# Patient Record
Sex: FEMALE | Employment: UNEMPLOYED | URBAN - METROPOLITAN AREA
[De-identification: names, ages, dates, MRNs, and addresses within clinical notes are randomized per-mention and may not be internally consistent; named-entity substitution may affect disease eponyms.]

---

## 2021-12-24 ENCOUNTER — HOSPITAL ENCOUNTER (INPATIENT)
Age: 71
LOS: 21 days | Discharge: NURSING, REHAB OR LONG-TERM CARE | DRG: 871 | End: 2022-01-14
Attending: EMERGENCY MEDICINE | Admitting: INTERNAL MEDICINE
Payer: MEDICARE

## 2021-12-24 ENCOUNTER — APPOINTMENT (OUTPATIENT)
Dept: CT IMAGING | Age: 71
DRG: 871 | End: 2021-12-24
Payer: MEDICARE

## 2021-12-24 DIAGNOSIS — R00.0 TACHYCARDIA: ICD-10-CM

## 2021-12-24 DIAGNOSIS — U07.1 PNEUMONIA DUE TO COVID-19 VIRUS: ICD-10-CM

## 2021-12-24 DIAGNOSIS — E86.0 DEHYDRATION: ICD-10-CM

## 2021-12-24 DIAGNOSIS — J12.82 PNEUMONIA DUE TO COVID-19 VIRUS: ICD-10-CM

## 2021-12-24 DIAGNOSIS — U07.1 COVID-19: Primary | ICD-10-CM

## 2021-12-24 LAB
-: NORMAL
ABSOLUTE EOS #: <0.03 K/UL (ref 0–0.44)
ABSOLUTE IMMATURE GRANULOCYTE: 0.42 K/UL (ref 0–0.3)
ABSOLUTE LYMPH #: 1.12 K/UL (ref 1.1–3.7)
ABSOLUTE MONO #: 0.58 K/UL (ref 0.1–1.2)
ALBUMIN SERPL-MCNC: 2.8 G/DL (ref 3.5–5.2)
ALBUMIN/GLOBULIN RATIO: 0.8 (ref 1–2.5)
ALLEN TEST: ABNORMAL
ALP BLD-CCNC: 112 U/L (ref 35–104)
ALT SERPL-CCNC: 35 U/L (ref 5–33)
ANION GAP SERPL CALCULATED.3IONS-SCNC: 15 MMOL/L (ref 9–17)
ANION GAP: 14 MMOL/L (ref 7–16)
AST SERPL-CCNC: 46 U/L
BASOPHILS # BLD: 0 % (ref 0–2)
BASOPHILS ABSOLUTE: 0.03 K/UL (ref 0–0.2)
BILIRUB SERPL-MCNC: 0.6 MG/DL (ref 0.3–1.2)
BNP INTERPRETATION: ABNORMAL
BUN BLDV-MCNC: 29 MG/DL (ref 8–23)
BUN/CREAT BLD: ABNORMAL (ref 9–20)
C-REACTIVE PROTEIN: 114.5 MG/L (ref 0–5)
CALCIUM SERPL-MCNC: 8.4 MG/DL (ref 8.6–10.4)
CHLORIDE BLD-SCNC: 96 MMOL/L (ref 98–107)
CO2: 17 MMOL/L (ref 20–31)
CREAT SERPL-MCNC: 0.67 MG/DL (ref 0.5–0.9)
D-DIMER QUANTITATIVE: 70.48 MG/L FEU
DIFFERENTIAL TYPE: ABNORMAL
EOSINOPHILS RELATIVE PERCENT: 0 % (ref 1–4)
FERRITIN: 858 UG/L (ref 13–150)
FIBRINOGEN: 205 MG/DL (ref 140–420)
FIO2: ABNORMAL
GFR AFRICAN AMERICAN: >60 ML/MIN
GFR NON-AFRICAN AMERICAN: >60 ML/MIN
GFR NON-AFRICAN AMERICAN: >60 ML/MIN
GFR SERPL CREATININE-BSD FRML MDRD: >60 ML/MIN
GFR SERPL CREATININE-BSD FRML MDRD: ABNORMAL ML/MIN/{1.73_M2}
GFR SERPL CREATININE-BSD FRML MDRD: ABNORMAL ML/MIN/{1.73_M2}
GFR SERPL CREATININE-BSD FRML MDRD: NORMAL ML/MIN/{1.73_M2}
GLUCOSE BLD-MCNC: 159 MG/DL (ref 70–99)
GLUCOSE BLD-MCNC: 173 MG/DL (ref 74–100)
HCO3 VENOUS: 24.2 MMOL/L (ref 22–29)
HCT VFR BLD CALC: 38.7 % (ref 36.3–47.1)
HCT VFR BLD CALC: 43.2 % (ref 36.3–47.1)
HEMOGLOBIN: 12.9 G/DL (ref 11.9–15.1)
HEMOGLOBIN: 14.4 G/DL (ref 11.9–15.1)
IMMATURE GRANULOCYTES: 4 %
INR BLD: 1.1
LACTATE DEHYDROGENASE: 528 U/L (ref 135–214)
LACTIC ACID, SEPSIS WHOLE BLOOD: 3.5 MMOL/L (ref 0.5–1.9)
LACTIC ACID, SEPSIS: ABNORMAL MMOL/L (ref 0.5–1.9)
LYMPHOCYTES # BLD: 9 % (ref 24–43)
MAGNESIUM: 2.4 MG/DL (ref 1.6–2.6)
MCH RBC QN AUTO: 32.4 PG (ref 25.2–33.5)
MCH RBC QN AUTO: 32.7 PG (ref 25.2–33.5)
MCHC RBC AUTO-ENTMCNC: 33.3 G/DL (ref 28.4–34.8)
MCHC RBC AUTO-ENTMCNC: 33.3 G/DL (ref 28.4–34.8)
MCV RBC AUTO: 97.1 FL (ref 82.6–102.9)
MCV RBC AUTO: 98 FL (ref 82.6–102.9)
MODE: ABNORMAL
MONOCYTES # BLD: 5 % (ref 3–12)
NEGATIVE BASE EXCESS, VEN: ABNORMAL (ref 0–2)
NRBC AUTOMATED: 0.8 PER 100 WBC
NRBC AUTOMATED: 1.4 PER 100 WBC
O2 DEVICE/FLOW/%: ABNORMAL
O2 SAT, VEN: 36 % (ref 60–85)
PARTIAL THROMBOPLASTIN TIME: 52.1 SEC (ref 20.5–30.5)
PATIENT TEMP: ABNORMAL
PCO2, VEN: 35.8 MM HG (ref 41–51)
PDW BLD-RTO: 13.5 % (ref 11.8–14.4)
PDW BLD-RTO: 13.5 % (ref 11.8–14.4)
PH VENOUS: 7.44 (ref 7.32–7.43)
PLATELET # BLD: 50 K/UL (ref 138–453)
PLATELET # BLD: ABNORMAL K/UL (ref 138–453)
PLATELET ESTIMATE: ABNORMAL
PLATELET, FLUORESCENCE: NORMAL K/UL (ref 138–453)
PLATELET, IMMATURE FRACTION: NORMAL % (ref 1.1–10.3)
PMV BLD AUTO: 12.5 FL (ref 8.1–13.5)
PMV BLD AUTO: ABNORMAL FL (ref 8.1–13.5)
PO2, VEN: 20.5 MM HG (ref 30–50)
POC BUN: 33 MG/DL (ref 8–26)
POC CHLORIDE: 98 MMOL/L (ref 98–107)
POC CREATININE: 0.68 MG/DL (ref 0.51–1.19)
POC HEMATOCRIT: 46 % (ref 36–46)
POC HEMOGLOBIN: 15.6 G/DL (ref 12–16)
POC IONIZED CALCIUM: 1.16 MMOL/L (ref 1.15–1.33)
POC LACTIC ACID: 3.98 MMOL/L (ref 0.56–1.39)
POC PCO2 TEMP: ABNORMAL MM HG
POC PH TEMP: ABNORMAL
POC PO2 TEMP: ABNORMAL MM HG
POC POTASSIUM: 3.9 MMOL/L (ref 3.5–4.5)
POC SODIUM: 136 MMOL/L (ref 138–146)
POC TCO2: 25 MMOL/L (ref 22–30)
POSITIVE BASE EXCESS, VEN: 0 (ref 0–3)
POTASSIUM SERPL-SCNC: 4.1 MMOL/L (ref 3.7–5.3)
PRO-BNP: 9968 PG/ML
PROCALCITONIN: 0.22 NG/ML
PROCALCITONIN: 0.25 NG/ML
PROTHROMBIN TIME: 12 SEC (ref 9.1–12.3)
RBC # BLD: 3.95 M/UL (ref 3.95–5.11)
RBC # BLD: 4.45 M/UL (ref 3.95–5.11)
RBC # BLD: ABNORMAL 10*6/UL
REASON FOR REJECTION: NORMAL
SAMPLE SITE: ABNORMAL
SARS-COV-2, RAPID: DETECTED
SEG NEUTROPHILS: 82 % (ref 36–65)
SEGMENTED NEUTROPHILS ABSOLUTE COUNT: 10 K/UL (ref 1.5–8.1)
SODIUM BLD-SCNC: 128 MMOL/L (ref 135–144)
SPECIMEN DESCRIPTION: ABNORMAL
TOTAL CO2, VENOUS: ABNORMAL MMOL/L (ref 23–30)
TOTAL PROTEIN: 6.3 G/DL (ref 6.4–8.3)
TROPONIN INTERP: ABNORMAL
TROPONIN T: ABNORMAL NG/ML
TROPONIN, HIGH SENSITIVITY: 54 NG/L (ref 0–14)
WBC # BLD: 12.2 K/UL (ref 3.5–11.3)
WBC # BLD: 14.6 K/UL (ref 3.5–11.3)
WBC # BLD: ABNORMAL 10*3/UL
ZZ NTE CLEAN UP: ORDERED TEST: NORMAL
ZZ NTE WITH NAME CLEAN UP: SPECIMEN SOURCE: NORMAL

## 2021-12-24 PROCEDURE — 96365 THER/PROPH/DIAG IV INF INIT: CPT

## 2021-12-24 PROCEDURE — 82947 ASSAY GLUCOSE BLOOD QUANT: CPT

## 2021-12-24 PROCEDURE — 85379 FIBRIN DEGRADATION QUANT: CPT

## 2021-12-24 PROCEDURE — 85384 FIBRINOGEN ACTIVITY: CPT

## 2021-12-24 PROCEDURE — 71260 CT THORAX DX C+: CPT

## 2021-12-24 PROCEDURE — 96361 HYDRATE IV INFUSION ADD-ON: CPT

## 2021-12-24 PROCEDURE — 6370000000 HC RX 637 (ALT 250 FOR IP): Performed by: NURSE PRACTITIONER

## 2021-12-24 PROCEDURE — 85027 COMPLETE CBC AUTOMATED: CPT

## 2021-12-24 PROCEDURE — 85610 PROTHROMBIN TIME: CPT

## 2021-12-24 PROCEDURE — 85055 RETICULATED PLATELET ASSAY: CPT

## 2021-12-24 PROCEDURE — 2580000003 HC RX 258: Performed by: STUDENT IN AN ORGANIZED HEALTH CARE EDUCATION/TRAINING PROGRAM

## 2021-12-24 PROCEDURE — 82565 ASSAY OF CREATININE: CPT

## 2021-12-24 PROCEDURE — 83615 LACTATE (LD) (LDH) ENZYME: CPT

## 2021-12-24 PROCEDURE — 82803 BLOOD GASES ANY COMBINATION: CPT

## 2021-12-24 PROCEDURE — 93005 ELECTROCARDIOGRAM TRACING: CPT | Performed by: STUDENT IN AN ORGANIZED HEALTH CARE EDUCATION/TRAINING PROGRAM

## 2021-12-24 PROCEDURE — 99223 1ST HOSP IP/OBS HIGH 75: CPT | Performed by: SURGERY

## 2021-12-24 PROCEDURE — 85014 HEMATOCRIT: CPT

## 2021-12-24 PROCEDURE — 82728 ASSAY OF FERRITIN: CPT

## 2021-12-24 PROCEDURE — 80051 ELECTROLYTE PANEL: CPT

## 2021-12-24 PROCEDURE — 80053 COMPREHEN METABOLIC PANEL: CPT

## 2021-12-24 PROCEDURE — 83735 ASSAY OF MAGNESIUM: CPT

## 2021-12-24 PROCEDURE — 99285 EMERGENCY DEPT VISIT HI MDM: CPT

## 2021-12-24 PROCEDURE — 82330 ASSAY OF CALCIUM: CPT

## 2021-12-24 PROCEDURE — 85025 COMPLETE CBC W/AUTO DIFF WBC: CPT

## 2021-12-24 PROCEDURE — 87040 BLOOD CULTURE FOR BACTERIA: CPT

## 2021-12-24 PROCEDURE — 84145 PROCALCITONIN (PCT): CPT

## 2021-12-24 PROCEDURE — 87635 SARS-COV-2 COVID-19 AMP PRB: CPT

## 2021-12-24 PROCEDURE — 6360000004 HC RX CONTRAST MEDICATION: Performed by: STUDENT IN AN ORGANIZED HEALTH CARE EDUCATION/TRAINING PROGRAM

## 2021-12-24 PROCEDURE — 6360000002 HC RX W HCPCS: Performed by: STUDENT IN AN ORGANIZED HEALTH CARE EDUCATION/TRAINING PROGRAM

## 2021-12-24 PROCEDURE — 6360000002 HC RX W HCPCS: Performed by: NURSE PRACTITIONER

## 2021-12-24 PROCEDURE — 2580000003 HC RX 258: Performed by: NURSE PRACTITIONER

## 2021-12-24 PROCEDURE — 85730 THROMBOPLASTIN TIME PARTIAL: CPT

## 2021-12-24 PROCEDURE — 84520 ASSAY OF UREA NITROGEN: CPT

## 2021-12-24 PROCEDURE — 36415 COLL VENOUS BLD VENIPUNCTURE: CPT

## 2021-12-24 PROCEDURE — 96375 TX/PRO/DX INJ NEW DRUG ADDON: CPT

## 2021-12-24 PROCEDURE — 83880 ASSAY OF NATRIURETIC PEPTIDE: CPT

## 2021-12-24 PROCEDURE — 94761 N-INVAS EAR/PLS OXIMETRY MLT: CPT

## 2021-12-24 PROCEDURE — 86140 C-REACTIVE PROTEIN: CPT

## 2021-12-24 PROCEDURE — 83605 ASSAY OF LACTIC ACID: CPT

## 2021-12-24 PROCEDURE — 94660 CPAP INITIATION&MGMT: CPT

## 2021-12-24 PROCEDURE — 2000000000 HC ICU R&B

## 2021-12-24 PROCEDURE — 84484 ASSAY OF TROPONIN QUANT: CPT

## 2021-12-24 RX ORDER — SODIUM CHLORIDE 0.9 % (FLUSH) 0.9 %
10 SYRINGE (ML) INJECTION PRN
Status: DISCONTINUED | OUTPATIENT
Start: 2021-12-24 | End: 2021-12-25

## 2021-12-24 RX ORDER — 0.9 % SODIUM CHLORIDE 0.9 %
500 INTRAVENOUS SOLUTION INTRAVENOUS ONCE
Status: COMPLETED | OUTPATIENT
Start: 2021-12-24 | End: 2021-12-24

## 2021-12-24 RX ORDER — HEPARIN SODIUM 1000 [USP'U]/ML
80 INJECTION, SOLUTION INTRAVENOUS; SUBCUTANEOUS PRN
Status: DISCONTINUED | OUTPATIENT
Start: 2021-12-24 | End: 2021-12-24 | Stop reason: SDUPTHER

## 2021-12-24 RX ORDER — SODIUM CHLORIDE 9 MG/ML
25 INJECTION, SOLUTION INTRAVENOUS PRN
Status: DISCONTINUED | OUTPATIENT
Start: 2021-12-24 | End: 2021-12-25

## 2021-12-24 RX ORDER — DEXAMETHASONE SODIUM PHOSPHATE 10 MG/ML
6 INJECTION INTRAMUSCULAR; INTRAVENOUS ONCE
Status: COMPLETED | OUTPATIENT
Start: 2021-12-24 | End: 2021-12-24

## 2021-12-24 RX ORDER — HEPARIN SODIUM 1000 [USP'U]/ML
40 INJECTION, SOLUTION INTRAVENOUS; SUBCUTANEOUS PRN
Status: DISCONTINUED | OUTPATIENT
Start: 2021-12-24 | End: 2021-12-24 | Stop reason: SDUPTHER

## 2021-12-24 RX ORDER — LORAZEPAM 2 MG/ML
1 INJECTION INTRAMUSCULAR ONCE
Status: COMPLETED | OUTPATIENT
Start: 2021-12-24 | End: 2021-12-24

## 2021-12-24 RX ORDER — VITAMIN B COMPLEX
2000 TABLET ORAL DAILY
Status: DISCONTINUED | OUTPATIENT
Start: 2021-12-25 | End: 2021-12-25

## 2021-12-24 RX ORDER — ACETAMINOPHEN 325 MG/1
650 TABLET ORAL EVERY 6 HOURS PRN
Status: DISCONTINUED | OUTPATIENT
Start: 2021-12-24 | End: 2021-12-25

## 2021-12-24 RX ORDER — SODIUM CHLORIDE 0.9 % (FLUSH) 0.9 %
5-40 SYRINGE (ML) INJECTION EVERY 12 HOURS SCHEDULED
Status: DISCONTINUED | OUTPATIENT
Start: 2021-12-24 | End: 2021-12-25

## 2021-12-24 RX ORDER — HEPARIN SODIUM 1000 [USP'U]/ML
80 INJECTION, SOLUTION INTRAVENOUS; SUBCUTANEOUS ONCE
Status: COMPLETED | OUTPATIENT
Start: 2021-12-24 | End: 2021-12-24

## 2021-12-24 RX ORDER — GUAIFENESIN DEXTROMETHORPHAN HYDROBROMIDE ORAL SOLUTION 10; 100 MG/5ML; MG/5ML
5 SOLUTION ORAL EVERY 4 HOURS PRN
Status: DISCONTINUED | OUTPATIENT
Start: 2021-12-24 | End: 2021-12-27

## 2021-12-24 RX ORDER — ACETAMINOPHEN 650 MG/1
650 SUPPOSITORY RECTAL EVERY 6 HOURS PRN
Status: DISCONTINUED | OUTPATIENT
Start: 2021-12-24 | End: 2021-12-25

## 2021-12-24 RX ORDER — HEPARIN SODIUM 10000 [USP'U]/100ML
5-30 INJECTION, SOLUTION INTRAVENOUS CONTINUOUS
Status: DISCONTINUED | OUTPATIENT
Start: 2021-12-24 | End: 2021-12-28

## 2021-12-24 RX ORDER — AZITHROMYCIN 250 MG/1
500 TABLET, FILM COATED ORAL EVERY 24 HOURS
Status: COMPLETED | OUTPATIENT
Start: 2021-12-24 | End: 2021-12-26

## 2021-12-24 RX ORDER — HEPARIN SODIUM 1000 [USP'U]/ML
80 INJECTION, SOLUTION INTRAVENOUS; SUBCUTANEOUS PRN
Status: DISCONTINUED | OUTPATIENT
Start: 2021-12-24 | End: 2021-12-28

## 2021-12-24 RX ORDER — DEXAMETHASONE SODIUM PHOSPHATE 10 MG/ML
6 INJECTION INTRAMUSCULAR; INTRAVENOUS EVERY 24 HOURS
Status: DISCONTINUED | OUTPATIENT
Start: 2021-12-24 | End: 2021-12-26

## 2021-12-24 RX ORDER — HEPARIN SODIUM 1000 [USP'U]/ML
40 INJECTION, SOLUTION INTRAVENOUS; SUBCUTANEOUS PRN
Status: DISCONTINUED | OUTPATIENT
Start: 2021-12-24 | End: 2021-12-28

## 2021-12-24 RX ORDER — HEPARIN SODIUM 10000 [USP'U]/100ML
5-30 INJECTION, SOLUTION INTRAVENOUS CONTINUOUS
Status: DISCONTINUED | OUTPATIENT
Start: 2021-12-24 | End: 2021-12-24 | Stop reason: SDUPTHER

## 2021-12-24 RX ORDER — ONDANSETRON 2 MG/ML
4 INJECTION INTRAMUSCULAR; INTRAVENOUS EVERY 6 HOURS PRN
Status: DISCONTINUED | OUTPATIENT
Start: 2021-12-24 | End: 2021-12-25

## 2021-12-24 RX ORDER — ONDANSETRON 4 MG/1
4 TABLET, ORALLY DISINTEGRATING ORAL EVERY 8 HOURS PRN
Status: DISCONTINUED | OUTPATIENT
Start: 2021-12-24 | End: 2021-12-25

## 2021-12-24 RX ADMIN — Medication 18 UNITS/KG/HR: at 20:09

## 2021-12-24 RX ADMIN — IOPAMIDOL 75 ML: 755 INJECTION, SOLUTION INTRAVENOUS at 16:55

## 2021-12-24 RX ADMIN — SODIUM CHLORIDE 500 ML: 9 INJECTION, SOLUTION INTRAVENOUS at 18:20

## 2021-12-24 RX ADMIN — SODIUM CHLORIDE, PRESERVATIVE FREE 10 ML: 5 INJECTION INTRAVENOUS at 22:50

## 2021-12-24 RX ADMIN — HEPARIN SODIUM 6750 UNITS: 1000 INJECTION INTRAVENOUS; SUBCUTANEOUS at 18:15

## 2021-12-24 RX ADMIN — DEXAMETHASONE SODIUM PHOSPHATE 6 MG: 10 INJECTION INTRAMUSCULAR; INTRAVENOUS at 22:41

## 2021-12-24 RX ADMIN — LORAZEPAM 1 MG: 2 INJECTION INTRAMUSCULAR; INTRAVENOUS at 18:15

## 2021-12-24 RX ADMIN — CEFTRIAXONE SODIUM 1000 MG: 1 INJECTION, POWDER, FOR SOLUTION INTRAMUSCULAR; INTRAVENOUS at 22:42

## 2021-12-24 RX ADMIN — AZITHROMYCIN 500 MG: 250 TABLET, FILM COATED ORAL at 22:42

## 2021-12-24 RX ADMIN — DEXAMETHASONE SODIUM PHOSPHATE 6 MG: 10 INJECTION, SOLUTION INTRAMUSCULAR; INTRAVENOUS at 18:15

## 2021-12-24 ASSESSMENT — ENCOUNTER SYMPTOMS
DIARRHEA: 0
BLOOD IN STOOL: 0
COUGH: 0
CHEST TIGHTNESS: 0
SINUS PRESSURE: 0
SHORTNESS OF BREATH: 1
SHORTNESS OF BREATH: 0
PHOTOPHOBIA: 0
VOMITING: 0
NAUSEA: 0
TACHYPNEA: 1
SORE THROAT: 0
SINUS PAIN: 0
ABDOMINAL PAIN: 0
WHEEZING: 0

## 2021-12-24 NOTE — ED TRIAGE NOTES
Py presents to ED with SOB with fall at home from standing height. EMS placed a 20 G in LFA. EMS states pt SPO2 was in the mid 50's upon arrival. PT presented to ED on non rebreather at 15 leather SPO2 at 78%.

## 2021-12-24 NOTE — ED PROVIDER NOTES
[facility]  ED  eMERGENCY dEPARTMENT eNCOUnter   Attending Attestation     Pt Name: Jelena Kapoor  MRN: [id number]  [place] 1950  Date of evaluation: 12/24/21       Jelena Kapoor is a 70 y.o. female who presents with Shortness of Breath and Fall      History: Patient lives shortness of breath and fall. Patient normally requiring BiPAP with severe hypoxia per EMS. Patient improved with BiPAP to 90s. Exam: Heart rate is elevated. Abdomen is soft, nontender. Lungs are clear however difficult to assess based on BiPAP. Concern for Covid and/or PE. Will get appropriate work-up including CTA chest, Covid screening, labs, will continue BiPAP. Plan for admission. I performed a history and physical examination of the patient and discussed management with the resident. I reviewed the residents note and agree with the documented findings and plan of care. Any areas of disagreement are noted on the chart. I was personally present for the key portions of any procedures. I have documented in the chart those procedures where I was not present during the key portions. I have personally reviewed all images and agree with the resident's interpretation. I have reviewed the emergency nurses triage note. I agree with the chief complaint, past medical history, past surgical history, allergies, medications, social and family history as documented unless otherwise noted below. Documentation of the HPI, Physical Exam and Medical Decision Making performed by medical students or scribes is based on my personal performance of the HPI, PE and MDM. For Phys Assistant/ Nurse Practitioner cases/documentation I have had a face to face evaluation of this patient and have completed at least one if not all key elements of the E/M (history, physical exam, and MDM). Additional findings are as noted.     For APC cases I have personally evaluated and examined the patient in conjunction with the [facility] and agree with the treatment plan and disposition of the patient as recorded by the APC.     [place], MD  Attending Emergency  Physician       Shane Kaur MD  12/24/21 3324

## 2021-12-24 NOTE — ED NOTES
Bed: 33  Expected date:   Expected time:   Means of arrival:   Comments:  RICHARD Phillips RN  12/24/21 3179

## 2021-12-24 NOTE — ED PROVIDER NOTES
[facility] ED  Emergency Department Encounter  EmergencyMedicine Resident     Pt Name:Cassy Engle  MRN: [id number]  [place] 1950  Date of evaluation: 12/24/21  PCP:  No primary care provider on file. CHIEF COMPLAINT       Chief Complaint   Patient presents with    Shortness of Breath    Fall       HISTORY OF PRESENT ILLNESS  (Location/Symptom, Timing/Onset, Context/Setting, Quality, Duration, Modifying Factors, Severity.)      Marie Garcia is a 70 y.o. female who presents with Lightheaded and dry. Patient's been having a cough for the past week. Patient initially had a negative Covid test 1 week ago. Patient was taken Saturday and was not breathing right but had no shortness of breath. Family found her and called EMS as she was tachypneic. EMS found her breathing 40-50 times a minute with a pulse ox of 50% on room air. Patient was placed on nonrebreather at 15 L and was satting at 73%. Patient has no complaints besides feeling dehydrated. She denies any fevers, chills, chest pain, shortness of breath, cough, abdominal pain, nausea/vomiting, dysuria, hematuria, diarrhea/constipation, melena, any easier, swelling her legs, numbness tingling or weakness. Of note, she denies falling in the shower, and having any LOC. She notes that she remembers [facility] arriving.     PAST MEDICAL / SURGICAL / SOCIAL / FAMILY HISTORY     Patient denies any pertinent past medical or surgical history    Social History     Socioeconomic History    Marital status: Not on file     Spouse name: Not on file    Number of children: Not on file    Years of education: Not on file    Highest education level: Not on file   Occupational History    Not on file   Tobacco Use    Smoking status: Not on file    Smokeless tobacco: Not on file   Substance and Sexual Activity    Alcohol use: Not on file    Drug use: Not on file    Sexual activity: Not on file   Other Topics Concern    Not on file   Social History Narrative    Not on file     Social Determinants of Health     Financial Resource Strain:     Difficulty of Paying Living Expenses: Not on file   Food Insecurity:     Worried About Running Out of Food in the Last Year: Not on file    Jones of Food in the Last Year: Not on file   Transportation Needs:     Lack of Transportation (Medical): Not on file    Lack of Transportation (Non-Medical): Not on file   Physical Activity:     Days of Exercise per Week: Not on file    Minutes of Exercise per Session: Not on file   Stress:     Feeling of Stress : Not on file   Social Connections:     Frequency of Communication with Friends and Family: Not on file    Frequency of Social Gatherings with Friends and Family: Not on file    Attends Jain Services: Not on file    Active Member of [address] [place] [address] [employer] or Organizations: Not on file    Attends Club or Organization Meetings: Not on file    Marital Status: Not on file   Intimate Partner Violence:     Fear of Current or Ex-Partner: Not on file    Emotionally Abused: Not on file    Physically Abused: Not on file    Sexually Abused: Not on file   Housing Stability:     Unable to Pay for Housing in the Last Year: Not on file    Number of [place] in the Last Year: Not on file    Unstable Housing in the Last Year: Not on file       No family history on file. Allergies:  Patient has no known allergies. Home Medications:  Prior to Admission medications    Not on File       REVIEW OF SYSTEMS    (2-9 systems for level 4, 10 or more for level 5)      Review of Systems   Constitutional: Negative for chills, diaphoresis, fatigue and fever. +dehydrated   HENT: Negative for congestion and sore throat. Respiratory: Negative for cough, shortness of breath and wheezing. Cardiovascular: Negative for chest pain, palpitations and leg swelling. Gastrointestinal: Negative for abdominal pain, blood in stool, nausea and vomiting.    Genitourinary: Negative for dysuria, hematuria, vaginal bleeding and vaginal discharge. Musculoskeletal: Negative for arthralgias and myalgias. Skin: Negative for rash and wound. Neurological: Negative for weakness, light-headedness and numbness. PHYSICAL EXAM   (up to 7 for level 4, 8 or more for level 5)      INITIAL VITALS:   /88   Pulse 126   Temp 97.8 °F (36.6 °C) (Axillary)   Resp 28   Ht 5' 4\" (1.626 m)   Wt 186 lb (84.4 kg)   SpO2 (!) 80%   Breastfeeding No   BMI 31.93 kg/m²     Physical Exam  Vitals and nursing note reviewed. Constitutional:       General: She is in acute distress. Appearance: Normal appearance. She is well-developed. She is obese. She is ill-appearing. She is not toxic-appearing or diaphoretic. HENT:      Head: Normocephalic and atraumatic. Right Ear: External ear normal.      Left Ear: External ear normal.      Nose: Nose normal.      Mouth/Throat:      Mouth: Mucous membranes are dry. Pharynx: Oropharynx is clear. Eyes:      Extraocular Movements: Extraocular movements intact. Conjunctiva/sclera: Conjunctivae normal.      Pupils: Pupils are equal, round, and reactive to light. Neck:      Vascular: No JVD. Trachea: No tracheal deviation. Cardiovascular:      Rate and Rhythm: Regular rhythm. Tachycardia present. Pulses: Normal pulses. Heart sounds: Normal heart sounds, S1 normal and S2 normal. No murmur heard. No friction rub. No gallop. Pulmonary:      Effort: Tachypnea and accessory muscle usage present. No bradypnea or respiratory distress. Breath sounds: Normal breath sounds. Abdominal:      General: Abdomen is flat. There is no distension. Palpations: Abdomen is soft. Tenderness: There is no abdominal tenderness. There is no guarding or rebound. Comments: Obese abdomen   Musculoskeletal:         General: No tenderness. Normal range of motion. Cervical back: Normal range of motion and neck supple.  No rigidity. Right lower leg: No tenderness. No edema. Left lower leg: No tenderness. No edema. Lymphadenopathy:      Cervical: No cervical adenopathy. Skin:     General: Skin is warm and dry. Capillary Refill: Capillary refill takes less than 2 seconds. Neurological:      General: No focal deficit present. Mental Status: She is alert. Mental status is at baseline. She is disoriented. Motor: No abnormal muscle tone. DIFFERENTIAL  DIAGNOSIS     PLAN (LABS / IMAGING / EKG):  Orders Placed This Encounter   Procedures    COVID-19, Rapid    Culture, Blood 1    Culture, Blood 1    CT CHEST PULMONARY EMBOLISM W CONTRAST    COMPREHENSIVE METABOLIC PANEL    MAGNESIUM    CBC Auto Differential    Troponin    Brain Natriuretic Peptide    Protime-INR    APTT    Lactate, Sepsis    ELECTROLYTES PLUS    Hemoglobin and hematocrit, blood    CALCIUM, IONIC (POC)    D-DIMER, QUANTITATIVE    FERRITIN    Procalcitonin    C-REACTIVE PROTEIN    FIBRINOGEN    Straight cath    POC Blood Gas    Venous Blood Gas, POC    Creatinine W/GFR Point of Care    POCT urea (BUN)    Lactic Acid, POC    POCT Glucose    EKG 12 Lead    Insert peripheral IV       MEDICATIONS ORDERED:  Orders Placed This Encounter   Medications    0.9 % sodium chloride bolus    LORazepam (ATIVAN) injection 1 mg    dexamethasone (DECADRON) injection 6 mg       DDX: Pneumonia, PE, ACS/MI, arrhythmia, anemia, dehydration, electrolyte abnormality, UTI    DIAGNOSTIC RESULTS / EMERGENCY DEPARTMENT COURSE / MDM   LAB RESULTS:  Results for orders placed or performed during the hospital encounter of 12/24/21   COVID-19, Rapid    Specimen: Nasopharyngeal Swab   Result Value Ref Range    Specimen Description . NASOPHARYNGEAL SWAB     SARS-CoV-2, Rapid DETECTED (A) Not Detected   Culture, Blood 1    Specimen: Blood   Result Value Ref Range    Specimen Description . BLOOD     Special Requests  RT HAND 2 ML     Culture NO GROWTH <24 HRS    ELECTROLYTES PLUS   Result Value Ref Range    POC Sodium 136 (L) 138 - 146 mmol/L    POC Potassium 3.9 3.5 - 4.5 mmol/L    POC Chloride 98 98 - 107 mmol/L    POC TCO2 25 22 - 30 mmol/L    Anion Gap 14 7 - 16 mmol/L   Hemoglobin and hematocrit, blood   Result Value Ref Range    POC Hemoglobin 15.6 12.0 - 16.0 g/dL    POC Hematocrit 46 36 - 46 %   CALCIUM, IONIC (POC)   Result Value Ref Range    POC Ionized Calcium 1.16 1.15 - 1.33 mmol/L   Venous Blood Gas, POC   Result Value Ref Range    pH, Jaylan 7.439 (H) 7.320 - 7.430    pCO2, Jaylan 35.8 (L) 41.0 - 51.0 mm Hg    pO2, Jaylan 20.5 (L) 30.0 - 50.0 mm Hg    HCO3, Venous 24.2 22.0 - 29.0 mmol/L    Total CO2, Venous NOT REPORTED 23.0 - 30.0 mmol/L    Negative Base Excess, Jayaln NOT REPORTED 0.0 - 2.0    Positive Base Excess, Jaylan 0 0.0 - 3.0    O2 Sat, Jaylan 36 (L) 60.0 - 85.0 %    O2 Device/Flow/% NOT REPORTED     Kenny Test NOT REPORTED     Sample Site NOT REPORTED     Mode NOT REPORTED     FIO2 NOT REPORTED     Pt Temp NOT REPORTED     POC pH Temp NOT REPORTED     POC pCO2 Temp NOT REPORTED mm Hg    POC pO2 Temp NOT REPORTED mm Hg   Creatinine W/GFR Point of Care   Result Value Ref Range    POC Creatinine 0.68 0.51 - 1.19 mg/dL    GFR Comment >60 >60 mL/min    GFR Non-African American >60 >60 mL/min    GFR Comment         POCT urea (BUN)   Result Value Ref Range    POC BUN 33 (H) 8 - 26 mg/dL   Lactic Acid, POC   Result Value Ref Range    POC Lactic Acid 3.98 (H) 0.56 - 1.39 mmol/L   POCT Glucose   Result Value Ref Range    POC Glucose 173 (H) 74 - 100 mg/dL       IMPRESSION: 70-year-old female presents for shortness of breath and hypoxemia. Patient appears to be acutely ill . Patient is breathing over 40 times a minute on evaluation with pulse ox of 73 with a poor waveform on examination. Patient was placed on BiPAP which she had improved respiratory rate and ease of breathing. Clear lung sounds. Abdomen is benign with no peritoneal signs.   Cap refills 84 seconds. Patient is clinically dry appearing. Concern for above differential diagnosis. Will do a infectious etiology work-up along with a cardiac work-up and Covid . Plan for admission either stepdown versus ICU. RADIOLOGY:  CT CHEST PULMONARY EMBOLISM W CONTRAST    Result Date: 12/24/2021  EXAMINATION: CTA OF THE CHEST 12/24/2021 4:41 pm TECHNIQUE: CTA of the chest was performed after the administration of intravenous contrast.  Multiplanar reformatted images are provided for review. MIP images are provided for review. Dose modulation, iterative reconstruction, and/or weight based adjustment of the mA/kV was utilized to reduce the radiation dose to as low as reasonably achievable. COMPARISON: None. HISTORY: ORDERING SYSTEM PROVIDED HISTORY: hypoxemia of 50 on RA and 73 on NRB at 15L; tachycardia TECHNOLOGIST PROVIDED HISTORY: hypoxemia of 50 on RA and 73 on NRB at 15L; tachycardia Decision Support Exception - unselect if not a suspected or confirmed emergency medical condition->Emergency Medical Condition (MA) Reason for Exam: hypoxemia of 50 on RA and 73 on NRB at 15L; tachycardia FINDINGS: Pulmonary Arteries: Pulmonary arteries are adequately opacified for evaluation. Study is motion degraded. There are pulmonary emboli in branches of the left lower lobe pulmonary artery. Other pulmonary emboli may be present but are not well visualized due to the respiratory motion. Main pulmonary artery is upper limits of normal in caliber. Mediastinum: There is mild right paratracheal adenopathy. There are mildly enlarged nodes in the AP window. There is evidence for right ventricular strain with septal bowing. There is no acute abnormality of the thoracic aorta. Lungs/pleura: There are patchy ground-glass opacities involving all lobes of both lungs primarily in a peripheral and subpleural distribution. There is no pneumothorax or pleural fluid.  Upper Abdomen: Limited images of the upper abdomen are motion degraded and grossly unremarkable. Soft Tissues/Bones: No acute bone or soft tissue abnormality. Motion degraded study. Acute pulmonary emboli suspected particularly in the left lower lobe but not well visualized due to motion artifact. There is evidence for right ventricular strain with septal bowing suggesting a significant clot burden. Moderate patchy ground-glass opacities involving all lobes of both lungs consistent with multifocal pneumonia typical for COVID pneumonia. Findings were discussed with Deric Cool at 5:39 pm on 12/24/2021. RECOMMENDATIONS: Unavailable       EKG  EKG Interpretation    Interpreted by emergency department physician    Rhythm: sinus tachycardia  Rate: 120-130  Axis: normal  Ectopy: none  Conduction: normal  ST Segments: no acute change  T Waves: inversion in  aVl  Q Waves: none    Clinical Impression: non-specific EKG    Idris Jauregui, DO      All EKG's are interpreted by the Emergency Department Physician who either signs or Co-signs this chart in the absence of a cardiologist.    EMERGENCY DEPARTMENT COURSE:  ED Course as of 12/24/21 2028   Fri Dec 24, 2021   1624 SARS-CoV-2, Rapid(!): DETECTED  Will give 6 mg of decadron. [CS]   8876 Patient was feeling anxious on the BiPAP and so normally given Ativan under observation. [CS]   1644 Troponin, High Sensitivity(!!): 54  Likely demand ischemia we will repeat. [CS]   6160 Talked to [place] radiology, they note that the pt does have Covid pneumonia, does note some small PEs however, that the patient has been breathing repeatedly through study causing a poor imaging. He does note that there is RV strain is concern for possible worsening clot burden. At this time will consult vascular surgery and follow recommendations with heparin going. .   [CS]   1740 Lactic Acid, Sepsis, Whole Blood(!): 3.5  Likely from dehydration along with prolonged tachycardia and respiratory distress. [CS]   7764 Pt updated and agrees with the plan. [CS]   1098 Pro-BNP(!): 9,968  Consitent with CT scan showing R heart strain [CS]   2003 Vascular surgery consulted and consulted cardiology for possible need for echo if MICU or vascular surgery would like it per recommendations. [CS]      ED Course User Index  [CS] Rudi Wadsworth DO         PROCEDURES:  None    CONSULTS:  None    CRITICAL CARE:  Please see attending note    FINAL IMPRESSION      1. COVID-19    2. Dehydration    3. Tachycardia          DISPOSITION / PLAN     DISPOSITION  Admission      PATIENT REFERRED TO:  No follow-up provider specified.     DISCHARGE MEDICATIONS:  New Prescriptions    No medications on file       Rudi Wadsworth DO  Emergency Medicine Resident    (Please note that portions of thisnote were completed with a voice recognition program.  Efforts were made to edit the dictations but occasionally words are mis-transcribed.)       Rudi Wadsworth DO  Resident  12/24/21 2029

## 2021-12-24 NOTE — Clinical Note
Patient Class: Inpatient [101]   REQUIRED: Diagnosis: [id number] [[id number]]   Estimated Length of Stay: Estimated stay of more than 2 midnights   Admitting Provider: Maura Gipson [[id number]]   Preferred Department: [facility]   Telemetry/Cardiac Monitoring Required?: Yes

## 2021-12-25 LAB
ABSOLUTE EOS #: <0.03 K/UL (ref 0–0.44)
ABSOLUTE EOS #: <0.03 K/UL (ref 0–0.44)
ABSOLUTE IMMATURE GRANULOCYTE: 0.32 K/UL (ref 0–0.3)
ABSOLUTE IMMATURE GRANULOCYTE: 0.34 K/UL (ref 0–0.3)
ABSOLUTE LYMPH #: 1.86 K/UL (ref 1.1–3.7)
ABSOLUTE LYMPH #: 1.95 K/UL (ref 1.1–3.7)
ABSOLUTE MONO #: 0.3 K/UL (ref 0.1–1.2)
ABSOLUTE MONO #: 0.35 K/UL (ref 0.1–1.2)
ANION GAP SERPL CALCULATED.3IONS-SCNC: 10 MMOL/L (ref 9–17)
ANION GAP SERPL CALCULATED.3IONS-SCNC: 13 MMOL/L (ref 9–17)
BASOPHILS # BLD: 0 % (ref 0–2)
BASOPHILS # BLD: 0 % (ref 0–2)
BASOPHILS ABSOLUTE: 0.04 K/UL (ref 0–0.2)
BASOPHILS ABSOLUTE: 0.04 K/UL (ref 0–0.2)
BUN BLDV-MCNC: 21 MG/DL (ref 8–23)
BUN BLDV-MCNC: 21 MG/DL (ref 8–23)
BUN/CREAT BLD: ABNORMAL (ref 9–20)
BUN/CREAT BLD: ABNORMAL (ref 9–20)
CALCIUM SERPL-MCNC: 7.7 MG/DL (ref 8.6–10.4)
CALCIUM SERPL-MCNC: 7.7 MG/DL (ref 8.6–10.4)
CHLORIDE BLD-SCNC: 103 MMOL/L (ref 98–107)
CHLORIDE BLD-SCNC: 106 MMOL/L (ref 98–107)
CO2: 19 MMOL/L (ref 20–31)
CO2: 20 MMOL/L (ref 20–31)
CREAT SERPL-MCNC: 0.51 MG/DL (ref 0.5–0.9)
CREAT SERPL-MCNC: 0.52 MG/DL (ref 0.5–0.9)
DIFFERENTIAL TYPE: ABNORMAL
DIFFERENTIAL TYPE: ABNORMAL
EOSINOPHILS RELATIVE PERCENT: 0 % (ref 1–4)
EOSINOPHILS RELATIVE PERCENT: 0 % (ref 1–4)
FIBRINOGEN: 247 MG/DL (ref 140–420)
GFR AFRICAN AMERICAN: >60 ML/MIN
GFR AFRICAN AMERICAN: >60 ML/MIN
GFR NON-AFRICAN AMERICAN: >60 ML/MIN
GFR NON-AFRICAN AMERICAN: >60 ML/MIN
GFR SERPL CREATININE-BSD FRML MDRD: ABNORMAL ML/MIN/{1.73_M2}
GLUCOSE BLD-MCNC: 162 MG/DL (ref 70–99)
GLUCOSE BLD-MCNC: 163 MG/DL (ref 70–99)
HCT VFR BLD CALC: 38.4 % (ref 36.3–47.1)
HCT VFR BLD CALC: 38.9 % (ref 36.3–47.1)
HEMOGLOBIN: 12.5 G/DL (ref 11.9–15.1)
HEMOGLOBIN: 13 G/DL (ref 11.9–15.1)
IMMATURE GRANULOCYTES: 2 %
IMMATURE GRANULOCYTES: 3 %
INR BLD: 1.1
LACTIC ACID, WHOLE BLOOD: 1.6 MMOL/L (ref 0.7–2.1)
LACTIC ACID: NORMAL MMOL/L
LV EF: 53 %
LVEF MODALITY: NORMAL
LYMPHOCYTES # BLD: 13 % (ref 24–43)
LYMPHOCYTES # BLD: 15 % (ref 24–43)
MCH RBC QN AUTO: 32.5 PG (ref 25.2–33.5)
MCH RBC QN AUTO: 33 PG (ref 25.2–33.5)
MCHC RBC AUTO-ENTMCNC: 32.6 G/DL (ref 28.4–34.8)
MCHC RBC AUTO-ENTMCNC: 33.4 G/DL (ref 28.4–34.8)
MCV RBC AUTO: 98.7 FL (ref 82.6–102.9)
MCV RBC AUTO: 99.7 FL (ref 82.6–102.9)
MONOCYTES # BLD: 2 % (ref 3–12)
MONOCYTES # BLD: 3 % (ref 3–12)
NRBC AUTOMATED: 0.3 PER 100 WBC
NRBC AUTOMATED: 0.3 PER 100 WBC
PARTIAL THROMBOPLASTIN TIME: 114.8 SEC (ref 20.5–30.5)
PARTIAL THROMBOPLASTIN TIME: 49.3 SEC (ref 20.5–30.5)
PARTIAL THROMBOPLASTIN TIME: 49.6 SEC (ref 20.5–30.5)
PARTIAL THROMBOPLASTIN TIME: 60.1 SEC (ref 20.5–30.5)
PDW BLD-RTO: 13.7 % (ref 11.8–14.4)
PDW BLD-RTO: 13.8 % (ref 11.8–14.4)
PLATELET # BLD: ABNORMAL K/UL (ref 138–453)
PLATELET # BLD: ABNORMAL K/UL (ref 138–453)
PLATELET ESTIMATE: ABNORMAL
PLATELET ESTIMATE: ABNORMAL
PLATELET, FLUORESCENCE: 55 K/UL (ref 138–453)
PLATELET, FLUORESCENCE: 59 K/UL (ref 138–453)
PLATELET, IMMATURE FRACTION: 15.9 % (ref 1.1–10.3)
PLATELET, IMMATURE FRACTION: 18.1 % (ref 1.1–10.3)
PMV BLD AUTO: ABNORMAL FL (ref 8.1–13.5)
PMV BLD AUTO: ABNORMAL FL (ref 8.1–13.5)
POTASSIUM SERPL-SCNC: 4.5 MMOL/L (ref 3.7–5.3)
POTASSIUM SERPL-SCNC: 4.6 MMOL/L (ref 3.7–5.3)
PROTHROMBIN TIME: 12 SEC (ref 9.1–12.3)
RBC # BLD: 3.85 M/UL (ref 3.95–5.11)
RBC # BLD: 3.94 M/UL (ref 3.95–5.11)
RBC # BLD: ABNORMAL 10*6/UL
RBC # BLD: ABNORMAL 10*6/UL
SEG NEUTROPHILS: 79 % (ref 36–65)
SEG NEUTROPHILS: 82 % (ref 36–65)
SEGMENTED NEUTROPHILS ABSOLUTE COUNT: 10.02 K/UL (ref 1.5–8.1)
SEGMENTED NEUTROPHILS ABSOLUTE COUNT: 11.47 K/UL (ref 1.5–8.1)
SODIUM BLD-SCNC: 135 MMOL/L (ref 135–144)
SODIUM BLD-SCNC: 136 MMOL/L (ref 135–144)
TROPONIN INTERP: ABNORMAL
TROPONIN INTERP: ABNORMAL
TROPONIN T: ABNORMAL NG/ML
TROPONIN T: ABNORMAL NG/ML
TROPONIN, HIGH SENSITIVITY: 19 NG/L (ref 0–14)
TROPONIN, HIGH SENSITIVITY: 19 NG/L (ref 0–14)
VITAMIN D 25-HYDROXY: 10.4 NG/ML (ref 30–100)
WBC # BLD: 12.7 K/UL (ref 3.5–11.3)
WBC # BLD: 14 K/UL (ref 3.5–11.3)
WBC # BLD: ABNORMAL 10*3/UL
WBC # BLD: ABNORMAL 10*3/UL

## 2021-12-25 PROCEDURE — 2700000000 HC OXYGEN THERAPY PER DAY

## 2021-12-25 PROCEDURE — 80048 BASIC METABOLIC PNL TOTAL CA: CPT

## 2021-12-25 PROCEDURE — 83605 ASSAY OF LACTIC ACID: CPT

## 2021-12-25 PROCEDURE — 2580000003 HC RX 258: Performed by: STUDENT IN AN ORGANIZED HEALTH CARE EDUCATION/TRAINING PROGRAM

## 2021-12-25 PROCEDURE — 99223 1ST HOSP IP/OBS HIGH 75: CPT | Performed by: INTERNAL MEDICINE

## 2021-12-25 PROCEDURE — 85610 PROTHROMBIN TIME: CPT

## 2021-12-25 PROCEDURE — 6370000000 HC RX 637 (ALT 250 FOR IP): Performed by: INTERNAL MEDICINE

## 2021-12-25 PROCEDURE — 82306 VITAMIN D 25 HYDROXY: CPT

## 2021-12-25 PROCEDURE — 6370000000 HC RX 637 (ALT 250 FOR IP): Performed by: STUDENT IN AN ORGANIZED HEALTH CARE EDUCATION/TRAINING PROGRAM

## 2021-12-25 PROCEDURE — 85730 THROMBOPLASTIN TIME PARTIAL: CPT

## 2021-12-25 PROCEDURE — 84484 ASSAY OF TROPONIN QUANT: CPT

## 2021-12-25 PROCEDURE — 85055 RETICULATED PLATELET ASSAY: CPT

## 2021-12-25 PROCEDURE — 94761 N-INVAS EAR/PLS OXIMETRY MLT: CPT

## 2021-12-25 PROCEDURE — 85025 COMPLETE CBC W/AUTO DIFF WBC: CPT

## 2021-12-25 PROCEDURE — 87641 MR-STAPH DNA AMP PROBE: CPT

## 2021-12-25 PROCEDURE — 36415 COLL VENOUS BLD VENIPUNCTURE: CPT

## 2021-12-25 PROCEDURE — 2060000000 HC ICU INTERMEDIATE R&B

## 2021-12-25 PROCEDURE — 6360000002 HC RX W HCPCS: Performed by: STUDENT IN AN ORGANIZED HEALTH CARE EDUCATION/TRAINING PROGRAM

## 2021-12-25 PROCEDURE — 93306 TTE W/DOPPLER COMPLETE: CPT

## 2021-12-25 PROCEDURE — 85384 FIBRINOGEN ACTIVITY: CPT

## 2021-12-25 PROCEDURE — 94660 CPAP INITIATION&MGMT: CPT

## 2021-12-25 RX ORDER — POLYETHYLENE GLYCOL 3350 17 G/17G
17 POWDER, FOR SOLUTION ORAL DAILY PRN
Status: DISCONTINUED | OUTPATIENT
Start: 2021-12-25 | End: 2022-01-14 | Stop reason: HOSPADM

## 2021-12-25 RX ORDER — ACETAMINOPHEN 325 MG/1
650 TABLET ORAL EVERY 6 HOURS PRN
Status: DISCONTINUED | OUTPATIENT
Start: 2021-12-25 | End: 2022-01-14 | Stop reason: HOSPADM

## 2021-12-25 RX ORDER — ONDANSETRON 2 MG/ML
4 INJECTION INTRAMUSCULAR; INTRAVENOUS EVERY 6 HOURS PRN
Status: DISCONTINUED | OUTPATIENT
Start: 2021-12-25 | End: 2022-01-14 | Stop reason: HOSPADM

## 2021-12-25 RX ORDER — SODIUM CHLORIDE 9 MG/ML
25 INJECTION, SOLUTION INTRAVENOUS PRN
Status: DISCONTINUED | OUTPATIENT
Start: 2021-12-25 | End: 2022-01-14 | Stop reason: HOSPADM

## 2021-12-25 RX ORDER — SODIUM CHLORIDE 0.9 % (FLUSH) 0.9 %
10 SYRINGE (ML) INJECTION EVERY 12 HOURS SCHEDULED
Status: DISCONTINUED | OUTPATIENT
Start: 2021-12-25 | End: 2022-01-14 | Stop reason: HOSPADM

## 2021-12-25 RX ORDER — PROMETHAZINE HYDROCHLORIDE 12.5 MG/1
12.5 TABLET ORAL EVERY 6 HOURS PRN
Status: DISCONTINUED | OUTPATIENT
Start: 2021-12-25 | End: 2022-01-14 | Stop reason: HOSPADM

## 2021-12-25 RX ORDER — ACETAMINOPHEN 650 MG/1
650 SUPPOSITORY RECTAL EVERY 6 HOURS PRN
Status: DISCONTINUED | OUTPATIENT
Start: 2021-12-25 | End: 2022-01-14 | Stop reason: HOSPADM

## 2021-12-25 RX ORDER — SODIUM CHLORIDE 0.9 % (FLUSH) 0.9 %
10 SYRINGE (ML) INJECTION PRN
Status: DISCONTINUED | OUTPATIENT
Start: 2021-12-25 | End: 2022-01-14 | Stop reason: HOSPADM

## 2021-12-25 RX ADMIN — HEPARIN SODIUM 3380 UNITS: 1000 INJECTION INTRAVENOUS; SUBCUTANEOUS at 23:51

## 2021-12-25 RX ADMIN — HEPARIN SODIUM 3380 UNITS: 1000 INJECTION INTRAVENOUS; SUBCUTANEOUS at 02:44

## 2021-12-25 RX ADMIN — BARICITINIB 4 MG: 2 TABLET, FILM COATED ORAL at 10:33

## 2021-12-25 RX ADMIN — AZITHROMYCIN 500 MG: 250 TABLET, FILM COATED ORAL at 21:05

## 2021-12-25 RX ADMIN — Medication 17 UNITS/KG/HR: at 12:15

## 2021-12-25 RX ADMIN — BARICITINIB 4 MG: 2 TABLET, FILM COATED ORAL at 01:58

## 2021-12-25 RX ADMIN — GUAIFENESIN DEXTROMETHORPHAN HYDROBROMIDE ORAL SOLUTION 5 ML: 200; 20 SOLUTION ORAL at 23:48

## 2021-12-25 RX ADMIN — CEFTRIAXONE SODIUM 1000 MG: 1 INJECTION, POWDER, FOR SOLUTION INTRAMUSCULAR; INTRAVENOUS at 21:05

## 2021-12-25 RX ADMIN — SODIUM CHLORIDE, PRESERVATIVE FREE 10 ML: 5 INJECTION INTRAVENOUS at 21:06

## 2021-12-25 RX ADMIN — DEXAMETHASONE SODIUM PHOSPHATE 6 MG: 10 INJECTION INTRAMUSCULAR; INTRAVENOUS at 21:06

## 2021-12-25 ASSESSMENT — PAIN SCALES - GENERAL
PAINLEVEL_OUTOF10: 0
PAINLEVEL_OUTOF10: 0

## 2021-12-25 NOTE — ED NOTES
First encounter with pt, report from previous RN Sarahy. Pt is resting comfortably on stretcher on a bipap. Pt vitals are stable and wnl at this time. Call light within reach.       Jacoby Pink RN  12/24/21 0085

## 2021-12-25 NOTE — ED NOTES
Purewick placed externally on pt, attached to suction. Pt readjusted to position of comfort on stretcher. Call light within reach.       Sabina Anderson RN  12/24/21 6167

## 2021-12-25 NOTE — H&P
Critical Care - History and Physical Examination    Patient's name:  Fco Grace  Medical Record Number: [id number]  Patient's account/billing number: [de-identified]  Patient's YOB: 1950  Age: 70 y.o. Date of Admission: 12/24/2021  3:37 PM  Date of History and Physical Examination: 12/24/2021      Primary Care Physician: No primary care provider on file. Code Status: Full Code    Chief complaint: SOB    HISTORY OF PRESENT ILLNESS:   History was obtained from chart review and the patient. Fco Grace is a 70 y.o. with no known past medical history,  Given shortness of breath and fatigue. Found to be Covid positive. On arrival, febrile, tachypneic, tachycardia 130s, /88  desated To 75%, on NRB then escalated to BiPAP. Denies any past medical history of recent surgeries, blood clots, being on blood thinners. Labs remarkable for normal renal function. Lactic acid 3.5. Glucose 160. Alcohol elevated at 122. Elevated inflammatory markers of CRP, LD, ferritin,  Troponin 54. Mildly elevated LFTs. WBC 14 K. Platelets 50 K. CT PE:  Motion degraded study.  Acute pulmonary emboli suspected particularly in the   left lower lobe but not well visualized due to motion artifact.       There is evidence for right ventricular strain with septal bowing suggesting   a significant clot burden.       Moderate patchy ground-glass opacities involving all lobes of both lungs   consistent with multifocal pneumonia typical for COVID pneumonia. Past Medical History:  No past medical history on file. Past Surgical History:  No past surgical history on file. Allergies:    No Known Allergies      Home Meds:   Prior to Admission medications    Not on File       Social History:   TOBACCO:   has no history on file for tobacco use. ETOH:   has no history on file for alcohol use. DRUGS:  has no history on file for drug use.   OCCUPATION:      Family History:   No family history on file. REVIEW OF SYSTEMS (ROS):  · Unable to due to respiratory distress on bipap    Physical Exam:    Vitals: /81   Pulse 107   Temp 97.8 °F (36.6 °C) (Axillary)   Resp 24   Ht 5' 4\" (1.626 m)   Wt 186 lb (84.4 kg)   SpO2 100%   Breastfeeding No   BMI 31.93 kg/m²     Last Body weight:   Wt Readings from Last 3 Encounters:   12/24/21 186 lb (84.4 kg)       Body Mass Index : Body mass index is 31.93 kg/m². PHYSICAL EXAMINATION :  · General appearance: awake, alert, cooperative  · HEENT: Head: Normocephalic, no lesions, without obvious abnormality. · Lungs: Rhonchi present. No rales, no wheezing. Decreased breath sounds throughout. · Heart: regular rate and rhythm, S1, S2 normal, no murmur  · Abdomen: soft, non-tender; bowel sounds normal; no masses,  no organomegaly  · Extremities: extremities normal, atraumatic, no cyanosis or edema  · Neurological:  Awake, pupils reactive. Following commands. Monitor movements. Gross motor exam normal.  ·   VENT SETTINGS (Comprehensive) (if applicable):  Vent Information  FiO2 : 100 %  SpO2: 100 %  SpO2/FiO2 ratio: 100  I Time/ I Time %: 0.9 s  Mask Type: Full face mask  Mask Size: Medium  Additional Respiratory  Assessments  Pulse: 107  Resp: 24  SpO2: 100 %    Laboratory findings:-    CBC:   Recent Labs     12/24/21  1556   WBC 12.2*   HGB 14.4   PLT See Reflexed IPF Result     BMP:    Recent Labs     12/24/21  1552 12/24/21  1556   NA  --  128*   K  --  4.1   CL  --  96*   CO2  --  17*   BUN  --  29*   CREATININE 0.68 0.67   GLUCOSE  --  159*     S. Calcium:  Recent Labs     12/24/21  1556   CALCIUM 8.4*     S. Ionized Calcium:No results for input(s): IONCA in the last 72 hours. S. Magnesium:  Recent Labs     12/24/21  1556   MG 2.4     S. Phosphorus:No results for input(s): PHOS in the last 72 hours.   S. Glucose:  Recent Labs     12/24/21  1552   POCGLU 173*     Glycosylated hemoglobin A1C: No results for input(s): LABA1C in the last 72 hours.  INR: No results for input(s): INR in the last 72 hours. Hepatic functions:   Recent Labs     12/24/21  1556   ALKPHOS 112*   ALT 35*   AST 46*   PROT 6.3*   BILITOT 0.60   LABALBU 2.8*     Pancreatic functions:No results for input(s): LACTA, AMYLASE in the last 72 hours. S. Lactic Acid: No results for input(s): LACTA in the last 72 hours. Cardiac enzymes:No results for input(s): CKTOTAL, CKMB, CKMBINDEX, TROPONINI in the last 72 hours. BNP:No results for input(s): BNP in the last 72 hours. Lipid profile: No results for input(s): CHOL, TRIG, HDL, LDL, LDLCALC in the last 72 hours. Blood Gases: No results found for: PH, PCO2, PO2, HCO3, O2SAT  Thyroid functions: No results found for: TSH     Urinalysis:     Microbiology:    Cultures during this admission:     Blood cultures:                 [] None drawn      [] Negative             []  Positive (Details:  )  Urine Culture:                   [] None drawn      [] Negative             []  Positive (Details:  )  Sputum Culture:               [] None drawn       [] Negative             []  Positive (Details:  )   Endotracheal aspirate:     [] None drawn       [] Negative             []  Positive (Details:  )         -----------------------------------------------------------------  Radiological reports:    (See actual reports for details)      Recent Cardiac Catheterization summary:   (See actual reports for details)    Electrocardiogram:     Last Echocardiogram findings:   (See actual reports for details)       Assessment and Plan     1. Acute left lower lobe subsegmental PE: CT PE imaging showing possible clot burden with RV strain. However possible motion artifact. Vascular evaluated. No concern for clot burden, no acute intervention at this time. Cardiology evaluated, no additional recommendations at this time. Follow-up echo. Mild troponin elevation likely from PE. Continue heparin drip.   Monitor for any bleeding due to thrombocytopenia. 2. COVID-19 pneumonia: ID Inés. Given steroids, Baricitinib. 3. Reactive leukocytosis  4. Thrombocytopenia: Check CBC now. If platelets are low or concern of bleeding have to stop anticoagulation. Then check lower extremity venous Dopplers. Regular diet  Already on DVT prophylaxis  No indication for GI prophylaxis at this time  Monitor in ICU. Parish Gray MD,.  PGY -3  Department of Internal Medicine/ Critical care  [facility] ([place]             12/24/2021, 10:14 PM

## 2021-12-25 NOTE — CONSULTS
Division of Vascular Surgery        New Consult      Physician Requesting Consult:  Dr. Lizzie Hinojosa    Reason for Consult:   L PE    Chief Complaint:      SOB, fatigue    History of Present Illness:      Dede Burch is a 70 y.o. woman who presents with shortness of breath and fatigue. Patient was found to be Covid positive. Patient reports that she has been having some shortness of breath, weakness and fatigue for the last several weeks. Patient is not a great historian as she feels very tired but can answer some questions. Patient states that she is never had blood clots in the past; states that she does not have any medical issues. Per chart review, patient has no medical or surgical history and nothing documented in care everywhere. Additionally states that her urine has been very dark and she has had decreased appetite. States that she lives at home. CT PE shows left segmental pulmonary embolism although this exam is limited due to motion artifact. Additionally has bilateral multifocal pneumonia consistent with Covid. Patient is tachycardic at bedside to 110s and on BiPAP with O2 sat 99%. Patient initially came in with a heart rate in 120s, afebrile and O2 sat in the 80s. Labs significant for hyponatremia, hypochloremia uremia, CO2 17, lactic acid 3.5, BMP 9968, .5 with troponin 54, procalcitonin 0.25 with WBC 12.2. Medical History:     No past medical history on file. Surgical History:     No past surgical history on file. Family History:     No family history on file. Allergies:       Patient has no known allergies.     Medications:      Current Facility-Administered Medications   Medication Dose Route Frequency Provider Last Rate Last Admin    heparin (porcine) injection 6,750 Units  80 Units/kg IntraVENous PRN Christin Jumper, DO        heparin (porcine) injection 3,380 Units  40 Units/kg IntraVENous PRN Christin Jumper, DO        heparin 25,000 units in dextrose 5% 250 mL (premix) infusion  5-30 Units/kg/hr IntraVENous Continuous Rudi Ermelinda, DO         No current outpatient medications on file. Social History:     Tobacco:    has no history on file for tobacco use. Alcohol:      has no history on file for alcohol use. Drug Use:  has no history on file for drug use. Review of Systems:     Review of Systems   Constitutional: Positive for activity change, appetite change and fatigue. Negative for fever. HENT: Negative for sinus pressure and sinus pain. Eyes: Negative for photophobia and visual disturbance. Respiratory: Positive for shortness of breath. Negative for chest tightness. Cardiovascular: Negative for chest pain and leg swelling. Gastrointestinal: Negative for abdominal pain, diarrhea, nausea and vomiting. Genitourinary: Positive for decreased urine volume. Negative for hematuria. Musculoskeletal: Negative for arthralgias and myalgias. Skin: Negative for pallor and wound. Neurological: Negative for syncope, weakness and headaches. Psychiatric/Behavioral: Negative for agitation and behavioral problems. Physical Exam:     Vitals:  /88   Pulse 126   Temp 97.8 °F (36.6 °C) (Axillary)   Resp 28   Ht 5' 4\" (1.626 m)   Wt 186 lb (84.4 kg)   SpO2 (!) 80%   Breastfeeding No   BMI 31.93 kg/m²     Physical Exam  Constitutional:       Appearance: She is obese. She is ill-appearing. HENT:      Head: Normocephalic and atraumatic. Nose: Nose normal. No rhinorrhea. Mouth/Throat:      Mouth: Mucous membranes are dry. Pharynx: Oropharynx is clear. Eyes:      Extraocular Movements: Extraocular movements intact. Cardiovascular:      Rate and Rhythm: Regular rhythm. Tachycardia present. Pulmonary:      Comments: Increased effort on BIPAP with symmetric rise and fall of chest wall  Abdominal:      General: There is no distension. Palpations: Abdomen is soft. Tenderness: There is no abdominal tenderness. pneumothorax or pleural fluid. Upper Abdomen: Limited images of the upper abdomen are motion degraded and grossly unremarkable. Soft Tissues/Bones: No acute bone or soft tissue abnormality. Motion degraded study. Acute pulmonary emboli suspected particularly in the left lower lobe but not well visualized due to motion artifact. There is evidence for right ventricular strain with septal bowing suggesting a significant clot burden. Moderate patchy ground-glass opacities involving all lobes of both lungs consistent with multifocal pneumonia typical for COVID pneumonia. Findings were discussed with Gabriela George at 5:39 pm on 12/24/2021. RECOMMENDATIONS: Unavailable       Assessment and Plan:     68-year-old female with Covid pneumonia, suspected left lower lobe pulmonary embolism and concern for right ventricular heart strain  · Medical management per primary team  · Recommend anticoagulation - okay for heparin drip at this time and transition to p.o. anticoagulation  · No need for lower extremity duplex at this time  · We will defer need for ECHO to primary team - doubt that the right heart strain seen on CT is secondary to the segmental PE    Discussed with Dr. Deana Almonte    Electronically signed by Khushbu Noble DO on 12/24/21 at 7:01 PM [facility]      [address]  Office: [phone]  Cell: [phone]  Email: [email or web address]

## 2021-12-25 NOTE — ED NOTES
Pt resting comfortably on stretcher. Vitals remain stable. Call light within reach.       Haley Brewer RN  12/25/21 6276

## 2021-12-25 NOTE — PROGRESS NOTES
Critical care team - Resident sign-out to medicine service      Date and time: 12/25/2021 9:19 AM  Patient's name:  Desmond Welch Record Number: [id number]  Patient's account/billing number: [de-identified]  Patient's YOB: 1950  Age: 70 y.o. Date of Admission: 12/24/2021  3:37 PM  Length of stay during current admission: 1    Primary Care Physician: No primary care provider on file. Code Status: Full Code    Mode of physician to physician communication:        [x] Via telephone   [] In person     Date and time of sign-out: 12/25/2021 9:19 AM    Accepting Internal Medicine resident: Dr. Agustina Hough    Accepting Medicine team: IM Team Intermed     Accepting team's attending: Dr. Agustina Hough    Patient's current ICU Bed:  ED 33     Patient's assigned bed in ICU:  3003        [x] [facility] ICU [] Step-down       [] Psychiatry ICU       [] Psych floor     Reason for ICU admission:     Patient seen and evaluated in the emergency department for shortness of breath and fatigue. Patient was found to be hypoxic at 75% on a nonrebreather. She was tachycardic and ultimately found to have a acute pulmonary embolus. Patient was found to be positive for COVID-19 infection. Patient denies any past medical history, recent surgeries, history of blood clots or being on any blood thinners. Patient had elevated lactic acid of 3.5 as well as elevated alcohol at 122. CT PE showed acute pulmonary emboli suspected in the left lower lobe there was concern for right ventricular strain with septal bowing. Patient was evaluated by vascular surgery. Vascular surgery believes this is motion artifact, no interventions recommended at this time aside from heparin. Patient is on a heparin drip. Platelets of 92,339. Continued on heparin drip monitor for signs of bleeding. Infectious disease was consulted due to the Covid infection.   Patient was admitted to the ICU, a Covid ICU bed became available and patient is transferred to [facility] care with pulmonology crit care consult      Procedures during patient's ICU stay:     N/A    Current Vitals:     /85   Pulse 90   Temp 97.8 °F (36.6 °C) (Axillary)   Resp 28   Ht 5' 4\" (1.626 m)   Wt 186 lb (84.4 kg)   SpO2 93%   Breastfeeding No   BMI 31.93 kg/m²       Cultures:     Blood cultures:                 [] None drawn      [x] Negative             []  Positive (Details:  )  Urine Culture:                   [x] None drawn      [] Negative             []  Positive (Details:  )  Sputum Culture:               [] None drawn       [x] Negative             []  Positive (Details:  )   Endotracheal aspirate:     [x] None drawn       [] Negative             []  Positive (Details:  )       Consults:     1. Vascular surgery  2. Infectious disease    Assessment:     Patient Active Problem List    Diagnosis Date Noted    COVID-19     Pulmonary embolus, left ([address]        Recommended Follow-up:     1. Continue heparin drip  2. Monitor for signs of bleeding  3. COVID-19 infection  4. Infectious disease consulted  5. Continue with Decadron 6 mg twice daily and Barcitinib   6. Maintain BiPAP to maintain O2 sats greater than 88%  7. Transfer care to [facility]        Above mentioned assessment and plan was discussed by me with the admitting medicine resident. The medicine team assigned to the patient by medicine admitting resident will be following up the patient from now onwards on the floor.        Chadwick Almaguer DO  Emergency Medicine Resident  [address]  12/25/2021, 9:19 AM EST

## 2021-12-25 NOTE — ED NOTES
Pt continues to rest comfortable on cot without distress, tolerating BiPAP well. Pt only c/o she is bored. awaiting room assignment and tranfer pt.        Doni Sears RN  12/25/21 7096

## 2021-12-25 NOTE — ED PROVIDER NOTES
[facility] ED  Emergency Department  Emergency Medicine Resident Sign-out     Care of Armin Bhakta was assumed from Dr. Adrián Hernandez and is being seen for Shortness of Breath and Fall  . The patient's initial evaluation and plan have been discussed with the prior provider who initially evaluated the patient.      EMERGENCY DEPARTMENT COURSE / MEDICAL DECISION MAKING:       MEDICATIONS GIVEN:  Orders Placed This Encounter   Medications    0.9 % sodium chloride bolus    LORazepam (ATIVAN) injection 1 mg    dexamethasone (DECADRON) injection 6 mg    iopamidol (ISOVUE-370) 76 % injection 75 mL    heparin (porcine) injection 6,750 Units    heparin (porcine) injection 6,750 Units    heparin (porcine) injection 3,380 Units    heparin 25,000 units in dextrose 5% 250 mL (premix) infusion       LABS / RADIOLOGY:     Labs Reviewed   COVID-19, RAPID - Abnormal; Notable for the following components:       Result Value    SARS-CoV-2, Rapid DETECTED (*)     All other components within normal limits   TROPONIN - Abnormal; Notable for the following components:    Troponin, High Sensitivity 54 (*)     All other components within normal limits   LACTATE, SEPSIS - Abnormal; Notable for the following components:    Lactic Acid, Sepsis, Whole Blood 3.5 (*)     All other components within normal limits   ELECTROLYTES PLUS - Abnormal; Notable for the following components:    POC Sodium 136 (*)     All other components within normal limits   BRAIN NATRIURETIC PEPTIDE - Abnormal; Notable for the following components:    Pro-BNP 9,968 (*)     All other components within normal limits   COMPREHENSIVE METABOLIC PANEL - Abnormal; Notable for the following components:    Glucose 159 (*)     BUN 29 (*)     Calcium 8.4 (*)     Sodium 128 (*)     Chloride 96 (*)     CO2 17 (*)     Alkaline Phosphatase 112 (*)     ALT 35 (*)     AST 46 (*)     Total Protein 6.3 (*)     Albumin 2.8 (*)     Albumin/Globulin Ratio 0.8 (*) images are provided for review. MIP images are provided for review. Dose modulation, iterative reconstruction, and/or weight based adjustment of the mA/kV was utilized to reduce the radiation dose to as low as reasonably achievable. COMPARISON: None. HISTORY: ORDERING SYSTEM PROVIDED HISTORY: hypoxemia of 50 on RA and 73 on NRB at 15L; tachycardia TECHNOLOGIST PROVIDED HISTORY: hypoxemia of 50 on RA and 73 on NRB at 15L; tachycardia Decision Support Exception - unselect if not a suspected or confirmed emergency medical condition->Emergency Medical Condition (MA) Reason for Exam: hypoxemia of 50 on RA and 73 on NRB at 15L; tachycardia FINDINGS: Pulmonary Arteries: Pulmonary arteries are adequately opacified for evaluation. Study is motion degraded. There are pulmonary emboli in branches of the left lower lobe pulmonary artery. Other pulmonary emboli may be present but are not well visualized due to the respiratory motion. Main pulmonary artery is upper limits of normal in caliber. Mediastinum: There is mild right paratracheal adenopathy. There are mildly enlarged nodes in the AP window. There is evidence for right ventricular strain with septal bowing. There is no acute abnormality of the thoracic aorta. Lungs/pleura: There are patchy ground-glass opacities involving all lobes of both lungs primarily in a peripheral and subpleural distribution. There is no pneumothorax or pleural fluid. Upper Abdomen: Limited images of the upper abdomen are motion degraded and grossly unremarkable. Soft Tissues/Bones: No acute bone or soft tissue abnormality. Motion degraded study. Acute pulmonary emboli suspected particularly in the left lower lobe but not well visualized due to motion artifact. There is evidence for right ventricular strain with septal bowing suggesting a significant clot burden.  Moderate patchy ground-glass opacities involving all lobes of both lungs consistent with multifocal pneumonia typical for COVID pneumonia. Findings were discussed with Gabriela George at 5:39 pm on 12/24/2021. RECOMMENDATIONS: Unavailable       RECENT VITALS:     Temp: 97.8 °F (36.6 °C),  Pulse: 93, Resp: 17, BP: (!) 121/93, SpO2: 100 %    This patient is a 70 y.o. Female with hypoxemia. Patient had a cough 1 week ago had negative Covid test.  Patient was found in her shower by family being short of breath. EMS was called and was satting 50% on room air. Patient is placed on nonrebreather and lab to 77% on a 15 L nonrebreather. Patient placed on BiPAP. Patient's lab work is concerning for Covid that is severe. CT PE study shows only a few PEs however due to poor study, cannot find any more peripherally. CT PE does show though concerns for right heart strain. Vascular surgery on board and wants a stat echocardiogram today. Cardiology contacted. In addition patient going to medical ICU for impending respiratory distress and cardiac compromise. Spoke with medical ICU who recommends stepdown at this time as patient is hemodynamically stable on BiPAP and respiratory status is improving. Patient admitted to [address] [place], [place] [address]elidia Hahn / RECOMMENDATIONS:    1. Await transfer to MICU     FINAL IMPRESSION:     1. COVID-19    2. Dehydration    3. Tachycardia        DISPOSITION:         DISPOSITION:  []  Discharge   []  Transfer -    [x]  Admission -  Intermed   []  Against Medical Advice   []  Eloped   FOLLOW-UP: No follow-up provider specified.    DISCHARGE MEDICATIONS: New Prescriptions    No medications on file          Lynsey Vo DO  Emergency Medicine Resident  [facility]       [place]  Resident  12/24/21 2681

## 2021-12-25 NOTE — CONSULTS
Attestation signed by      [facility] Cardiology Cardiology    Consult / H&P               Today's Date: 12/25/2021  Patient Name: Janet Ortega  Date of admission: 12/24/2021  3:37 PM  Patient's age: 70 y.o., 1950  Admission Dx: Dehydration [E86.0]  Tachycardia [R00.0]  COVID-19 [U07.1]    Reason for Consult:  Cardiac evaluation    Requesting Physician: Sonia Worley MD    CHIEF COMPLAINT: Shortness of breath. History Obtained From:  patient, electronic medical record    HISTORY OF PRESENT ILLNESS:      The patient is a 70 y.o. female was initially admitted for shortness of breath found to be Covid positive. Patient was subsequently found to have a small subsegmental left lower pulmonary artery embolus with low clot burden. Vascular recommended therapeutic Lovenox or oral AC. Cardiology was consulted for possible right heart strain. We will get an echocardiogram.    Past Medical History:   has no past medical history on file. Past Surgical History:   has no past surgical history on file.      Home Medications:    Prior to Admission medications    Not on File      Current Facility-Administered Medications: sodium chloride flush 0.9 % injection 10 mL, 10 mL, IntraVENous, 2 times per day  sodium chloride flush 0.9 % injection 10 mL, 10 mL, IntraVENous, PRN  0.9 % sodium chloride infusion, 25 mL, IntraVENous, PRN  promethazine (PHENERGAN) tablet 12.5 mg, 12.5 mg, Oral, Q6H PRN **OR** ondansetron (ZOFRAN) injection 4 mg, 4 mg, IntraVENous, Q6H PRN  polyethylene glycol (GLYCOLAX) packet 17 g, 17 g, Oral, Daily PRN  acetaminophen (TYLENOL) tablet 650 mg, 650 mg, Oral, Q6H PRN **OR** acetaminophen (TYLENOL) suppository 650 mg, 650 mg, Rectal, Q6H PRN  heparin (porcine) injection 6,750 Units, 80 Units/kg, IntraVENous, PRN  heparin (porcine) injection 3,380 Units, 40 Units/kg, IntraVENous, PRN  heparin 25,000 units in dextrose 5% 250 mL (premix) infusion, 5-30 Units/kg/hr, IntraVENous, Continuous  cefTRIAXone (ROCEPHIN) 1000 mg IVPB in 50 mL D5W minibag, 1,000 mg, IntraVENous, Q24H **AND** azithromycin (ZITHROMAX) tablet 500 mg, 500 mg, Oral, Q24H  dextromethorphan-guaiFENesin (ROBITUSSIN-DM)  MG/5ML liquid 5 mL, 5 mL, Oral, Q4H PRN  dexamethasone (DECADRON) injection 6 mg, 6 mg, IntraVENous, Q24H  baricitinib (OLUMIANT) tablet 4 mg, 4 mg, Oral, Daily    Allergies:  Patient has no known allergies. Social History:        Family History: family history is not on file. REVIEW OF SYSTEMS:    Not obtained      PHYSICAL EXAM:      /85   Pulse 88   Temp 98.4 °F (36.9 °C) (Axillary)   Resp (!) 31   Ht 5' 4\" (1.626 m)   Wt 188 lb 0.8 oz (85.3 kg)   SpO2 91%   Breastfeeding No   BMI 32.28 kg/m²    Deferred due to COVID-19 pandemic. Labs:     CBC:   Recent Labs     12/25/21  0345 12/25/21  0631   WBC 14.0* 12.7*   HGB 13.0 12.5   HCT 38.9 38.4   PLT See Reflexed IPF Result See Reflexed IPF Result     BMP:   Recent Labs     12/25/21  0345 12/25/21  0631    136   K 4.6 4.5   CO2 19* 20   BUN 21 21   CREATININE 0.52 0.51   LABGLOM >60 >60   GLUCOSE 162* 163*     BNP: No results for input(s): BNP in the last 72 hours. PT/INR:   Recent Labs     12/24/21  2215 12/25/21  0345   PROTIME 12.0 12.0   INR 1.1 1.1     APTT:  Recent Labs     12/25/21  0202 12/25/21  1036   APTT 49.6* 114.8*     CARDIAC ENZYMES:No results for input(s): CKTOTAL, CKMB, CKMBINDEX, TROPONINI in the last 72 hours. FASTING LIPID PANEL:No results found for: HDL, LDLDIRECT, LDLCALC, TRIG  LIVER PROFILE:  Recent Labs     12/24/21  1556   AST 46*   ALT 35*   LABALBU 2.8*     DATA:    Diagnostics:    ECHO: Ordered. IMPRESSION:    1. Possible right heart strain on CT of the chest secondary to left subsegmental pulmonary embolus. 2.  Troponin elevation likely secondary to PE. Will trend troponin. 3.  Acute respiratory failure secondary to COVID-19 pneumonia.     RECOMMENDATIONS:  Further management as per primary. We will follow echocardiogram.  Further recommendations based on echo findings. Odalis Taylor MD       Cardiovascular Fellow PGY-4  12/25/2021, 11:23 AM      Attending Physician Statement  I have discussed the care of Rachid Mensah, including pertinent history and exam findings,  with the cardiology fellow/resident. I agree with the assessment, plan and orders as documented by the resident with additional recommendations as below:       Acute hypoxic resp failure due to HISJU-27 PNA complicated by small subsegmental PE. No ischemic changes on ecg. HS troponins minimally elevated. Concern for right heart strain, will obtain TTE today for further evaluation.     Monica Mcduffie MD, [facility]

## 2021-12-25 NOTE — ED NOTES
Critical care at bedside, Dr Jose Miguel Benz is admitting doctor, Internal med. SUNNI updated, awaiting room.      Sun Bang RN  12/25/21 1240

## 2021-12-25 NOTE — PLAN OF CARE
Decadron 6 mg daily x 10  Barcitinib (low plts)  anticoag     Dg PE and covid diffuse pneumonia     Leukocytosis reactive    ,Angelina Bee MD. Infectious Diseases

## 2021-12-25 NOTE — ED NOTES
Report recd from [facility], Pt resting on cot. Per SUNNI pt may go to critical care and room after order recd. Perfect Serve sent, awaiting response.       Rosendo Perea RN  12/25/21 0628

## 2021-12-25 NOTE — PLAN OF CARE
Problem: Respiratory  Intervention: Respiratory assessment  Note: NONINVASIVE VENTILATION    PROVIDE OPTIMAL VENTILATION/ACCEPTABLE SPO2   IMPLEMENT NONINVASIVE VENTILATION PROTOCOL   MAINTAIN ACCEPTABLE SPO2   ASSESS SKIN INTEGRITY/BREAKDOWN SCORE   PATIENT EDUCATION AS NEEDED   BIPAP AS NEEDED            Problem: OXYGENATION/RESPIRATORY FUNCTION  Goal: Patient will maintain patent airway  Outcome: Ongoing  Goal: Patient will achieve/maintain normal respiratory rate/effort  Description: Respiratory rate and effort will be within normal limits for the patient  Outcome: Ongoing

## 2021-12-25 NOTE — H&P
[facility]  Office: [address], , Mikhail Ordoñez, DO, Lucy John, DO, Hair Johnson Blood, DO, Tatianna Mcfarlane MD, Samm Ribeiro MD, Jose L Duval MD, Eulalio Bolanos MD, Denia Flores MD, Osmin Arnold MD, Leah Hutchinson MD, Heavenly More, DO, Navin Butler, DO, Anival Morales MD,  Rosario Lo DO, Kari Helms MD, Pat Bardales MD, Deb Weber MD, Evaristo Oates MD, Mireya Bauer MD, Ruiz Lou MD, Joe Alvarado MD, Lary De Leon [facility], [facility], CNP, Helena Brewer, CNP, Wilmer Menjivar, CNS, Tereza Costa, CNP, Beryle Fix, CNP, Cy Manriquez, CNP, Breezy Reese, CNP, Michael Hinojosa, CNP, Nadya Ricketts PA-C, Jarred Ricci, DNP, Zhao Lovell, DNP, Lilian Winter, CNP, Pola Gómez, CNP, Vandana Martinez, CNP, Fernando Pearson, CNP, Frank Díaz, CNP, Dominique Segovia, CNP         [address]    HISTORY AND PHYSICAL EXAMINATION            Date:   12/25/2021  Patient name:  Josee Rodarte  Date of admission:  12/24/2021  3:37 PM  MRN:   [id number]  Account:  [de-identified]  YOB: 1950  PCP:    No primary care provider on file. Room:   [address] [place] [address]  Code Status:    Full Code    Chief Complaint:     Chief Complaint   Patient presents with    Shortness of Breath    Fall       History Obtained From:     patient, electronic medical record    History of Present Illness: This is a 59-year-old female who is coming in with complaints of lightheadedness and dry cough has been going on for past 1 week. Patient reports that she initially had negative Covid test 1 week ago however started having some trouble breathing. Patient family members called EMS and she was found to be tachypneic. Patient was breathing at 40-50 times per minute with pulse ox of 50% on room air. She was started on nonrebreather mask which brought her saturations up to 73%.   At the time of my evaluation patient was already on BiPAP saturating into low 90s. In the emergency department patient was noted to have positive Covid test.  She was noted to have elevated troponin along with elevated lactic acid. Her BNP was also elevated. CTA chest showed that patient also has subsegmental PE. There was also a comment regarding right heart strain. She was evaluated by vascular surgery who recommends that patient unlikely has any sort of strain on the heart. Vascular surgery recommends to continue patient on heparin drip at this time. Patient had low platelet levels however at this time vascular surgery is okay to continue her on heparin drip. Indicates patient starts having any bleeding episode plan is for her to get lower extremity Doppler to rule out DVT and vascular intervention such as filter at that time. At the time of my evaluation patient denies any complaints. Patient reports that she would like to remain full code. Past Medical History:     No past medical history on file. Past Surgical History:     No past surgical history on file. Medications Prior to Admission:     Prior to Admission medications    Not on File        Allergies:     Patient has no known allergies. Social History:     Tobacco:    has no history on file for tobacco use. Alcohol:      has no history on file for alcohol use. Drug Use:  has no history on file for drug use. Family History:     No family history on file. Review of Systems:     Positive and Negative as described in HPI.     CONSTITUTIONAL:  negative for fevers, chills, sweats, fatigue, weight loss  HEENT:  negative for vision, hearing changes, runny nose, throat pain  RESPIRATORY: Positive for shortness of breath, cough, congestion, wheezing  CARDIOVASCULAR:  negative for chest pain, palpitations  GASTROINTESTINAL:  negative for nausea, vomiting, diarrhea, constipation, change in bowel habits, abdominal pain   GENITOURINARY:  negative for difficulty of urination, burning with urination, frequency   INTEGUMENT:  negative for rash, skin lesions, easy bruising   HEMATOLOGIC/LYMPHATIC:  negative for swelling/edema   ALLERGIC/IMMUNOLOGIC:  negative for urticaria , itching  ENDOCRINE:  negative increase in drinking, increase in urination, hot or cold intolerance  MUSCULOSKELETAL:  negative joint pains, muscle aches, swelling of joints  NEUROLOGICAL:  negative for headaches, dizziness, lightheadedness, numbness, pain, tingling extremities  BEHAVIOR/PSYCH:  negative for depression, anxiety    Physical Exam:   /85   Pulse 88   Temp 97.8 °F (36.6 °C) (Axillary)   Resp (!) 35   Ht 5' 4\" (1.626 m)   Wt 186 lb (84.4 kg)   SpO2 92%   Breastfeeding No   BMI 31.93 kg/m²   Temp (24hrs), Av.8 °F (36.6 °C), Min:97.8 °F (36.6 °C), Max:97.8 °F (36.6 °C)    Recent Labs     21  1552   POCGLU 173*     No intake or output data in the 24 hours ending 21 1001    General Appearance: Alert, in acute respiratory distress  Mental status: oriented to person, place, and time  Head: normocephalic, atraumatic  Eye: no icterus, redness, pupils equal and reactive, extraocular eye movements intact, conjunctiva clear  Ear: normal external ear, no discharge, hearing intact  Nose: no drainage noted  Mouth: mucous membranes moist  Neck: supple, no carotid bruits, thyroid not palpable  Lungs: Diminished air sounds bilaterally  Cardiovascular: normal rate, regular rhythm, no murmur, gallop, rub  Abdomen: Soft, nontender, nondistended, normal bowel sounds, no hepatomegaly or splenomegaly  Neurologic: There are no new focal motor or sensory deficits, normal muscle tone and bulk, no abnormal sensation, normal speech, cranial nerves II through XII grossly intact  Skin: No gross lesions, rashes, bruising or bleeding on exposed skin area  Extremities: peripheral pulses palpable, no pedal edema or calf pain with palpation  Psych: normal affect    Investigations:      Laboratory Testing:  Recent Results (from the past 24 hour(s))   Venous Blood Gas, POC    Collection Time: 12/24/21  3:52 PM   Result Value Ref Range    pH, Jaylan 7.439 (H) 7.320 - 7.430    pCO2, Jaylan 35.8 (L) 41.0 - 51.0 mm Hg    pO2, Jaylan 20.5 (L) 30.0 - 50.0 mm Hg    HCO3, Venous 24.2 22.0 - 29.0 mmol/L    Total CO2, Venous NOT REPORTED 23.0 - 30.0 mmol/L    Negative Base Excess, Jaylan NOT REPORTED 0.0 - 2.0    Positive Base Excess, Jaylan 0 0.0 - 3.0    O2 Sat, Jaylan 36 (L) 60.0 - 85.0 %    O2 Device/Flow/% NOT REPORTED     Kenny Test NOT REPORTED     Sample Site NOT REPORTED     Mode NOT REPORTED     FIO2 NOT REPORTED     Pt Temp NOT REPORTED     POC pH Temp NOT REPORTED     POC pCO2 Temp NOT REPORTED mm Hg    POC pO2 Temp NOT REPORTED mm Hg   ELECTROLYTES PLUS    Collection Time: 12/24/21  3:52 PM   Result Value Ref Range    POC Sodium 136 (L) 138 - 146 mmol/L    POC Potassium 3.9 3.5 - 4.5 mmol/L    POC Chloride 98 98 - 107 mmol/L    POC TCO2 25 22 - 30 mmol/L    Anion Gap 14 7 - 16 mmol/L   Hemoglobin and hematocrit, blood    Collection Time: 12/24/21  3:52 PM   Result Value Ref Range    POC Hemoglobin 15.6 12.0 - 16.0 g/dL    POC Hematocrit 46 36 - 46 %   Creatinine W/GFR Point of Care    Collection Time: 12/24/21  3:52 PM   Result Value Ref Range    POC Creatinine 0.68 0.51 - 1.19 mg/dL    GFR Comment >60 >60 mL/min    GFR Non-African American >60 >60 mL/min    GFR Comment         CALCIUM, IONIC (POC)    Collection Time: 12/24/21  3:52 PM   Result Value Ref Range    POC Ionized Calcium 1.16 1.15 - 1.33 mmol/L   POCT urea (BUN)    Collection Time: 12/24/21  3:52 PM   Result Value Ref Range    POC BUN 33 (H) 8 - 26 mg/dL   Lactic Acid, POC    Collection Time: 12/24/21  3:52 PM   Result Value Ref Range    POC Lactic Acid 3.98 (H) 0.56 - 1.39 mmol/L   POCT Glucose    Collection Time: 12/24/21  3:52 PM   Result Value Ref Range    POC Glucose 173 (H) 74 - 100 mg/dL   Troponin    Collection Time: 12/24/21  3:56 PM   Result Value Ref Range    Troponin, High Sensitivity 54 (HH) 0 - 14 ng/L    Troponin T NOT REPORTED <0.03 ng/mL    Troponin Interp NOT REPORTED    Brain Natriuretic Peptide    Collection Time: 12/24/21  3:56 PM   Result Value Ref Range    Pro-BNP 9,968 (H) <300 pg/mL    BNP Interpretation NOT REPORTED    Comprehensive Metabolic Panel    Collection Time: 12/24/21  3:56 PM   Result Value Ref Range    Glucose 159 (H) 70 - 99 mg/dL    BUN 29 (H) 8 - 23 mg/dL    CREATININE 0.67 0.50 - 0.90 mg/dL    Bun/Cre Ratio NOT REPORTED 9 - 20    Calcium 8.4 (L) 8.6 - 10.4 mg/dL    Sodium 128 (L) 135 - 144 mmol/L    Potassium 4.1 3.7 - 5.3 mmol/L    Chloride 96 (L) 98 - 107 mmol/L    CO2 17 (L) 20 - 31 mmol/L    Anion Gap 15 9 - 17 mmol/L    Alkaline Phosphatase 112 (H) 35 - 104 U/L    ALT 35 (H) 5 - 33 U/L    AST 46 (H) <32 U/L    Total Bilirubin 0.60 0.3 - 1.2 mg/dL    Total Protein 6.3 (L) 6.4 - 8.3 g/dL    Albumin 2.8 (L) 3.5 - 5.2 g/dL    Albumin/Globulin Ratio 0.8 (L) 1.0 - 2.5    GFR Non-African American >60 >60 mL/min    GFR African American >60 >60 mL/min    GFR Comment          GFR Staging NOT REPORTED    C-Reactive Protein    Collection Time: 12/24/21  3:56 PM   Result Value Ref Range    .5 (H) 0.0 - 5.0 mg/L   Ferritin    Collection Time: 12/24/21  3:56 PM   Result Value Ref Range    Ferritin 858 (H) 13 - 150 ug/L   Magnesium    Collection Time: 12/24/21  3:56 PM   Result Value Ref Range    Magnesium 2.4 1.6 - 2.6 mg/dL   Procalcitonin    Collection Time: 12/24/21  3:56 PM   Result Value Ref Range    Procalcitonin 0.25 (H) <0.09 ng/mL   CBC Auto Differential    Collection Time: 12/24/21  3:56 PM   Result Value Ref Range    WBC 12.2 (H) 3.5 - 11.3 k/uL    RBC 4.45 3.95 - 5.11 m/uL    Hemoglobin 14.4 11.9 - 15.1 g/dL    Hematocrit 43.2 36.3 - 47.1 %    MCV 97.1 82.6 - 102.9 fL    MCH 32.4 25.2 - 33.5 pg    MCHC 33.3 28.4 - 34.8 g/dL    RDW 13.5 11.8 - 14.4 %    Platelets See Reflexed IPF Result 138 - 453 k/uL    MPV NOT REPORTED 8.1 - 13.5 fL    NRBC Automated 1.4 (H) 0.0 per 100 WBC    Differential Type NOT REPORTED     WBC Morphology NOT REPORTED     RBC Morphology NOT REPORTED     Platelet Estimate NOT REPORTED     Seg Neutrophils 82 (H) 36 - 65 %    Lymphocytes 9 (L) 24 - 43 %    Monocytes 5 3 - 12 %    Eosinophils % 0 (L) 1 - 4 %    Basophils 0 0 - 2 %    Immature Granulocytes 4 (H) 0 %    Segs Absolute 10.00 (H) 1.50 - 8.10 k/uL    Absolute Lymph # 1.12 1.10 - 3.70 k/uL    Absolute Mono # 0.58 0.10 - 1.20 k/uL    Absolute Eos # <0.03 0.00 - 0.44 k/uL    Basophils Absolute 0.03 0.00 - 0.20 k/uL    Absolute Immature Granulocyte 0.42 (H) 0.00 - 0.30 k/uL   Immature Platelet Fraction    Collection Time: 12/24/21  3:56 PM   Result Value Ref Range    Platelet, Immature Fraction NOT REPORTED 1.1 - 10.3 %    Platelet, Fluorescence Platelet clumps present, count appears decreased. 138 - 453 k/uL   Culture, Blood 1    Collection Time: 12/24/21  3:58 PM    Specimen: Blood   Result Value Ref Range    Specimen Description . BLOOD     Special Requests  RT HAND 2 ML     Culture NO GROWTH 14 HOURS    COVID-19, Rapid    Collection Time: 12/24/21  4:00 PM    Specimen: Nasopharyngeal Swab   Result Value Ref Range    Specimen Description . NASOPHARYNGEAL SWAB     SARS-CoV-2, Rapid DETECTED (A) Not Detected   Culture, Blood 1    Collection Time: 12/24/21  4:18 PM    Specimen: Blood   Result Value Ref Range    Specimen Description . BLOOD     Special Requests  R AC 2 ML      Culture NO GROWTH 14 HOURS    Lactate, Sepsis    Collection Time: 12/24/21  5:27 PM   Result Value Ref Range    Lactic Acid, Sepsis NOT REPORTED 0.5 - 1.9 mmol/L    Lactic Acid, Sepsis, Whole Blood 3.5 (H) 0.5 - 1.9 mmol/L   SPECIMEN REJECTION    Collection Time: 12/24/21  5:27 PM   Result Value Ref Range    Specimen Source . BLOOD     Ordered Test DIME,FIB,PT,PTT     Reason for Rejection       Unable to perform testing: Specimen quantity not sufficient.    - NOT REPORTED    Procalcitonin    Collection Time: 12/24/21 10:15 PM   Result Value Ref Range    Procalcitonin 0.22 (H) <0.09 ng/mL   Lactate Dehydrogenase    Collection Time: 12/24/21 10:15 PM   Result Value Ref Range     (H) 135 - 214 U/L   CBC    Collection Time: 12/24/21 10:15 PM   Result Value Ref Range    WBC 14.6 (H) 3.5 - 11.3 k/uL    RBC 3.95 3.95 - 5.11 m/uL    Hemoglobin 12.9 11.9 - 15.1 g/dL    Hematocrit 38.7 36.3 - 47.1 %    MCV 98.0 82.6 - 102.9 fL    MCH 32.7 25.2 - 33.5 pg    MCHC 33.3 28.4 - 34.8 g/dL    RDW 13.5 11.8 - 14.4 %    Platelets 50 (L) 152 - 453 k/uL    MPV 12.5 8.1 - 13.5 fL    NRBC Automated 0.8 (H) 0.0 per 100 WBC   APTT    Collection Time: 12/24/21 10:15 PM   Result Value Ref Range    PTT 52.1 (H) 20.5 - 30.5 sec   Fibrinogen    Collection Time: 12/24/21 10:15 PM   Result Value Ref Range    Fibrinogen 205 140 - 420 mg/dL   Protime-INR    Collection Time: 12/24/21 10:15 PM   Result Value Ref Range    Protime 12.0 9.1 - 12.3 sec    INR 1.1    D-Dimer, Quantitative    Collection Time: 12/24/21 10:15 PM   Result Value Ref Range    D-Dimer, Quant 70.48 mg/L FEU   APTT    Collection Time: 12/25/21  2:02 AM   Result Value Ref Range    PTT 49.6 (H) 20.5 - 30.5 sec   Basic Metabolic Panel w/ Reflex to MG    Collection Time: 12/25/21  3:45 AM   Result Value Ref Range    Glucose 162 (H) 70 - 99 mg/dL    BUN 21 8 - 23 mg/dL    CREATININE 0.52 0.50 - 0.90 mg/dL    Bun/Cre Ratio NOT REPORTED 9 - 20    Calcium 7.7 (L) 8.6 - 10.4 mg/dL    Sodium 135 135 - 144 mmol/L    Potassium 4.6 3.7 - 5.3 mmol/L    Chloride 106 98 - 107 mmol/L    CO2 19 (L) 20 - 31 mmol/L    Anion Gap 10 9 - 17 mmol/L    GFR Non-African American >60 >60 mL/min    GFR African American >60 >60 mL/min    GFR Comment          GFR Staging NOT REPORTED    CBC Auto Differential    Collection Time: 12/25/21  3:45 AM   Result Value Ref Range    WBC 14.0 (H) 3.5 - 11.3 k/uL    RBC 3.94 (L) 3.95 - 5.11 m/uL    Hemoglobin 13.0 11.9 - 15.1 g/dL    Hematocrit 38.9 36.3 - 47.1 %    MCV 98.7 82.6 - 102.9 fL    MCH 33.0 25.2 - 33.5 pg    MCHC 33.4 28.4 - 34.8 g/dL    RDW 13.7 11.8 - 14.4 %    Platelets See Reflexed IPF Result 138 - 453 k/uL    MPV NOT REPORTED 8.1 - 13.5 fL    NRBC Automated 0.3 (H) 0.0 per 100 WBC    Differential Type NOT REPORTED     WBC Morphology NOT REPORTED     RBC Morphology NOT REPORTED     Platelet Estimate NOT REPORTED     Seg Neutrophils 82 (H) 36 - 65 %    Lymphocytes 13 (L) 24 - 43 %    Monocytes 3 3 - 12 %    Eosinophils % 0 (L) 1 - 4 %    Basophils 0 0 - 2 %    Immature Granulocytes 2 (H) 0 %    Segs Absolute 11.47 (H) 1.50 - 8.10 k/uL    Absolute Lymph # 1.86 1.10 - 3.70 k/uL    Absolute Mono # 0.35 0.10 - 1.20 k/uL    Absolute Eos # <0.03 0.00 - 0.44 k/uL    Basophils Absolute 0.04 0.00 - 0.20 k/uL    Absolute Immature Granulocyte 0.32 (H) 0.00 - 0.30 k/uL   Fibrinogen    Collection Time: 12/25/21  3:45 AM   Result Value Ref Range    Fibrinogen 247 140 - 420 mg/dL   Lactic Acid, Plasma    Collection Time: 12/25/21  3:45 AM   Result Value Ref Range    Lactic Acid NOT REPORTED mmol/L    Lactic Acid, Whole Blood 1.6 0.7 - 2.1 mmol/L   Protime-INR    Collection Time: 12/25/21  3:45 AM   Result Value Ref Range    Protime 12.0 9.1 - 12.3 sec    INR 1.1    Immature Platelet Fraction    Collection Time: 12/25/21  3:45 AM   Result Value Ref Range    Platelet, Immature Fraction 18.1 (H) 1.1 - 10.3 %    Platelet, Fluorescence 55 (L) 138 - 453 k/uL   Vitamin D 25 Hydroxy    Collection Time: 12/25/21  6:31 AM   Result Value Ref Range    Vit D, 25-Hydroxy 10.4 (L) 30.0 - 100.0 ng/mL   Basic Metabolic Panel w/ Reflex to MG    Collection Time: 12/25/21  6:31 AM   Result Value Ref Range    Glucose 163 (H) 70 - 99 mg/dL    BUN 21 8 - 23 mg/dL    CREATININE 0.51 0.50 - 0.90 mg/dL    Bun/Cre Ratio NOT REPORTED 9 - 20    Calcium 7.7 (L) 8.6 - 10.4 mg/dL    Sodium 136 135 - 144 mmol/L    Potassium 4.5 3.7 - 5.3 mmol/L    Chloride 103 98 - 107 mmol/L    CO2 20 20 - 31 mmol/L    Anion Gap 13 9 - 17 mmol/L    GFR Non-African American >60 >60 mL/min    GFR African American >60 >60 mL/min    GFR Comment          GFR Staging NOT REPORTED    CBC Auto Differential    Collection Time: 12/25/21  6:31 AM   Result Value Ref Range    WBC 12.7 (H) 3.5 - 11.3 k/uL    RBC 3.85 (L) 3.95 - 5.11 m/uL    Hemoglobin 12.5 11.9 - 15.1 g/dL    Hematocrit 38.4 36.3 - 47.1 %    MCV 99.7 82.6 - 102.9 fL    MCH 32.5 25.2 - 33.5 pg    MCHC 32.6 28.4 - 34.8 g/dL    RDW 13.8 11.8 - 14.4 %    Platelets See Reflexed IPF Result 138 - 453 k/uL    MPV NOT REPORTED 8.1 - 13.5 fL    NRBC Automated 0.3 (H) 0.0 per 100 WBC    Differential Type NOT REPORTED     WBC Morphology NOT REPORTED     RBC Morphology NOT REPORTED     Platelet Estimate NOT REPORTED     Seg Neutrophils 79 (H) 36 - 65 %    Lymphocytes 15 (L) 24 - 43 %    Monocytes 2 (L) 3 - 12 %    Eosinophils % 0 (L) 1 - 4 %    Basophils 0 0 - 2 %    Immature Granulocytes 3 (H) 0 %    Segs Absolute 10.02 (H) 1.50 - 8.10 k/uL    Absolute Lymph # 1.95 1.10 - 3.70 k/uL    Absolute Mono # 0.30 0.10 - 1.20 k/uL    Absolute Eos # <0.03 0.00 - 0.44 k/uL    Basophils Absolute 0.04 0.00 - 0.20 k/uL    Absolute Immature Granulocyte 0.34 (H) 0.00 - 0.30 k/uL   Immature Platelet Fraction    Collection Time: 12/25/21  6:31 AM   Result Value Ref Range    Platelet, Immature Fraction 15.9 (H) 1.1 - 10.3 %    Platelet, Fluorescence 59 (L) 138 - 453 k/uL       Imaging/Diagnostics:  CT CHEST PULMONARY EMBOLISM W CONTRAST    Result Date: 12/24/2021  Motion degraded study. Acute pulmonary emboli suspected particularly in the left lower lobe but not well visualized due to motion artifact. There is evidence for right ventricular strain with septal bowing suggesting a significant clot burden. Moderate patchy ground-glass opacities involving all lobes of both lungs consistent with multifocal pneumonia typical for COVID pneumonia. Findings were discussed with Luis A Puckett at 5:39 pm on 12/24/2021. RECOMMENDATIONS: Unavailable       Assessment :      Hospital Problems           Last Modified POA    COVID-19 12/24/2021 Yes          Plan:     Patient status inpatient in the Progressive Unit/Step down    PE  -Continue heparin drip  -Vascular surgery following    COVID-19 pneumonia  -Continue Decadron, Rocephin, azithromycin, baricitinib  -Procalcitonin slightly elevated at 0.22    Acute hypoxic respiratory failure  -Management as above  -Patient currently on BiPAP  -Patient is okay with intubation if needed    Leukocytosis  -Management as above    Consultations:   [id number] [facility] CONSULT TO CRITICAL CARE  IP CONSULT TO HOSPITALIST  IP CONSULT TO INFECTIOUS DISEASES  IP CONSULT TO CARDIOLOGY  IP CONSULT TO PHARMACY     Patient is admitted as inpatient status because of co-morbidities listed above, severity of signs and symptoms as outlined, requirement for current medical therapies and most importantly because of direct risk to patient if care not provided in a hospital setting. Expected length of stay > 48 hours. Sandrita Salter MD  12/25/2021  10:01 AM    Copy sent to Dr. Castro primary care provider on file.

## 2021-12-25 NOTE — PLAN OF CARE
Discussed with ER resident and Dr. Zepeda Lupis regarding the ICU admission. C/o SOB, CT PE with subsegmental PE. Vascular evaluated, continuiing heparin drip, possible motion artifiact withuout concern for clot burden requiring intervention. COVID + with elevated inflammatory markers. On 100% Bipap with 100% saturation. Not in respiratory distress. Plan:  Recommending to wean down Fio2 to maintain saturation goal >92%  Unable to find any PMH of COPD. If stable on Bipap, patient can go to COVID step down in light of 'Disaster period'. If progressive respiratory failure, please call ICU to admit. Thank you    Reevaluation of patient, still on 100% fio2. Will admit patient to ICU, however will be boarded in the ED due to staff and bed deficiency.           Mariano Smart MD, PGY-3  Critical Care Resident  Internal Medicine Residency Program  [facility]  12/24/2021

## 2021-12-26 ENCOUNTER — APPOINTMENT (OUTPATIENT)
Dept: GENERAL RADIOLOGY | Age: 71
DRG: 871 | End: 2021-12-26
Payer: MEDICARE

## 2021-12-26 LAB
ABSOLUTE EOS #: <0.03 K/UL (ref 0–0.44)
ABSOLUTE IMMATURE GRANULOCYTE: 0.5 K/UL (ref 0–0.3)
ABSOLUTE LYMPH #: 1.93 K/UL (ref 1.1–3.7)
ABSOLUTE MONO #: 0.71 K/UL (ref 0.1–1.2)
ANION GAP SERPL CALCULATED.3IONS-SCNC: 9 MMOL/L (ref 9–17)
BASOPHILS # BLD: 0 % (ref 0–2)
BASOPHILS ABSOLUTE: <0.03 K/UL (ref 0–0.2)
BNP INTERPRETATION: ABNORMAL
BUN BLDV-MCNC: 27 MG/DL (ref 8–23)
BUN/CREAT BLD: ABNORMAL (ref 9–20)
C-REACTIVE PROTEIN: 36.8 MG/L (ref 0–5)
CALCIUM SERPL-MCNC: 8 MG/DL (ref 8.6–10.4)
CHLORIDE BLD-SCNC: 104 MMOL/L (ref 98–107)
CO2: 22 MMOL/L (ref 20–31)
CREAT SERPL-MCNC: 0.6 MG/DL (ref 0.5–0.9)
DIFFERENTIAL TYPE: ABNORMAL
EOSINOPHILS RELATIVE PERCENT: 0 % (ref 1–4)
GFR AFRICAN AMERICAN: >60 ML/MIN
GFR NON-AFRICAN AMERICAN: >60 ML/MIN
GFR SERPL CREATININE-BSD FRML MDRD: ABNORMAL ML/MIN/{1.73_M2}
GFR SERPL CREATININE-BSD FRML MDRD: ABNORMAL ML/MIN/{1.73_M2}
GLUCOSE BLD-MCNC: 159 MG/DL (ref 70–99)
HCT VFR BLD CALC: 37.2 % (ref 36.3–47.1)
HEMOGLOBIN: 12.2 G/DL (ref 11.9–15.1)
IMMATURE GRANULOCYTES: 4 %
LYMPHOCYTES # BLD: 17 % (ref 24–43)
MCH RBC QN AUTO: 33.1 PG (ref 25.2–33.5)
MCHC RBC AUTO-ENTMCNC: 32.8 G/DL (ref 28.4–34.8)
MCV RBC AUTO: 100.8 FL (ref 82.6–102.9)
MONOCYTES # BLD: 6 % (ref 3–12)
MRSA, DNA, NASAL: NORMAL
NRBC AUTOMATED: 0.2 PER 100 WBC
PARTIAL THROMBOPLASTIN TIME: 102.2 SEC (ref 20.5–30.5)
PARTIAL THROMBOPLASTIN TIME: 49.4 SEC (ref 20.5–30.5)
PARTIAL THROMBOPLASTIN TIME: 51.4 SEC (ref 20.5–30.5)
PARTIAL THROMBOPLASTIN TIME: 54.9 SEC (ref 20.5–30.5)
PARTIAL THROMBOPLASTIN TIME: >120 SEC (ref 20.5–30.5)
PDW BLD-RTO: 13.9 % (ref 11.8–14.4)
PLATELET # BLD: 95 K/UL (ref 138–453)
PLATELET ESTIMATE: ABNORMAL
PMV BLD AUTO: 11.7 FL (ref 8.1–13.5)
POTASSIUM SERPL-SCNC: 4.6 MMOL/L (ref 3.7–5.3)
PRO-BNP: 1664 PG/ML
RBC # BLD: 3.69 M/UL (ref 3.95–5.11)
RBC # BLD: ABNORMAL 10*6/UL
SEG NEUTROPHILS: 72 % (ref 36–65)
SEGMENTED NEUTROPHILS ABSOLUTE COUNT: 8.26 K/UL (ref 1.5–8.1)
SODIUM BLD-SCNC: 135 MMOL/L (ref 135–144)
SPECIMEN DESCRIPTION: NORMAL
WBC # BLD: 11.4 K/UL (ref 3.5–11.3)
WBC # BLD: ABNORMAL 10*3/UL

## 2021-12-26 PROCEDURE — 83880 ASSAY OF NATRIURETIC PEPTIDE: CPT

## 2021-12-26 PROCEDURE — 2700000000 HC OXYGEN THERAPY PER DAY

## 2021-12-26 PROCEDURE — 6360000002 HC RX W HCPCS: Performed by: STUDENT IN AN ORGANIZED HEALTH CARE EDUCATION/TRAINING PROGRAM

## 2021-12-26 PROCEDURE — 6360000002 HC RX W HCPCS: Performed by: FAMILY MEDICINE

## 2021-12-26 PROCEDURE — 2060000000 HC ICU INTERMEDIATE R&B

## 2021-12-26 PROCEDURE — 6370000000 HC RX 637 (ALT 250 FOR IP): Performed by: STUDENT IN AN ORGANIZED HEALTH CARE EDUCATION/TRAINING PROGRAM

## 2021-12-26 PROCEDURE — 2580000003 HC RX 258: Performed by: STUDENT IN AN ORGANIZED HEALTH CARE EDUCATION/TRAINING PROGRAM

## 2021-12-26 PROCEDURE — 71045 X-RAY EXAM CHEST 1 VIEW: CPT

## 2021-12-26 PROCEDURE — APPSS30 APP SPLIT SHARED TIME 16-30 MINUTES: Performed by: NURSE PRACTITIONER

## 2021-12-26 PROCEDURE — 36415 COLL VENOUS BLD VENIPUNCTURE: CPT

## 2021-12-26 PROCEDURE — 87086 URINE CULTURE/COLONY COUNT: CPT

## 2021-12-26 PROCEDURE — 99232 SBSQ HOSP IP/OBS MODERATE 35: CPT | Performed by: INTERNAL MEDICINE

## 2021-12-26 PROCEDURE — 94660 CPAP INITIATION&MGMT: CPT

## 2021-12-26 PROCEDURE — 99291 CRITICAL CARE FIRST HOUR: CPT | Performed by: INTERNAL MEDICINE

## 2021-12-26 PROCEDURE — 6370000000 HC RX 637 (ALT 250 FOR IP): Performed by: INTERNAL MEDICINE

## 2021-12-26 PROCEDURE — 2580000003 HC RX 258: Performed by: FAMILY MEDICINE

## 2021-12-26 PROCEDURE — 80048 BASIC METABOLIC PNL TOTAL CA: CPT

## 2021-12-26 PROCEDURE — 94761 N-INVAS EAR/PLS OXIMETRY MLT: CPT

## 2021-12-26 PROCEDURE — 85025 COMPLETE CBC W/AUTO DIFF WBC: CPT

## 2021-12-26 PROCEDURE — 99232 SBSQ HOSP IP/OBS MODERATE 35: CPT | Performed by: FAMILY MEDICINE

## 2021-12-26 PROCEDURE — 86140 C-REACTIVE PROTEIN: CPT

## 2021-12-26 PROCEDURE — 85730 THROMBOPLASTIN TIME PARTIAL: CPT

## 2021-12-26 RX ORDER — DEXAMETHASONE SODIUM PHOSPHATE 10 MG/ML
10 INJECTION INTRAMUSCULAR; INTRAVENOUS DAILY
Status: DISCONTINUED | OUTPATIENT
Start: 2021-12-31 | End: 2021-12-28

## 2021-12-26 RX ADMIN — BARICITINIB 4 MG: 2 TABLET, FILM COATED ORAL at 08:09

## 2021-12-26 RX ADMIN — HEPARIN SODIUM 3380 UNITS: 1000 INJECTION INTRAVENOUS; SUBCUTANEOUS at 16:08

## 2021-12-26 RX ADMIN — SODIUM CHLORIDE, PRESERVATIVE FREE 10 ML: 5 INJECTION INTRAVENOUS at 20:03

## 2021-12-26 RX ADMIN — SODIUM CHLORIDE, PRESERVATIVE FREE 10 ML: 5 INJECTION INTRAVENOUS at 08:11

## 2021-12-26 RX ADMIN — AZITHROMYCIN 500 MG: 250 TABLET, FILM COATED ORAL at 08:10

## 2021-12-26 RX ADMIN — Medication 18.96 UNITS/KG/HR: at 04:40

## 2021-12-26 RX ADMIN — DEXAMETHASONE SODIUM PHOSPHATE 6 MG: 10 INJECTION INTRAMUSCULAR; INTRAVENOUS at 08:09

## 2021-12-26 RX ADMIN — GUAIFENESIN DEXTROMETHORPHAN HYDROBROMIDE ORAL SOLUTION 5 ML: 200; 20 SOLUTION ORAL at 09:01

## 2021-12-26 RX ADMIN — DEXAMETHASONE SODIUM PHOSPHATE 20 MG: 10 INJECTION INTRAMUSCULAR; INTRAVENOUS at 15:12

## 2021-12-26 ASSESSMENT — ENCOUNTER SYMPTOMS
NAUSEA: 0
WHEEZING: 0
SHORTNESS OF BREATH: 1
CHEST TIGHTNESS: 0
RHINORRHEA: 0
BLOOD IN STOOL: 0
VOMITING: 0
CONSTIPATION: 0
COUGH: 0
DIARRHEA: 0
ABDOMINAL PAIN: 0

## 2021-12-26 ASSESSMENT — PAIN SCALES - GENERAL
PAINLEVEL_OUTOF10: 0

## 2021-12-26 NOTE — CARE COORDINATION
Transitional planning. Called son Saintclair Hawks for initial interview and left VM asking for a call back. Called juan Liu and his VM is not set up yet.

## 2021-12-26 NOTE — CONSULTS
Pulmonary/Critical Care consultation    Patient's name:  Lupe Cristobal  Medical Record Number: [id number]  Patient's account/billing number: [de-identified]  Patient's YOB: 1950  Age: 70 y.o. Date of Admission: 12/24/2021  3:37 PM  Date of Consult: 12/26/2021      Primary Care Physician: No primary care provider on file. Code Status: Full Code    Reason for consult: Acute hypoxemic respiratory failure secondary to COVID-19 pneumonia with ARDS along with PE involving subsegmental vessels of the left lower lobe      HISTORY OF PRESENT ILLNESS:   History was obtained from chart review and the patient. Lupe Cristobal is a 70 y.o. woman was admitted with complaints of shortness of breath and found to be Covid positive but also had a left lower lobe subsegmental pulmonary embolism with right ventricular strain pattern  Vascular surgery was consulted and it was felt, rightfully so, that the cause of the right ventricular strain is unlikely pulmonary embolism  Patient was admitted to the [facility] ICU  Requiring 95% oxygen via high flow  Has shortness of breath along with coughing  No wheezing  No chest pain or pressure  No hemoptysis  No orthopnea or PND        Past Medical History:  No past medical history on file. Past Surgical History:  No past surgical history on file. Allergies:    No Known Allergies      Home Meds:   Prior to Admission medications    Not on File       Family History:   No family history on file. Social History:   TOBACCO:   has no history on file for tobacco use. ETOH:   has no history on file for alcohol use. DRUGS:  has no history on file for drug use.   OCCUPATION:   Noncontributory          REVIEW OF SYSTEMS:    Review of Systems -   General ROS: Completed and except as mentioned above were negative   Psychological ROS:  Completed and except as mentioned above were negative  Ophthalmic ROS:  Completed and except as mentioned above were negative  ENT ROS:  Completed and except as mentioned above were negative  Allergy and Immunology ROS:  Completed and except as mentioned above were negative  Hematological and Lymphatic ROS:  Completed and except as mentioned above were negative  Endocrine ROS: Completed and except as mentioned above were negative  Breast ROS:  Completed and except as mentioned above were negative  Respiratory ROS:  Completed and except as mentioned above were negative  Cardiovascular ROS:  Completed and except as mentioned above were negative  Gastrointestinal ROS: Completed and except as mentioned above were negative  Genito-Urinary ROS:  Completed and except as mentioned above were negative  Musculoskeletal ROS:  Completed and except as mentioned above were negative  Neurological ROS:  Completed and except as mentioned above were negative  Dermatological ROS:  Completed and except as mentioned above were negative          Physical Exam:    Vitals: /89   Pulse 79   Temp 98.6 °F (37 °C) (Axillary)   Resp 23   Ht 5' 4\" (1.626 m)   Wt 188 lb 0.8 oz (85.3 kg)   SpO2 93%   Breastfeeding No   BMI 32.28 kg/m²     Last Body weight:   Wt Readings from Last 3 Encounters:   12/25/21 188 lb 0.8 oz (85.3 kg)       Body Mass Index : Body mass index is 32.28 kg/m².       Intake and Output summary:     Intake/Output Summary (Last 24 hours) at 12/26/2021 0818  Last data filed at 12/26/2021 0811  Gross per 24 hour   Intake 10 ml   Output 400 ml   Net -390 ml       Physical Examination:   PHYSICAL EXAMINATION:  Vitals:    12/26/21 0052 12/26/21 0345 12/26/21 0400 12/26/21 0812   BP: 118/86  126/89    Pulse: 82  79    Resp: 20 21 21 23   Temp: 98.2 °F (36.8 °C)  98.6 °F (37 °C)    TempSrc: Axillary  Axillary    SpO2: 97%  94% 93%   Weight:       Height:         Constitutional: This is a well developed, well nourished, 30-34.9 - Obesity Grade I 70y.o. year old female who is alert, oriented, cooperative and in no apparent distress. Head:normocephalic and atraumatic. EENT:   SOPHIA. No conjunctival injections. Septum was midline, mucosa was without erythema, exudates or cobblestoning. No thrush was noted. Mallampati III (soft palate, base of uvula visible)  Neck: Supple without thyromegaly. No elevated JVP. Trachea was midline. Respiratory: Chest was symmetrical without dullness to percussion. Breath sounds bilaterally were clear to auscultation. There were no wheezes, rhonchi or rales. There is no intercostal retraction or use of accessory muscles. No egophony noted. Cardiovascular: Regular without murmur, clicks, gallops or rubs. Abdomen: Slightly rounded and soft without organomegaly. No rebound tenderness, rigidity or guarding was appreciated. Lymphatic: No lymphadenopathy. Musculoskeletal: Normal curvature of the spine. No gross muscle weakness. Extremities:  No lower extremity edema, ulcerations, tenderness, varicosities or erythema. Muscle size, tone and strength are normal.  No involuntary movements are noted. Skin:  Warm and dry. Good color, turgor and pigmentation. No lesions or scars. No cyanosis or clubbing  Neurological/Psychiatric: The patient's general behavior, level of consciousness, thought content and emotional status is normal.            Laboratory findings:-    CBC:   Recent Labs     12/26/21  0642   WBC 11.4*   HGB 12.2   PLT 95*     BMP:    Recent Labs     12/25/21  0345 12/25/21  0345 12/25/21  0631 12/25/21  0631 12/26/21  0642      < > 136   < > 135   K 4.6   < > 4.5   < > 4.6      < > 103   < > 104   CO2 19*   < > 20   < > 22   BUN 21   < > 21   < > 27*   CREATININE 0.52  --  0.51  --  0.60   GLUCOSE 162*   < > 163*   < > 159*    < > = values in this interval not displayed. S. Calcium:  Recent Labs     12/26/21  0642   CALCIUM 8.0*     S. Ionized Calcium:No results for input(s): IONCA in the last 72 hours.   S. Magnesium:  Recent Labs     12/24/21  1556 MG 2.4     S. Phosphorus:No results for input(s): PHOS in the last 72 hours. S. Glucose:  Recent Labs     12/24/21  1552   POCGLU 173*     Glycosylated hemoglobin A1C: No results for input(s): LABA1C in the last 72 hours. INR:   Recent Labs     12/25/21  0345   INR 1.1     Hepatic functions:   Recent Labs     12/24/21  1556   ALKPHOS 112*   ALT 35*   AST 46*   PROT 6.3*   BILITOT 0.60   LABALBU 2.8*     Pancreatic functions:  Recent Labs     12/25/21  0345   LACTA NOT REPORTED     S. Lactic Acid:   Recent Labs     12/25/21  0345   LACTA NOT REPORTED     Cardiac enzymes:No results for input(s): CKTOTAL, CKMB, CKMBINDEX, TROPONINI in the last 72 hours. BNP:No results for input(s): BNP in the last 72 hours. Lipid profile: No results for input(s): CHOL, TRIG, HDL, LDL, LDLCALC in the last 72 hours. Blood Gases: No results found for: PH, PCO2, PO2, HCO3, O2SAT  Thyroid functions: No results found for: TSH         Microbiology:    Cultures during this admission:     Blood cultures:      [] None drawn      [x] Negative             []  Positive (Details:  )  Urine Culture:        [] None drawn      [] Negative             []  Positive (Details:  )  Sputum Culture:   [] None drawn       [] Negative             []  Positive (Details:  )     SARS-CoV-2 rapid test was positive    Radiological reports:    CT CHEST PULMONARY EMBOLISM W CONTRAST    Result Date: 12/24/2021  Motion degraded study. Acute pulmonary emboli suspected particularly in the left lower lobe but not well visualized due to motion artifact. There is evidence for right ventricular strain with septal bowing suggesting a significant clot burden. Moderate patchy ground-glass opacities involving all lobes of both lungs consistent with multifocal pneumonia typical for COVID pneumonia. Findings were discussed with Enrico Saenz at 5:39 pm on 12/24/2021. RECOMMENDATIONS: Unavailable           Assessment and Plan       IMPRESSION:   1. COVID-19    2. Dehydration    3. Tachycardia        Active Problems:    COVID-19  Resolved Problems:    * No resolved hospital problems. *       Acute hypoxic respiratory failure   Acute respiratory distress syndrome   COVID 19 infection/pneumonia   Left lower lobe segmental pulmonary embolism   Possible pulmonary artery hypertension secondary to COVID-19 pneumonia much more than any pulmonary embolism given the small magnitude of pulmonary       Plan:     Continue Airborne isolation   Continue high flow oxygen   Use BiPAP, if needed   Obtain X-ray chest as needed    Monitor input/output, with a goal of even/negative fluid balance   Patient receiving baricitinib   Continue Decadron   Monitor CRP, LDH, AST/ALT/ferritin/ D-dimer   Continue supportive care   GI/DVT prophylaxis   Glycemic control per primary service   Patient is on heparin infusion for pulmonary embolism. Can switch to Eliquis, if no anticipated procedures   On nutrition via oral diet  Management as per guidance provided by hospital policy and prevailing evidence based medicine, during the COVID-19 pandemic emergency. This patient was evaluated in the context of the global SARS-CoV-2 (COVID-19) pandemic, which necessitated considerations that the patient either has COVID-19 infection or is at risk of infection with COVID-19. Institutional protocols and algorithms that pertain to the evaluation & management of patients with COVID-19 or those at risk for COVID-19 are in a state of rapid changes based on information released by regulatory bodies including the CDC and federal and state organizations. These policies and algorithms were followed during the patient's care. Please note that this chart was generated using voice recognition Dragon dictation software. Although every effort was made to ensure the accuracy of this automated transcription, some errors in transcription may have occurred.     Thank you for having us involved in the care of your patient. Please call us if you have any questions or concerns. Total critical care time caring for this patient with life threatening, unstable organ failure, including direct patient contact, management of life support systems, review of data including imaging and labs, discussions with other team members and physicians at least 27  Min so far today, excluding procedures.       Nicki Echevarria MD,            12/26/2021, 8:18 AM

## 2021-12-26 NOTE — PROGRESS NOTES
[facility]  Office: [address], DO, Love Favre, DO, Grayland Runner, DO, Jolanta Eye Blood, DO, Rivera Pineda MD, Lety Raymundo MD, Andres Raines MD, Raisa Real MD, Tamica Cui MD, Bryant Bullard MD, Cuco Figueroa MD, Theresa Cooper, DO, Kevin Chong, DO, Murlean Gowers, MD,  Kamilah Calix, DO, Miky Acosta MD, Inge Holloway MD, Rigoberto Smith MD, Angelica So MD, Víctor Bliss MD, Gómez Fox MD, Blair Gutirerez MD, Genevieve Aguilera [facility], [address], [facility], Nicola Freeman, CNP, James Paula, CNS, Devin Shrestha, CNP, Abiola Toledo, CNP, Grace Santos, CNP, Kaylen Khoury, CNP, Magnolia Julian, CNP, Magdalena Hernandez PA-C, Karin Hernandez, DNP, Kenya Lacy, DNP, Mary Arevalo, CNP, Joe Swanson, CNP, Larry Menon, CNP, Coleman Estrada, CNP, Gage Strickland, CNP, Ibrahima Dougherty, [facility]      Daily Progress Note     Admit Date: 12/24/2021  Bed/Room No.  3005/3005-01  Admitting Physician : Andres Raines MD  Code Status :Full Code  Hospital Day:  LOS: 2 days   Chief Complaint:     Chief Complaint   Patient presents with    Shortness of Breath    Fall     Principal Problem:    COVID-19  Active Problems:    Pulmonary embolus, left [facility])  Resolved Problems:    * No resolved hospital problems. *    Subjective : Interval History/Significant events :  12/26/21    Patient continues to have difficulty in breathing and remains on high flow oxygen nasal cannula. She has decreased appetite, decreased energy, fatigue. Patient remains on 60 LPM, 95% FiO2. She desaturates quickly with position change. Vitals - Unstable afebrile  Labs -normal kidney function, normal electrolytes    Nursing notes , Consults notes reviewed. Overnight events and updates discussed with Nursing staff . Background History:         Dalila Chew is 70 y.o. female  Who was admitted to the hospital on 12/24/2021 for treatment of COVID-19.    Patient came to the hospital with lightheadedness and dry cough for 1 week. She had episode of fall at home and family called EMS. Initial evaluation by EMS found patient having tachypnea with respirate 40 to 50/min, hypoxia SPO2 50% on room air, tachycardia. Patient was placed on nonrebreather and improved only to 73%. Patient was then placed on BiPAP. Evaluation emergency room showed positive COVID-19 test, elevated troponin, elevated proBNP. CT chest showed subsegmental PE with possible right heart strain. Vascular surgery was consulted and recommended anticoagulation without need for thrombolysis. Patient also had thrombocytopenia. Cardiology was consulted and echocardiogram obtained which was negative for RV strain with RV systolic pressure 29 mmHg. PMH:  No past medical history on file. Allergies: No Known Allergies   Medications :  sodium chloride flush, 10 mL, IntraVENous, 2 times per day  cefTRIAXone (ROCEPHIN) IV, 1,000 mg, IntraVENous, Q24H  dexamethasone, 6 mg, IntraVENous, Q24H  baricitinib, 4 mg, Oral, Daily        Review of Systems   Review of Systems   Constitutional: Positive for activity change, appetite change and fatigue. Negative for fever and unexpected weight change. HENT: Negative for congestion, rhinorrhea and sneezing. Eyes: Negative for visual disturbance. Respiratory: Positive for shortness of breath. Negative for cough, chest tightness and wheezing. Cardiovascular: Negative for chest pain and palpitations. Gastrointestinal: Negative for abdominal pain, blood in stool, constipation, diarrhea, nausea and vomiting. Genitourinary: Negative for dysuria, enuresis, frequency and hematuria. Musculoskeletal: Negative for arthralgias and myalgias. Skin: Negative for rash. Neurological: Negative for dizziness, weakness, light-headedness and headaches. Hematological: Does not bruise/bleed easily. Psychiatric/Behavioral: Negative for dysphoric mood and sleep disturbance.      Objective : Current Vitals : Temp: 98.4 °F (36.9 °C),  Pulse: 78, Resp: 20, BP: 129/88, SpO2: 93 %  Last 24 Hrs Vitals   Patient Vitals for the past 24 hrs:   BP Temp Temp src Pulse Resp SpO2 Height Weight   12/26/21 0908 -- -- -- -- 20 93 % -- --   12/26/21 0812 -- -- -- -- 23 93 % -- --   12/26/21 0800 129/88 98.4 °F (36.9 °C) Oral 78 24 98 % -- --   12/26/21 0730 (!) 131/118 -- -- 85 23 96 % -- --   12/26/21 0700 120/85 -- -- 81 24 98 % -- --   12/26/21 0400 126/89 98.6 °F (37 °C) Axillary 79 21 94 % -- --   12/26/21 0345 -- -- -- -- 21 -- -- --   12/26/21 0052 118/86 98.2 °F (36.8 °C) Axillary 82 20 97 % -- --   12/25/21 2147 -- -- -- 90 30 91 % -- --   12/25/21 2100 121/85 98.2 °F (36.8 °C) Oral 85 23 91 % -- --   12/25/21 2039 -- -- -- -- 23 93 % -- --   12/25/21 1800 119/78 98.6 °F (37 °C) Oral 83 22 93 % -- --   12/25/21 1700 112/80 -- -- 82 24 90 % -- --   12/25/21 1600 103/79 98.5 °F (36.9 °C) Oral 80 20 93 % -- --   12/25/21 1500 106/74 -- -- 83 20 92 % -- --   12/25/21 1400 120/74 98.6 °F (37 °C) Oral 89 24 93 % -- --   12/25/21 1330 -- -- -- 83 22 95 % -- --   12/25/21 1300 115/77 -- -- 85 20 97 % -- --   12/25/21 1230 -- -- -- 87 25 93 % -- --   12/25/21 1200 112/75 98.4 °F (36.9 °C) -- 88 29 92 % -- --   12/25/21 1130 -- -- -- 89 28 (!) 89 % -- --   12/25/21 1108 -- -- -- -- (!) 31 91 % -- --   12/25/21 1100 118/86 -- -- 89 28 95 % -- --   12/25/21 1030 114/80 -- -- 90 30 94 % -- --   12/25/21 1000 121/88 98.4 °F (36.9 °C) Axillary 96 23 91 % -- --   12/25/21 0955 -- -- -- -- (!) 35 -- -- --   12/25/21 0951 -- 98.4 °F (36.9 °C) Axillary -- -- -- 5' 4\" (1.626 m) 188 lb 0.8 oz (85.3 kg)   12/25/21 0930 -- -- -- 88 -- 92 % -- --     Intake / output   12/25 0701 - 12/26 0700  In: -   Out: 400 [Urine:400]  Physical Exam:  Physical Exam  Vitals and nursing note reviewed. Constitutional:       General: She is not in acute distress. Appearance: She is obese. She is not diaphoretic.       Interventions: Nasal cannula in place. Comments: High flow oxygen nasal cannula   HENT:      Head: Normocephalic and atraumatic. Nose:      Right Sinus: No maxillary sinus tenderness or frontal sinus tenderness. Left Sinus: No maxillary sinus tenderness or frontal sinus tenderness. Mouth/Throat:      Pharynx: No oropharyngeal exudate. Eyes:      General: No scleral icterus. Conjunctiva/sclera: Conjunctivae normal.      Pupils: Pupils are equal, round, and reactive to light. Neck:      Thyroid: No thyromegaly. Vascular: No JVD. Cardiovascular:      Rate and Rhythm: Normal rate and regular rhythm. Pulses:           Dorsalis pedis pulses are 2+ on the right side and 2+ on the left side. Heart sounds: Normal heart sounds. No murmur heard. Pulmonary:      Effort: Pulmonary effort is normal.      Breath sounds: Normal breath sounds. No wheezing or rales. Abdominal:      Palpations: Abdomen is soft. There is no mass. Tenderness: There is no abdominal tenderness. Musculoskeletal:      Cervical back: Full passive range of motion without pain and neck supple. Lymphadenopathy:      Head:      Right side of head: No submandibular adenopathy. Left side of head: No submandibular adenopathy. Cervical: No cervical adenopathy. Skin:     General: Skin is warm. Neurological:      Mental Status: She is alert and oriented to person, place, and time. Motor: No tremor. Psychiatric:         Behavior: Behavior is cooperative. Laboratory findings:    Recent Labs     12/24/21  2215 12/24/21  2215 12/25/21  0345 12/25/21  0631 12/26/21  0642   WBC 14.6*   < > 14.0* 12.7* 11.4*   HGB 12.9   < > 13.0 12.5 12.2   HCT 38.7   < > 38.9 38.4 37.2   PLT 50*   < > See Reflexed IPF Result See Reflexed IPF Result 95*   INR 1.1  --  1.1  --   --     < > = values in this interval not displayed.      Recent Labs     12/24/21  1556 12/24/21  1556 12/25/21  0345 12/25/21  0631 12/26/21  0642   *   < > 135 136 135   K 4.1   < > 4.6 4.5 4.6   CL 96*   < > 106 103 104   CO2 17*   < > 19* 20 22   GLUCOSE 159*   < > 162* 163* 159*   BUN 29*   < > 21 21 27*   CREATININE 0.67   < > 0.52 0.51 0.60   MG 2.4  --   --   --   --    CALCIUM 8.4*   < > 7.7* 7.7* 8.0*    < > = values in this interval not displayed. Recent Labs     12/24/21  1556 12/24/21  2215   PROT 6.3*  --    LABALBU 2.8*  --    AST 46*  --    ALT 35*  --    LDH  --  528*   ALKPHOS 112*  --    BILITOT 0.60  --           No results found for: SPECGRAV, PROTEINU, RBCUA, BLOODU, BACTERIA, NITRU, WBCUA, LEUKOCYTESUR    Imaging / Clinical Data :-   CT CHEST PULMONARY EMBOLISM W CONTRAST    Result Date: 12/24/2021  Motion degraded study. Acute pulmonary emboli suspected particularly in the left lower lobe but not well visualized due to motion artifact. There is evidence for right ventricular strain with septal bowing suggesting a significant clot burden. Moderate patchy ground-glass opacities involving all lobes of both lungs consistent with multifocal pneumonia typical for COVID pneumonia. Findings were discussed with Peace Cm at 5:39 pm on 12/24/2021. RECOMMENDATIONS: Unavailable        Clinical Course : unchanged  Assessment and Plan  :        Acute PE -continue anticoagulation. No plan for thrombolysis. COVID-19 pneumonia -high-dose Decadron, baricitinib, GI prophylaxis, DVT prophylaxis,   Acute respiratory failure with hypoxia -high flow oxygen support, risk of worsening  Hyponatremia -resolved. Obesity -       Continue to monitor vitals , Intake / output ,  Cell count , HGB , Kidney function, oxygenation  as indicated . Plan and updates discussed with patient ,  answers  explained to satisfaction.    Plan discussed with Staff  RN     (Please note that portions of this note were completed with a voice recognition program. Efforts were made to edit the dictations but occasionally words are mis-transcribed.)      Akil Kenney MD  12/26/2021

## 2021-12-26 NOTE — PLAN OF CARE
Patient tolerating HF NC O2 at 60 L/ 100% O2. Contacted MD about diet- regular adult diet called in    aPTT at 8:00 am was 102. 2- held Hep drip for 1 hour and then restarted at 13.5 mL/kg/hr. New orders for aPTT at 1430 today. Spoke w/ patient's son Dixie Dougherty) at 0930 giving updates.

## 2021-12-26 NOTE — PLAN OF CARE
Problem: Airway Clearance - Ineffective  Goal: Achieve or maintain patent airway  12/25/2021 2140 by Lionel Collet, RCP  Outcome: Ongoing     Problem: Gas Exchange - Impaired  Goal: Absence of hypoxia  12/25/2021 2207 by Dudley Walsh RN  Outcome: Ongoing  12/25/2021 2140 by Lionel Collet, RCP  Outcome: Ongoing  Goal: Promote optimal lung function  12/25/2021 2207 by Dudley Walsh RN  Outcome: Ongoing  12/25/2021 2140 by Lionel Collet, RCP  Outcome: Ongoing     Problem: Breathing Pattern - Ineffective  Goal: Ability to achieve and maintain a regular respiratory rate  12/25/2021 2207 by Dudley Walsh RN  Outcome: Ongoing  12/25/2021 2140 by Lionel Collet, RCP  Outcome: Ongoing     Problem: Isolation Precautions - Risk of Spread of Infection  Goal: Prevent transmission of infection  Outcome: Ongoing     Problem: Fatigue  Goal: Verbalize increase energy and improved vitality  Outcome: Ongoing     Problem: OXYGENATION/RESPIRATORY FUNCTION  Goal: Patient will maintain patent airway  12/25/2021 0958 by Aravind Hooks RCP  Outcome: Ongoing  Goal: Patient will achieve/maintain normal respiratory rate/effort  Description: Respiratory rate and effort will be within normal limits for the patient  12/25/2021 0958 by Aravind Hooks RCP  Outcome: Ongoing

## 2021-12-26 NOTE — CONSULTS
[facility] - Initial Consult Note COVID 19 Patient  Today's Date and Time: 12/26/2021, 3:29 PM    Impression :     COVID 19 Confirmed Infection  Covid tests:  12/24/2021: Positive  Acute hypoxic respiratory failure  Acute left lower lobe segmental pulmonary emboli  Thrombocytopenia    Recommendations:   Antibiotic treatment:  Rocephin initiated 12/24/2021-discontinued 12/26/2021  Covid Rx:    Remdesivir-out of window  Decadron-high-dose initiated 12/24/2021  Baricitinib initiated 12/24/2021  Monoclonal antibodies-out of window      Medical Decision Making/Summary/Discussion:12/26/2021     Patient admitted with COVID 19 infection  Isolation continues until 1/14/2021    Infection Control Recommendations   Strong Precautions  Airborne isolation  Droplet Isolation    Antimicrobial Stewardship Recommendations     Simplification of therapy  Targeted therapy    Coordination of Outpatient Care:   Estimated Length of IV antimicrobials:TBD  Patient will need Midline Catheter Insertion: TBD  Patient will need PICC line Insertion: No  Patient will need: Home IV , [place],  SNF,  LTAC:TBD  Patient will need outpatient wound care:No    Chief complaint/reason for consultation:   Concern for COVID infection      History of Present Illness:   Nidhi Hopper is a 70y.o.-year-old female who was initially admitted on 12/24/2021. Patient seen at the request of Dr. Mor Sylvester:    Patient presented through ER via EMS after her family found her with severe shortness of breath in her shower. She had developed a cough approximately a week ago and according to the patient had a negative Covid test at that time. She was found to have an SPO2 of 50% on room air requiring BiPAP. A rapid Covid swab was positive    CTA of the chest showed segmental pulmonary embolism in the left lower lobe with possible right heart strain.    An echocardiogram was completed and was negative for RV strain with RV systolic pressure of 29 mmHg    The patient was initiated on a heparin drip as well as high-dose Decadron, Rocephin and baricitinib. Abnormal labs include:  .5    BNP 9968  Troponin 54  Lactic acid 3.5  Ferritin 858  WBC 14.6  D-dimer 70.48    CTA chest 12/24/2021:  Acute pulmonary emboli suspected particularly in the left lower lobe but not well visualized due to motion artifact. There is evidence for right ventricular strain with septal bowing suggesting a significant clot burden. Moderate patchy ground-glass opacities involving all lobes of both lungs consistent with multifocal pneumonia typical for COVID pneumonia. Patient admitted because of concerns with COVID 19.    CURRENT EVALUATION : 12/26/2021    Afebrile  VS stable    Patient exhibiting respiratory distress: Yes  Respiratory secretions:     Patient receiving supplemental oxygen: High flow nasal cannula  RR: 25  02 sat: 94    % FIO2: 95  Flow rate: 60 L/min  PEEP:      QTc:       NEWS Score: 0-4 Low risk group; 5-6: Medium risk group; 7 or above: High risk group  Parameters 3 2 1 0 1 2 3   Age    < 65   = 65   RR = 8  9-11 12-20  21-24 = 25   O2 Sats = 91 92-93 94-95 = 96      Suppl O2  Yes  No      SBP = 90  101-110 111-219   = 220   HR = 40  41-50 51-90  111-130 = 131   Consciousness    Alert   Drowsiness, lethargy, or confusion   Temperature = 35.0 C (95.0 F)  35.1-36.0 C 95.1-96.9 F 36.1-38.0 C 97.0-100.4 F 38.1-39.0 C 100.5-102.3 F = 39.1 C = 102.4 F      NEWS Score:  12/26/2021: 8 high risk    Overall Daily Picture:      Worsening    Presence of secondary bacterial Infection:  Yes  No   Additional antibiotics:     Labs, X rays reviewed: 12/26/2021    BUN:27  Cr:0.60    WBC:14.6-->12.7-->11.4  Hb:12.2  Plat: 95    Absolute Neutrophils:  Absolute Lymphocytes:  Neutrophil/Lymphocyte Ratio:     CRP: 114.5  Ferritin: 858  LDH: 528    Pro Calcitonin:      Cultures:  Urine:    Blood:    Sputum :    Wound:      CXR:     CAT:  12/24/21      Discussed with patient, RN, CC, IM. I have personally reviewed the past medical history, past surgical history, medications, social history, and family history, and I have updated the database accordingly. Past Medical History:   No past medical history on file. Past Surgical  History:   No past surgical history on file. Medications:      dexamethasone  20 mg IntraVENous Daily    Followed by   Cj Healy ON 12/31/2021] dexamethasone  10 mg IntraVENous Daily    sodium chloride flush  10 mL IntraVENous 2 times per day    cefTRIAXone (ROCEPHIN) IV  1,000 mg IntraVENous Q24H    baricitinib  4 mg Oral Daily       Social History:     Social History     Socioeconomic History    Marital status: Not on file     Spouse name: Not on file    Number of children: Not on file    Years of education: Not on file    Highest education level: Not on file   Occupational History    Not on file   Tobacco Use    Smoking status: Not on file    Smokeless tobacco: Not on file   Substance and Sexual Activity    Alcohol use: Not on file    Drug use: Not on file    Sexual activity: Not on file   Other Topics Concern    Not on file   Social History Narrative    Not on file     Social Determinants of Health     Financial Resource Strain:     Difficulty of Paying Living Expenses: Not on file   Food Insecurity:     Worried About Running Out of Food in the Last Year: Not on file    Jones of Food in the Last Year: Not on file   Transportation Needs:     Lack of Transportation (Medical): Not on file    Lack of Transportation (Non-Medical):  Not on file   Physical Activity:     Days of Exercise per Week: Not on file    Minutes of Exercise per Session: Not on file   Stress:     Feeling of Stress : Not on file   Social Connections:     Frequency of Communication with Friends and Family: Not on file    Frequency of Social Gatherings with Friends and Family: Not on file   Anton Attends Yazdanism Services: Not on file    Active Member of Clubs or Organizations: Not on file    Attends Club or Organization Meetings: Not on file    Marital Status: Not on file   Intimate Partner Violence:     Fear of Current or Ex-Partner: Not on file    Emotionally Abused: Not on file    Physically Abused: Not on file    Sexually Abused: Not on file   Housing Stability:     Unable to Pay for Housing in the Last Year: Not on file    Number of [place] in the Last Year: Not on file    Unstable Housing in the Last Year: Not on file       Family History:   No family history on file. Allergies:   Patient has no known allergies. Review of Systems:       Constitutional: No fevers or chills. No systemic complaints  Head: No headaches  Eyes: No double vision or blurry vision. No conjunctival inflammation. ENT: No sore throat or runny nose. . No hearing loss, tinnitus or vertigo. Cardiovascular: No chest pain or palpitations. No Shortness of breath. No VELARDE  Lung: No Shortness of breath or cough. No sputum production  Abdomen: No nausea, vomiting, diarrhea, or abdominal pain. Meryle Sandman No cramps. Genitourinary: No increased urinary frequency, or dysuria. No hematuria. No suprapubic or CVA pain  Musculoskeletal: No muscle aches or pains. No joint effusions, swelling or deformities  Hematologic: No bleeding or bruising. Neurologic: No headache, weakness, numbness, or tingling. Integument: No rash, no ulcers. Psychiatric: No depression. Endocrine: No polyuria, no polydipsia, no polyphagia.     Physical Examination :     Patient Vitals for the past 8 hrs:   BP Temp Temp src Pulse Resp SpO2   12/26/21 1515  98.6 °F (37 °C) Oral 84 25 94 %   12/26/21 1300    90     12/26/21 1145 121/78 98.2 °F (36.8 °C) Oral 91 28 (!) 87 %   12/26/21 1000 125/86   86 27 92 %   12/26/21 0908     20 93 %   12/26/21 0900 123/82 98.4 °F (36.9 °C) Oral 81 25 92 %   12/26/21 0812     23 93 %   12/26/21 0800 129/88 98.4 °F (36.9 °C) Oral 78 24 98 %   12/26/21 0730 (!) 131/118   85 23 96 %     General Appearance: Awake, alert, and in no apparent distress  Head:  Normocephalic, no trauma  Eyes: Pupils equal, round, reactive to light; sclera anicteric; conjunctivae pink. No embolic phenomena. ENT: Oropharynx clear, without erythema, exudate, or thrush. No tenderness of sinuses. Mouth/throat: mucosa pink and moist. No lesions. Dentition in good repair. Neck:Supple, without lymphadenopathy. Thyroid normal, No bruits. Pulmonary/Chest: Clear to auscultation, without wheezes, rales, or rhonchi. No dullness to percussion. Cardiovascular: Regular rate and rhythm without murmurs, rubs, or gallops. Abdomen: Soft, non tender. Bowel sounds normal. No organomegaly  All four Extremities: No cyanosis, clubbing, edema, or effusions. Neurologic: No gross sensory or motor deficits. Skin: Warm and dry with good turgor. No signs of peripheral arterial or venous insufficiency. No ulcerations. No open wounds. Medical Decision Making -Laboratory:   I have independently reviewed/ordered the following labs:    CBC with Differential:   Recent Labs     12/25/21  0631 12/26/21  0642   WBC 12.7* 11.4*   HGB 12.5 12.2   HCT 38.4 37.2   PLT See Reflexed IPF Result 95*   LYMPHOPCT 15* 17*   MONOPCT 2* 6     BMP:   Recent Labs     12/24/21  1556 12/25/21  0345 12/25/21  0631 12/26/21  0642   *   < > 136 135   K 4.1   < > 4.5 4.6   CL 96*   < > 103 104   CO2 17*   < > 20 22   BUN 29*   < > 21 27*   CREATININE 0.67   < > 0.51 0.60   MG 2.4  --   --   --     < > = values in this interval not displayed. Hepatic Function Panel:   Recent Labs     12/24/21  1556   PROT 6.3*   LABALBU 2.8*   BILITOT 0.60   ALKPHOS 112*   ALT 35*   AST 46*     No results for input(s): RPR in the last 72 hours. No results for input(s): HIV in the last 72 hours. No results for input(s): BC in the last 72 hours.   Lab Results   Component Value Date    RBC 3.69 12/26/2021    WBC 11.4 12/26/2021     Lab Results   Component Value Date    CREATININE 0.60 12/26/2021    GLUCOSE 159 12/26/2021       Medical Decision Making-Imaging:     Narrative   EXAMINATION:   CTA OF THE CHEST 12/24/2021 4:41 pm       TECHNIQUE:   CTA of the chest was performed after the administration of intravenous   contrast.  Multiplanar reformatted images are provided for review.  MIP   images are provided for review. Dose modulation, iterative reconstruction,   and/or weight based adjustment of the mA/kV was utilized to reduce the   radiation dose to as low as reasonably achievable.       COMPARISON:   None.       HISTORY:   ORDERING SYSTEM PROVIDED HISTORY: hypoxemia of 50 on RA and 73 on NRB at 15L;   tachycardia   TECHNOLOGIST PROVIDED HISTORY:   hypoxemia of 50 on RA and 73 on NRB at 15L; tachycardia   Decision Support Exception - unselect if not a suspected or confirmed   emergency medical condition->Emergency Medical Condition (MA)   Reason for Exam: hypoxemia of 50 on RA and 73 on NRB at 15L; tachycardia       FINDINGS:   Pulmonary Arteries: Pulmonary arteries are adequately opacified for   evaluation.  Study is motion degraded. Justa Plate are pulmonary emboli in   branches of the left lower lobe pulmonary artery.  Other pulmonary emboli may   be present but are not well visualized due to the respiratory motion.  Main   pulmonary artery is upper limits of normal in caliber.       Mediastinum: There is mild right paratracheal adenopathy.  There are mildly   enlarged nodes in the AP window.  There is evidence for right ventricular   strain with septal bowing.  There is no acute abnormality of the thoracic   aorta.       Lungs/pleura:  There are patchy ground-glass opacities involving all lobes of   both lungs primarily in a peripheral and subpleural distribution.  There is   no pneumothorax or pleural fluid.       Upper Abdomen: Limited images of the upper abdomen are motion degraded and   grossly unremarkable.       Soft Tissues/Bones: No acute bone or soft tissue abnormality.           Impression   Motion degraded study.  Acute pulmonary emboli suspected particularly in the   left lower lobe but not well visualized due to motion artifact.       There is evidence for right ventricular strain with septal bowing suggesting   a significant clot burden.       Moderate patchy ground-glass opacities involving all lobes of both lungs   consistent with multifocal pneumonia typical for COVID pneumonia.       Findings were discussed with Jyothi Cavazos at 5:39 pm on 12/24/2021.       RECOMMENDATIONS:   Unavailable           Medical Decision [id number]:       Medical Decision Making-Other:     Note:  Labs, medications, radiologic studies were reviewed with personal review of films  Large amounts of data were reviewed  Discussed with nursing Staff, Discharge planner  Infection Control and Prevention measures reviewed  All prior entries were reviewed  Administer medications as ordered  Prognosis: Guarded  Discharge planning reviewed      Thank you for allowing us to participate in the care of this patient. Please call with questions. MORENITA Guerrero - CNP  Pager: [phone] - Office: [phone]    ATTESTATION:    I have discussed the case, including pertinent history and exam findings with the APRN. I have evaluated the  History, physical findings and pictures of the patient and the key elements of the encounter have been performed by me. I have reviewed the laboratory data, other diagnostic studies and discussed them with the APRN. I have updated the medical record where necessary. I agree with the assessment, plan and orders as documented by the APRN.     Makr Yeung MD.

## 2021-12-26 NOTE — PROGRESS NOTES
ECHO reviewed and low risk  No further cardiac workup at this time  Please call with further questions or concerns

## 2021-12-26 NOTE — PLAN OF CARE
Problem: Airway Clearance - Ineffective  Goal: Achieve or maintain patent airway  Outcome: Ongoing     Problem: Gas Exchange - Impaired  Goal: Absence of hypoxia  Outcome: Ongoing     Problem: Gas Exchange - Impaired  Goal: Promote optimal lung function  Outcome: Ongoing     Problem: Breathing Pattern - Ineffective  Goal: Ability to achieve and maintain a regular respiratory rate  Outcome: Ongoing     NON INVASIVE VENTILATION    PROVIDE OPTIMAL VENTILATION/ACCEPTABLE SP02  IMPLEMENT NON INVASIVE VENTILATION PROTOCOL  ASSESSMENT SKIN INTEGRITY  PATIENT EDUCATION AS NEEDED  BIPAP AS NEEDED

## 2021-12-27 LAB
ABSOLUTE EOS #: <0.03 K/UL (ref 0–0.44)
ABSOLUTE IMMATURE GRANULOCYTE: 0.33 K/UL (ref 0–0.3)
ABSOLUTE LYMPH #: 1.23 K/UL (ref 1.1–3.7)
ABSOLUTE MONO #: 0.58 K/UL (ref 0.1–1.2)
ALBUMIN SERPL-MCNC: 2.8 G/DL (ref 3.5–5.2)
ALBUMIN/GLOBULIN RATIO: 0.9 (ref 1–2.5)
ALP BLD-CCNC: 81 U/L (ref 35–104)
ALT SERPL-CCNC: 37 U/L (ref 5–33)
ANION GAP SERPL CALCULATED.3IONS-SCNC: 11 MMOL/L (ref 9–17)
AST SERPL-CCNC: 37 U/L
BASOPHILS # BLD: 0 % (ref 0–2)
BASOPHILS ABSOLUTE: <0.03 K/UL (ref 0–0.2)
BILIRUB SERPL-MCNC: 0.56 MG/DL (ref 0.3–1.2)
BILIRUBIN DIRECT: 0.17 MG/DL
BILIRUBIN, INDIRECT: 0.39 MG/DL (ref 0–1)
BUN BLDV-MCNC: 31 MG/DL (ref 8–23)
BUN/CREAT BLD: ABNORMAL (ref 9–20)
C-REACTIVE PROTEIN: 18.8 MG/L (ref 0–5)
CALCIUM SERPL-MCNC: 8.2 MG/DL (ref 8.6–10.4)
CHLORIDE BLD-SCNC: 106 MMOL/L (ref 98–107)
CO2: 20 MMOL/L (ref 20–31)
CREAT SERPL-MCNC: 0.6 MG/DL (ref 0.5–0.9)
CULTURE: NORMAL
DIFFERENTIAL TYPE: ABNORMAL
EKG ATRIAL RATE: 126 BPM
EKG P AXIS: 43 DEGREES
EKG P-R INTERVAL: 152 MS
EKG Q-T INTERVAL: 322 MS
EKG QRS DURATION: 78 MS
EKG QTC CALCULATION (BAZETT): 466 MS
EKG R AXIS: -25 DEGREES
EKG T AXIS: -19 DEGREES
EKG VENTRICULAR RATE: 126 BPM
EOSINOPHILS RELATIVE PERCENT: 0 % (ref 1–4)
GFR AFRICAN AMERICAN: >60 ML/MIN
GFR NON-AFRICAN AMERICAN: >60 ML/MIN
GFR SERPL CREATININE-BSD FRML MDRD: ABNORMAL ML/MIN/{1.73_M2}
GFR SERPL CREATININE-BSD FRML MDRD: ABNORMAL ML/MIN/{1.73_M2}
GLOBULIN: ABNORMAL G/DL (ref 1.5–3.8)
GLUCOSE BLD-MCNC: 161 MG/DL (ref 70–99)
HCT VFR BLD CALC: 36.6 % (ref 36.3–47.1)
HEMOGLOBIN: 12.1 G/DL (ref 11.9–15.1)
IMMATURE GRANULOCYTES: 3 %
LYMPHOCYTES # BLD: 12 % (ref 24–43)
Lab: NORMAL
MCH RBC QN AUTO: 33.2 PG (ref 25.2–33.5)
MCHC RBC AUTO-ENTMCNC: 33.1 G/DL (ref 28.4–34.8)
MCV RBC AUTO: 100.3 FL (ref 82.6–102.9)
MONOCYTES # BLD: 6 % (ref 3–12)
NRBC AUTOMATED: 0 PER 100 WBC
PARTIAL THROMBOPLASTIN TIME: 105.2 SEC (ref 20.5–30.5)
PARTIAL THROMBOPLASTIN TIME: 39.9 SEC (ref 20.5–30.5)
PARTIAL THROMBOPLASTIN TIME: 46.4 SEC (ref 20.5–30.5)
PARTIAL THROMBOPLASTIN TIME: 84.8 SEC (ref 20.5–30.5)
PDW BLD-RTO: 14.2 % (ref 11.8–14.4)
PLATELET # BLD: 127 K/UL (ref 138–453)
PLATELET ESTIMATE: ABNORMAL
PMV BLD AUTO: 12 FL (ref 8.1–13.5)
POTASSIUM SERPL-SCNC: 5 MMOL/L (ref 3.7–5.3)
RBC # BLD: 3.65 M/UL (ref 3.95–5.11)
RBC # BLD: ABNORMAL 10*6/UL
SEG NEUTROPHILS: 79 % (ref 36–65)
SEGMENTED NEUTROPHILS ABSOLUTE COUNT: 7.88 K/UL (ref 1.5–8.1)
SODIUM BLD-SCNC: 137 MMOL/L (ref 135–144)
SPECIMEN DESCRIPTION: NORMAL
TOTAL PROTEIN: 5.9 G/DL (ref 6.4–8.3)
WBC # BLD: 10 K/UL (ref 3.5–11.3)
WBC # BLD: ABNORMAL 10*3/UL

## 2021-12-27 PROCEDURE — 94761 N-INVAS EAR/PLS OXIMETRY MLT: CPT

## 2021-12-27 PROCEDURE — 6370000000 HC RX 637 (ALT 250 FOR IP): Performed by: INTERNAL MEDICINE

## 2021-12-27 PROCEDURE — 86140 C-REACTIVE PROTEIN: CPT

## 2021-12-27 PROCEDURE — 99291 CRITICAL CARE FIRST HOUR: CPT | Performed by: INTERNAL MEDICINE

## 2021-12-27 PROCEDURE — 6360000002 HC RX W HCPCS: Performed by: FAMILY MEDICINE

## 2021-12-27 PROCEDURE — 2580000003 HC RX 258: Performed by: FAMILY MEDICINE

## 2021-12-27 PROCEDURE — 36415 COLL VENOUS BLD VENIPUNCTURE: CPT

## 2021-12-27 PROCEDURE — 2060000000 HC ICU INTERMEDIATE R&B

## 2021-12-27 PROCEDURE — 85730 THROMBOPLASTIN TIME PARTIAL: CPT

## 2021-12-27 PROCEDURE — 2580000003 HC RX 258: Performed by: STUDENT IN AN ORGANIZED HEALTH CARE EDUCATION/TRAINING PROGRAM

## 2021-12-27 PROCEDURE — 85025 COMPLETE CBC W/AUTO DIFF WBC: CPT

## 2021-12-27 PROCEDURE — 94660 CPAP INITIATION&MGMT: CPT

## 2021-12-27 PROCEDURE — 93010 ELECTROCARDIOGRAM REPORT: CPT | Performed by: INTERNAL MEDICINE

## 2021-12-27 PROCEDURE — 80076 HEPATIC FUNCTION PANEL: CPT

## 2021-12-27 PROCEDURE — 6360000002 HC RX W HCPCS: Performed by: STUDENT IN AN ORGANIZED HEALTH CARE EDUCATION/TRAINING PROGRAM

## 2021-12-27 PROCEDURE — 99232 SBSQ HOSP IP/OBS MODERATE 35: CPT | Performed by: FAMILY MEDICINE

## 2021-12-27 PROCEDURE — 99233 SBSQ HOSP IP/OBS HIGH 50: CPT | Performed by: INTERNAL MEDICINE

## 2021-12-27 PROCEDURE — 80048 BASIC METABOLIC PNL TOTAL CA: CPT

## 2021-12-27 PROCEDURE — 2700000000 HC OXYGEN THERAPY PER DAY

## 2021-12-27 RX ORDER — HYDROCODONE POLISTIREX AND CHLORPHENIRAMINE POLISTIREX 10; 8 MG/5ML; MG/5ML
5 SUSPENSION, EXTENDED RELEASE ORAL EVERY 12 HOURS PRN
Status: DISCONTINUED | OUTPATIENT
Start: 2021-12-27 | End: 2022-01-02

## 2021-12-27 RX ORDER — HYDROCODONE POLISTIREX AND CHLORPHENIRAMINE POLISTIREX 10; 8 MG/5ML; MG/5ML
5 SUSPENSION, EXTENDED RELEASE ORAL EVERY 12 HOURS PRN
Status: DISCONTINUED | OUTPATIENT
Start: 2021-12-27 | End: 2022-01-08

## 2021-12-27 RX ADMIN — DEXAMETHASONE SODIUM PHOSPHATE 20 MG: 10 INJECTION INTRAMUSCULAR; INTRAVENOUS at 08:28

## 2021-12-27 RX ADMIN — Medication 16 UNITS/KG/HR: at 05:36

## 2021-12-27 RX ADMIN — SODIUM CHLORIDE, PRESERVATIVE FREE 10 ML: 5 INJECTION INTRAVENOUS at 20:37

## 2021-12-27 RX ADMIN — HEPARIN SODIUM 3380 UNITS: 1000 INJECTION INTRAVENOUS; SUBCUTANEOUS at 02:27

## 2021-12-27 RX ADMIN — HEPARIN SODIUM 3380 UNITS: 1000 INJECTION INTRAVENOUS; SUBCUTANEOUS at 17:32

## 2021-12-27 RX ADMIN — BARICITINIB 4 MG: 2 TABLET, FILM COATED ORAL at 08:28

## 2021-12-27 RX ADMIN — SODIUM CHLORIDE, PRESERVATIVE FREE 10 ML: 5 INJECTION INTRAVENOUS at 08:28

## 2021-12-27 RX ADMIN — Medication 5 ML: at 20:48

## 2021-12-27 ASSESSMENT — PAIN SCALES - GENERAL
PAINLEVEL_OUTOF10: 0

## 2021-12-27 ASSESSMENT — ENCOUNTER SYMPTOMS
CHEST TIGHTNESS: 0
BLOOD IN STOOL: 0
CONSTIPATION: 0
COLOR CHANGE: 0
COUGH: 1
COUGH: 0
DIARRHEA: 0
RHINORRHEA: 0
VOMITING: 0
EYE ITCHING: 0
APNEA: 0
ABDOMINAL DISTENTION: 0
ABDOMINAL PAIN: 0
NAUSEA: 0
WHEEZING: 0
SHORTNESS OF BREATH: 1

## 2021-12-27 NOTE — CARE COORDINATION
Case Management Initial Discharge Plan  Jessica Or,             Met with: son via telephone to discuss discharge plans. Information verified: address, contacts, phone number, , insurance Yes  Insurance Provider: Medicare    Emergency Contact/Next of Kin name & number: Marc Clark (son) [phone]  Who are involved in patient's support system? family    PCP: No primary care provider on file. Date of last visit:       Discharge Planning    Living Arrangements:  [id number] 1 [address]  3 stairs to climb to get into front door    Patient able to perform ADL's:Independent    Current Services (outpatient & in home) none  DME equipment: none    Is patient receiving oral anticoagulation therapy? No    Potential Assistance Needed:  Long Term Acute Care    Patient agreeable to home care: No  [place] of choice provided:  n/a    Prior SNF/Rehab Placement and Facility:   Agreeable to SNF/Rehab: No  [place] of choice provided: n/a     Evaluation: no    Expected Discharge date:       Patient expects to be discharged to: If home: is the family and/or caregiver wiling & able to provide support at home? yes  Who will be providing this support? Son, Marc Clark    Follow Up Appointment: Best Day/ Time:      Transportation provider: Marc Clark  Transportation arrangements needed for discharge: Yes    Readmission Risk              Risk of Unplanned Readmission:  13             Does patient have a readmission risk score greater than 14?: No  If yes, follow-up appointment must be made within 7 days of discharge. Goals of Care: breathe easier      Educated Marc Husbands on transitional options, provided freedom of choice and are agreeable with plan      Discharge Plan: return home with Marc Laytons. Discussed the possibility of pt requiring LTAC at discharge.  Marc Laytons agreeable if needed          Electronically signed by Kay Mendez RN on 21 at 9:23 AM EST

## 2021-12-27 NOTE — PROGRESS NOTES
Writer received call from pts son, Karla, for update on his mothers condition. Writer updated son on current oxygen settings, use of bipap, vital signs et current plan of care. Questions answered.

## 2021-12-27 NOTE — PLAN OF CARE
Problem: Airway Clearance - Ineffective  Goal: Achieve or maintain patent airway  Outcome: Ongoing     Problem: Gas Exchange - Impaired  Goal: Absence of hypoxia  Outcome: Ongoing  Goal: Promote optimal lung function  Outcome: Ongoing     Problem: Breathing Pattern - Ineffective  Goal: Ability to achieve and maintain a regular respiratory rate  Outcome: Ongoing     Problem:  Body Temperature -  Risk of, Imbalanced  Goal: Ability to maintain a body temperature within defined limits  Outcome: Ongoing  Goal: Will regain or maintain usual level of consciousness  Outcome: Ongoing  Goal: Complications related to the disease process, condition or treatment will be avoided or minimized  Outcome: Ongoing     Problem: Isolation Precautions - Risk of Spread of Infection  Goal: Prevent transmission of infection  Outcome: Ongoing     Problem: Nutrition Deficits  Goal: Optimize nutritional status  Outcome: Ongoing     Problem: Risk for Fluid Volume Deficit  Goal: Maintain normal heart rhythm  Outcome: Ongoing  Goal: Maintain absence of muscle cramping  Outcome: Ongoing  Goal: Maintain normal serum potassium, sodium, calcium, phosphorus, and pH  Outcome: Ongoing     Problem: Loneliness or Risk for Loneliness  Goal: Demonstrate positive use of time alone when socialization is not possible  Outcome: Ongoing     Problem: Fatigue  Goal: Verbalize increase energy and improved vitality  Outcome: Ongoing     Problem: Patient Education: Go to Patient Education Activity  Goal: Patient/Family Education  Outcome: Ongoing     Problem: OXYGENATION/RESPIRATORY FUNCTION  Goal: Patient will maintain patent airway  Outcome: Ongoing  Goal: Patient will achieve/maintain normal respiratory rate/effort  Description: Respiratory rate and effort will be within normal limits for the patient  Outcome: Ongoing     Problem: Skin Integrity:  Goal: Will show no infection signs and symptoms  Description: Will show no infection signs and symptoms  Outcome: Ongoing  Goal: Absence of new skin breakdown  Description: Absence of new skin breakdown  Outcome: Ongoing

## 2021-12-27 NOTE — PROGRESS NOTES
[facility] -   Infectious diseases evaluation  admission date 12/24/2021    reason for consultation:   covid +    Impression :   Current:  · covid pneumonia   · PE -  left lower lobe segmental emboli -  · bandemia  · Thrombocytopenia    Other:  ·   Discussion / summary of stay / plan of care   ·   Recommendations   · barcitinib 12/24 -  · Decadron 6 mg daily 12/24  · Anticoagulation  · Very poor outcome due to high oxygen requirement despite aggressive therapy  · CRP improved, but clinically she has not improved    Infection Control Recommendations   · [place] Precautions  · Contact Isolation   · Airborne isolation  · Droplet Isolation  Antimicrobial Stewardship Recommendations   · Off AB    Coordination ofOutpatient Care:   · Estimated Length of IV antimicrobials:  · Patient will need Midline / picc Catheter Insertion:   · Patient will need SNF:  · Patient will need outpatient wound care:     History of Present Illness:   Initial history:  Bentley Leon is a 70y.o.-year-old female Patient presented through ER via EMS after her family found her with severe shortness of breath in her shower. She had developed a cough approximately a week ago and according to the patient had a negative Covid test at that time. She was found to have an SPO2 of 50% on room air requiring BiPAP. A rapid Covid swab was positive  CTA of the chest showed segmental pulmonary embolism in the left lower lobe with possible right heart strain. An echocardiogram was completed and was negative for RV strain with RV systolic pressure of 29 mmHg  The patient was initiated on a heparin drip as well as high-dose Decadron, Rocephin and baricitinib.        Interval changes  12/27/2021   Patient Vitals for the past 8 hrs:   BP Temp Temp src Pulse Resp SpO2   12/27/21 1600 (!) 139/92 97.2 °F (36.2 °C) Oral 62 17 99 %   12/27/21 1408 -- -- -- -- -- 96 %   12/27/21 1200 110/79 97.3 °F (36.3 °C) Oral 67 19 98 %   12/27/21 0830 125/89 97.2 °F (36.2 °C) Oral 84 20 92 %   CRP 18-improved  WBC 10  Blood culture still negative, chest x-ray positive diffuse pneumonia  Patient is on baricitinib, but saturation 86% she is on 90% high flow    Summary of relevant labs:  Labs:  .5-18-    BNP 9968  Troponin 54  Lactic acid 3.5  Ferritin 858  WBC 14.6  D-dimer 70.48    Micro:    Imaging:  CT chest bilat covid pneumonia w thick consolidations    I have personally reviewed the past medical history, past surgical history, medications, social history, and family history, and I haveupdated the database accordingly. Allergies:   Patient has no known allergies. Review of Systems:     Review of Systems   Constitutional: Positive for activity change. HENT: Negative for congestion. Eyes: Negative for itching. Respiratory: Positive for cough and shortness of breath. Negative for apnea. Cardiovascular: Negative for chest pain. Gastrointestinal: Negative for abdominal distention. Endocrine: Negative for heat intolerance. Genitourinary: Negative for dysuria. Musculoskeletal: Negative for arthralgias. Skin: Negative for color change. Allergic/Immunologic: Negative for immunocompromised state. Neurological: Negative for dizziness, seizures and numbness. Hematological: Negative for adenopathy. Psychiatric/Behavioral: Negative for agitation. Physical Examination :       Physical Exam  Constitutional:       Appearance: Normal appearance. She is not ill-appearing. HENT:      Head: Normocephalic and atraumatic. Nose: Nose normal.      Mouth/Throat:      Mouth: Mucous membranes are moist.   Eyes:      General: No scleral icterus. Conjunctiva/sclera: Conjunctivae normal.   Cardiovascular:      Rate and Rhythm: Normal rate and regular rhythm. Heart sounds: Normal heart sounds. No murmur heard. Pulmonary:      Breath sounds: Normal breath sounds.    Abdominal:      General: There is no distension. Tenderness: There is no abdominal tenderness. Genitourinary:     Comments: No castro  Musculoskeletal:         General: No swelling, deformity or signs of injury. Cervical back: Neck supple. No rigidity. Skin:     General: Skin is dry. Coloration: Skin is not jaundiced or pale. Findings: No erythema. Neurological:      General: No focal deficit present. Mental Status: She is alert and oriented to person, place, and time. Psychiatric:         Thought Content: Thought content normal.         Past Medical History:   No past medical history on file. Past Surgical  History:   No past surgical history on file. Medications:      dexamethasone  20 mg IntraVENous Daily    Followed by   Melissa Andersen ON 12/31/2021] dexamethasone  10 mg IntraVENous Daily    sodium chloride flush  10 mL IntraVENous 2 times per day    baricitinib  4 mg Oral Daily       Social History:     Social History     Socioeconomic History    Marital status: Unknown     Spouse name: Not on file    Number of children: Not on file    Years of education: Not on file    Highest education level: Not on file   Occupational History    Not on file   Tobacco Use    Smoking status: Not on file    Smokeless tobacco: Not on file   Substance and Sexual Activity    Alcohol use: Not on file    Drug use: Not on file    Sexual activity: Not on file   Other Topics Concern    Not on file   Social History Narrative    Not on file     Social Determinants of Health     Financial Resource Strain:     Difficulty of Paying Living Expenses: Not on file   Food Insecurity:     Worried About Running Out of Food in the Last Year: Not on file    Jones of Food in the Last Year: Not on file   Transportation Needs:     Lack of Transportation (Medical): Not on file    Lack of Transportation (Non-Medical):  Not on file   Physical Activity:     Days of Exercise per Week: Not on file    Minutes of Exercise per the document can still have some errors including those of syntax and sound a like substitutions which may escape proof reading. It such instances, actual meaningcan be extrapolated by contextual diversion.     Joaquin Simon MD  Office: [phone]  Perfect serve / office [phone]      '

## 2021-12-27 NOTE — PROGRESS NOTES
[facility]  Office: [address], DO, Jordyn March, DO, Guilherme Montelongo, DO, Rosalind Abraham Blood, DO, Luca Jaramillo MD, Marbin Montoya MD, Krys Foy MD, Derian Bolanos MD, Yvette King MD, Torres Brownlee MD, Gen Campos MD, Josette Noonan, DO, Iveth Judd, DO, Basil Dahl MD,  Patience Rivera, DO, Pearl Benitez MD, Joy Schmidt MD, Marielle Grigsby MD, Sandy Plasencia MD, Jacquelyn Rodrigues MD, Kg Oleary MD, Xochilt Gage MD, Kaz Weir, [facility], [address], [facility], Hung Carlos, CNP, [facility] Kohler, CNS, Anisa Tena, CNP, Marc Knapp, CNP, Betzadia Mejia, CNP, Lynodn Romano, CNP, Isis Escobar, CNP, Tello Viveros PA-C, Riley Castellanos, [facility], Claudette Anis, [facility], Ezekiel Gresham, CNP, Deni Posada, CNP, Brian Agudelo, CNP, Roseline Garcia, CNP, Rubin Rodrigez, [facility], Diaz Gear, [address] [place] [address]      Daily Progress Note     Admit Date: 12/24/2021  Bed/Room No.  3005/3005-01  Admitting Physician : Krys Foy MD  Code Status :Full Code  Hospital Day:  LOS: 3 days   Chief Complaint:     Chief Complaint   Patient presents with    Shortness of Breath    Fall     Principal Problem:    COVID-19  Active Problems:    Pulmonary embolus, left [facility])  Resolved Problems:    * No resolved hospital problems. *    Subjective : Interval History/Significant events :  12/27/21    Patient continues to be on high flow currently at 50 LPM, 90% FiO2. Patient is alert, oriented. She feels breathing is improved. Vitals - Unstable afebrile  Labs -normal kidney function, normal electrolytes, CRP decreased. Nursing notes , Consults notes reviewed. Overnight events and updates discussed with Nursing staff . Background History:         Vilma Paulino is 70 y.o. female  Who was admitted to the hospital on 12/24/2021 for treatment of COVID-19. Patient came to the hospital with lightheadedness and dry cough for 1 week.   She had episode of fall at home and family Objective :      Current Vitals : Temp: 98.1 °F (36.7 °C),  Pulse: 62, Resp: 19, BP: (!) 138/95, SpO2: 97 %  Last 24 Hrs Vitals   Patient Vitals for the past 24 hrs:   BP Temp Temp src Pulse Resp SpO2 Weight   12/27/21 0600 -- -- -- -- -- -- 195 lb 15.8 oz (88.9 kg)   12/27/21 0400 (!) 138/95 98.1 °F (36.7 °C) Axillary 62 19 97 % --   12/27/21 0341 -- -- -- -- 18 -- --   12/27/21 0030 135/83 98 °F (36.7 °C) Axillary 81 28 (!) 87 % --   12/26/21 2000 (!) 131/92 98.3 °F (36.8 °C) Oral 83 24 93 % --   12/26/21 1630 -- 97.9 °F (36.6 °C) Axillary 88 24 90 % --   12/26/21 1559 -- -- -- -- 24 93 % --   12/26/21 1515 -- 98.6 °F (37 °C) Oral 88 25 94 % --   12/26/21 1300 -- -- -- 90 -- -- --   12/26/21 1145 121/78 98.2 °F (36.8 °C) Oral 91 28 (!) 87 % --   12/26/21 1000 125/86 -- -- 86 27 92 % --   12/26/21 0908 -- -- -- -- 20 93 % --   12/26/21 0900 123/82 98.4 °F (36.9 °C) Oral 81 25 92 % --   12/26/21 0812 -- -- -- -- 23 93 % --   12/26/21 0800 129/88 98.4 °F (36.9 °C) Oral 78 24 98 % --     Intake / output   12/26 0701 - 12/27 0700  In: 134.2 [I.V.:134.2]  Out: 1300 [Urine:1300]  Physical Exam:  Physical Exam  Vitals and nursing note reviewed. Constitutional:       General: She is not in acute distress. Appearance: She is obese. She is not diaphoretic. Interventions: Nasal cannula in place. Comments: High flow oxygen nasal cannula   HENT:      Head: Normocephalic and atraumatic. Nose:      Right Sinus: No maxillary sinus tenderness or frontal sinus tenderness. Left Sinus: No maxillary sinus tenderness or frontal sinus tenderness. Mouth/Throat:      Pharynx: No oropharyngeal exudate. Eyes:      General: No scleral icterus. Conjunctiva/sclera: Conjunctivae normal.      Pupils: Pupils are equal, round, and reactive to light. Neck:      Thyroid: No thyromegaly. Vascular: No JVD. Cardiovascular:      Rate and Rhythm: Normal rate and regular rhythm.       Pulses: Dorsalis pedis pulses are 2+ on the right side and 2+ on the left side. Heart sounds: Normal heart sounds. No murmur heard. Pulmonary:      Effort: Pulmonary effort is normal.      Breath sounds: Normal breath sounds. No wheezing or rales. Abdominal:      Palpations: Abdomen is soft. There is no mass. Tenderness: There is no abdominal tenderness. Musculoskeletal:      Cervical back: Full passive range of motion without pain and neck supple. Lymphadenopathy:      Head:      Right side of head: No submandibular adenopathy. Left side of head: No submandibular adenopathy. Cervical: No cervical adenopathy. Skin:     General: Skin is warm. Neurological:      Mental Status: She is alert and oriented to person, place, and time. Motor: No tremor. Psychiatric:         Behavior: Behavior is cooperative. Laboratory findings:    Recent Labs     12/24/21 2215 12/24/21 2215 12/25/21 0345 12/25/21 0345 12/25/21 0631 12/26/21  0642 12/27/21  0154   WBC 14.6*   < > 14.0*   < > 12.7* 11.4* 10.0   HGB 12.9   < > 13.0   < > 12.5 12.2 12.1   HCT 38.7   < > 38.9   < > 38.4 37.2 36.6   PLT 50*   < > See Reflexed IPF Result   < > See Reflexed IPF Result 95* 127*   INR 1.1  --  1.1  --   --   --   --     < > = values in this interval not displayed. Recent Labs     12/24/21  1556 12/25/21 0345 12/25/21 0631 12/26/21  0642 12/27/21  0154   *   < > 136 135 137   K 4.1   < > 4.5 4.6 5.0   CL 96*   < > 103 104 106   CO2 17*   < > 20 22 20   GLUCOSE 159*   < > 163* 159* 161*   BUN 29*   < > 21 27* 31*   CREATININE 0.67   < > 0.51 0.60 0.60   MG 2.4  --   --   --   --    CALCIUM 8.4*   < > 7.7* 8.0* 8.2*    < > = values in this interval not displayed.      Recent Labs     12/24/21  1556 12/24/21 2215 12/27/21  0154   PROT 6.3*  --  5.9*   LABALBU 2.8*  --  2.8*   AST 46*  --  37*   ALT 35*  --  37*   LDH  --  528*  --    ALKPHOS 112*  --  81   BILITOT 0.60  --  0.56   BILIDIR  -- --  0.17          No results found for: Bhavana Fergusson, RBCUA, BLOODU, BACTERIA, NITRU, [address], LEUKOCYTESUR    Imaging / Clinical Data :-   CT CHEST PULMONARY EMBOLISM W CONTRAST    Result Date: 12/24/2021  Motion degraded study. Acute pulmonary emboli suspected particularly in the left lower lobe but not well visualized due to motion artifact. There is evidence for right ventricular strain with septal bowing suggesting a significant clot burden. Moderate patchy ground-glass opacities involving all lobes of both lungs consistent with multifocal pneumonia typical for COVID pneumonia. Findings were discussed with Sheeba Lincoln at 5:39 pm on 12/24/2021. RECOMMENDATIONS: Unavailable        Clinical Course : unchanged  Assessment and Plan  :        Acute PE - continue anticoagulation. No plan for thrombolysis . COVID-19 pneumonia -high-dose Decadron, baricitinib, GI prophylaxis, DVT prophylaxis, antibiotics per ID. Acute respiratory failure with hypoxia -high flow oxygen support, wean as tolerated. Hyponatremia -resolved. Obesity -     Encourage incentive spirometry. Continue to monitor vitals , Intake / output ,  Cell count , HGB , Kidney function, oxygenation  as indicated . Plan and updates discussed with patient ,  answers  explained to satisfaction.    Plan discussed with Staff  RN     (Please note that portions of this note were completed with a voice recognition program. Efforts were made to edit the dictations but occasionally words are mis-transcribed.)      Itzel Rose MD  12/27/2021

## 2021-12-27 NOTE — PROGRESS NOTES
Writer received call from patient's son Rafita Lomax. All questions and concerns were answered at this time. Writer will continue to monitor patient and update family accordingly.

## 2021-12-28 LAB
ABSOLUTE EOS #: <0.03 K/UL (ref 0–0.44)
ABSOLUTE IMMATURE GRANULOCYTE: 0.23 K/UL (ref 0–0.3)
ABSOLUTE LYMPH #: 1.18 K/UL (ref 1.1–3.7)
ABSOLUTE MONO #: 0.82 K/UL (ref 0.1–1.2)
ANION GAP SERPL CALCULATED.3IONS-SCNC: 10 MMOL/L (ref 9–17)
BASOPHILS # BLD: 0 % (ref 0–2)
BASOPHILS ABSOLUTE: <0.03 K/UL (ref 0–0.2)
BUN BLDV-MCNC: 31 MG/DL (ref 8–23)
BUN/CREAT BLD: ABNORMAL (ref 9–20)
CALCIUM SERPL-MCNC: 8.1 MG/DL (ref 8.6–10.4)
CHLORIDE BLD-SCNC: 103 MMOL/L (ref 98–107)
CO2: 22 MMOL/L (ref 20–31)
CREAT SERPL-MCNC: 0.59 MG/DL (ref 0.5–0.9)
DIFFERENTIAL TYPE: ABNORMAL
EOSINOPHILS RELATIVE PERCENT: 0 % (ref 1–4)
GFR AFRICAN AMERICAN: >60 ML/MIN
GFR NON-AFRICAN AMERICAN: >60 ML/MIN
GFR SERPL CREATININE-BSD FRML MDRD: ABNORMAL ML/MIN/{1.73_M2}
GFR SERPL CREATININE-BSD FRML MDRD: ABNORMAL ML/MIN/{1.73_M2}
GLUCOSE BLD-MCNC: 151 MG/DL (ref 70–99)
HCT VFR BLD CALC: 38.8 % (ref 36.3–47.1)
HEMOGLOBIN: 12.8 G/DL (ref 11.9–15.1)
IMMATURE GRANULOCYTES: 2 %
LYMPHOCYTES # BLD: 11 % (ref 24–43)
MCH RBC QN AUTO: 33 PG (ref 25.2–33.5)
MCHC RBC AUTO-ENTMCNC: 33 G/DL (ref 28.4–34.8)
MCV RBC AUTO: 100 FL (ref 82.6–102.9)
MONOCYTES # BLD: 8 % (ref 3–12)
NRBC AUTOMATED: 0 PER 100 WBC
PDW BLD-RTO: 14.3 % (ref 11.8–14.4)
PLATELET # BLD: 142 K/UL (ref 138–453)
PLATELET ESTIMATE: ABNORMAL
PMV BLD AUTO: 11.7 FL (ref 8.1–13.5)
POTASSIUM SERPL-SCNC: 5.5 MMOL/L (ref 3.7–5.3)
RBC # BLD: 3.88 M/UL (ref 3.95–5.11)
RBC # BLD: ABNORMAL 10*6/UL
SEG NEUTROPHILS: 79 % (ref 36–65)
SEGMENTED NEUTROPHILS ABSOLUTE COUNT: 8.62 K/UL (ref 1.5–8.1)
SODIUM BLD-SCNC: 135 MMOL/L (ref 135–144)
WBC # BLD: 10.9 K/UL (ref 3.5–11.3)
WBC # BLD: ABNORMAL 10*3/UL

## 2021-12-28 PROCEDURE — 2060000000 HC ICU INTERMEDIATE R&B

## 2021-12-28 PROCEDURE — 2580000003 HC RX 258: Performed by: STUDENT IN AN ORGANIZED HEALTH CARE EDUCATION/TRAINING PROGRAM

## 2021-12-28 PROCEDURE — 2580000003 HC RX 258: Performed by: FAMILY MEDICINE

## 2021-12-28 PROCEDURE — 99291 CRITICAL CARE FIRST HOUR: CPT | Performed by: INTERNAL MEDICINE

## 2021-12-28 PROCEDURE — 2700000000 HC OXYGEN THERAPY PER DAY

## 2021-12-28 PROCEDURE — 6360000002 HC RX W HCPCS: Performed by: FAMILY MEDICINE

## 2021-12-28 PROCEDURE — 99232 SBSQ HOSP IP/OBS MODERATE 35: CPT | Performed by: FAMILY MEDICINE

## 2021-12-28 PROCEDURE — 6360000002 HC RX W HCPCS: Performed by: STUDENT IN AN ORGANIZED HEALTH CARE EDUCATION/TRAINING PROGRAM

## 2021-12-28 PROCEDURE — 6370000000 HC RX 637 (ALT 250 FOR IP): Performed by: INTERNAL MEDICINE

## 2021-12-28 PROCEDURE — 94761 N-INVAS EAR/PLS OXIMETRY MLT: CPT

## 2021-12-28 PROCEDURE — 6370000000 HC RX 637 (ALT 250 FOR IP): Performed by: STUDENT IN AN ORGANIZED HEALTH CARE EDUCATION/TRAINING PROGRAM

## 2021-12-28 PROCEDURE — 36415 COLL VENOUS BLD VENIPUNCTURE: CPT

## 2021-12-28 PROCEDURE — 94660 CPAP INITIATION&MGMT: CPT

## 2021-12-28 PROCEDURE — 85025 COMPLETE CBC W/AUTO DIFF WBC: CPT

## 2021-12-28 PROCEDURE — 80048 BASIC METABOLIC PNL TOTAL CA: CPT

## 2021-12-28 PROCEDURE — 99233 SBSQ HOSP IP/OBS HIGH 50: CPT | Performed by: INTERNAL MEDICINE

## 2021-12-28 RX ORDER — FUROSEMIDE 10 MG/ML
40 INJECTION INTRAMUSCULAR; INTRAVENOUS ONCE
Status: COMPLETED | OUTPATIENT
Start: 2021-12-28 | End: 2021-12-28

## 2021-12-28 RX ORDER — DEXAMETHASONE SODIUM PHOSPHATE 10 MG/ML
6 INJECTION INTRAMUSCULAR; INTRAVENOUS DAILY
Status: DISCONTINUED | OUTPATIENT
Start: 2021-12-31 | End: 2021-12-30

## 2021-12-28 RX ORDER — DEXAMETHASONE SODIUM PHOSPHATE 10 MG/ML
10 INJECTION INTRAMUSCULAR; INTRAVENOUS DAILY
Status: COMPLETED | OUTPATIENT
Start: 2021-12-29 | End: 2021-12-30

## 2021-12-28 RX ADMIN — ENOXAPARIN SODIUM 90 MG: 100 INJECTION SUBCUTANEOUS at 20:47

## 2021-12-28 RX ADMIN — Medication 5 ML: at 12:02

## 2021-12-28 RX ADMIN — SODIUM CHLORIDE, PRESERVATIVE FREE 10 ML: 5 INJECTION INTRAVENOUS at 20:47

## 2021-12-28 RX ADMIN — BARICITINIB 4 MG: 2 TABLET, FILM COATED ORAL at 08:29

## 2021-12-28 RX ADMIN — SODIUM CHLORIDE, PRESERVATIVE FREE 10 ML: 5 INJECTION INTRAVENOUS at 08:29

## 2021-12-28 RX ADMIN — ENOXAPARIN SODIUM 90 MG: 100 INJECTION SUBCUTANEOUS at 09:53

## 2021-12-28 RX ADMIN — ACETAMINOPHEN 650 MG: 325 TABLET ORAL at 09:53

## 2021-12-28 RX ADMIN — FUROSEMIDE 40 MG: 10 INJECTION, SOLUTION INTRAMUSCULAR; INTRAVENOUS at 13:08

## 2021-12-28 RX ADMIN — Medication 13 UNITS/KG/HR: at 00:44

## 2021-12-28 RX ADMIN — DEXAMETHASONE SODIUM PHOSPHATE 20 MG: 10 INJECTION INTRAMUSCULAR; INTRAVENOUS at 08:29

## 2021-12-28 ASSESSMENT — ENCOUNTER SYMPTOMS
BLOOD IN STOOL: 0
DIARRHEA: 0
ABDOMINAL PAIN: 0
CONSTIPATION: 0
VOMITING: 0
NAUSEA: 0
RHINORRHEA: 0
SHORTNESS OF BREATH: 1
CHEST TIGHTNESS: 0
COUGH: 0
WHEEZING: 0

## 2021-12-28 ASSESSMENT — PAIN SCALES - GENERAL: PAINLEVEL_OUTOF10: 0

## 2021-12-28 NOTE — PROGRESS NOTES
Pulmonary/Critical Care Progress Note    Patient's name:  Luly Coyne  Medical Record Number: [id number]  Patient's account/billing number: [de-identified]  Patient's YOB: 1950  Age: 70 y.o. Date of Admission: 12/24/2021  3:37 PM  Date of Consult: 12/27/2021      Primary Care Physician: No primary care provider on file. Code Status: Full Code    Reason for consult: Acute hypoxemic respiratory failure secondary to COVID-19 pneumonia with ARDS along with PE involving subsegmental vessels of the left lower lobe      HISTORY OF PRESENT ILLNESS:   History was obtained from chart review and the patient. Luly Coyne is a 70 y.o. woman was admitted with complaints of shortness of breath and found to be Covid positive but also had a left lower lobe subsegmental pulmonary embolism with right ventricular strain pattern  Vascular surgery was consulted and it was felt, rightfully so, that the cause of the right ventricular strain is unlikely pulmonary embolism  Patient was admitted to the [facility] ICU  Requiring 95% oxygen via high flow  Has shortness of breath along with coughing  No wheezing  No chest pain or pressure  No hemoptysis  No orthopnea or PND    INTERVAL HISTORY:  Dry, hacking cough persists. Not responsive to Robitussin. Will switch to Tussionex. Remains on high flow oxygen FiO2 80%. Respiratory rate mid 20s. Mild-moderate respiratory distress. Past Medical History:  No past medical history on file. Past Surgical History:  No past surgical history on file. Allergies:    No Known Allergies      Home Meds:   Prior to Admission medications    Not on File       Family History:   No family history on file. Social History:   TOBACCO:   has no history on file for tobacco use. ETOH:   has no history on file for alcohol use. DRUGS:  has no history on file for drug use.   OCCUPATION:   Noncontributory          REVIEW OF SYSTEMS:    Review of Systems -   General ROS: Completed and except as mentioned above were negative   Psychological ROS:  Completed and except as mentioned above were negative  Ophthalmic ROS:  Completed and except as mentioned above were negative  ENT ROS:  Completed and except as mentioned above were negative  Allergy and Immunology ROS:  Completed and except as mentioned above were negative  Hematological and Lymphatic ROS:  Completed and except as mentioned above were negative  Endocrine ROS: Completed and except as mentioned above were negative  Breast ROS:  Completed and except as mentioned above were negative  Respiratory ROS:  Completed and except as mentioned above were negative  Cardiovascular ROS:  Completed and except as mentioned above were negative  Gastrointestinal ROS: Completed and except as mentioned above were negative  Genito-Urinary ROS:  Completed and except as mentioned above were negative  Musculoskeletal ROS:  Completed and except as mentioned above were negative  Neurological ROS:  Completed and except as mentioned above were negative  Dermatological ROS:  Completed and except as mentioned above were negative          Physical Exam:    Vitals: BP (!) 132/100   Pulse 67   Temp 97.3 °F (36.3 °C) (Axillary)   Resp 26   Ht 5' 4\" (1.626 m)   Wt 195 lb 15.8 oz (88.9 kg)   SpO2 95%   Breastfeeding No   BMI 33.64 kg/m²     Last Body weight:   Wt Readings from Last 3 Encounters:   12/27/21 195 lb 15.8 oz (88.9 kg)       Body Mass Index : Body mass index is 33.64 kg/m².       Intake and Output summary:     Intake/Output Summary (Last 24 hours) at 12/27/2021 2312  Last data filed at 12/27/2021 1900  Gross per 24 hour   Intake 299.21 ml   Output 950 ml   Net -650.79 ml       Physical Examination:   PHYSICAL EXAMINATION:  Vitals:    12/27/21 1408 12/27/21 1600 12/27/21 2000 12/27/21 2020   BP:  (!) 139/92 (!) 132/100    Pulse:  62 67    Resp:  17 24 26   Temp:  97.2 °F (36.2 °C) 97.3 °F (36.3 °C)    TempSrc:  Oral Axillary    SpO2: 96% 99% 99% 95%   Weight:       Height:         Constitutional: This is a well developed, well nourished, 30-34.9 - Obesity Grade I 70y.o. year old female who is alert, oriented, cooperative and in no apparent distress. Head:normocephalic and atraumatic. EENT:   SOPHIA. No conjunctival injections. Septum was midline, mucosa was without erythema, exudates or cobblestoning. No thrush was noted. Mallampati III (soft palate, base of uvula visible)  Neck: Supple without thyromegaly. No elevated JVP. Trachea was midline. Respiratory: Chest was symmetrical without dullness to percussion. Breath sounds bilaterally were clear to auscultation. There were no wheezes, rhonchi or rales. There is no intercostal retraction or use of accessory muscles. No egophony noted. Cardiovascular: Regular without murmur, clicks, gallops or rubs. Abdomen: Slightly rounded and soft without organomegaly. No rebound tenderness, rigidity or guarding was appreciated. Lymphatic: No lymphadenopathy. Musculoskeletal: Normal curvature of the spine. No gross muscle weakness. Extremities:  No lower extremity edema, ulcerations, tenderness, varicosities or erythema. Muscle size, tone and strength are normal.  No involuntary movements are noted. Skin:  Warm and dry. Good color, turgor and pigmentation. No lesions or scars.   No cyanosis or clubbing  Neurological/Psychiatric: The patient's general behavior, level of consciousness, thought content and emotional status is normal.            Laboratory findings:-    CBC:   Recent Labs     12/27/21  0154   WBC 10.0   HGB 12.1   *     BMP:    Recent Labs     12/25/21  0631 12/25/21  0631 12/26/21  0642 12/26/21  0642 12/27/21  0154      < > 135   < > 137   K 4.5   < > 4.6   < > 5.0      < > 104   < > 106   CO2 20   < > 22   < > 20   BUN 21   < > 27*   < > 31*   CREATININE 0.51  --  0.60  --  0.60   GLUCOSE 163*   < > 159*   < > 161*    < > = values in this interval not displayed. S. Calcium:  Recent Labs     12/27/21 0154   CALCIUM 8.2*     S. Ionized Calcium:No results for input(s): IONCA in the last 72 hours. S. Magnesium:  No results for input(s): MG in the last 72 hours. S. Phosphorus:No results for input(s): PHOS in the last 72 hours. S. Glucose:  No results for input(s): POCGLU in the last 72 hours. Glycosylated hemoglobin A1C: No results for input(s): LABA1C in the last 72 hours. INR:   Recent Labs     12/25/21 0345   INR 1.1     Hepatic functions:   Recent Labs     12/27/21 0154   ALKPHOS 81   ALT 37*   AST 37*   PROT 5.9*   BILITOT 0.56   BILIDIR 0.17   LABALBU 2.8*     Pancreatic functions:  Recent Labs     12/25/21 0345   LACTA NOT REPORTED     S. Lactic Acid:   Recent Labs     12/25/21 0345   LACTA NOT REPORTED     Cardiac enzymes:No results for input(s): CKTOTAL, CKMB, CKMBINDEX, TROPONINI in the last 72 hours. BNP:No results for input(s): BNP in the last 72 hours. Lipid profile: No results for input(s): CHOL, TRIG, HDL, LDL, LDLCALC in the last 72 hours. Blood Gases: No results found for: PH, PCO2, PO2, HCO3, O2SAT  Thyroid functions: No results found for: TSH         Microbiology:    Cultures during this admission:     Blood cultures:      [] None drawn      [x] Negative             []  Positive (Details:  )  Urine Culture:        [] None drawn      [] Negative             []  Positive (Details:  )  Sputum Culture:   [] None drawn       [] Negative             []  Positive (Details:  )     SARS-CoV-2 rapid test was positive    Radiological reports:    CT CHEST PULMONARY EMBOLISM W CONTRAST    Result Date: 12/24/2021  Motion degraded study. Acute pulmonary emboli suspected particularly in the left lower lobe but not well visualized due to motion artifact. There is evidence for right ventricular strain with septal bowing suggesting a significant clot burden.  Moderate patchy ground-glass opacities involving all lobes of both lungs consistent with multifocal pneumonia typical for COVID pneumonia. Findings were discussed with Joe Tiwari at 5:39 pm on 12/24/2021. RECOMMENDATIONS: Unavailable           Assessment and Plan       IMPRESSION:   1. COVID-19    2. Dehydration    3. Tachycardia        Principal Problem:    COVID-19  Active Problems:    Pulmonary embolus, left (HCC)  Resolved Problems:    * No resolved hospital problems. *       Acute hypoxic respiratory failure   Acute respiratory distress syndrome   COVID 19 infection/pneumonia   Left lower lobe segmental pulmonary embolism   Possible pulmonary artery hypertension secondary to COVID-19 pneumonia much more than any pulmonary embolism given the small magnitude of pulmonary       Plan:     Continue Airborne isolation   Continue high flow oxygen   Use BiPAP, if needed   Obtain X-ray chest as needed    Monitor input/output, with a goal of even/negative fluid balance   Patient receiving baricitinib   Continue Decadron   Monitor CRP, LDH, AST/ALT/ferritin/ D-dimer   Continue supportive care   GI/DVT prophylaxis   Glycemic control per primary service   Patient is on heparin infusion for pulmonary embolism. Can switch to Eliquis, if no anticipated procedures   On nutrition via oral diet  Management as per guidance provided by hospital policy and prevailing evidence based medicine, during the COVID-19 pandemic emergency. This patient was evaluated in the context of the global SARS-CoV-2 (COVID-19) pandemic, which necessitated considerations that the patient either has COVID-19 infection or is at risk of infection with COVID-19. Institutional protocols and algorithms that pertain to the evaluation & management of patients with COVID-19 or those at risk for COVID-19 are in a state of rapid changes based on information released by regulatory bodies including the CDC and federal and state organizations.  These policies and algorithms were followed during the patient's care. Please note that this chart was generated using voice recognition Dragon dictation software. Although every effort was made to ensure the accuracy of this automated transcription, some errors in transcription may have occurred. Thank you for having us involved in the care of your patient. Please call us if you have any questions or concerns. Total critical care time caring for this patient with life threatening, unstable organ failure, including direct patient contact, management of life support systems, review of data including imaging and labs, discussions with other team members and physicians at least 27  Min so far today, excluding procedures.       Aki Diego DO,            12/27/2021, 11:12 PM

## 2021-12-28 NOTE — PROGRESS NOTES
[facility]  Office: [address], DO, Jordyn March, DO, Guilherme Montelongo, DO, Rosalind Abraham Blood, DO, Luca Jaramillo MD, Marbin Montoya MD, Krys Foy MD, Derian Bolanos MD, Yvette King MD, Torres Brownlee MD, Gen Campos MD, Josette Noonan, DO, Iveth Judd, DO, Basil Dahl MD,  Patience Rivera, DO, Pearl Benitez MD, Joy Schmidt MD, Marielle Grigsby MD, Sandy Plasencia MD, Jacquelyn Rodrigues MD, Kg Oleary MD, Xochilt Gage MD, Kaz Weir, [facility], [address], [facility], Hung Carlos, CNP, [facility], CNS, Anisa Tena, CNP, Marc Knapp, CNP, Betzaida Mejia, CNP, Lyndon Romano, CNP, Isis Escobar, CNP, Tello Viveros PA-C, Riley Castellanos, [facility], [place] Claudette Anis, [facility] [place]), Ezekiel Gresham, CNP, Deni Posada, CNP, Brian Agudelo, CNP, Roseline Garcia, CNP, Rubin Rodrigez, [facility], Diaz Gear, [facility]      Daily Progress Note     Admit Date: 12/24/2021  Bed/Room No.  3005/3005-01  Admitting Physician : Krys Foy MD  Code Status :Full Code  Hospital Day:  LOS: 4 days   Chief Complaint:     Chief Complaint   Patient presents with    Shortness of Breath    Fall     Principal Problem:    COVID-19  Active Problems:    Pulmonary embolus, left [facility])  Resolved Problems:    * No resolved hospital problems. *    Subjective : Interval History/Significant events :  12/28/21    Patient reports improvement in breathing. She remains on high flow. Patient is eating and drinking well. She remains afebrile. Patient is currently on 70% FiO2, 50 LPM on high flow. Vitals - Unstable afebrile  Labs -hypokalemia 5.5, WBC 10.9. Nursing notes , Consults notes reviewed. Overnight events and updates discussed with Nursing staff . Background History:         Vilma Paulino is 70 y.o. female  Who was admitted to the hospital on 12/24/2021 for treatment of COVID-19. Patient came to the hospital with lightheadedness and dry cough for 1 week.   She had episode of fall at home and family called EMS. Initial evaluation by EMS found patient having tachypnea with respirate 40 to 50/min, hypoxia SPO2 50% on room air, tachycardia. Patient was placed on nonrebreather and improved only to 73%. Patient was then placed on BiPAP. Evaluation emergency room showed positive COVID-19 test, elevated troponin, elevated proBNP. CT chest showed subsegmental PE with possible right heart strain. Vascular surgery was consulted and recommended anticoagulation without need for thrombolysis. Patient also had thrombocytopenia. Cardiology was consulted and echocardiogram obtained which was negative for RV strain with RV systolic pressure 29 mmHg. Patient was treated with heparin infusion, Decadron and started on baricitinib. PMH:  No past medical history on file. Allergies: No Known Allergies   Medications :  enoxaparin, 1 mg/kg, SubCUTAneous, BID  dexamethasone, 20 mg, IntraVENous, Daily   Followed by  Marie Wisdom ON 12/31/2021] dexamethasone, 10 mg, IntraVENous, Daily  sodium chloride flush, 10 mL, IntraVENous, 2 times per day  baricitinib, 4 mg, Oral, Daily        Review of Systems   Review of Systems   Constitutional: Positive for activity change, appetite change and fatigue. Negative for fever and unexpected weight change. HENT: Negative for congestion, rhinorrhea and sneezing. Eyes: Negative for visual disturbance. Respiratory: Positive for shortness of breath. Negative for cough, chest tightness and wheezing. Cardiovascular: Negative for chest pain and palpitations. Gastrointestinal: Negative for abdominal pain, blood in stool, constipation, diarrhea, nausea and vomiting. Genitourinary: Negative for dysuria, enuresis, frequency and hematuria. Musculoskeletal: Negative for arthralgias and myalgias. Skin: Negative for rash. Neurological: Negative for dizziness, weakness, light-headedness and headaches. Hematological: Does not bruise/bleed easily.    Psychiatric/Behavioral: Negative for dysphoric mood and sleep disturbance. Objective :      Current Vitals : Temp: 97.3 °F (36.3 °C),  Pulse: 67, Resp: 18, BP: (!) 132/100, SpO2: 95 %  Last 24 Hrs Vitals   Patient Vitals for the past 24 hrs:   BP Temp Temp src Pulse Resp SpO2   12/28/21 0554 -- -- -- -- 18 --   12/27/21 2341 -- -- -- -- 24 --   12/27/21 2020 -- -- -- -- 26 95 %   12/27/21 2000 (!) 132/100 97.3 °F (36.3 °C) Axillary 67 24 99 %   12/27/21 1600 (!) 139/92 97.2 °F (36.2 °C) Oral 62 17 99 %   12/27/21 1408 -- -- -- -- -- 96 %   12/27/21 1200 110/79 97.3 °F (36.3 °C) Oral 67 19 98 %   12/27/21 0830 125/89 97.2 °F (36.2 °C) Oral 84 20 92 %     Intake / output   12/27 0701 - 12/28 0700  In: 175 [I.V.:175]  Out: 450 [Urine:450]  Physical Exam:  Physical Exam  Vitals and nursing note reviewed. Constitutional:       General: She is not in acute distress. Appearance: She is obese. She is not diaphoretic. Interventions: Nasal cannula in place. Comments: High flow oxygen nasal cannula   HENT:      Head: Normocephalic and atraumatic. Nose:      Right Sinus: No maxillary sinus tenderness or frontal sinus tenderness. Left Sinus: No maxillary sinus tenderness or frontal sinus tenderness. Mouth/Throat:      Pharynx: No oropharyngeal exudate. Eyes:      General: No scleral icterus. Conjunctiva/sclera: Conjunctivae normal.      Pupils: Pupils are equal, round, and reactive to light. Neck:      Thyroid: No thyromegaly. Vascular: No JVD. Cardiovascular:      Rate and Rhythm: Normal rate and regular rhythm. Pulses:           Dorsalis pedis pulses are 2+ on the right side and 2+ on the left side. Heart sounds: Normal heart sounds. No murmur heard. Pulmonary:      Effort: Pulmonary effort is normal.      Breath sounds: Normal breath sounds. No wheezing or rales. Abdominal:      Palpations: Abdomen is soft. There is no mass. Tenderness: There is no abdominal tenderness. Musculoskeletal:      Cervical back: Full passive range of motion without pain and neck supple. Lymphadenopathy:      Head:      Right side of head: No submandibular adenopathy. Left side of head: No submandibular adenopathy. Cervical: No cervical adenopathy. Skin:     General: Skin is warm. Neurological:      Mental Status: She is alert and oriented to person, place, and time. Motor: No tremor. Psychiatric:         Behavior: Behavior is cooperative. Laboratory findings:    Recent Labs     12/26/21  0642 12/27/21  0154 12/28/21  0309   WBC 11.4* 10.0 10.9   HGB 12.2 12.1 12.8   HCT 37.2 36.6 38.8   PLT 95* 127* 142     Recent Labs     12/26/21  0642 12/27/21  0154 12/28/21  0309    137 135   K 4.6 5.0 5.5*    106 103   CO2 22 20 22   GLUCOSE 159* 161* 151*   BUN 27* 31* 31*   CREATININE 0.60 0.60 0.59   CALCIUM 8.0* 8.2* 8.1*     Recent Labs     12/27/21  0154   PROT 5.9*   LABALBU 2.8*   AST 37*   ALT 37*   ALKPHOS 81   BILITOT 0.56   BILIDIR 0.17          No results found for: SPECGRAV, PROTEINU, RBCUA, BLOODU, BACTERIA, NITRU, WBCUA, LEUKOCYTESUR    Imaging / Clinical Data :-   CT CHEST PULMONARY EMBOLISM W CONTRAST    Result Date: 12/24/2021  Motion degraded study. Acute pulmonary emboli suspected particularly in the left lower lobe but not well visualized due to motion artifact. There is evidence for right ventricular strain with septal bowing suggesting a significant clot burden. Moderate patchy ground-glass opacities involving all lobes of both lungs consistent with multifocal pneumonia typical for COVID pneumonia. Findings were discussed with Mckenna Mccarty at 5:39 pm on 12/24/2021. RECOMMENDATIONS: Unavailable        Clinical Course : unchanged  Assessment and Plan  :        Acute PE -change heparin infusion to Lovenox 1 mg/g twice daily. COVID-19 pneumonia -high-dose Decadron, baricitinib, GI prophylaxis, DVT prophylaxis, antibiotics per ID.   Acute respiratory failure with hypoxia -high flow oxygen support, wean as tolerated. Hyponatremia -resolved. Obesity -     Encourage incentive spirometry. Activity limited. Use bedside commode. Continue to monitor vitals , Intake / output ,  Cell count , HGB , Kidney function, oxygenation  as indicated . Plan and updates discussed with patient ,  answers  explained to satisfaction.    Plan discussed with Staff  RN     (Please note that portions of this note were completed with a voice recognition program. Efforts were made to edit the dictations but occasionally words are mis-transcribed.)      Patrick Rosenthal MD  12/28/2021

## 2021-12-28 NOTE — FLOWSHEET NOTE
SPIRITUAL CARE DEPARTMENT - [facility]  PROGRESS NOTE    Shift date: 12/27/2021  Shift day: Monday    Shift # 2    Room # 3005/3005-01   Name: Fco Grace            Age: 70 y.o. Gender: female          Pentecostalism: No Sikh on file   [facility] of [facility]:      Referral: Routine Visit    Admit Date & Time: 12/24/2021  3:37 PM    PATIENT/EVENT DESCRIPTION:  Fco Grace is a 70 y.o. female   suffering from a COVID infection. SPIRITUAL ASSESSMENT/INTERVENTION:   called into to patient's room to assess pt's spiritual\emotional state. Patient expresses that she is \"feeling much better\" and has a positive outlook. Patient expresses that she finds it difficult to talk and would like to end the visit.  said a prayer for patient upon concluding visit. SPIRITUAL CARE FOLLOW-UP PLAN:  Chaplains will remain available to offer spiritual and emotional support as needed. 12/27/21 2035   Encounter Summary   Services provided to: Patient   Referral/Consult From: [facility] Family members   Continue Visiting   (12/27/21)   Complexity of Encounter Low   Length of Encounter 15 minutes   Spiritual Assessment Completed Yes   Routine   Type Initial   Assessment Calm;Coping; Hopeful   Intervention Active listening;Explored feelings, thoughts, concerns   Outcome Expressed gratitude       Electronically signed by Chaplain Resident Tyson Segovia MDiv.on 12/27/2021 at 8:37 PM   [employer]  [phone]

## 2021-12-29 LAB
-: NORMAL
ABSOLUTE EOS #: <0.03 K/UL (ref 0–0.44)
ABSOLUTE IMMATURE GRANULOCYTE: 0.11 K/UL (ref 0–0.3)
ABSOLUTE LYMPH #: 0.64 K/UL (ref 1.1–3.7)
ABSOLUTE MONO #: 0.52 K/UL (ref 0.1–1.2)
ANION GAP SERPL CALCULATED.3IONS-SCNC: 12 MMOL/L (ref 9–17)
BASOPHILS # BLD: 0 % (ref 0–2)
BASOPHILS ABSOLUTE: <0.03 K/UL (ref 0–0.2)
BUN BLDV-MCNC: 29 MG/DL (ref 8–23)
BUN/CREAT BLD: ABNORMAL (ref 9–20)
CALCIUM SERPL-MCNC: 7.9 MG/DL (ref 8.6–10.4)
CHLORIDE BLD-SCNC: 102 MMOL/L (ref 98–107)
CO2: 22 MMOL/L (ref 20–31)
CREAT SERPL-MCNC: 0.45 MG/DL (ref 0.5–0.9)
CULTURE: NORMAL
CULTURE: NORMAL
DIFFERENTIAL TYPE: ABNORMAL
EOSINOPHILS RELATIVE PERCENT: 0 % (ref 1–4)
GFR AFRICAN AMERICAN: >60 ML/MIN
GFR NON-AFRICAN AMERICAN: >60 ML/MIN
GFR SERPL CREATININE-BSD FRML MDRD: ABNORMAL ML/MIN/{1.73_M2}
GFR SERPL CREATININE-BSD FRML MDRD: ABNORMAL ML/MIN/{1.73_M2}
GLUCOSE BLD-MCNC: 148 MG/DL (ref 70–99)
HCT VFR BLD CALC: 36.2 % (ref 36.3–47.1)
HEMOGLOBIN: 12.3 G/DL (ref 11.9–15.1)
IMMATURE GRANULOCYTES: 2 %
LYMPHOCYTES # BLD: 9 % (ref 24–43)
Lab: NORMAL
Lab: NORMAL
MCH RBC QN AUTO: 33.2 PG (ref 25.2–33.5)
MCHC RBC AUTO-ENTMCNC: 34 G/DL (ref 28.4–34.8)
MCV RBC AUTO: 97.8 FL (ref 82.6–102.9)
MONOCYTES # BLD: 7 % (ref 3–12)
NRBC AUTOMATED: 0 PER 100 WBC
PDW BLD-RTO: 14.1 % (ref 11.8–14.4)
PLATELET # BLD: ABNORMAL K/UL (ref 138–453)
PLATELET ESTIMATE: ABNORMAL
PLATELET, FLUORESCENCE: 137 K/UL (ref 138–453)
PLATELET, IMMATURE FRACTION: 10.1 % (ref 1.1–10.3)
PMV BLD AUTO: ABNORMAL FL (ref 8.1–13.5)
POTASSIUM SERPL-SCNC: 5.4 MMOL/L (ref 3.7–5.3)
RBC # BLD: 3.7 M/UL (ref 3.95–5.11)
RBC # BLD: ABNORMAL 10*6/UL
REASON FOR REJECTION: NORMAL
SEG NEUTROPHILS: 82 % (ref 36–65)
SEGMENTED NEUTROPHILS ABSOLUTE COUNT: 5.73 K/UL (ref 1.5–8.1)
SODIUM BLD-SCNC: 136 MMOL/L (ref 135–144)
SPECIMEN DESCRIPTION: NORMAL
SPECIMEN DESCRIPTION: NORMAL
WBC # BLD: 7 K/UL (ref 3.5–11.3)
WBC # BLD: ABNORMAL 10*3/UL
ZZ NTE CLEAN UP: ORDERED TEST: NORMAL
ZZ NTE WITH NAME CLEAN UP: SPECIMEN SOURCE: NORMAL

## 2021-12-29 PROCEDURE — 99232 SBSQ HOSP IP/OBS MODERATE 35: CPT | Performed by: FAMILY MEDICINE

## 2021-12-29 PROCEDURE — 94761 N-INVAS EAR/PLS OXIMETRY MLT: CPT

## 2021-12-29 PROCEDURE — 6370000000 HC RX 637 (ALT 250 FOR IP): Performed by: FAMILY MEDICINE

## 2021-12-29 PROCEDURE — 80048 BASIC METABOLIC PNL TOTAL CA: CPT

## 2021-12-29 PROCEDURE — 6370000000 HC RX 637 (ALT 250 FOR IP): Performed by: INTERNAL MEDICINE

## 2021-12-29 PROCEDURE — 36415 COLL VENOUS BLD VENIPUNCTURE: CPT

## 2021-12-29 PROCEDURE — 99291 CRITICAL CARE FIRST HOUR: CPT | Performed by: INTERNAL MEDICINE

## 2021-12-29 PROCEDURE — 2700000000 HC OXYGEN THERAPY PER DAY

## 2021-12-29 PROCEDURE — 99233 SBSQ HOSP IP/OBS HIGH 50: CPT | Performed by: INTERNAL MEDICINE

## 2021-12-29 PROCEDURE — 85055 RETICULATED PLATELET ASSAY: CPT

## 2021-12-29 PROCEDURE — 85025 COMPLETE CBC W/AUTO DIFF WBC: CPT

## 2021-12-29 PROCEDURE — 6370000000 HC RX 637 (ALT 250 FOR IP): Performed by: NURSE PRACTITIONER

## 2021-12-29 PROCEDURE — 2580000003 HC RX 258: Performed by: STUDENT IN AN ORGANIZED HEALTH CARE EDUCATION/TRAINING PROGRAM

## 2021-12-29 PROCEDURE — 6360000002 HC RX W HCPCS: Performed by: FAMILY MEDICINE

## 2021-12-29 PROCEDURE — 2060000000 HC ICU INTERMEDIATE R&B

## 2021-12-29 RX ORDER — BENZONATATE 100 MG/1
200 CAPSULE ORAL 3 TIMES DAILY PRN
Status: DISCONTINUED | OUTPATIENT
Start: 2021-12-29 | End: 2022-01-14 | Stop reason: HOSPADM

## 2021-12-29 RX ORDER — SODIUM POLYSTYRENE SULFONATE 15 G/60ML
15 SUSPENSION ORAL; RECTAL ONCE
Status: COMPLETED | OUTPATIENT
Start: 2021-12-29 | End: 2021-12-29

## 2021-12-29 RX ADMIN — BENZONATATE 200 MG: 100 CAPSULE ORAL at 16:14

## 2021-12-29 RX ADMIN — SODIUM CHLORIDE, PRESERVATIVE FREE 10 ML: 5 INJECTION INTRAVENOUS at 20:31

## 2021-12-29 RX ADMIN — SODIUM POLYSTYRENE SULFONATE 15 G: 15 SUSPENSION ORAL; RECTAL at 16:15

## 2021-12-29 RX ADMIN — DEXAMETHASONE SODIUM PHOSPHATE 10 MG: 10 INJECTION INTRAMUSCULAR; INTRAVENOUS at 08:23

## 2021-12-29 RX ADMIN — ENOXAPARIN SODIUM 90 MG: 100 INJECTION SUBCUTANEOUS at 20:30

## 2021-12-29 RX ADMIN — BENZONATATE 200 MG: 100 CAPSULE ORAL at 05:09

## 2021-12-29 RX ADMIN — BARICITINIB 4 MG: 2 TABLET, FILM COATED ORAL at 08:22

## 2021-12-29 RX ADMIN — ENOXAPARIN SODIUM 90 MG: 100 INJECTION SUBCUTANEOUS at 08:22

## 2021-12-29 RX ADMIN — SODIUM CHLORIDE, PRESERVATIVE FREE 10 ML: 5 INJECTION INTRAVENOUS at 08:23

## 2021-12-29 ASSESSMENT — ENCOUNTER SYMPTOMS
COLOR CHANGE: 0
NAUSEA: 0
VOMITING: 0
ABDOMINAL DISTENTION: 0
SHORTNESS OF BREATH: 1
COUGH: 0
APNEA: 0
COUGH: 1
WHEEZING: 0
DIARRHEA: 0
CHEST TIGHTNESS: 0
ABDOMINAL PAIN: 0
RHINORRHEA: 0
EYE ITCHING: 0
BLOOD IN STOOL: 0
CONSTIPATION: 0

## 2021-12-29 ASSESSMENT — PAIN SCALES - GENERAL
PAINLEVEL_OUTOF10: 0

## 2021-12-29 NOTE — PROGRESS NOTES
[facility]  Office: [address], DO, Elsy Jamaica, DO, Jake March, DO, Maris Rocha, DO, Rhiannon Holland MD, Jeffrey Bryant MD, Ronaldo Christiansen MD, Tyree Shelley MD, Bartolo Heart MD, Jessica Pastor MD, Rosalio Lockwood MD, Mauricio Ordoñez, DO, Bhumi Zamudio, DO, Najma Sinclair MD,  Rox Resendiz, DO, Macario Farfan MD, Cody Gaona MD, Arlette Ellison MD, Cameron Ware MD, Roseline Vieira MD, Asa Landa MD, Abbie Felipe MD, Parvin Mendieta, [facility], [address], CNP, Jamila Lin, CNP, Latonia Solis, CNS, Che Bland, CNP, Aubrey Guerin, CNP, Gila Ayoub, CNP, Corey Redman, CNP, Arlette Redman, CNP, Adolph Gleason PA-C, Xiang Buitrago, [facility], Mickie Rodriguez, [facility], Montez Cisneros, CNP, Raquel Mayer, CNP, Carmen Laureano, CNP, Ethel Silva, CNP, Allegra Miller, [facility], Billy Story, [address] [place] [address]      Daily Progress Note     Admit Date: 12/24/2021  Bed/Room No.  3005/3005-01  Admitting Physician : Ronaldo Christiansen MD  Code Status :Full Code  Hospital Day:  LOS: 5 days   Chief Complaint:     Chief Complaint   Patient presents with    Shortness of Breath    Fall     Principal Problem:    COVID-19  Active Problems:    Pulmonary embolus, left [facility])  Resolved Problems:    * No resolved hospital problems. *    Subjective : Interval History/Significant events :  12/29/21    Patient continues to have hypoxia even on high flow. She is on maximum oxygenation from high flow. She continues to have cough. Patient has poor appetite. Vitals - Unstable afebrile  Labs -hyperkalemia 5.4. Nursing notes , Consults notes reviewed. Overnight events and updates discussed with Nursing staff . Background History:         Nabila Franklin is 70 y.o. female  Who was admitted to the hospital on 12/24/2021 for treatment of COVID-19. Patient came to the hospital with lightheadedness and dry cough for 1 week. She had episode of fall at home and family called EMS. sleep disturbance. Objective :      Current Vitals : Temp: 98.4 °F (36.9 °C),  Pulse: 68, Resp: 24, BP: (!) 152/99, SpO2: (!) 88 %  Last 24 Hrs Vitals   Patient Vitals for the past 24 hrs:   BP Temp Temp src Pulse Resp SpO2   12/29/21 0450 -- -- -- -- -- (!) 88 %   12/29/21 0446 (!) 152/99 98.4 °F (36.9 °C) Oral 68 24 (!) 89 %   12/29/21 0044 (!) 150/109 98.4 °F (36.9 °C) Oral 53 15 90 %   12/28/21 2113 -- -- -- -- 20 91 %   12/28/21 2050 -- -- -- -- -- 92 %   12/28/21 2045 (!) 149/94 98.4 °F (36.9 °C) Oral 69 21 --   12/28/21 1700 (!) 128/98 97.1 °F (36.2 °C) Axillary -- -- --   12/28/21 1424 -- -- -- -- 24 95 %   12/28/21 1314 -- -- -- -- 22 93 %   12/28/21 1200 103/82 97 °F (36.1 °C) Axillary -- -- --   12/28/21 0800 -- 96.6 °F (35.9 °C) Axillary -- -- --     Intake / output   12/28 0701 - 12/29 0700  In: 1140 [P.O.:1140]  Out: 2300 [Urine:2300]  Physical Exam:  Physical Exam  Vitals and nursing note reviewed. Constitutional:       General: She is not in acute distress. Appearance: She is obese. She is not diaphoretic. Interventions: Nasal cannula in place. Comments: High flow oxygen nasal cannula   HENT:      Head: Normocephalic and atraumatic. Nose:      Right Sinus: No maxillary sinus tenderness or frontal sinus tenderness. Left Sinus: No maxillary sinus tenderness or frontal sinus tenderness. Mouth/Throat:      Pharynx: No oropharyngeal exudate. Eyes:      General: No scleral icterus. Conjunctiva/sclera: Conjunctivae normal.      Pupils: Pupils are equal, round, and reactive to light. Neck:      Thyroid: No thyromegaly. Vascular: No JVD. Cardiovascular:      Rate and Rhythm: Normal rate and regular rhythm. Pulses:           Dorsalis pedis pulses are 2+ on the right side and 2+ on the left side. Heart sounds: Normal heart sounds. No murmur heard. Pulmonary:      Effort: Pulmonary effort is normal.      Breath sounds: Normal breath sounds.  No wheezing or rales. Abdominal:      Palpations: Abdomen is soft. There is no mass. Tenderness: There is no abdominal tenderness. Musculoskeletal:      Cervical back: Full passive range of motion without pain and neck supple. Lymphadenopathy:      Head:      Right side of head: No submandibular adenopathy. Left side of head: No submandibular adenopathy. Cervical: No cervical adenopathy. Skin:     General: Skin is warm. Neurological:      Mental Status: She is alert and oriented to person, place, and time. Motor: No tremor. Psychiatric:         Behavior: Behavior is cooperative. Laboratory findings:    Recent Labs     12/27/21 0154 12/28/21  0309   WBC 10.0 10.9   HGB 12.1 12.8   HCT 36.6 38.8   * 142     Recent Labs     12/27/21 0154 12/28/21  0309    135   K 5.0 5.5*    103   CO2 20 22   GLUCOSE 161* 151*   BUN 31* 31*   CREATININE 0.60 0.59   CALCIUM 8.2* 8.1*     Recent Labs     12/27/21 0154   PROT 5.9*   LABALBU 2.8*   AST 37*   ALT 37*   ALKPHOS 81   BILITOT 0.56   BILIDIR 0.17          No results found for: SPECGRAV, PROTEINU, RBCUA, BLOODU, BACTERIA, NITRU, WBCUA, LEUKOCYTESUR    Imaging / Clinical Data :-   CT CHEST PULMONARY EMBOLISM W CONTRAST    Result Date: 12/24/2021  Motion degraded study. Acute pulmonary emboli suspected particularly in the left lower lobe but not well visualized due to motion artifact. There is evidence for right ventricular strain with septal bowing suggesting a significant clot burden. Moderate patchy ground-glass opacities involving all lobes of both lungs consistent with multifocal pneumonia typical for COVID pneumonia. Findings were discussed with Kenny School at 5:39 pm on 12/24/2021. RECOMMENDATIONS: Unavailable        Clinical Course : unchanged  Assessment and Plan  :        Acute PE -continue Lovenox 1 mg/kg twice daily.     COVID-19 pneumonia - Decadron, baricitinib, GI prophylaxis, DVT prophylaxis, antibiotics per ID. Acute respiratory failure with hypoxia - high flow oxygen support, high risk of worsening and intubation. Hyponatremia -resolved. Obesity -     Encourage incentive spirometry. Activity limited. I called patient's son Saira Mcdonough and updated him about condition and risk of intubation. Continue to monitor vitals , Intake / output ,  Cell count , HGB , Kidney function, oxygenation  as indicated . Plan and updates discussed with patient ,  answers  explained to satisfaction.    Plan discussed with Staff Johanna Valdez RN     (Please note that portions of this note were completed with a voice recognition program. Efforts were made to edit the dictations but occasionally words are mis-transcribed.)      Andres Raines MD  12/29/2021

## 2021-12-29 NOTE — PROGRESS NOTES
Left message for Marly Christopher, pts. Son. Updated with voicemail. Left our unit phone number in case he would like to call back.

## 2021-12-29 NOTE — PROGRESS NOTES
[facility] -   Infectious diseases evaluation  admission date 12/24/2021    reason for consultation:   covid +    Impression :   Current:  · covid pneumonia   · PE -  left lower lobe segmental emboli -  · bandemia  · Thrombocytopenia    Other:  ·   Discussion / summary of stay / plan of care   ·   Recommendations   · barcitinib 12/24 -  · Decadron 6 mg daily 12/24  · Anticoagulation  · Very poor outcome due to high oxygen requirement despite aggressive therapy  · CRP improved, but clinically she has not improved    Infection Control Recommendations   · [place] Precautions  · Contact Isolation   · Airborne isolation  · Droplet Isolation  Antimicrobial Stewardship Recommendations   · Off AB    Coordination ofOutpatient Care:   · Estimated Length of IV antimicrobials:  · Patient will need Midline / picc Catheter Insertion:   · Patient will need SNF:  · Patient will need outpatient wound care:     History of Present Illness:   Initial history:  Janet Ortega is a 70y.o.-year-old female Patient presented through ER via EMS after her family found her with severe shortness of breath in her shower. She had developed a cough approximately a week ago and according to the patient had a negative Covid test at that time. She was found to have an SPO2 of 50% on room air requiring BiPAP. A rapid Covid swab was positive  CTA of the chest showed segmental pulmonary embolism in the left lower lobe with possible right heart strain. An echocardiogram was completed and was negative for RV strain with RV systolic pressure of 29 mmHg  The patient was initiated on a heparin drip as well as high-dose Decadron, Rocephin and baricitinib.        Interval changes  12/29/2021   Patient Vitals for the past 8 hrs:   BP Temp Temp src Pulse Resp SpO2   12/28/21 2113 -- -- -- -- 20 91 %   12/28/21 2050 -- -- -- -- -- 92 %   12/28/21 2045 (!) 149/94 98.4 °F (36.9 °C) Oral 69 21 --   12/28/21 1700 (!) 128/98 97.1 °F (36.2 °C) Axillary -- -- --   CRP 18-improved  WBC 10  Blood culture still negative, chest x-ray positive diffuse pneumonia  Patient is on baricitinib, but saturation 86% she is on 90% high flow    12/28  60 FiO2 high flow, he feels better eating better but he saturating 90%,    Summary of relevant labs:  Labs:  .5-18-    BNP 9968  Troponin 54  Lactic acid 3.5  Ferritin 858  WBC 14.6  D-dimer 70.48    Micro:    Imaging:  CT chest bilat covid pneumonia w thick consolidations    I have personally reviewed the past medical history, past surgical history, medications, social history, and family history, and I haveupdated the database accordingly. Allergies:   Patient has no known allergies. Review of Systems:     Review of Systems   Constitutional: Positive for activity change. HENT: Negative for congestion. Eyes: Negative for itching. Respiratory: Positive for cough and shortness of breath. Negative for apnea. Cardiovascular: Negative for chest pain. Gastrointestinal: Negative for abdominal distention. Endocrine: Negative for heat intolerance, polydipsia and polyphagia. Genitourinary: Negative for dysuria. Musculoskeletal: Negative for arthralgias. Skin: Negative for color change. Allergic/Immunologic: Negative for immunocompromised state. Neurological: Negative for dizziness, seizures, numbness and headaches. Hematological: Negative for adenopathy. Psychiatric/Behavioral: Negative for agitation. Physical Examination :       Physical Exam  Constitutional:       Appearance: Normal appearance. She is not ill-appearing. HENT:      Head: Normocephalic and atraumatic. Nose: Nose normal.      Mouth/Throat:      Mouth: Mucous membranes are moist.   Eyes:      General: No scleral icterus. Conjunctiva/sclera: Conjunctivae normal.   Cardiovascular:      Rate and Rhythm: Normal rate and regular rhythm.       Heart sounds: Normal heart sounds. No murmur heard. Pulmonary:      Breath sounds: Normal breath sounds. Abdominal:      General: There is no distension. Tenderness: There is no abdominal tenderness. Genitourinary:     Comments: No castro  Musculoskeletal:         General: No swelling, deformity or signs of injury. Cervical back: Neck supple. No rigidity. Skin:     General: Skin is dry. Coloration: Skin is not jaundiced or pale. Findings: No erythema. Neurological:      General: No focal deficit present. Mental Status: She is alert and oriented to person, place, and time. Psychiatric:         Thought Content: Thought content normal.         Past Medical History:   No past medical history on file. Past Surgical  History:   No past surgical history on file. Medications:      enoxaparin  1 mg/kg SubCUTAneous BID    dexamethasone  10 mg IntraVENous Daily    Followed by   Darlin Coronel ON 12/31/2021] dexamethasone  6 mg IntraVENous Daily    sodium chloride flush  10 mL IntraVENous 2 times per day    baricitinib  4 mg Oral Daily       Social History:     Social History     Socioeconomic History    Marital status: Unknown     Spouse name: Not on file    Number of children: Not on file    Years of education: Not on file    Highest education level: Not on file   Occupational History    Not on file   Tobacco Use    Smoking status: Not on file    Smokeless tobacco: Not on file   Substance and Sexual Activity    Alcohol use: Not on file    Drug use: Not on file    Sexual activity: Not on file   Other Topics Concern    Not on file   Social History Narrative    Not on file     Social Determinants of Health     Financial Resource Strain:     Difficulty of Paying Living Expenses: Not on file   Food Insecurity:     Worried About Running Out of Food in the Last Year: Not on file    Jones of Food in the Last Year: Not on file   Transportation Needs:     Lack of Transportation (Medical):  Not on file  Lack of Transportation (Non-Medical): Not on file   Physical Activity:     Days of Exercise per Week: Not on file    Minutes of Exercise per Session: Not on file   Stress:     Feeling of Stress : Not on file   Social Connections:     Frequency of Communication with Friends and Family: Not on file    Frequency of Social Gatherings with Friends and Family: Not on file    Attends Orthodox Services: Not on file    Active Member of [address] [place] [address] or Organizations: Not on file    Attends Club or Organization Meetings: Not on file    Marital Status: Not on file   Intimate Partner Violence:     Fear of Current or Ex-Partner: Not on file    Emotionally Abused: Not on file    Physically Abused: Not on file    Sexually Abused: Not on file   Housing Stability:     Unable to Pay for Housing in the Last Year: Not on file    Number of [place] in the Last Year: Not on file    Unstable Housing in the Last Year: Not on file       Family History:   No family history on file. Medical Decision Making:   I have independently reviewed/ordered the following labs:    CBC with Differential:   Recent Labs     12/27/21  0154 12/28/21  0309   WBC 10.0 10.9   HGB 12.1 12.8   HCT 36.6 38.8   * 142   LYMPHOPCT 12* 11*   MONOPCT 6 8     BMP:  Recent Labs     12/27/21  0154 12/28/21  0309    135   K 5.0 5.5*    103   CO2 20 22   BUN 31* 31*   CREATININE 0.60 0.59     Hepatic Function Panel:   Recent Labs     12/27/21  0154   PROT 5.9*   LABALBU 2.8*   BILIDIR 0.17   IBILI 0.39   BILITOT 0.56   ALKPHOS 81   ALT 37*   AST 37*     No results for input(s): RPR in the last 72 hours. No results for input(s): HIV in the last 72 hours. No results for input(s): BC in the last 72 hours. Lab Results   Component Value Date    CREATININE 0.59 12/28/2021    GLUCOSE 151 12/28/2021       Detailed results: Thank you for allowing us to participate in the care of this patient. Please call with questions.     This note is created with the assistance of a speech recognition program.  While intending to generate adocument that actually reflects the content of the visit, the document can still have some errors including those of syntax and sound a like substitutions which may escape proof reading. It such instances, actual meaningcan be extrapolated by contextual diversion.     Tonja Calderon MD  Office: [phone]  Perfect serve / office [phone]      '

## 2021-12-29 NOTE — PLAN OF CARE
Problem: Airway Clearance - Ineffective  Goal: Achieve or maintain patent airway  12/29/2021 1818 by Sonam Mclaughlin RN  Outcome: Ongoing  12/29/2021 0544 by Lyssa Denny RN  Outcome: Ongoing     Problem: Gas Exchange - Impaired  Goal: Absence of hypoxia  12/29/2021 1818 by Sonam Mclaughlin RN  Outcome: Ongoing  12/29/2021 0544 by Lyssa Denny RN  Outcome: Ongoing  Goal: Promote optimal lung function  12/29/2021 1818 by Sonam Mclaughlin RN  Outcome: Ongoing  12/29/2021 0544 by Lyssa Denny RN  Outcome: Ongoing     Problem: Breathing Pattern - Ineffective  Goal: Ability to achieve and maintain a regular respiratory rate  12/29/2021 1818 by Sonam Mclaughlin RN  Outcome: Ongoing  12/29/2021 0544 by Lyssa Denny RN  Outcome: Ongoing     Problem:  Body Temperature -  Risk of, Imbalanced  Goal: Ability to maintain a body temperature within defined limits  12/29/2021 1818 by Sonam Mclaughlin RN  Outcome: Ongoing  12/29/2021 0544 by Lyssa Denny RN  Outcome: Ongoing  Goal: Will regain or maintain usual level of consciousness  12/29/2021 1818 by Sonam Mclaughlin RN  Outcome: Ongoing  12/29/2021 0544 by Lyssa Denny RN  Outcome: Ongoing  Goal: Complications related to the disease process, condition or treatment will be avoided or minimized  12/29/2021 1818 by Sonam Mclaughlin RN  Outcome: Ongoing  12/29/2021 0544 by yLssa Denny RN  Outcome: Ongoing     Problem: Isolation Precautions - Risk of Spread of Infection  Goal: Prevent transmission of infection  12/29/2021 1818 by Sonam Mclaughlin RN  Outcome: Ongoing  12/29/2021 0544 by Lyssa Denny RN  Outcome: Ongoing     Problem: Nutrition Deficits  Goal: Optimize nutritional status  12/29/2021 1818 by Sonam Mclaughlin RN  Outcome: Ongoing  12/29/2021 0544 by Lyssa Denny RN  Outcome: Ongoing     Problem: Risk for Fluid Volume Deficit  Goal: Maintain normal heart rhythm  12/29/2021 1818 by Sonam Mclaughlin RN  Outcome: Ongoing  12/29/2021 0544 by Leopoldo Kelly RN  Outcome: Ongoing  Goal: Maintain absence of muscle cramping  12/29/2021 1818 by Matthew Edward RN  Outcome: Ongoing  12/29/2021 0544 by Leopoldo Kelly RN  Outcome: Ongoing  Goal: Maintain normal serum potassium, sodium, calcium, phosphorus, and pH  12/29/2021 1818 by Matthew Edward RN  Outcome: Ongoing  12/29/2021 0544 by Leopoldo Kelly RN  Outcome: Ongoing     Problem: Loneliness or Risk for Loneliness  Goal: Demonstrate positive use of time alone when socialization is not possible  12/29/2021 1818 by Matthew Edward RN  Outcome: Ongoing  12/29/2021 0544 by Leopoldo Kelly RN  Outcome: Ongoing     Problem: Fatigue  Goal: Verbalize increase energy and improved vitality  12/29/2021 1818 by Matthew Edward RN  Outcome: Ongoing  12/29/2021 0544 by Leopoldo Kelly RN  Outcome: Ongoing     Problem: Patient Education: Go to Patient Education Activity  Goal: Patient/Family Education  12/29/2021 1818 by Matthew Edward RN  Outcome: Ongoing  12/29/2021 0544 by Leopoldo Kelly RN  Outcome: Ongoing     Problem: OXYGENATION/RESPIRATORY FUNCTION  Goal: Patient will maintain patent airway  12/29/2021 1818 by Matthew Edward RN  Outcome: Ongoing  12/29/2021 0544 by Leopoldo Kelly RN  Outcome: Ongoing  Goal: Patient will achieve/maintain normal respiratory rate/effort  Description: Respiratory rate and effort will be within normal limits for the patient  12/29/2021 1818 by Matthew Edward RN  Outcome: Ongoing  12/29/2021 0544 by Leopoldo Kelly RN  Outcome: Ongoing     Problem: Skin Integrity:  Goal: Will show no infection signs and symptoms  Description: Will show no infection signs and symptoms  12/29/2021 1818 by Matthew Edward RN  Outcome: Ongoing  12/29/2021 0544 by Leopoldo Kelly RN  Outcome: Ongoing  Goal: Absence of new skin breakdown  Description: Absence of new skin breakdown  12/29/2021 1818 by Kianna Saldana RN  Outcome: Ongoing  12/29/2021 0544 by Froilan Al RN  Outcome: Ongoing

## 2021-12-29 NOTE — PROGRESS NOTES
Writer in to patient room. Pt. Started coughing, BIPAP mask removed and placed back on Heated High flow. Pt given warm water to drink per her request and order given for tessalon pearls which were also administered. Pt. Left in a high fowlers position. Writer stayed with pt in room until she started to recover. After 10 minutes, pt. Only sating at 87 at the most.  RT informed and went into pt. Room to adjust her settings. Will continue to monitor.

## 2021-12-29 NOTE — PROGRESS NOTES
Pulmonary/Critical Care Progress Note    Patient's name:  Susan Cannon  Medical Record Number: [id number]  Patient's account/billing number: [de-identified]  Patient's YOB: 1950  Age: 70 y.o. Date of Admission: 12/24/2021  3:37 PM  Date of Consult: 12/28/2021      Primary Care Physician: No primary care provider on file. Code Status: Full Code    Reason for consult: Acute hypoxemic respiratory failure secondary to COVID-19 pneumonia with ARDS along with PE involving subsegmental vessels of the left lower lobe      HISTORY OF PRESENT ILLNESS:   History was obtained from chart review and the patient. Susan Cannon is a 70 y.o. woman was admitted with complaints of shortness of breath and found to be Covid positive but also had a left lower lobe subsegmental pulmonary embolism with right ventricular strain pattern  Vascular surgery was consulted and it was felt, rightfully so, that the cause of the right ventricular strain is unlikely pulmonary embolism  Patient was admitted to the [facility] ICU  Requiring 95% oxygen via high flow  Has shortness of breath along with coughing  No wheezing  No chest pain or pressure  No hemoptysis  No orthopnea or PND    INTERVAL HISTORY:  Cough better on Tussionex. FiO2 now at 70%. Mild respiratory distress. Negative fluid balance 1.82 L. Past Medical History:  No past medical history on file. Past Surgical History:  No past surgical history on file. Allergies:    No Known Allergies      Home Meds:   Prior to Admission medications    Not on File       Family History:   No family history on file. Social History:   TOBACCO:   has no history on file for tobacco use. ETOH:   has no history on file for alcohol use. DRUGS:  has no history on file for drug use.   OCCUPATION:   Noncontributory          REVIEW OF SYSTEMS:    Review of Systems -   General ROS: Completed and except as mentioned above were negative Psychological ROS:  Completed and except as mentioned above were negative  Ophthalmic ROS:  Completed and except as mentioned above were negative  ENT ROS:  Completed and except as mentioned above were negative  Allergy and Immunology ROS:  Completed and except as mentioned above were negative  Hematological and Lymphatic ROS:  Completed and except as mentioned above were negative  Endocrine ROS: Completed and except as mentioned above were negative  Breast ROS:  Completed and except as mentioned above were negative  Respiratory ROS:  Completed and except as mentioned above were negative  Cardiovascular ROS:  Completed and except as mentioned above were negative  Gastrointestinal ROS: Completed and except as mentioned above were negative  Genito-Urinary ROS:  Completed and except as mentioned above were negative  Musculoskeletal ROS:  Completed and except as mentioned above were negative  Neurological ROS:  Completed and except as mentioned above were negative  Dermatological ROS:  Completed and except as mentioned above were negative          Physical Exam:    Vitals: BP (!) 149/94   Pulse 69   Temp 98.4 °F (36.9 °C) (Oral)   Resp 20   Ht 5' 4\" (1.626 m)   Wt 195 lb 15.8 oz (88.9 kg)   SpO2 91%   Breastfeeding No   BMI 33.64 kg/m²     Last Body weight:   Wt Readings from Last 3 Encounters:   12/27/21 195 lb 15.8 oz (88.9 kg)       Body Mass Index : Body mass index is 33.64 kg/m².       Intake and Output summary:     Intake/Output Summary (Last 24 hours) at 12/28/2021 2206  Last data filed at 12/28/2021 1828  Gross per 24 hour   Intake 900 ml   Output 1100 ml   Net -200 ml       Physical Examination:   PHYSICAL EXAMINATION:  Vitals:    12/28/21 1700 12/28/21 2045 12/28/21 2050 12/28/21 2113   BP: (!) 128/98 (!) 149/94     Pulse:  69     Resp:  21  20   Temp: 97.1 °F (36.2 °C) 98.4 °F (36.9 °C)     TempSrc: Axillary Oral     SpO2:   92% 91%   Weight:       Height:         Constitutional: This is a well developed, well nourished, 30-34.9 - Obesity Grade I 70y.o. year old female who is alert, oriented, cooperative and in no apparent distress. Head:normocephalic and atraumatic. EENT:   SOPHIA. No conjunctival injections. Septum was midline, mucosa was without erythema, exudates or cobblestoning. No thrush was noted. Mallampati III (soft palate, base of uvula visible)  Neck: Supple without thyromegaly. No elevated JVP. Trachea was midline. Respiratory: Chest was symmetrical without dullness to percussion. Breath sounds bilaterally were clear to auscultation. There were no wheezes, rhonchi or rales. There is no intercostal retraction or use of accessory muscles. No egophony noted. Cardiovascular: Regular without murmur, clicks, gallops or rubs. Abdomen: Slightly rounded and soft without organomegaly. No rebound tenderness, rigidity or guarding was appreciated. Lymphatic: No lymphadenopathy. Musculoskeletal: Normal curvature of the spine. No gross muscle weakness. Extremities:  No lower extremity edema, ulcerations, tenderness, varicosities or erythema. Muscle size, tone and strength are normal.  No involuntary movements are noted. Skin:  Warm and dry. Good color, turgor and pigmentation. No lesions or scars. No cyanosis or clubbing  Neurological/Psychiatric: The patient's general behavior, level of consciousness, thought content and emotional status is normal.            Laboratory findings:-    CBC:   Recent Labs     12/28/21  0309   WBC 10.9   HGB 12.8        BMP:    Recent Labs     12/26/21  0642 12/26/21  0642 12/27/21  0154 12/27/21  0154 12/28/21  0309      < > 137   < > 135   K 4.6   < > 5.0   < > 5.5*      < > 106   < > 103   CO2 22   < > 20   < > 22   BUN 27*   < > 31*   < > 31*   CREATININE 0.60  --  0.60  --  0.59   GLUCOSE 159*   < > 161*   < > 151*    < > = values in this interval not displayed.      S. Calcium:  Recent Labs     12/28/21  0309   CALCIUM 8.1* S. Ionized Calcium:No results for input(s): IONCA in the last 72 hours. S. Magnesium:  No results for input(s): MG in the last 72 hours. S. Phosphorus:No results for input(s): PHOS in the last 72 hours. S. Glucose:  No results for input(s): POCGLU in the last 72 hours. Glycosylated hemoglobin A1C: No results for input(s): LABA1C in the last 72 hours. INR:   No results for input(s): INR in the last 72 hours. Hepatic functions:   Recent Labs     12/27/21  0154   ALKPHOS 81   ALT 37*   AST 37*   PROT 5.9*   BILITOT 0.56   BILIDIR 0.17   LABALBU 2.8*     Pancreatic functions:  No results for input(s): LACTA, AMYLASE in the last 72 hours. S. Lactic Acid:   No results for input(s): LACTA in the last 72 hours. Cardiac enzymes:No results for input(s): CKTOTAL, CKMB, CKMBINDEX, TROPONINI in the last 72 hours. BNP:No results for input(s): BNP in the last 72 hours. Lipid profile: No results for input(s): CHOL, TRIG, HDL, LDL, LDLCALC in the last 72 hours. Blood Gases: No results found for: PH, PCO2, PO2, HCO3, O2SAT  Thyroid functions: No results found for: TSH         Microbiology:    Cultures during this admission:     Blood cultures:      [] None drawn      [x] Negative             []  Positive (Details:  )  Urine Culture:        [] None drawn      [] Negative             []  Positive (Details:  )  Sputum Culture:   [] None drawn       [] Negative             []  Positive (Details:  )     SARS-CoV-2 rapid test was positive    Radiological reports:    CT CHEST PULMONARY EMBOLISM W CONTRAST    Result Date: 12/24/2021  Motion degraded study. Acute pulmonary emboli suspected particularly in the left lower lobe but not well visualized due to motion artifact. There is evidence for right ventricular strain with septal bowing suggesting a significant clot burden. Moderate patchy ground-glass opacities involving all lobes of both lungs consistent with multifocal pneumonia typical for COVID pneumonia.  Findings were discussed with Hany Jon at 5:39 pm on 12/24/2021. RECOMMENDATIONS: Unavailable           Assessment and Plan       IMPRESSION:   1. COVID-19    2. Dehydration    3. Tachycardia        Principal Problem:    COVID-19  Active Problems:    Pulmonary embolus, left (HCC)  Resolved Problems:    * No resolved hospital problems. *       Acute hypoxic respiratory failure   Acute respiratory distress syndrome   COVID 19 infection/pneumonia   Left lower lobe segmental pulmonary embolism   Possible pulmonary artery hypertension secondary to COVID-19 pneumonia much more than any pulmonary embolism given the small magnitude of pulmonary       Plan:     Continue Airborne isolation   Continue high flow oxygen   Use BiPAP, if needed   Obtain X-ray chest as needed    Monitor input/output, with a goal of even/negative fluid balance   Patient receiving baricitinib   Continue Decadron   Monitor CRP, LDH, AST/ALT/ferritin/ D-dimer   Continue supportive care   GI/DVT prophylaxis   Glycemic control per primary service   Patient is on heparin infusion for pulmonary embolism. Can switch to Eliquis, if no anticipated procedures   On nutrition via oral diet  Management as per guidance provided by hospital policy and prevailing evidence based medicine, during the COVID-19 pandemic emergency. This patient was evaluated in the context of the global SARS-CoV-2 (COVID-19) pandemic, which necessitated considerations that the patient either has COVID-19 infection or is at risk of infection with COVID-19. Institutional protocols and algorithms that pertain to the evaluation & management of patients with COVID-19 or those at risk for COVID-19 are in a state of rapid changes based on information released by regulatory bodies including the CDC and federal and state organizations. These policies and algorithms were followed during the patient's care.     Please note that this chart was generated using voice recognition Dragon dictation software. Although every effort was made to ensure the accuracy of this automated transcription, some errors in transcription may have occurred. .  Total critical care time caring for this patient with life threatening, unstable organ failure, including direct patient contact, management of life support systems, review of data including imaging and labs, discussions with other team members and physicians at least 27  Min so far today, excluding procedures.       Maggi Rowe DO,            12/28/2021, 10:06 PM

## 2021-12-30 LAB
ABSOLUTE EOS #: 0 K/UL (ref 0–0.4)
ABSOLUTE IMMATURE GRANULOCYTE: 0 K/UL (ref 0–0.3)
ABSOLUTE LYMPH #: 0.71 K/UL (ref 1–4.8)
ABSOLUTE MONO #: 0.81 K/UL (ref 0.1–0.8)
ANION GAP SERPL CALCULATED.3IONS-SCNC: 8 MMOL/L (ref 9–17)
BASOPHILS # BLD: 0 % (ref 0–2)
BASOPHILS ABSOLUTE: 0 K/UL (ref 0–0.2)
BUN BLDV-MCNC: 26 MG/DL (ref 8–23)
BUN/CREAT BLD: ABNORMAL (ref 9–20)
C-REACTIVE PROTEIN: 6 MG/L (ref 0–5)
CALCIUM SERPL-MCNC: 8 MG/DL (ref 8.6–10.4)
CHLORIDE BLD-SCNC: 103 MMOL/L (ref 98–107)
CO2: 23 MMOL/L (ref 20–31)
CREAT SERPL-MCNC: 0.44 MG/DL (ref 0.5–0.9)
DIFFERENTIAL TYPE: ABNORMAL
EOSINOPHILS RELATIVE PERCENT: 0 % (ref 1–4)
GFR AFRICAN AMERICAN: >60 ML/MIN
GFR NON-AFRICAN AMERICAN: >60 ML/MIN
GFR SERPL CREATININE-BSD FRML MDRD: ABNORMAL ML/MIN/{1.73_M2}
GFR SERPL CREATININE-BSD FRML MDRD: ABNORMAL ML/MIN/{1.73_M2}
GLUCOSE BLD-MCNC: 151 MG/DL (ref 70–99)
HCT VFR BLD CALC: 38.2 % (ref 36.3–47.1)
HEMOGLOBIN: 12.8 G/DL (ref 11.9–15.1)
IMMATURE GRANULOCYTES: 0 %
LYMPHOCYTES # BLD: 7 % (ref 24–44)
MCH RBC QN AUTO: 33.1 PG (ref 25.2–33.5)
MCHC RBC AUTO-ENTMCNC: 33.5 G/DL (ref 28.4–34.8)
MCV RBC AUTO: 98.7 FL (ref 82.6–102.9)
MONOCYTES # BLD: 8 % (ref 1–7)
MORPHOLOGY: NORMAL
NRBC AUTOMATED: 0 PER 100 WBC
PDW BLD-RTO: 14.2 % (ref 11.8–14.4)
PLATELET # BLD: 197 K/UL (ref 138–453)
PLATELET ESTIMATE: ABNORMAL
PMV BLD AUTO: 11.3 FL (ref 8.1–13.5)
POTASSIUM SERPL-SCNC: 4.6 MMOL/L (ref 3.7–5.3)
RBC # BLD: 3.87 M/UL (ref 3.95–5.11)
RBC # BLD: ABNORMAL 10*6/UL
SEG NEUTROPHILS: 85 % (ref 36–66)
SEGMENTED NEUTROPHILS ABSOLUTE COUNT: 8.58 K/UL (ref 1.8–7.7)
SODIUM BLD-SCNC: 134 MMOL/L (ref 135–144)
WBC # BLD: 10.1 K/UL (ref 3.5–11.3)
WBC # BLD: ABNORMAL 10*3/UL

## 2021-12-30 PROCEDURE — 2580000003 HC RX 258: Performed by: STUDENT IN AN ORGANIZED HEALTH CARE EDUCATION/TRAINING PROGRAM

## 2021-12-30 PROCEDURE — 6370000000 HC RX 637 (ALT 250 FOR IP): Performed by: INTERNAL MEDICINE

## 2021-12-30 PROCEDURE — 6370000000 HC RX 637 (ALT 250 FOR IP): Performed by: NURSE PRACTITIONER

## 2021-12-30 PROCEDURE — 94761 N-INVAS EAR/PLS OXIMETRY MLT: CPT

## 2021-12-30 PROCEDURE — 6360000002 HC RX W HCPCS: Performed by: FAMILY MEDICINE

## 2021-12-30 PROCEDURE — 2060000000 HC ICU INTERMEDIATE R&B

## 2021-12-30 PROCEDURE — 99233 SBSQ HOSP IP/OBS HIGH 50: CPT | Performed by: INTERNAL MEDICINE

## 2021-12-30 PROCEDURE — 99291 CRITICAL CARE FIRST HOUR: CPT | Performed by: INTERNAL MEDICINE

## 2021-12-30 PROCEDURE — 2700000000 HC OXYGEN THERAPY PER DAY

## 2021-12-30 PROCEDURE — 36415 COLL VENOUS BLD VENIPUNCTURE: CPT

## 2021-12-30 PROCEDURE — 6370000000 HC RX 637 (ALT 250 FOR IP): Performed by: FAMILY MEDICINE

## 2021-12-30 PROCEDURE — 94660 CPAP INITIATION&MGMT: CPT

## 2021-12-30 PROCEDURE — 86140 C-REACTIVE PROTEIN: CPT

## 2021-12-30 PROCEDURE — 2000000000 HC ICU R&B

## 2021-12-30 PROCEDURE — 80048 BASIC METABOLIC PNL TOTAL CA: CPT

## 2021-12-30 PROCEDURE — 99232 SBSQ HOSP IP/OBS MODERATE 35: CPT | Performed by: FAMILY MEDICINE

## 2021-12-30 PROCEDURE — 85025 COMPLETE CBC W/AUTO DIFF WBC: CPT

## 2021-12-30 RX ORDER — FAMOTIDINE 20 MG/1
20 TABLET, FILM COATED ORAL 2 TIMES DAILY
Status: DISCONTINUED | OUTPATIENT
Start: 2021-12-30 | End: 2022-01-14 | Stop reason: HOSPADM

## 2021-12-30 RX ORDER — DEXAMETHASONE SODIUM PHOSPHATE 10 MG/ML
6 INJECTION INTRAMUSCULAR; INTRAVENOUS DAILY
Status: COMPLETED | OUTPATIENT
Start: 2021-12-31 | End: 2022-01-07

## 2021-12-30 RX ORDER — LOPERAMIDE HYDROCHLORIDE 2 MG/1
2 CAPSULE ORAL 4 TIMES DAILY PRN
Status: DISCONTINUED | OUTPATIENT
Start: 2021-12-30 | End: 2022-01-14 | Stop reason: HOSPADM

## 2021-12-30 RX ADMIN — BARICITINIB 4 MG: 2 TABLET, FILM COATED ORAL at 08:11

## 2021-12-30 RX ADMIN — LOPERAMIDE HYDROCHLORIDE 2 MG: 2 CAPSULE ORAL at 15:41

## 2021-12-30 RX ADMIN — SODIUM CHLORIDE, PRESERVATIVE FREE 10 ML: 5 INJECTION INTRAVENOUS at 20:06

## 2021-12-30 RX ADMIN — BENZONATATE 200 MG: 100 CAPSULE ORAL at 08:11

## 2021-12-30 RX ADMIN — ENOXAPARIN SODIUM 90 MG: 100 INJECTION SUBCUTANEOUS at 20:06

## 2021-12-30 RX ADMIN — FAMOTIDINE 20 MG: 20 TABLET, FILM COATED ORAL at 20:05

## 2021-12-30 RX ADMIN — ENOXAPARIN SODIUM 90 MG: 100 INJECTION SUBCUTANEOUS at 08:12

## 2021-12-30 RX ADMIN — DEXAMETHASONE SODIUM PHOSPHATE 10 MG: 10 INJECTION INTRAMUSCULAR; INTRAVENOUS at 08:11

## 2021-12-30 RX ADMIN — BENZONATATE 200 MG: 100 CAPSULE ORAL at 14:08

## 2021-12-30 RX ADMIN — SODIUM CHLORIDE, PRESERVATIVE FREE 10 ML: 5 INJECTION INTRAVENOUS at 08:12

## 2021-12-30 ASSESSMENT — ENCOUNTER SYMPTOMS
DIARRHEA: 1
VOMITING: 0
ABDOMINAL PAIN: 0
WHEEZING: 0
CHEST TIGHTNESS: 0
BLOOD IN STOOL: 0
NAUSEA: 0
RHINORRHEA: 0
COUGH: 0
CONSTIPATION: 0
SHORTNESS OF BREATH: 1

## 2021-12-30 ASSESSMENT — PAIN SCALES - GENERAL
PAINLEVEL_OUTOF10: 0

## 2021-12-30 NOTE — PROGRESS NOTES
[facility] -   Infectious diseases evaluation  admission date 12/24/2021    reason for consultation:   covid +    Impression :   Current:  · covid pneumonia   · PE -  left lower lobe segmental emboli -  · bandemia  · Thrombocytopenia    Other:  ·   Discussion / summary of stay / plan of care   ·   Recommendations   · barcitinib 12/24 -  · Decadron 6 mg daily 12/24  · Anticoagulation  · Very poor outcome due to high oxygen requirement despite aggressive therapy  · CRP improved, but clinically she has not improved    Infection Control Recommendations   · [place] Precautions  · Contact Isolation   · Airborne isolation  · Droplet Isolation  Antimicrobial Stewardship Recommendations   · Off AB    Coordination ofOutpatient Care:   · Estimated Length of IV antimicrobials:  · Patient will need Midline / picc Catheter Insertion:   · Patient will need SNF:  · Patient will need outpatient wound care:     History of Present Illness:   Initial history:  Josee Rodarte is a 70y.o.-year-old female Patient presented through ER via EMS after her family found her with severe shortness of breath in her shower. She had developed a cough approximately a week ago and according to the patient had a negative Covid test at that time. She was found to have an SPO2 of 50% on room air requiring BiPAP. A rapid Covid swab was positive  CTA of the chest showed segmental pulmonary embolism in the left lower lobe with possible right heart strain. An echocardiogram was completed and was negative for RV strain with RV systolic pressure of 29 mmHg  The patient was initiated on a heparin drip as well as high-dose Decadron, Rocephin and baricitinib.        Interval changes  12/29/2021   Patient Vitals for the past 8 hrs:   BP Temp Temp src Pulse Resp SpO2   12/29/21 2002 -- -- -- -- 18 99 %   12/29/21 1600 (!) 144/97 98.1 °F (36.7 °C) Oral 63 21 93 %   12/29/21 1538 -- -- -- -- 21 95 %   CRP 18-improved  WBC 10  Blood culture still negative, chest x-ray positive diffuse pneumonia  Patient is on baricitinib, but saturation 86% she is on 90% high flow    12/28  60 FiO2 high flow, he feels better eating better but he saturating 90%,    12/29  70% FiO2 high flow, saturating 89%, she is short of breath, eating slightly, has a cough. In general she feels okay  Labs reviewed    Summary of relevant labs:  Labs:  .5-18-    BNP 9968  Troponin 54  Lactic acid 3.5  Ferritin 858  WBC 14.6  D-dimer 70.48    Micro:    Imaging:  CT chest bilat covid pneumonia w thick consolidations    I have personally reviewed the past medical history, past surgical history, medications, social history, and family history, and I haveupdated the database accordingly. Allergies:   Patient has no known allergies. Review of Systems:     Review of Systems   Constitutional: Positive for activity change. Negative for fatigue and fever. HENT: Negative for congestion. Eyes: Negative for itching. Respiratory: Positive for cough and shortness of breath. Negative for apnea. Cardiovascular: Negative for chest pain. Gastrointestinal: Negative for abdominal distention. Endocrine: Negative for heat intolerance, polydipsia and polyphagia. Genitourinary: Negative for dysuria. Musculoskeletal: Negative for arthralgias. Skin: Negative for color change. Allergic/Immunologic: Negative for immunocompromised state. Neurological: Negative for dizziness, seizures, numbness and headaches. Hematological: Negative for adenopathy. Psychiatric/Behavioral: Negative for agitation. Physical Examination :       Physical Exam  Constitutional:       Appearance: Normal appearance. She is not ill-appearing. HENT:      Head: Normocephalic and atraumatic. Nose: Nose normal.      Mouth/Throat:      Mouth: Mucous membranes are moist.   Eyes:      General: No scleral icterus.      Conjunctiva/sclera: Conjunctivae normal.   Cardiovascular:      Rate and Rhythm: Normal rate and regular rhythm. Heart sounds: Normal heart sounds. No murmur heard. Pulmonary:      Breath sounds: Normal breath sounds. Abdominal:      General: There is no distension. Tenderness: There is no abdominal tenderness. Genitourinary:     Comments: No castro  Musculoskeletal:         General: No swelling, deformity or signs of injury. Cervical back: Neck supple. No rigidity. Skin:     General: Skin is dry. Coloration: Skin is not jaundiced or pale. Findings: No bruising or erythema. Neurological:      General: No focal deficit present. Mental Status: She is alert and oriented to person, place, and time. Psychiatric:         Thought Content: Thought content normal.         Past Medical History:   No past medical history on file. Past Surgical  History:   No past surgical history on file.     Medications:      enoxaparin  1 mg/kg SubCUTAneous BID    dexamethasone  10 mg IntraVENous Daily    Followed by   Jonathan Wang ON 12/31/2021] dexamethasone  6 mg IntraVENous Daily    sodium chloride flush  10 mL IntraVENous 2 times per day    baricitinib  4 mg Oral Daily       Social History:     Social History     Socioeconomic History    Marital status: Unknown     Spouse name: Not on file    Number of children: Not on file    Years of education: Not on file    Highest education level: Not on file   Occupational History    Not on file   Tobacco Use    Smoking status: Not on file    Smokeless tobacco: Not on file   Substance and Sexual Activity    Alcohol use: Not on file    Drug use: Not on file    Sexual activity: Not on file   Other Topics Concern    Not on file   Social History Narrative    Not on file     Social Determinants of Health     Financial Resource Strain:     Difficulty of Paying Living Expenses: Not on file   Food Insecurity:     Worried About Running Out of Food in the Last Year: Not on file  Ran Out of Food in the Last Year: Not on file   Transportation Needs:     Lack of Transportation (Medical): Not on file    Lack of Transportation (Non-Medical): Not on file   Physical Activity:     Days of Exercise per Week: Not on file    Minutes of Exercise per Session: Not on file   Stress:     Feeling of Stress : Not on file   Social Connections:     Frequency of Communication with Friends and Family: Not on file    Frequency of Social Gatherings with Friends and Family: Not on file    Attends Gnosticism Services: Not on file    Active Member of [address] [place] [address] [employer] or Organizations: Not on file    Attends Club or Organization Meetings: Not on file    Marital Status: Not on file   Intimate Partner Violence:     Fear of Current or Ex-Partner: Not on file    Emotionally Abused: Not on file    Physically Abused: Not on file    Sexually Abused: Not on file   Housing Stability:     Unable to Pay for Housing in the Last Year: Not on file    Number of [place] in the Last Year: Not on file    Unstable Housing in the Last Year: Not on file       Family History:   No family history on file. Medical Decision Making:   I have independently reviewed/ordered the following labs:    CBC with Differential:   Recent Labs     12/28/21  0309 12/29/21  0733   WBC 10.9 7.0   HGB 12.8 12.3   HCT 38.8 36.2*    See Reflexed IPF Result   LYMPHOPCT 11* 9*   MONOPCT 8 7     BMP:  Recent Labs     12/28/21  0309 12/29/21  0933    136   K 5.5* 5.4*    102   CO2 22 22   BUN 31* 29*   CREATININE 0.59 0.45*     Hepatic Function Panel:   Recent Labs     12/27/21  0154   PROT 5.9*   LABALBU 2.8*   BILIDIR 0.17   IBILI 0.39   BILITOT 0.56   ALKPHOS 81   ALT 37*   AST 37*     No results for input(s): RPR in the last 72 hours. No results for input(s): HIV in the last 72 hours. No results for input(s): BC in the last 72 hours.   Lab Results   Component Value Date    CREATININE 0.45 12/29/2021    GLUCOSE 148 12/29/2021 Detailed results: Thank you for allowing us to participate in the care of this patient. Please call with questions. This note is created with the assistance of a speech recognition program.  While intending to generate adocument that actually reflects the content of the visit, the document can still have some errors including those of syntax and sound a like substitutions which may escape proof reading. It such instances, actual meaningcan be extrapolated by contextual diversion.     Lisy Nogueira MD  Office: [phone]  Perfect serve / office [phone]      '

## 2021-12-30 NOTE — PLAN OF CARE
Problem: Airway Clearance - Ineffective  Goal: Achieve or maintain patent airway  12/30/2021 0605 by Kings Mclaughlin RN  Outcome: Ongoing  12/29/2021 1818 by Kendal Sher RN  Outcome: Ongoing     Problem: Gas Exchange - Impaired  Goal: Absence of hypoxia  12/30/2021 0605 by Kings Mclaughlin RN  Outcome: Ongoing  12/29/2021 1818 by Kendal Sher RN  Outcome: Ongoing  Goal: Promote optimal lung function  12/30/2021 0605 by Kings Mclaughlin RN  Outcome: Ongoing  12/29/2021 1818 by Kendal Sher RN  Outcome: Ongoing     Problem: Breathing Pattern - Ineffective  Goal: Ability to achieve and maintain a regular respiratory rate  12/30/2021 0605 by Kings Mclaughlin RN  Outcome: Ongoing  12/29/2021 1818 by Kendal Sher RN  Outcome: Ongoing     Problem:  Body Temperature -  Risk of, Imbalanced  Goal: Ability to maintain a body temperature within defined limits  12/30/2021 0605 by Kings Mclaughlin RN  Outcome: Ongoing  12/29/2021 1818 by Kendal Sher RN  Outcome: Ongoing  Goal: Will regain or maintain usual level of consciousness  12/30/2021 0605 by Kings Mclaughlin RN  Outcome: Ongoing  12/29/2021 1818 by Kendal Sher RN  Outcome: Ongoing  Goal: Complications related to the disease process, condition or treatment will be avoided or minimized  12/30/2021 0605 by Kings Mclaughlin RN  Outcome: Ongoing  12/29/2021 1818 by Kendal Sher RN  Outcome: Ongoing     Problem: Isolation Precautions - Risk of Spread of Infection  Goal: Prevent transmission of infection  12/30/2021 0605 by Kings Mclaughlin RN  Outcome: Ongoing  12/29/2021 1818 by Kendal Sher RN  Outcome: Ongoing     Problem: Nutrition Deficits  Goal: Optimize nutritional status  12/30/2021 0605 by Kings Mclaughlin RN  Outcome: Ongoing  12/29/2021 1818 by Kendal Sher RN  Outcome: Ongoing     Problem: Risk for Fluid Volume Deficit  Goal: Maintain normal heart rhythm  12/30/2021 0605 by Kings Mclaughlin RN  Outcome: Ongoing  12/29/2021 1818 by Damion Michael RN  Outcome: Ongoing  Goal: Maintain absence of muscle cramping  12/30/2021 0605 by Reno Johnson RN  Outcome: Ongoing  12/29/2021 1818 by Damion Michael RN  Outcome: Ongoing  Goal: Maintain normal serum potassium, sodium, calcium, phosphorus, and pH  12/30/2021 0605 by Reno Johnson RN  Outcome: Ongoing  12/29/2021 1818 by Damion Michael RN  Outcome: Ongoing     Problem: Loneliness or Risk for Loneliness  Goal: Demonstrate positive use of time alone when socialization is not possible  12/30/2021 0605 by Reno Johnson RN  Outcome: Ongoing  12/29/2021 1818 by Damion Michael RN  Outcome: Ongoing     Problem: Fatigue  Goal: Verbalize increase energy and improved vitality  12/30/2021 0605 by Reno Johnson RN  Outcome: Ongoing  12/29/2021 1818 by Damion Michael RN  Outcome: Ongoing     Problem: Patient Education: Go to Patient Education Activity  Goal: Patient/Family Education  12/30/2021 0699 by Reno Johnson RN  Outcome: Ongoing  12/29/2021 1818 by Damion Michael RN  Outcome: Ongoing     Problem: OXYGENATION/RESPIRATORY FUNCTION  Goal: Patient will maintain patent airway  12/30/2021 0605 by Reno Johnson RN  Outcome: Ongoing  12/29/2021 1818 by Damion Michael RN  Outcome: Ongoing  Goal: Patient will achieve/maintain normal respiratory rate/effort  Description: Respiratory rate and effort will be within normal limits for the patient  12/30/2021 6446 by Reno Johnson RN  Outcome: Ongoing  12/29/2021 1818 by Damion Michael RN  Outcome: Ongoing     Problem: Skin Integrity:  Goal: Will show no infection signs and symptoms  Description: Will show no infection signs and symptoms  12/30/2021 0605 by Reno Johnson RN  Outcome: Ongoing  12/29/2021 1818 by Damion Michael RN  Outcome: Ongoing  Goal: Absence of new skin breakdown  Description: Absence of new skin breakdown  12/30/2021 0605 by Reno Johsnon RN  Outcome: Ongoing  12/29/2021 1818 by Neto Tyson RN  Outcome: Ongoing

## 2021-12-30 NOTE — PROGRESS NOTES
[facility]  Office: [address], DO, Mikhail Tiago, DO, Lucy Bending, DO, Hair Smoker Blood, DO, Tatianna Mcfarlane MD, Samm Ribeiro MD, Jose L Duval MD, Eulalio Bolanos MD, Denia Flores MD, Osmin Arnold MD, Leah Hutchinson MD, Heavenly More, DO, Navin Butler, DO, Anival Morales MD,  Rosario Lo, DO, Kari Helms MD, Pat Bardales MD, Deb Weber MD, Evaristo Oates MD, Mireya Bauer MD, Ruiz Lou MD, Joe Alvarado MD, Lary De Leon, [facility], [address], [facility], Helena Brewer, CNP, Wilmer Menjivar, CNS, Tereza Costa, [facility], Beryle Fix, CNP, Cy Manriquez, CNP, Breezy Reese, CNP, Michael Hinojosa, CNP, Nadya Ricketts PA-C, Jarred Ricci, DNP, Churchill Sumner, [facility], Lilian Winter, CNP, Pola Gómez, CNP, Vandana Martinez, CNP, Fernando Pearson, CNP, Frank Díaz, CNP, Dominique Segovia, [address] [place] [address]      Daily Progress Note     Admit Date: 12/24/2021  Bed/Room No.  3005/3005-01  Admitting Physician : Jose L Duval MD  Code Status :Full Code  Hospital Day:  LOS: 6 days   Chief Complaint:     Chief Complaint   Patient presents with    Shortness of Breath    Fall     Principal Problem:    Pneumonia due to COVID-19 virus  Active Problems:    Pulmonary embolus, left [facility])  Resolved Problems:    * No resolved hospital problems. *    Subjective : Interval History/Significant events :  12/30/21    Patient remains in respiratory failure vent dependent on high flow. Patient remains afebrile. She is on FiO2 80%, 60 LPM.  Patient is alert, oriented. She is eating and drinking better. Patient continues to have diarrhea. She is also having vaginal bleed. She remains on Lovenox. Vitals - Unstable afebrile  Labs -potassium 4.6, creatinine normal 0.44, WBC 10.1    Nursing notes , Consults notes reviewed. Overnight events and updates discussed with Nursing staff .    Background History:         Josee Rodarte is 70 y.o. female  Who was admitted to the hospital on 12/24/2021 for treatment of Pneumonia due to COVID-19 virus. Patient came to the hospital with lightheadedness and dry cough for 1 week. She had episode of fall at home and family called EMS. Initial evaluation by EMS found patient having tachypnea with respirate 40 to 50/min, hypoxia SPO2 50% on room air, tachycardia. Patient was placed on nonrebreather and improved only to 73%. Patient was then placed on BiPAP. Evaluation emergency room showed positive COVID-19 test, elevated troponin, elevated proBNP. CT chest showed subsegmental PE with possible right heart strain. Vascular surgery was consulted and recommended anticoagulation without need for thrombolysis. Patient also had thrombocytopenia. Cardiology was consulted and echocardiogram obtained which was negative for RV strain with RV systolic pressure 29 mmHg. Patient was treated with heparin infusion, Decadron and started on baricitinib. PMH:  No past medical history on file. Allergies: No Known Allergies   Medications :  enoxaparin, 1 mg/kg, SubCUTAneous, BID  [START ON 12/31/2021] dexamethasone, 6 mg, IntraVENous, Daily  sodium chloride flush, 10 mL, IntraVENous, 2 times per day  baricitinib, 4 mg, Oral, Daily        Review of Systems   Review of Systems   Constitutional: Positive for activity change, appetite change and fatigue. Negative for fever and unexpected weight change. HENT: Negative for congestion, rhinorrhea and sneezing. Eyes: Negative for visual disturbance. Respiratory: Positive for shortness of breath. Negative for cough, chest tightness and wheezing. Cardiovascular: Negative for chest pain and palpitations. Gastrointestinal: Positive for diarrhea. Negative for abdominal pain, blood in stool, constipation, nausea and vomiting. Genitourinary: Positive for vaginal bleeding. Negative for dysuria, enuresis, frequency and hematuria. Musculoskeletal: Negative for arthralgias and myalgias. Skin: Negative for rash. Neurological: Negative for dizziness, weakness, light-headedness and headaches. Hematological: Does not bruise/bleed easily. Psychiatric/Behavioral: Negative for dysphoric mood and sleep disturbance. Objective :      Current Vitals : Temp: 98.4 °F (36.9 °C),  Pulse: 65, Resp: 18, BP: (!) 127/94, SpO2: 100 %  Last 24 Hrs Vitals   Patient Vitals for the past 24 hrs:   BP Temp Temp src Pulse Resp SpO2   12/30/21 1130 (!) 127/94 98.4 °F (36.9 °C) Oral 65 18 100 %   12/30/21 0834 -- -- -- -- 20 --   12/30/21 0745 (!) 134/92 97.9 °F (36.6 °C) Oral 54 18 98 %   12/30/21 0400 133/85 98.3 °F (36.8 °C) Axillary 58 21 96 %   12/30/21 0302 -- -- -- -- 17 --   12/30/21 0000 (!) 133/92 98.3 °F (36.8 °C) Axillary 54 17 97 %   12/29/21 2332 -- -- -- -- 18 --   12/29/21 2002 -- -- -- -- 18 99 %   12/29/21 1600 (!) 144/97 98.1 °F (36.7 °C) Oral 63 21 93 %   12/29/21 1538 -- -- -- -- 21 95 %     Intake / output   No intake/output data recorded. Physical Exam:  Physical Exam  Vitals and nursing note reviewed. Constitutional:       General: She is not in acute distress. Appearance: She is obese. She is not diaphoretic. Interventions: Nasal cannula in place. Comments: High flow oxygen nasal cannula   HENT:      Head: Normocephalic and atraumatic. Nose:      Right Sinus: No maxillary sinus tenderness or frontal sinus tenderness. Left Sinus: No maxillary sinus tenderness or frontal sinus tenderness. Mouth/Throat:      Pharynx: No oropharyngeal exudate. Eyes:      General: No scleral icterus. Conjunctiva/sclera: Conjunctivae normal.      Pupils: Pupils are equal, round, and reactive to light. Neck:      Thyroid: No thyromegaly. Vascular: No JVD. Cardiovascular:      Rate and Rhythm: Normal rate and regular rhythm. Pulses:           Dorsalis pedis pulses are 2+ on the right side and 2+ on the left side. Heart sounds: Normal heart sounds. No murmur heard.       Pulmonary: Effort: Pulmonary effort is normal.      Breath sounds: Normal breath sounds. No wheezing or rales. Abdominal:      Palpations: Abdomen is soft. There is no mass. Tenderness: There is no abdominal tenderness. Musculoskeletal:      Cervical back: Full passive range of motion without pain and neck supple. Lymphadenopathy:      Head:      Right side of head: No submandibular adenopathy. Left side of head: No submandibular adenopathy. Cervical: No cervical adenopathy. Skin:     General: Skin is warm. Neurological:      Mental Status: She is alert and oriented to person, place, and time. Motor: No tremor. Psychiatric:         Behavior: Behavior is cooperative. Laboratory findings:    Recent Labs     12/28/21  0309 12/29/21  0733 12/30/21  0356   WBC 10.9 7.0 10.1   HGB 12.8 12.3 12.8   HCT 38.8 36.2* 38.2    See Reflexed IPF Result 197     Recent Labs     12/28/21  0309 12/29/21  0933 12/30/21  0356    136 134*   K 5.5* 5.4* 4.6    102 103   CO2 22 22 23   GLUCOSE 151* 148* 151*   BUN 31* 29* 26*   CREATININE 0.59 0.45* 0.44*   CALCIUM 8.1* 7.9* 8.0*     No results for input(s): PROT, LABALBU, LABA1C, I4CDZEB, H1PIODV, FT4, TSH, AST, ALT, LDH, GGT, ALKPHOS, BILITOT, BILIDIR, AMMONIA, AMYLASE, LIPASE, LACTATE, CHOL, HDL, LDLCHOLESTEROL, CHOLHDLRATIO, TRIG, VLDL, BNP, TROPONINI, CKTOTAL, CKMB, CKMBINDEX, RF, JESUS in the last 72 hours. No results found for: [place], RBCUA, BLOODU, BACTERIA, NITRU, [address], LEUKOCYTESUR    Imaging / Clinical Data :-   CT CHEST PULMONARY EMBOLISM W CONTRAST    Result Date: 12/24/2021  Motion degraded study. Acute pulmonary emboli suspected particularly in the left lower lobe but not well visualized due to motion artifact. There is evidence for right ventricular strain with septal bowing suggesting a significant clot burden.  Moderate patchy ground-glass opacities involving all lobes of both lungs consistent with multifocal pneumonia typical for COVID pneumonia. Findings were discussed with Brennan Camacho at 5:39 pm on 12/24/2021. RECOMMENDATIONS: Unavailable        Clinical Course : unchanged  Assessment and Plan  :        Acute PE - continue Lovenox 1 mg/kg twice daily. COVID-19 pneumonia - Decadron , baricitinib, GI prophylaxis with Pepcid, monitor off antibiotics. ID on board. Continue airborne isolation till 1/13/2022. Acute respiratory failure with hypoxia - high flow oxygen support, high risk of worsening and intubation. Hyponatremia -resolved. Obesity -   Vaginal bleed - post menopausal . Will get TVUS when stable . Monitor hemoglobin. Diarrhea Imodium as needed. Encourage incentive spirometry. Activity limited. Discussed vaginal bleed with patient and plan explained. Patient agrees at this time. Continue to monitor vitals , Intake / output ,  Cell count , HGB , Kidney function, oxygenation  as indicated . Plan and updates discussed with patient ,  answers  explained to satisfaction.    Plan discussed with Staff Lesli Novak  RN     (Please note that portions of this note were completed with a voice recognition program. Efforts were made to edit the dictations but occasionally words are mis-transcribed.)      Lupe Orr MD  12/30/2021

## 2021-12-30 NOTE — PLAN OF CARE
Problem: OXYGENATION/RESPIRATORY FUNCTION  Goal: Patient will maintain patent airway  12/30/2021 0836 by Miky Burrell RCP  Outcome: Ongoing     Problem: OXYGENATION/RESPIRATORY FUNCTION  Goal: Patient will achieve/maintain normal respiratory rate/effort  Description: Respiratory rate and effort will be within normal limits for the patient  12/30/2021 0836 by Miky Burrell RCP  Outcome: Ongoing

## 2021-12-30 NOTE — PROGRESS NOTES
Pulmonary/Critical Care Progress Note    Patient's name:  Nidhi Hopper  Medical Record Number: [id number]  Patient's account/billing number: [de-identified]  Patient's YOB: 1950  Age: 70 y.o. Date of Admission: 12/24/2021  3:37 PM  Date of Consult: 12/29/2021      Primary Care Physician: No primary care provider on file. Code Status: Full Code    Reason for consult: Acute hypoxemic respiratory failure secondary to COVID-19 pneumonia with ARDS along with PE involving subsegmental vessels of the left lower lobe      HISTORY OF PRESENT ILLNESS:   History was obtained from chart review and the patient. Nidhi Hopper is a 70 y.o. woman was admitted with complaints of shortness of breath and found to be Covid positive but also had a left lower lobe subsegmental pulmonary embolism with right ventricular strain pattern  Vascular surgery was consulted and it was felt, rightfully so, that the cause of the right ventricular strain is unlikely pulmonary embolism  Patient was admitted to the [facility]  Requiring 95% oxygen via high flow  Has shortness of breath along with coughing  No wheezing  No chest pain or pressure  No hemoptysis  No orthopnea or PND    INTERVAL HISTORY:  Patient currently on high flow oxygen 80%. Respiratory rate 20. I's/O- 3.8 L. Past Medical History:  No past medical history on file. Past Surgical History:  No past surgical history on file. Allergies:    No Known Allergies      Home Meds:   Prior to Admission medications    Not on File       Family History:   No family history on file. Social History:   TOBACCO:   has no history on file for tobacco use. ETOH:   has no history on file for alcohol use. DRUGS:  has no history on file for drug use.   OCCUPATION:   Noncontributory          REVIEW OF SYSTEMS:    Review of Systems -   General ROS: Completed and except as mentioned above were negative   Psychological ROS: Completed and except as mentioned above were negative  Ophthalmic ROS:  Completed and except as mentioned above were negative  ENT ROS:  Completed and except as mentioned above were negative  Allergy and Immunology ROS:  Completed and except as mentioned above were negative  Hematological and Lymphatic ROS:  Completed and except as mentioned above were negative  Endocrine ROS: Completed and except as mentioned above were negative  Breast ROS:  Completed and except as mentioned above were negative  Respiratory ROS:  Completed and except as mentioned above were negative  Cardiovascular ROS:  Completed and except as mentioned above were negative  Gastrointestinal ROS: Completed and except as mentioned above were negative  Genito-Urinary ROS:  Completed and except as mentioned above were negative  Musculoskeletal ROS:  Completed and except as mentioned above were negative  Neurological ROS:  Completed and except as mentioned above were negative  Dermatological ROS:  Completed and except as mentioned above were negative          Physical Exam:    Vitals: BP (!) 144/97   Pulse 63   Temp 98.1 °F (36.7 °C) (Oral)   Resp 18   Ht 5' 4\" (1.626 m)   Wt 195 lb 15.8 oz (88.9 kg)   SpO2 99%   Breastfeeding No   BMI 33.64 kg/m²     Last Body weight:   Wt Readings from Last 3 Encounters:   12/27/21 195 lb 15.8 oz (88.9 kg)       Body Mass Index : Body mass index is 33.64 kg/m².       Intake and Output summary:     Intake/Output Summary (Last 24 hours) at 12/29/2021 2250  Last data filed at 12/29/2021 1417  Gross per 24 hour   Intake 240 ml   Output 2050 ml   Net -1810 ml       Physical Examination:   PHYSICAL EXAMINATION:  Vitals:    12/29/21 1234 12/29/21 1538 12/29/21 1600 12/29/21 2002   BP:   (!) 144/97    Pulse:   63    Resp:  21 21 18   Temp:   98.1 °F (36.7 °C)    TempSrc:   Oral    SpO2: (!) 89% 95% 93% 99%   Weight:       Height:         Constitutional: This is a well developed, well nourished, 30-34.9 - Obesity Grade I 70y.o. year old female who is alert, oriented, cooperative and in no apparent distress. Head:normocephalic and atraumatic. EENT:   SOPHIA. No conjunctival injections. Septum was midline, mucosa was without erythema, exudates or cobblestoning. No thrush was noted. Mallampati III (soft palate, base of uvula visible)  Neck: Supple without thyromegaly. No elevated JVP. Trachea was midline. Respiratory: Chest was symmetrical without dullness to percussion. Breath sounds bilaterally were clear to auscultation. There were no wheezes, rhonchi or rales. There is no intercostal retraction or use of accessory muscles. No egophony noted. Cardiovascular: Regular without murmur, clicks, gallops or rubs. Abdomen: Slightly rounded and soft without organomegaly. No rebound tenderness, rigidity or guarding was appreciated. Lymphatic: No lymphadenopathy. Musculoskeletal: Normal curvature of the spine. No gross muscle weakness. Extremities:  No lower extremity edema, ulcerations, tenderness, varicosities or erythema. Muscle size, tone and strength are normal.  No involuntary movements are noted. Skin:  Warm and dry. Good color, turgor and pigmentation. No lesions or scars. No cyanosis or clubbing  Neurological/Psychiatric: The patient's general behavior, level of consciousness, thought content and emotional status is normal.            Laboratory findings:-    CBC:   Recent Labs     12/29/21  0733   WBC 7.0   HGB 12.3   PLT See Reflexed IPF Result     BMP:    Recent Labs     12/27/21  0154 12/27/21  0154 12/28/21  0309 12/28/21  0309 12/29/21  0933      < > 135   < > 136   K 5.0   < > 5.5*   < > 5.4*      < > 103   < > 102   CO2 20   < > 22   < > 22   BUN 31*   < > 31*   < > 29*   CREATININE 0.60  --  0.59  --  0.45*   GLUCOSE 161*   < > 151*   < > 148*    < > = values in this interval not displayed. S. Calcium:  Recent Labs     12/29/21  0933   CALCIUM 7.9*     S.  Ionized Calcium:No results for input(s): IONCA in the last 72 hours. S. Magnesium:  No results for input(s): MG in the last 72 hours. S. Phosphorus:No results for input(s): PHOS in the last 72 hours. S. Glucose:  No results for input(s): POCGLU in the last 72 hours. Glycosylated hemoglobin A1C: No results for input(s): LABA1C in the last 72 hours. INR:   No results for input(s): INR in the last 72 hours. Hepatic functions:   Recent Labs     12/27/21  0154   ALKPHOS 81   ALT 37*   AST 37*   PROT 5.9*   BILITOT 0.56   BILIDIR 0.17   LABALBU 2.8*     Pancreatic functions:  No results for input(s): LACTA, AMYLASE in the last 72 hours. S. Lactic Acid:   No results for input(s): LACTA in the last 72 hours. Cardiac enzymes:No results for input(s): CKTOTAL, CKMB, CKMBINDEX, TROPONINI in the last 72 hours. BNP:No results for input(s): BNP in the last 72 hours. Lipid profile: No results for input(s): CHOL, TRIG, HDL, LDL, LDLCALC in the last 72 hours. Blood Gases: No results found for: PH, PCO2, PO2, HCO3, O2SAT  Thyroid functions: No results found for: TSH         Microbiology:    Cultures during this admission:     Blood cultures:      [] None drawn      [x] Negative             []  Positive (Details:  )  Urine Culture:        [] None drawn      [] Negative             []  Positive (Details:  )  Sputum Culture:   [] None drawn       [] Negative             []  Positive (Details:  )     SARS-CoV-2 rapid test was positive    Radiological reports:    CT CHEST PULMONARY EMBOLISM W CONTRAST    Result Date: 12/24/2021  Motion degraded study. Acute pulmonary emboli suspected particularly in the left lower lobe but not well visualized due to motion artifact. There is evidence for right ventricular strain with septal bowing suggesting a significant clot burden. Moderate patchy ground-glass opacities involving all lobes of both lungs consistent with multifocal pneumonia typical for COVID pneumonia.  Findings were discussed with Blayne Yao at 5:39 pm on 12/24/2021. RECOMMENDATIONS: Unavailable           Assessment and Plan       IMPRESSION:   1. COVID-19    2. Dehydration    3. Tachycardia        Principal Problem:    Pneumonia due to COVID-19 virus  Active Problems:    Pulmonary embolus, left (HCC)  Resolved Problems:    * No resolved hospital problems. *       Acute hypoxic respiratory failure   Acute respiratory distress syndrome   COVID 19 infection/pneumonia   Left lower lobe segmental pulmonary embolism   Possible pulmonary artery hypertension secondary to COVID-19 pneumonia much more than any pulmonary embolism given the small magnitude of pulmonary       Plan:     Continue Airborne isolation   Continue high flow oxygen   Use BiPAP, if needed   Obtain X-ray chest as needed    Monitor input/output, with a goal of even/negative fluid balance   Patient receiving baricitinib   Continue Decadron   Monitor CRP, LDH, AST/ALT/ferritin/ D-dimer   Continue supportive care   GI/DVT prophylaxis   Glycemic control per primary service   Lovenox 1 mg/kg twice daily. Can switch to Eliquis, if no anticipated procedures   On nutrition via oral diet  Management as per guidance provided by hospital policy and prevailing evidence based medicine, during the COVID-19 pandemic emergency. This patient was evaluated in the context of the global SARS-CoV-2 (COVID-19) pandemic, which necessitated considerations that the patient either has COVID-19 infection or is at risk of infection with COVID-19. Institutional protocols and algorithms that pertain to the evaluation & management of patients with COVID-19 or those at risk for COVID-19 are in a state of rapid changes based on information released by regulatory bodies including the CDC and federal and state organizations. These policies and algorithms were followed during the patient's care. Please note that this chart was generated using voice recognition Dragon dictation software. Although every effort was made to ensure the accuracy of this automated transcription, some errors in transcription may have occurred. .  Total critical care time caring for this patient with life threatening, unstable organ failure, including direct patient contact, management of life support systems, review of data including imaging and labs, discussions with other team members and physicians at least 27  Min so far today, excluding procedures.       Jeffy Ferrer DO,            12/29/2021, 10:50 PM

## 2021-12-30 NOTE — PLAN OF CARE
Problem: Airway Clearance - Ineffective  Goal: Achieve or maintain patent airway  12/30/2021 1805 by Nidhi Klein RN  Outcome: Ongoing     Problem: Gas Exchange - Impaired  Goal: Absence of hypoxia  12/30/2021 1805 by Nidhi Klein RN  Outcome: Ongoing  Goal: Promote optimal lung function  12/30/2021 1805 by Nidhi Klein RN  Outcome: Ongoing     Problem: Breathing Pattern - Ineffective  Goal: Ability to achieve and maintain a regular respiratory rate  12/30/2021 1805 by Nidhi Klein RN  Outcome: Ongoing    Problem:  Body Temperature -  Risk of, Imbalanced  Goal: Ability to maintain a body temperature within defined limits  12/30/2021 1805 by Nidhi Klein RN  Outcome: Ongoing  Goal: Will regain or maintain usual level of consciousness  12/30/2021 1805 by Nidhi Klein RN  Outcome: Ongoing  Goal: Complications related to the disease process, condition or treatment will be avoided or minimized  12/30/2021 1805 by Nidhi Klein RN  Outcome: Ongoing    Problem: Isolation Precautions - Risk of Spread of Infection  Goal: Prevent transmission of infection  12/30/2021 1805 by Nidhi Klein RN  Outcome: Ongoing     Problem: Nutrition Deficits  Goal: Optimize nutritional status  12/30/2021 1805 by Nidhi Klein RN  Outcome: Ongoing    Problem: Risk for Fluid Volume Deficit  Goal: Maintain normal heart rhythm  12/30/2021 1805 by Nidhi Klein RN  Outcome: Ongoing  12/30/2021 0605 by Nanda Nesbitt RN  Outcome: Ongoing  Goal: Maintain absence of muscle cramping  12/30/2021 1805 by Nidhi Klein RN  Outcome: Ongoing  Goal: Maintain normal serum potassium, sodium, calcium, phosphorus, and pH  12/30/2021 1805 by Nidhi Klein RN  Outcome: Ongoing     Problem: Loneliness or Risk for Loneliness  Goal: Demonstrate positive use of time alone when socialization is not possible  12/30/2021 1805 by Nidhi Klein RN  Outcome: Ongoing     Problem: Fatigue  Goal: Verbalize increase energy and improved vitality  12/30/2021 1805 by Kirk Friedman RN  Outcome: Ongoing     Problem: Patient Education: Go to Patient Education Activity  Goal: Patient/Family Education  12/30/2021 1805 by Kirk Friedman RN  Outcome: Ongoing     Problem: OXYGENATION/RESPIRATORY FUNCTION  Goal: Patient will maintain patent airway  12/30/2021 1805 by Kirk Friedman RN  Outcome: Ongoing  Goal: Patient will achieve/maintain normal respiratory rate/effort  Description: Respiratory rate and effort will be within normal limits for the patient  12/30/2021 1805 by Kirk Friedman RN  Outcome: Ongoing    Problem: Skin Integrity:  Goal: Will show no infection signs and symptoms  Description: Will show no infection signs and symptoms  12/30/2021 1805 by Kirk Friedman RN  Outcome: Ongoing    Goal: Absence of new skin breakdown  Description: Absence of new skin breakdown  12/30/2021 1805 by Kirk Friedman RN  Outcome: Ongoing

## 2021-12-31 PROBLEM — N95.0 POSTMENOPAUSAL BLEEDING: Status: ACTIVE | Noted: 2021-12-31

## 2021-12-31 LAB
ABSOLUTE EOS #: 0 K/UL (ref 0–0.44)
ABSOLUTE IMMATURE GRANULOCYTE: 0.07 K/UL (ref 0–0.3)
ABSOLUTE LYMPH #: 0.44 K/UL (ref 1.1–3.7)
ABSOLUTE MONO #: 0.52 K/UL (ref 0.1–1.2)
ANION GAP SERPL CALCULATED.3IONS-SCNC: 8 MMOL/L (ref 9–17)
BASOPHILS # BLD: 0 % (ref 0–2)
BASOPHILS ABSOLUTE: 0 K/UL (ref 0–0.2)
BUN BLDV-MCNC: 21 MG/DL (ref 8–23)
BUN/CREAT BLD: ABNORMAL (ref 9–20)
CALCIUM SERPL-MCNC: 8.1 MG/DL (ref 8.6–10.4)
CHLORIDE BLD-SCNC: 104 MMOL/L (ref 98–107)
CO2: 22 MMOL/L (ref 20–31)
CREAT SERPL-MCNC: 0.42 MG/DL (ref 0.5–0.9)
DIFFERENTIAL TYPE: ABNORMAL
EOSINOPHILS RELATIVE PERCENT: 0 % (ref 1–4)
GFR AFRICAN AMERICAN: >60 ML/MIN
GFR NON-AFRICAN AMERICAN: >60 ML/MIN
GFR SERPL CREATININE-BSD FRML MDRD: ABNORMAL ML/MIN/{1.73_M2}
GFR SERPL CREATININE-BSD FRML MDRD: ABNORMAL ML/MIN/{1.73_M2}
GLUCOSE BLD-MCNC: 149 MG/DL (ref 70–99)
HCT VFR BLD CALC: 36.5 % (ref 36.3–47.1)
HEMOGLOBIN: 12 G/DL (ref 11.9–15.1)
IMMATURE GRANULOCYTES: 1 %
LYMPHOCYTES # BLD: 6 % (ref 24–43)
MCH RBC QN AUTO: 32.8 PG (ref 25.2–33.5)
MCHC RBC AUTO-ENTMCNC: 32.9 G/DL (ref 28.4–34.8)
MCV RBC AUTO: 99.7 FL (ref 82.6–102.9)
MONOCYTES # BLD: 7 % (ref 3–12)
MORPHOLOGY: NORMAL
NRBC AUTOMATED: 0 PER 100 WBC
PDW BLD-RTO: 14 % (ref 11.8–14.4)
PLATELET # BLD: 207 K/UL (ref 138–453)
PLATELET ESTIMATE: ABNORMAL
PMV BLD AUTO: 10.9 FL (ref 8.1–13.5)
POTASSIUM SERPL-SCNC: 4.8 MMOL/L (ref 3.7–5.3)
RBC # BLD: 3.66 M/UL (ref 3.95–5.11)
RBC # BLD: ABNORMAL 10*6/UL
SEG NEUTROPHILS: 86 % (ref 36–65)
SEGMENTED NEUTROPHILS ABSOLUTE COUNT: 6.37 K/UL (ref 1.5–8.1)
SODIUM BLD-SCNC: 134 MMOL/L (ref 135–144)
WBC # BLD: 7.4 K/UL (ref 3.5–11.3)
WBC # BLD: ABNORMAL 10*3/UL

## 2021-12-31 PROCEDURE — 2700000000 HC OXYGEN THERAPY PER DAY

## 2021-12-31 PROCEDURE — 94660 CPAP INITIATION&MGMT: CPT

## 2021-12-31 PROCEDURE — 94761 N-INVAS EAR/PLS OXIMETRY MLT: CPT

## 2021-12-31 PROCEDURE — 36415 COLL VENOUS BLD VENIPUNCTURE: CPT

## 2021-12-31 PROCEDURE — 6370000000 HC RX 637 (ALT 250 FOR IP): Performed by: NURSE PRACTITIONER

## 2021-12-31 PROCEDURE — 6360000002 HC RX W HCPCS: Performed by: FAMILY MEDICINE

## 2021-12-31 PROCEDURE — 2060000000 HC ICU INTERMEDIATE R&B

## 2021-12-31 PROCEDURE — 99232 SBSQ HOSP IP/OBS MODERATE 35: CPT | Performed by: FAMILY MEDICINE

## 2021-12-31 PROCEDURE — 99233 SBSQ HOSP IP/OBS HIGH 50: CPT | Performed by: INTERNAL MEDICINE

## 2021-12-31 PROCEDURE — 2580000003 HC RX 258: Performed by: STUDENT IN AN ORGANIZED HEALTH CARE EDUCATION/TRAINING PROGRAM

## 2021-12-31 PROCEDURE — 80048 BASIC METABOLIC PNL TOTAL CA: CPT

## 2021-12-31 PROCEDURE — 99291 CRITICAL CARE FIRST HOUR: CPT | Performed by: INTERNAL MEDICINE

## 2021-12-31 PROCEDURE — 85025 COMPLETE CBC W/AUTO DIFF WBC: CPT

## 2021-12-31 PROCEDURE — 6370000000 HC RX 637 (ALT 250 FOR IP): Performed by: INTERNAL MEDICINE

## 2021-12-31 PROCEDURE — 6370000000 HC RX 637 (ALT 250 FOR IP): Performed by: FAMILY MEDICINE

## 2021-12-31 RX ADMIN — APIXABAN 10 MG: 5 TABLET, FILM COATED ORAL at 13:50

## 2021-12-31 RX ADMIN — FAMOTIDINE 20 MG: 20 TABLET, FILM COATED ORAL at 20:13

## 2021-12-31 RX ADMIN — BENZONATATE 200 MG: 100 CAPSULE ORAL at 08:23

## 2021-12-31 RX ADMIN — BENZONATATE 200 MG: 100 CAPSULE ORAL at 17:45

## 2021-12-31 RX ADMIN — BARICITINIB 4 MG: 2 TABLET, FILM COATED ORAL at 08:23

## 2021-12-31 RX ADMIN — SODIUM CHLORIDE, PRESERVATIVE FREE 10 ML: 5 INJECTION INTRAVENOUS at 20:13

## 2021-12-31 RX ADMIN — SODIUM CHLORIDE, PRESERVATIVE FREE 10 ML: 5 INJECTION INTRAVENOUS at 08:23

## 2021-12-31 RX ADMIN — FAMOTIDINE 20 MG: 20 TABLET, FILM COATED ORAL at 08:23

## 2021-12-31 RX ADMIN — DEXAMETHASONE SODIUM PHOSPHATE 6 MG: 10 INJECTION INTRAMUSCULAR; INTRAVENOUS at 08:23

## 2021-12-31 RX ADMIN — APIXABAN 10 MG: 5 TABLET, FILM COATED ORAL at 20:13

## 2021-12-31 ASSESSMENT — PAIN SCALES - GENERAL
PAINLEVEL_OUTOF10: 0

## 2021-12-31 NOTE — PLAN OF CARE
Problem: Airway Clearance - Ineffective  Goal: Achieve or maintain patent airway  12/30/2021 2201 by Mary Sutton RN  Outcome: Ongoing  12/30/2021 2113 by Bela Jones RCP  Outcome: Ongoing  12/30/2021 1805 by Jey Fuentes RN  Outcome: Ongoing     Problem: Gas Exchange - Impaired  Goal: Absence of hypoxia  12/30/2021 2201 by Mary Sutton RN  Outcome: Ongoing  12/30/2021 2113 by Bela Jones RCP  Outcome: Ongoing  12/30/2021 1805 by Jey Fuentes RN  Outcome: Ongoing  Goal: Promote optimal lung function  12/30/2021 2201 by Mary Suttno RN  Outcome: Ongoing  12/30/2021 2113 by Bela Jones RCP  Outcome: Ongoing  12/30/2021 1805 by Jey Fuentes RN  Outcome: Ongoing     Problem: Breathing Pattern - Ineffective  Goal: Ability to achieve and maintain a regular respiratory rate  12/30/2021 2201 by Mary Sutton RN  Outcome: Ongoing  12/30/2021 2113 by Bela Jones RCP  Outcome: Ongoing  12/30/2021 1805 by Jey Fuentes RN  Outcome: Ongoing     Problem:  Body Temperature -  Risk of, Imbalanced  Goal: Ability to maintain a body temperature within defined limits  12/30/2021 2201 by Mary Sutton RN  Outcome: Ongoing  12/30/2021 1805 by Jey Fuentes RN  Outcome: Ongoing  Goal: Will regain or maintain usual level of consciousness  12/30/2021 2201 by Mary Sutton RN  Outcome: Ongoing  12/30/2021 1805 by Jey Fuentes RN  Outcome: Ongoing  Goal: Complications related to the disease process, condition or treatment will be avoided or minimized  12/30/2021 2201 by Mary Sutton RN  Outcome: Ongoing  12/30/2021 1805 by Jey Fuentes RN  Outcome: Ongoing     Problem: Isolation Precautions - Risk of Spread of Infection  Goal: Prevent transmission of infection  12/30/2021 2201 by Mary Sutton RN  Outcome: Ongoing  12/30/2021 1805 by Jey Fuentes RN  Outcome: Ongoing     Problem: Nutrition Deficits  Goal: Optimize nutritional status  12/30/2021 2201 by Mary Sutton RN  Outcome: Ongoing  12/30/2021 1805 by Thais Bradford RN  Outcome: Ongoing     Problem: Risk for Fluid Volume Deficit  Goal: Maintain normal heart rhythm  12/30/2021 2201 by Manjit Michael RN  Outcome: Ongoing  12/30/2021 1805 by Thais Bradford RN  Outcome: Ongoing  Goal: Maintain absence of muscle cramping  12/30/2021 2201 by Manjit Michael RN  Outcome: Ongoing  12/30/2021 1805 by Thais Bradford RN  Outcome: Ongoing  Goal: Maintain normal serum potassium, sodium, calcium, phosphorus, and pH  12/30/2021 2201 by Manjit Michael RN  Outcome: Ongoing  12/30/2021 1805 by Thais Bradford RN  Outcome: Ongoing     Problem: Loneliness or Risk for Loneliness  Goal: Demonstrate positive use of time alone when socialization is not possible  12/30/2021 2201 by Manjit Michael RN  Outcome: Ongoing  12/30/2021 1805 by Thais Bradford RN  Outcome: Ongoing     Problem: Fatigue  Goal: Verbalize increase energy and improved vitality  12/30/2021 2201 by Manjit Michael RN  Outcome: Ongoing  12/30/2021 1805 by Thais Bradford RN  Outcome: Ongoing     Problem: Patient Education: Go to Patient Education Activity  Goal: Patient/Family Education  12/30/2021 2201 by Manjit Michael RN  Outcome: Ongoing  12/30/2021 1805 by Thais Bradford RN  Outcome: Ongoing     Problem: OXYGENATION/RESPIRATORY FUNCTION  Goal: Patient will maintain patent airway  12/30/2021 2201 by Manjit Michael RN  Outcome: Ongoing  12/30/2021 1805 by Thais Bradford RN  Outcome: Ongoing  12/30/2021 0836 by Bryson Leggett RCP  Outcome: Ongoing  Goal: Patient will achieve/maintain normal respiratory rate/effort  Description: Respiratory rate and effort will be within normal limits for the patient  12/30/2021 2201 by Manjit Michael RN  Outcome: Ongoing  12/30/2021 1805 by Thais Bradford RN  Outcome: Ongoing  12/30/2021 0836 by Bryson Leggett RCP  Outcome: Ongoing     Problem: Skin Integrity:  Goal: Will show no infection signs and symptoms  Description: Will show no infection signs and symptoms  12/30/2021 2201 by Marguerite Kennedy RN  Outcome: Ongoing  12/30/2021 1805 by Levander Brunner, RN  Outcome: Ongoing  Goal: Absence of new skin breakdown  Description: Absence of new skin breakdown  12/30/2021 2201 by Marguerite Kennedy RN  Outcome: Ongoing  12/30/2021 1805 by Levander Brunner, RN  Outcome: Ongoing

## 2021-12-31 NOTE — PROGRESS NOTES
Comprehensive Nutrition Assessment    Type and Reason for Visit:  Initial,Consult (Tony score)    Nutrition Recommendations/Plan:   - Continue current diet, HH  - Monitor/encourage PO intake    Nutrition Assessment:  69 yo F admitted for COVID-19 management and acute PE. Per RN, pt eating well, 100% at breakfast. Per chart, % intake. Malnutrition Assessment:  Malnutrition Status:  Insufficient data      Nutrition Related Findings:  Labs/meds reviewed. LBM 12/30. BLE +1 edema. Wounds:  None       Current Nutrition Therapies:    ADULT DIET; Regular; Low Fat/Low Chol/High Fiber/LOUISE; Low Sodium (2 gm)    Anthropometric Measures:  · Height: 5' 4\" (162.6 cm)  · Current Body Weight: 195 lb 15.8 oz (88.9 kg) (12/27, [facility])   · Ideal Body Weight: 120 lbs; % Ideal Body Weight 163.3 %   · BMI: 33.6  · BMI Categories: Obese Class 1 (BMI 30.0-34. 9)       Nutrition Diagnosis:   · No nutrition diagnosis at this time related to   as evidenced by        Nutrition Interventions:   Food and/or Nutrient Delivery:  Continue Current Diet  Nutrition Education/Counseling:  Education not indicated   Coordination of Nutrition Care:  Continue to monitor while inpatient    Nutrition Monitoring and Evaluation:   Behavioral-Environmental Outcomes:  None Identified   Food/Nutrient Intake Outcomes:  Food and Nutrient Intake  Physical Signs/Symptoms Outcomes:  Biochemical Data,Fluid Status or Edema,Weight     Discharge Planning:     Too soon to determine     Electronically signed by Komal Marie MS, RD, LD on 12/31/21 at 2:08 PM EST    Contact: [id number]

## 2021-12-31 NOTE — PROGRESS NOTES
[facility] -   Infectious diseases evaluation  admission date 12/24/2021    reason for consultation:   covid +    Impression :   Current:  · covid pneumonia   · PE -  left lower lobe segmental emboli -  · bandemia  · Thrombocytopenia    Other:  ·   Discussion / summary of stay / plan of care   ·   Recommendations   · barcitinib 12/24 -  · Decadron 6 mg daily 12/24  · Anticoagulation  · Very poor outcome due to high oxygen requirement despite aggressive therapy  · CRP improved, but clinically she has not improved    Infection Control Recommendations   · Reese Precautions  · Contact Isolation   · Airborne isolation  · Droplet Isolation  Antimicrobial Stewardship Recommendations   · Off AB    Coordination ofOutpatient Care:   · Estimated Length of IV antimicrobials:  · Patient will need Midline / picc Catheter Insertion:   · Patient will need SNF:  · Patient will need outpatient wound care:     History of Present Illness:   Initial history:  Gogo Lawrence is a 70y.o.-year-old female Patient presented through ER via EMS after her family found her with severe shortness of breath in her shower. She had developed a cough approximately a week ago and according to the patient had a negative Covid test at that time. She was found to have an SPO2 of 50% on room air requiring BiPAP. A rapid Covid swab was positive  CTA of the chest showed segmental pulmonary embolism in the left lower lobe with possible right heart strain. An echocardiogram was completed and was negative for RV strain with RV systolic pressure of 29 mmHg  The patient was initiated on a heparin drip as well as high-dose Decadron, Rocephin and baricitinib.        Interval changes  12/31/2021   Patient Vitals for the past 8 hrs:   BP Temp Temp src Pulse Resp SpO2   12/31/21 0000 -- -- -- 62 -- --   12/30/21 2344 117/81 97.7 °F (36.5 °C) Oral 62 23 96 %   12/30/21 2305 -- -- -- -- 23 --   12/30/21 2020 -- -- -- -- 20 94 %   12/30/21 2000 126/79 -- -- 70 -- 95 %   12/30/21 1945 126/79 98.6 °F (37 °C) Oral 61 20 97 %   CRP 18-improved  WBC 10  Blood culture still negative, chest x-ray positive diffuse pneumonia  Patient is on baricitinib, but saturation 86% she is on 90% high flow    12/28  60 FiO2 high flow, he feels better eating better but he saturating 90%,    12/29  70% FiO2 high flow, saturating 89%, she is short of breath, eating slightly, has a cough. In general she feels okay  Labs reviewed    12/30  60 FiO2 high flow, good appetite, alert oriented x3  WBC 10  Urine culture 12/26 -      Summary of relevant labs:  Labs:  .5-18-    BNP 9968  Troponin 54  Lactic acid 3.5  Ferritin 858  WBC 14.6  D-dimer 70.48    Micro:    Imaging:  CT chest bilat covid pneumonia w thick consolidations    I have personally reviewed the past medical history, past surgical history, medications, social history, and family history, and I haveupdated the database accordingly. Allergies:   Patient has no known allergies. Review of Systems:     Review of Systems   Constitutional: Positive for activity change. Negative for fatigue and fever. HENT: Negative for congestion. Eyes: Negative for photophobia, redness and itching. Respiratory: Positive for cough and shortness of breath. Negative for apnea. Cardiovascular: Negative for chest pain. Gastrointestinal: Negative for abdominal distention. Endocrine: Negative for heat intolerance, polydipsia and polyphagia. Genitourinary: Negative for dysuria. Musculoskeletal: Negative for arthralgias. Skin: Negative for color change. Allergic/Immunologic: Negative for immunocompromised state. Neurological: Negative for dizziness, seizures, numbness and headaches. Hematological: Negative for adenopathy. Psychiatric/Behavioral: Negative for agitation. Physical Examination :       Physical Exam  Constitutional:       Appearance: Normal appearance. She is not ill-appearing or diaphoretic. HENT:      Head: Normocephalic and atraumatic. Nose: Nose normal.      Mouth/Throat:      Mouth: Mucous membranes are moist.   Eyes:      General: No scleral icterus. Conjunctiva/sclera: Conjunctivae normal.   Cardiovascular:      Rate and Rhythm: Normal rate and regular rhythm. Heart sounds: Normal heart sounds. No murmur heard. Pulmonary:      Breath sounds: Normal breath sounds. Abdominal:      General: There is no distension. Tenderness: There is no abdominal tenderness. Genitourinary:     Comments: No castro  Musculoskeletal:         General: No swelling, deformity or signs of injury. Cervical back: Neck supple. No rigidity. Skin:     General: Skin is dry. Coloration: Skin is not jaundiced or pale. Findings: No bruising or erythema. Neurological:      General: No focal deficit present. Mental Status: She is alert and oriented to person, place, and time. Psychiatric:         Thought Content: Thought content normal.         Past Medical History:   No past medical history on file. Past Surgical  History:   No past surgical history on file.     Medications:      dexamethasone  6 mg IntraVENous Daily    famotidine  20 mg Oral BID    enoxaparin  1 mg/kg SubCUTAneous BID    sodium chloride flush  10 mL IntraVENous 2 times per day    baricitinib  4 mg Oral Daily       Social History:     Social History     Socioeconomic History    Marital status: Unknown     Spouse name: Not on file    Number of children: Not on file    Years of education: Not on file    Highest education level: Not on file   Occupational History    Not on file   Tobacco Use    Smoking status: Not on file    Smokeless tobacco: Not on file   Substance and Sexual Activity    Alcohol use: Not on file    Drug use: Not on file    Sexual activity: Not on file   Other Topics Concern    Not on file   Social History Narrative    Not on file Social Determinants of Health     Financial Resource Strain:     Difficulty of Paying Living Expenses: Not on file   Food Insecurity:     Worried About Running Out of Food in the Last Year: Not on file    Jones of Food in the Last Year: Not on file   Transportation Needs:     Lack of Transportation (Medical): Not on file    Lack of Transportation (Non-Medical): Not on file   Physical Activity:     Days of Exercise per Week: Not on file    Minutes of Exercise per Session: Not on file   Stress:     Feeling of Stress : Not on file   Social Connections:     Frequency of Communication with Friends and Family: Not on file    Frequency of Social Gatherings with Friends and Family: Not on file    Attends Cheondoism Services: Not on file    Active Member of [address] [place] [address] Intertainment Media or Organizations: Not on file    Attends Club or Organization Meetings: Not on file    Marital Status: Not on file   Intimate Partner Violence:     Fear of Current or Ex-Partner: Not on file    Emotionally Abused: Not on file    Physically Abused: Not on file    Sexually Abused: Not on file   Housing Stability:     Unable to Pay for Housing in the Last Year: Not on file    Number of [place] in the Last Year: Not on file    Unstable Housing in the Last Year: Not on file       Family History:   No family history on file. Medical Decision Making:   I have independently reviewed/ordered the following labs:    CBC with Differential:   Recent Labs     12/29/21  0733 12/30/21  0356   WBC 7.0 10.1   HGB 12.3 12.8   HCT 36.2* 38.2   PLT See Reflexed IPF Result 197   LYMPHOPCT 9* 7*   MONOPCT 7 8*     BMP:  Recent Labs     12/29/21  0933 12/30/21  0356    134*   K 5.4* 4.6    103   CO2 22 23   BUN 29* 26*   CREATININE 0.45* 0.44*     Hepatic Function Panel:   No results for input(s): PROT, LABALBU, BILIDIR, IBILI, BILITOT, ALKPHOS, ALT, AST in the last 72 hours. No results for input(s): RPR in the last 72 hours.   No results for input(s): HIV in the last 72 hours. No results for input(s): BC in the last 72 hours. Lab Results   Component Value Date    CREATININE 0.44 12/30/2021    GLUCOSE 151 12/30/2021       Detailed results: Thank you for allowing us to participate in the care of this patient. Please call with questions. This note is created with the assistance of a speech recognition program.  While intending to generate adocument that actually reflects the content of the visit, the document can still have some errors including those of syntax and sound a like substitutions which may escape proof reading. It such instances, actual meaningcan be extrapolated by contextual diversion.     Luciana Villar MD  Office: [phone]  Perfect serve / office [phone]      '

## 2021-12-31 NOTE — PROGRESS NOTES
[facility]  Office: [address], DO, Whit Escobedo, DO, Verena Luo, DO, Eliana Naomi Blood, DO, Vik Fontana MD, Kymberly Lomeli MD, Charlee Keita MD, Mg Bey MD, Nusrat Calderón MD, Queen Gabriel MD, Alphonso Klinefelter, MD, Jacob Whalen, DO, Joseph Du, DO, Suraj Parrish MD,  Adela Wilks, DO, Eileen Tucker MD, Ronda Toney MD, Narayan Castillo MD, Nahed Thomason MD, Janessa Ferrell MD, Cade Pham MD, Sarah Steven MD, Jorge Mccormack, [facility], [address], [facility], Reid Comer, CNP, Jose Fung, CNS, Chace Lora, CNP, Maurice Asencio, CNP, Serjio Vasquez, CNP, Ivy Robins, CNP, Tesfaye Nicole, CNP, Donis Garrido PA-C, Nader Kothari, [facility], Oseas Miles, [facility], Ruma Mercedes, CNP, Brisa Eastman, CNP, Henry Colon, CNP, Jose L Stroud, CNP, Kalpana Kessler, CNP, Lalita Bourne, [facility]      Daily Progress Note     Admit Date: 12/24/2021  Bed/Room No.  3021/3021-01  Admitting Physician : Charlee Keita MD  Code Status :Full Code  Hospital Day:  LOS: 7 days   Chief Complaint:     Chief Complaint   Patient presents with    Shortness of Breath    Fall     Principal Problem:    Pneumonia due to COVID-19 virus  Active Problems:    Pulmonary embolus, left [facility])  Resolved Problems:    * No resolved hospital problems. *    Subjective : Interval History/Significant events :  12/31/21    Patient patient continues to be on high flow currently at 70% FiO2, 60 LPM.  Patient is alert, oriented. She is having worsening fatigue. Patient continues to have vaginal bleed. Hemoglobin is stable. Vitals - Unstable afebrile  Labs -potassium 4.6, creatinine normal 0.44, WBC 10.1, hemoglobin 12.0. Nursing notes , Consults notes reviewed. Overnight events and updates discussed with Nursing staff . Background History:         Armin Bhakta is 70 y.o. female  Who was admitted to the hospital on 12/24/2021 for treatment of Pneumonia due to COVID-19 virus. Patient came to the hospital with lightheadedness and dry cough for 1 week. She had episode of fall at home and family called EMS. Initial evaluation by EMS found patient having tachypnea with respirate 40 to 50/min, hypoxia SPO2 50% on room air, tachycardia. Patient was placed on nonrebreather and improved only to 73%. Patient was then placed on BiPAP. Evaluation emergency room showed positive COVID-19 test, elevated troponin, elevated proBNP. CT chest showed subsegmental PE with possible right heart strain. Vascular surgery was consulted and recommended anticoagulation without need for thrombolysis. Patient also had thrombocytopenia. Cardiology was consulted and echocardiogram obtained which was negative for RV strain with RV systolic pressure 29 mmHg. Patient was treated with heparin infusion, Decadron and started on baricitinib. Patient had vaginal bleeding on 12/30/21  PMH:  No past medical history on file. Allergies: No Known Allergies   Medications :  apixaban, 10 mg, Oral, BID   Followed by  Jennifer Maurer ON 1/7/2022] apixaban, 5 mg, Oral, BID  dexamethasone, 6 mg, IntraVENous, Daily  famotidine, 20 mg, Oral, BID  sodium chloride flush, 10 mL, IntraVENous, 2 times per day  baricitinib, 4 mg, Oral, Daily        Review of Systems   Review of Systems   Constitutional: Positive for activity change, appetite change and fatigue. Negative for fever and unexpected weight change. HENT: Negative for congestion, rhinorrhea and sneezing. Eyes: Negative for visual disturbance. Respiratory: Positive for shortness of breath. Negative for cough, chest tightness and wheezing. Cardiovascular: Negative for chest pain and palpitations. Gastrointestinal: Positive for diarrhea. Negative for abdominal pain, blood in stool, constipation, nausea and vomiting. Genitourinary: Positive for vaginal bleeding. Negative for dysuria, enuresis, frequency and hematuria.    Musculoskeletal: Negative for arthralgias and myalgias. Skin: Negative for rash. Neurological: Negative for dizziness, weakness, light-headedness and headaches. Hematological: Does not bruise/bleed easily. Psychiatric/Behavioral: Negative for dysphoric mood and sleep disturbance. Objective :      Current Vitals : Temp: 98.1 °F (36.7 °C),  Pulse: 61, Resp: 21, BP: 132/81, SpO2: 99 %  Last 24 Hrs Vitals   Patient Vitals for the past 24 hrs:   BP Temp Temp src Pulse Resp SpO2   12/31/21 1307 -- -- -- -- 21 99 %   12/31/21 1100 132/81 98.1 °F (36.7 °C) Oral 61 23 91 %   12/31/21 0814 -- -- -- -- 18 97 %   12/31/21 0800 (!) 151/90 98.6 °F (37 °C) Oral 52 15 97 %   12/31/21 0554 -- -- -- -- 22 94 %   12/31/21 0545 (!) 145/99 98 °F (36.7 °C) Axillary 66 30 92 %   12/31/21 0305 -- -- -- -- 18 --   12/31/21 0000 -- -- -- 62 -- --   12/30/21 2344 117/81 97.7 °F (36.5 °C) Oral 62 23 96 %   12/30/21 2305 -- -- -- -- 23 --   12/30/21 2020 -- -- -- -- 20 94 %   12/30/21 2000 126/79 -- -- 70 -- 95 %   12/30/21 1945 126/79 98.6 °F (37 °C) Oral 61 20 97 %   12/30/21 1657 -- -- -- -- -- 96 %   12/30/21 1530 (!) 134/96 98.4 °F (36.9 °C) Oral 62 19 100 %     Intake / output   12/30 0701 - 12/31 0700  In: 5 [P.O.:720]  Out: 550 [Urine:550]  Physical Exam:  Physical Exam  Vitals and nursing note reviewed. Constitutional:       General: She is not in acute distress. Appearance: She is obese. She is not diaphoretic. Interventions: Nasal cannula in place. Comments: High flow oxygen nasal cannula   HENT:      Head: Normocephalic and atraumatic. Nose:      Right Sinus: No maxillary sinus tenderness or frontal sinus tenderness. Left Sinus: No maxillary sinus tenderness or frontal sinus tenderness. Mouth/Throat:      Pharynx: No oropharyngeal exudate. Eyes:      General: No scleral icterus. Conjunctiva/sclera: Conjunctivae normal.      Pupils: Pupils are equal, round, and reactive to light. Neck:      Thyroid: No thyromegaly. Vascular: No JVD. Cardiovascular:      Rate and Rhythm: Normal rate and regular rhythm. Pulses:           Dorsalis pedis pulses are 2+ on the right side and 2+ on the left side. Heart sounds: Normal heart sounds. No murmur heard. Pulmonary:      Effort: Pulmonary effort is normal.      Breath sounds: Normal breath sounds. No wheezing or rales. Abdominal:      Palpations: Abdomen is soft. There is no mass. Tenderness: There is no abdominal tenderness. Musculoskeletal:      Cervical back: Full passive range of motion without pain and neck supple. Lymphadenopathy:      Head:      Right side of head: No submandibular adenopathy. Left side of head: No submandibular adenopathy. Cervical: No cervical adenopathy. Skin:     General: Skin is warm. Neurological:      Mental Status: She is alert and oriented to person, place, and time. Motor: No tremor. Psychiatric:         Behavior: Behavior is cooperative. Laboratory findings:    Recent Labs     12/29/21  0733 12/30/21  0356 12/31/21  0442   WBC 7.0 10.1 7.4   HGB 12.3 12.8 12.0   HCT 36.2* 38.2 36.5   PLT See Reflexed IPF Result 197 207     Recent Labs     12/29/21  0933 12/30/21  0356 12/31/21  0442    134* 134*   K 5.4* 4.6 4.8    103 104   CO2 22 23 22   GLUCOSE 148* 151* 149*   BUN 29* 26* 21   CREATININE 0.45* 0.44* 0.42*   CALCIUM 7.9* 8.0* 8.1*     No results for input(s): PROT, LABALBU, LABA1C, A1CAACE, J8PPIMQ, FT4, TSH, AST, ALT, LDH, GGT, ALKPHOS, BILITOT, BILIDIR, AMMONIA, AMYLASE, LIPASE, LACTATE, CHOL, HDL, LDLCHOLESTEROL, CHOLHDLRATIO, TRIG, VLDL, BNP, TROPONINI, CKTOTAL, CKMB, CKMBINDEX, RF, JESUS in the last 72 hours. No results found for: Darra Qasim, RBCUA, BLOODU, BACTERIA, NITRU, [address], LEUKOCYTESUR    Imaging / Clinical Data :-   CT CHEST PULMONARY EMBOLISM W CONTRAST    Result Date: 12/24/2021  Motion degraded study.   Acute pulmonary emboli suspected particularly in the left lower lobe but not well visualized due to motion artifact. There is evidence for right ventricular strain with septal bowing suggesting a significant clot burden. Moderate patchy ground-glass opacities involving all lobes of both lungs consistent with multifocal pneumonia typical for COVID pneumonia. Findings were discussed with Fer Montoya at 5:39 pm on 12/24/2021. RECOMMENDATIONS: Unavailable        Clinical Course : unchanged  Assessment and Plan  :        Acute PE -treated with Lovenox 1 mg/kg twice daily. Changed Eliquis 12/31/21. COVID-19 pneumonia - Decadron , baricitinib, GI prophylaxis with Pepcid, monitor off antibiotics. ID on board. Continue airborne isolation till 1/13/2022. Acute respiratory failure with hypoxia - high flow oxygen support, high risk of worsening and intubation. Hyponatremia -resolved. Obesity -   Vaginal bleed - post menopausal . Will get TVUS when stable . Monitor hemoglobin. Diarrhea Imodium as needed. Postmenopausal bleed -outpatient follow-up    Encourage incentive spirometry. Activity limited. Monitor hemoglobin  Discussed vaginal bleed with patient and plan explained. Patient agrees at this time. Continue to monitor vitals , Intake / output ,  Cell count , HGB , Kidney function, oxygenation  as indicated . Plan and updates discussed with patient ,  answers  explained to satisfaction.    Plan discussed with Staff Abigail PHAN     (Please note that portions of this note were completed with a voice recognition program. Efforts were made to edit the dictations but occasionally words are mis-transcribed.)      Sindy Muñoz MD  12/31/2021

## 2021-12-31 NOTE — PLAN OF CARE
Problem: Airway Clearance - Ineffective  Goal: Achieve or maintain patent airway  12/31/2021 0913 by AMANDEEP WangP  Outcome: Ongoing     Problem: Gas Exchange - Impaired  Goal: Absence of hypoxia  12/31/2021 0913 by AMANDEEP WangP  Outcome: Ongoing     Problem: Gas Exchange - Impaired  Goal: Promote optimal lung function  12/31/2021 0913 by AMANDEEP WangP  Outcome: Ongoing     Problem: Breathing Pattern - Ineffective  Goal: Ability to achieve and maintain a regular respiratory rate  12/31/2021 0913 by AMANDEEP WangP  Outcome: Ongoing     PROVIDE ADEQUATE OXYGENATION WITH ACCEPTABLE SP02/ABG'S    [x]  IDENTIFY APPROPRIATE OXYGEN THERAPY  [x]   MONITOR SP02/ABG'S AS NEEDED   [x]   PATIENT EDUCATION AS NEEDED     NON INVASIVE VENTILATION  [x]  PROVIDE OPTIMAL VENTILATION/ACCEPTABLE SP02  [x]  IMPLEMENT NON INVASIVE VENTILATION PROTOCOL  [x]  ASSESSMENT SKIN INTEGRITY  []  PATIENT EDUCATION AS NEEDED  [x]  BIPAP AS NEEDED

## 2021-12-31 NOTE — PLAN OF CARE
Problem: Airway Clearance - Ineffective  Goal: Achieve or maintain patent airway  12/30/2021 2113 by Wicho Wong RCP  Outcome: Ongoing     Problem: Gas Exchange - Impaired  Goal: Absence of hypoxia  12/30/2021 2113 by Wicho Wong RCP  Outcome: Ongoing     Problem: Gas Exchange - Impaired  Goal: Promote optimal lung function  12/30/2021 2113 by Wicho Wong RCP  Outcome: Ongoing     Problem: Breathing Pattern - Ineffective  Goal: Ability to achieve and maintain a regular respiratory rate  12/30/2021 2113 by Wicho Wong RCP  Outcome: Ongoing

## 2021-12-31 NOTE — PROGRESS NOTES
Pulmonary/Critical Care Progress Note    Patient's name:  Janet Ortega  Medical Record Number: [id number]  Patient's account/billing number: [de-identified]  Patient's YOB: 1950  Age: 70 y.o. Date of Admission: 12/24/2021  3:37 PM  Date of Consult: 12/30/2021      Primary Care Physician: No primary care provider on file. Code Status: Full Code    Reason for consult: Acute hypoxemic respiratory failure secondary to COVID-19 pneumonia with ARDS along with PE involving subsegmental vessels of the left lower lobe      HISTORY OF PRESENT ILLNESS:   History was obtained from chart review and the patient. Janet Ortega is a 70 y.o. woman was admitted with complaints of shortness of breath and found to be Covid positive but also had a left lower lobe subsegmental pulmonary embolism with right ventricular strain pattern  Vascular surgery was consulted and it was felt, rightfully so, that the cause of the right ventricular strain is unlikely pulmonary embolism  Patient was admitted to the [facility] ICU  Requiring 95% oxygen via high flow  Has shortness of breath along with coughing  No wheezing  No chest pain or pressure  No hemoptysis  No orthopnea or PND    INTERVAL HISTORY:  No change. Remains on high flow oxygen FiO2 80%. Goes on BiPAP at night. Past Medical History:  No past medical history on file. Past Surgical History:  No past surgical history on file. Allergies:    No Known Allergies      Home Meds:   Prior to Admission medications    Not on File       Family History:   No family history on file. Social History:   TOBACCO:   has no history on file for tobacco use. ETOH:   has no history on file for alcohol use. DRUGS:  has no history on file for drug use.   OCCUPATION:   Noncontributory          REVIEW OF SYSTEMS:    Review of Systems -   General ROS: Completed and except as mentioned above were negative   Psychological ROS: Completed and except as mentioned above were negative  Ophthalmic ROS:  Completed and except as mentioned above were negative  ENT ROS:  Completed and except as mentioned above were negative  Allergy and Immunology ROS:  Completed and except as mentioned above were negative  Hematological and Lymphatic ROS:  Completed and except as mentioned above were negative  Endocrine ROS: Completed and except as mentioned above were negative  Breast ROS:  Completed and except as mentioned above were negative  Respiratory ROS:  Completed and except as mentioned above were negative  Cardiovascular ROS:  Completed and except as mentioned above were negative  Gastrointestinal ROS: Completed and except as mentioned above were negative  Genito-Urinary ROS:  Completed and except as mentioned above were negative  Musculoskeletal ROS:  Completed and except as mentioned above were negative  Neurological ROS:  Completed and except as mentioned above were negative  Dermatological ROS:  Completed and except as mentioned above were negative          Physical Exam:    Vitals: /79   Pulse 70   Temp 98.6 °F (37 °C) (Oral)   Resp 20   Ht 5' 4\" (1.626 m)   Wt 195 lb 15.8 oz (88.9 kg)   SpO2 94%   Breastfeeding No   BMI 33.64 kg/m²     Last Body weight:   Wt Readings from Last 3 Encounters:   12/27/21 195 lb 15.8 oz (88.9 kg)       Body Mass Index : Body mass index is 33.64 kg/m².       Intake and Output summary:     Intake/Output Summary (Last 24 hours) at 12/30/2021 2228  Last data filed at 12/30/2021 2000  Gross per 24 hour   Intake 360 ml   Output --   Net 360 ml       Physical Examination:   PHYSICAL EXAMINATION:  Vitals:    12/30/21 1657 12/30/21 1945 12/30/21 2000 12/30/21 2020   BP:  126/79 126/79    Pulse:  61 70    Resp:  20  20   Temp:  98.6 °F (37 °C)     TempSrc:  Oral     SpO2: 96% 97% 95% 94%   Weight:       Height:         Constitutional: This is a well developed, well nourished, 30-34.9 - Obesity Grade I 70 y.o. year old female who is alert, oriented, cooperative and in no apparent distress. Head:normocephalic and atraumatic. EENT:   SOPHIA. No conjunctival injections. Septum was midline, mucosa was without erythema, exudates or cobblestoning. No thrush was noted. Mallampati III (soft palate, base of uvula visible)  Neck: Supple without thyromegaly. No elevated JVP. Trachea was midline. Respiratory: Chest was symmetrical without dullness to percussion. Breath sounds bilaterally were clear to auscultation. There were no wheezes, rhonchi or rales. There is no intercostal retraction or use of accessory muscles. No egophony noted. Cardiovascular: Regular without murmur, clicks, gallops or rubs. Abdomen: Slightly rounded and soft without organomegaly. No rebound tenderness, rigidity or guarding was appreciated. Lymphatic: No lymphadenopathy. Musculoskeletal: Normal curvature of the spine. No gross muscle weakness. Extremities:  No lower extremity edema, ulcerations, tenderness, varicosities or erythema. Muscle size, tone and strength are normal.  No involuntary movements are noted. Skin:  Warm and dry. Good color, turgor and pigmentation. No lesions or scars. No cyanosis or clubbing  Neurological/Psychiatric: The patient's general behavior, level of consciousness, thought content and emotional status is normal.            Laboratory findings:-    CBC:   Recent Labs     12/30/21  0356   WBC 10.1   HGB 12.8        BMP:    Recent Labs     12/28/21  0309 12/28/21  0309 12/29/21  0933 12/29/21  0933 12/30/21  0356      < > 136   < > 134*   K 5.5*   < > 5.4*   < > 4.6      < > 102   < > 103   CO2 22   < > 22   < > 23   BUN 31*   < > 29*   < > 26*   CREATININE 0.59  --  0.45*  --  0.44*   GLUCOSE 151*   < > 148*   < > 151*    < > = values in this interval not displayed. S. Calcium:  Recent Labs     12/30/21  0356   CALCIUM 8.0*     S.  Ionized Calcium:No results for input(s): IONCA in the last 72 hours. S. Magnesium:  No results for input(s): MG in the last 72 hours. S. Phosphorus:No results for input(s): PHOS in the last 72 hours. S. Glucose:  No results for input(s): POCGLU in the last 72 hours. Glycosylated hemoglobin A1C: No results for input(s): LABA1C in the last 72 hours. INR:   No results for input(s): INR in the last 72 hours. Hepatic functions:   No results for input(s): ALKPHOS, ALT, AST, PROT, BILITOT, BILIDIR, LABALBU in the last 72 hours. Pancreatic functions:  No results for input(s): LACTA, AMYLASE in the last 72 hours. S. Lactic Acid:   No results for input(s): LACTA in the last 72 hours. Cardiac enzymes:No results for input(s): CKTOTAL, CKMB, CKMBINDEX, TROPONINI in the last 72 hours. BNP:No results for input(s): BNP in the last 72 hours. Lipid profile: No results for input(s): CHOL, TRIG, HDL, LDL, LDLCALC in the last 72 hours. Blood Gases: No results found for: PH, PCO2, PO2, HCO3, O2SAT  Thyroid functions: No results found for: TSH         Microbiology:    Cultures during this admission:     Blood cultures:      [] None drawn      [x] Negative             []  Positive (Details:  )  Urine Culture:        [] None drawn      [] Negative             []  Positive (Details:  )  Sputum Culture:   [] None drawn       [] Negative             []  Positive (Details:  )     SARS-CoV-2 rapid test was positive    Radiological reports:    CT CHEST PULMONARY EMBOLISM W CONTRAST    Result Date: 12/24/2021  Motion degraded study. Acute pulmonary emboli suspected particularly in the left lower lobe but not well visualized due to motion artifact. There is evidence for right ventricular strain with septal bowing suggesting a significant clot burden. Moderate patchy ground-glass opacities involving all lobes of both lungs consistent with multifocal pneumonia typical for COVID pneumonia. Findings were discussed with Brennan Camacho at 5:39 pm on 12/24/2021.  RECOMMENDATIONS: Unavailable Assessment and Plan       IMPRESSION:   1. COVID-19    2. Dehydration    3. Tachycardia        Principal Problem:    Pneumonia due to COVID-19 virus  Active Problems:    Pulmonary embolus, left (HCC)  Resolved Problems:    * No resolved hospital problems. *       Acute hypoxic respiratory failure   Acute respiratory distress syndrome   COVID 19 infection/pneumonia   Left lower lobe segmental pulmonary embolism   Possible pulmonary artery hypertension secondary to COVID-19 pneumonia much more than any pulmonary embolism given the small magnitude of pulmonary       Plan:     Continue Airborne isolation   Continue high flow oxygen   Use BiPAP, if needed   Obtain X-ray chest as needed    Monitor input/output, with a goal of even/negative fluid balance   Patient receiving baricitinib   Continue Decadron   Monitor CRP, LDH, AST/ALT/ferritin/ D-dimer   Continue supportive care   GI/DVT prophylaxis   Glycemic control per primary service   Lovenox 1 mg/kg twice daily. Can switch to Eliquis, if no anticipated procedures   On nutrition via oral diet  Management as per guidance provided by hospital policy and prevailing evidence based medicine, during the COVID-19 pandemic emergency. This patient was evaluated in the context of the global SARS-CoV-2 (COVID-19) pandemic, which necessitated considerations that the patient either has COVID-19 infection or is at risk of infection with COVID-19. Institutional protocols and algorithms that pertain to the evaluation & management of patients with COVID-19 or those at risk for COVID-19 are in a state of rapid changes based on information released by regulatory bodies including the CDC and federal and state organizations. These policies and algorithms were followed during the patient's care. Please note that this chart was generated using voice recognition Dragon dictation software.   Although every effort was made to ensure the accuracy of this automated transcription, some errors in transcription may have occurred. .  Total critical care time caring for this patient with life threatening, unstable organ failure, including direct patient contact, management of life support systems, review of data including imaging and labs, discussions with other team members and physicians at least 27  Min so far today, excluding procedures.       Ezell Alpers, DO,            12/30/2021, 10:28 PM

## 2021-12-31 NOTE — PLAN OF CARE
Problem: Airway Clearance - Ineffective  Goal: Achieve or maintain patent airway  12/31/2021 1814 by Marycruz Dunham RN  Outcome: Ongoing  12/31/2021 0913 by Nash Stone RCP  Outcome: Ongoing     Problem: Gas Exchange - Impaired  Goal: Absence of hypoxia  12/31/2021 1814 by Marycruz Dunham RN  Outcome: Ongoing  12/31/2021 0913 by Nash Stone RCP  Outcome: Ongoing  Goal: Promote optimal lung function  12/31/2021 1814 by Marycruz Dunham RN  Outcome: Ongoing  12/31/2021 0913 by Nash Stone RCP  Outcome: Ongoing     Problem: Breathing Pattern - Ineffective  Goal: Ability to achieve and maintain a regular respiratory rate  12/31/2021 1814 by Marycruz Dunham RN  Outcome: Ongoing  12/31/2021 0913 by Nash Stone RCP  Outcome: Ongoing     Problem:  Body Temperature -  Risk of, Imbalanced  Goal: Ability to maintain a body temperature within defined limits  Outcome: Ongoing  Goal: Will regain or maintain usual level of consciousness  Outcome: Ongoing  Goal: Complications related to the disease process, condition or treatment will be avoided or minimized  Outcome: Ongoing     Problem: Isolation Precautions - Risk of Spread of Infection  Goal: Prevent transmission of infection  Outcome: Ongoing     Problem: Nutrition Deficits  Goal: Optimize nutritional status  Outcome: Ongoing     Problem: Risk for Fluid Volume Deficit  Goal: Maintain normal heart rhythm  Outcome: Ongoing  Goal: Maintain absence of muscle cramping  Outcome: Ongoing  Goal: Maintain normal serum potassium, sodium, calcium, phosphorus, and pH  Outcome: Ongoing     Problem: Loneliness or Risk for Loneliness  Goal: Demonstrate positive use of time alone when socialization is not possible  Outcome: Ongoing     Problem: Fatigue  Goal: Verbalize increase energy and improved vitality  Outcome: Ongoing     Problem: Patient Education: Go to Patient Education Activity  Goal: Patient/Family Education  Outcome: Ongoing     Problem: OXYGENATION/RESPIRATORY FUNCTION  Goal: Patient will maintain patent airway  Outcome: Ongoing  Goal: Patient will achieve/maintain normal respiratory rate/effort  Description: Respiratory rate and effort will be within normal limits for the patient  Outcome: Ongoing     Problem: Skin Integrity:  Goal: Will show no infection signs and symptoms  Description: Will show no infection signs and symptoms  Outcome: Ongoing  Goal: Absence of new skin breakdown  Description: Absence of new skin breakdown  Outcome: Ongoing

## 2021-12-31 NOTE — PROGRESS NOTES
Physician Progress Note      Bisi Buck  [facility] #:                  [id number]  :                       1950  ADMIT DATE:       2021 3:37 PM  DISCH DATE:  RESPONDING  PROVIDER #:        Miko Lynch MD          QUERY TEXT:    Pt admitted with COVID-19 and noted to have .5, lactic acid 3.5, LDH   528, procalcitonin . 25 and wbc 12.2 on admission  . If possible, please   clarify one of the following    The medical record reflects the following:  Risk Factors: age, COVID pneumonia, Acute pulmonary embolism  Clinical Indicators: Admitted with COVID-19 and noted to have .5,   lactic acid 3.5, , procalcitonin . 25 and wbc 12.2 on admission  Treatment: monitoring, iv fluids, oxygen , iv Decadron, po  baricitinib    Thank you Call if questions Satya ALLRED [facility]                                                                                                                                                                                                                                                                                                                                 [phone]  Options provided:  -- Sepsis present on admission due to COVID-19 pneumonia  -- Sepsis not present on admission due to COVID-19 pneumonia  -- Covid-19 pneumonia without sepsis  -- Other - I will add my own diagnosis  -- Disagree - Not applicable / Not valid  -- Disagree - Clinically unable to determine / Unknown  -- Refer to Clinical Documentation Reviewer    PROVIDER RESPONSE TEXT:    This patient has sepsis which was present on admission due to COVID-19   pneumonia. Query created by: Aure Menchaca on 2021 11:15 AM      QUERY TEXT:    Pt admitted with Acute pulmonary embolism, COVID pneumonia . Pt noted to have   CT showing evidence of rt heart strain with BNP 9968 on admission and Echo   shows normal systolic function with dilated rt ventricle .  If possible, please   clarify one of the following    The medical record reflects the following:  Risk Factors: age. COVID pneumonia,  Clinical Indicators: admitted with Acute pulmonary embolism, COVID pneumonia . Pt noted to have CT showing evidence of rt heart strain with BNP 9968 on   admission and Echo shows normal systolic function with dilated rt ventricle  Treatment: iv heparin, intake and output, cardiac monitoring, vascular and   cardiology consult    Thank you Call if questions Brenden ALLRED [facility]                                                                                                                                                                                                                                                                                                                                 [phone]  Options provided:  -- Pulmonary Embolism with Acute Cor Pulmonale  -- Pulmonary Embolism without Acute Cor Pulmonale  -- Other - I will add my own diagnosis  -- Disagree - Not applicable / Not valid  -- Disagree - Clinically unable to determine / Unknown  -- Refer to Clinical Documentation Reviewer    PROVIDER RESPONSE TEXT:    This patient has Pulmonary Embolism with acute cor pulmonale. Query created by:  Allan Sharp on 12/27/2021 11:19 AM      Electronically signed by:  Faith Parsons MD 12/31/2021 8:13 AM

## 2022-01-01 LAB
ABSOLUTE EOS #: 0 K/UL (ref 0–0.44)
ABSOLUTE IMMATURE GRANULOCYTE: 0.09 K/UL (ref 0–0.3)
ABSOLUTE LYMPH #: 0.55 K/UL (ref 1.1–3.7)
ABSOLUTE MONO #: 0.64 K/UL (ref 0.1–1.2)
ANION GAP SERPL CALCULATED.3IONS-SCNC: 11 MMOL/L (ref 9–17)
BASOPHILS # BLD: 0 % (ref 0–2)
BASOPHILS ABSOLUTE: 0 K/UL (ref 0–0.2)
BUN BLDV-MCNC: 25 MG/DL (ref 8–23)
BUN/CREAT BLD: ABNORMAL (ref 9–20)
CALCIUM SERPL-MCNC: 8.3 MG/DL (ref 8.6–10.4)
CHLORIDE BLD-SCNC: 106 MMOL/L (ref 98–107)
CO2: 21 MMOL/L (ref 20–31)
CREAT SERPL-MCNC: 0.31 MG/DL (ref 0.5–0.9)
DIFFERENTIAL TYPE: ABNORMAL
EOSINOPHILS RELATIVE PERCENT: 0 % (ref 1–4)
GFR AFRICAN AMERICAN: >60 ML/MIN
GFR NON-AFRICAN AMERICAN: >60 ML/MIN
GFR SERPL CREATININE-BSD FRML MDRD: ABNORMAL ML/MIN/{1.73_M2}
GFR SERPL CREATININE-BSD FRML MDRD: ABNORMAL ML/MIN/{1.73_M2}
GLUCOSE BLD-MCNC: 132 MG/DL (ref 70–99)
HCT VFR BLD CALC: 40.4 % (ref 36.3–47.1)
HEMOGLOBIN: 13.6 G/DL (ref 11.9–15.1)
IMMATURE GRANULOCYTES: 1 %
LYMPHOCYTES # BLD: 6 % (ref 24–43)
MCH RBC QN AUTO: 32.7 PG (ref 25.2–33.5)
MCHC RBC AUTO-ENTMCNC: 33.7 G/DL (ref 28.4–34.8)
MCV RBC AUTO: 97.1 FL (ref 82.6–102.9)
MONOCYTES # BLD: 7 % (ref 3–12)
MORPHOLOGY: NORMAL
NRBC AUTOMATED: 0 PER 100 WBC
PDW BLD-RTO: 14.1 % (ref 11.8–14.4)
PLATELET # BLD: 248 K/UL (ref 138–453)
PLATELET ESTIMATE: ABNORMAL
PMV BLD AUTO: 10.8 FL (ref 8.1–13.5)
POTASSIUM SERPL-SCNC: 5.2 MMOL/L (ref 3.7–5.3)
RBC # BLD: 4.16 M/UL (ref 3.95–5.11)
RBC # BLD: ABNORMAL 10*6/UL
SEG NEUTROPHILS: 86 % (ref 36–65)
SEGMENTED NEUTROPHILS ABSOLUTE COUNT: 7.92 K/UL (ref 1.5–8.1)
SODIUM BLD-SCNC: 138 MMOL/L (ref 135–144)
WBC # BLD: 9.2 K/UL (ref 3.5–11.3)
WBC # BLD: ABNORMAL 10*3/UL

## 2022-01-01 PROCEDURE — 2060000000 HC ICU INTERMEDIATE R&B

## 2022-01-01 PROCEDURE — 6370000000 HC RX 637 (ALT 250 FOR IP): Performed by: INTERNAL MEDICINE

## 2022-01-01 PROCEDURE — 2700000000 HC OXYGEN THERAPY PER DAY

## 2022-01-01 PROCEDURE — 99233 SBSQ HOSP IP/OBS HIGH 50: CPT | Performed by: INTERNAL MEDICINE

## 2022-01-01 PROCEDURE — 99291 CRITICAL CARE FIRST HOUR: CPT | Performed by: INTERNAL MEDICINE

## 2022-01-01 PROCEDURE — 99232 SBSQ HOSP IP/OBS MODERATE 35: CPT | Performed by: FAMILY MEDICINE

## 2022-01-01 PROCEDURE — 6370000000 HC RX 637 (ALT 250 FOR IP): Performed by: NURSE PRACTITIONER

## 2022-01-01 PROCEDURE — 2580000003 HC RX 258: Performed by: STUDENT IN AN ORGANIZED HEALTH CARE EDUCATION/TRAINING PROGRAM

## 2022-01-01 PROCEDURE — 85025 COMPLETE CBC W/AUTO DIFF WBC: CPT

## 2022-01-01 PROCEDURE — 6360000002 HC RX W HCPCS: Performed by: FAMILY MEDICINE

## 2022-01-01 PROCEDURE — 6370000000 HC RX 637 (ALT 250 FOR IP): Performed by: FAMILY MEDICINE

## 2022-01-01 PROCEDURE — 80048 BASIC METABOLIC PNL TOTAL CA: CPT

## 2022-01-01 PROCEDURE — 36415 COLL VENOUS BLD VENIPUNCTURE: CPT

## 2022-01-01 PROCEDURE — 94660 CPAP INITIATION&MGMT: CPT

## 2022-01-01 RX ADMIN — BENZONATATE 200 MG: 100 CAPSULE ORAL at 17:03

## 2022-01-01 RX ADMIN — APIXABAN 10 MG: 5 TABLET, FILM COATED ORAL at 20:44

## 2022-01-01 RX ADMIN — FAMOTIDINE 20 MG: 20 TABLET, FILM COATED ORAL at 20:44

## 2022-01-01 RX ADMIN — APIXABAN 10 MG: 5 TABLET, FILM COATED ORAL at 09:07

## 2022-01-01 RX ADMIN — Medication 5 ML: at 13:02

## 2022-01-01 RX ADMIN — FAMOTIDINE 20 MG: 20 TABLET, FILM COATED ORAL at 09:07

## 2022-01-01 RX ADMIN — SODIUM CHLORIDE, PRESERVATIVE FREE 10 ML: 5 INJECTION INTRAVENOUS at 20:45

## 2022-01-01 RX ADMIN — SODIUM CHLORIDE, PRESERVATIVE FREE 10 ML: 5 INJECTION INTRAVENOUS at 09:08

## 2022-01-01 RX ADMIN — BENZONATATE 200 MG: 100 CAPSULE ORAL at 10:43

## 2022-01-01 RX ADMIN — DEXAMETHASONE SODIUM PHOSPHATE 6 MG: 10 INJECTION INTRAMUSCULAR; INTRAVENOUS at 09:07

## 2022-01-01 RX ADMIN — BARICITINIB 4 MG: 2 TABLET, FILM COATED ORAL at 09:07

## 2022-01-01 ASSESSMENT — ENCOUNTER SYMPTOMS
ABDOMINAL PAIN: 0
CHEST TIGHTNESS: 0
VOMITING: 0
CONSTIPATION: 0
PHOTOPHOBIA: 0
COLOR CHANGE: 0
DIARRHEA: 1
RHINORRHEA: 0
NAUSEA: 0
WHEEZING: 0
BLOOD IN STOOL: 0
SHORTNESS OF BREATH: 1
COUGH: 0
ABDOMINAL DISTENTION: 0
SHORTNESS OF BREATH: 0

## 2022-01-01 ASSESSMENT — PAIN SCALES - GENERAL
PAINLEVEL_OUTOF10: 0

## 2022-01-01 NOTE — PROGRESS NOTES
Pulmonary/Critical Care Progress Note    Patient's name:  Brendan Rutherford  Medical Record Number: [id number]  Patient's account/billing number: [de-identified]  Patient's YOB: 1950  Age: 70 y.o. Date of Admission: 12/24/2021  3:37 PM  Date of Consult: 12/31/2021      Primary Care Physician: No primary care provider on file. Code Status: Full Code    Reason for consult: Acute hypoxemic respiratory failure secondary to COVID-19 pneumonia with ARDS along with PE involving subsegmental vessels of the left lower lobe      HISTORY OF PRESENT ILLNESS:   History was obtained from chart review and the patient. Brendan Rutherford is a 70 y.o. woman was admitted with complaints of shortness of breath and found to be Covid positive but also had a left lower lobe subsegmental pulmonary embolism with right ventricular strain pattern  Vascular surgery was consulted and it was felt, rightfully so, that the cause of the right ventricular strain is unlikely pulmonary embolism  Patient was admitted to the [facility]  Requiring 95% oxygen via high flow  Has shortness of breath along with coughing  No wheezing  No chest pain or pressure  No hemoptysis  No orthopnea or PND    INTERVAL HISTORY:  No clinical change. High flow oxygen 70% FiO2. Alternates with BiPAP at night. Afebrile. Hemodynamically stable. Respiratory rate 20s. Laboratory values are reasonable. No serious abnormalities. Past Medical History:  No past medical history on file. Past Surgical History:  No past surgical history on file. Allergies:    No Known Allergies      Home Meds:   Prior to Admission medications    Not on File       Family History:   No family history on file. Social History:   TOBACCO:   has no history on file for tobacco use. ETOH:   has no history on file for alcohol use. DRUGS:  has no history on file for drug use.   OCCUPATION:   Noncontributory          REVIEW OF SYSTEMS:    Review of Systems -   General ROS: Completed and except as mentioned above were negative   Psychological ROS:  Completed and except as mentioned above were negative  Ophthalmic ROS:  Completed and except as mentioned above were negative  ENT ROS:  Completed and except as mentioned above were negative  Allergy and Immunology ROS:  Completed and except as mentioned above were negative  Hematological and Lymphatic ROS:  Completed and except as mentioned above were negative  Endocrine ROS: Completed and except as mentioned above were negative  Breast ROS:  Completed and except as mentioned above were negative  Respiratory ROS:  Completed and except as mentioned above were negative  Cardiovascular ROS:  Completed and except as mentioned above were negative  Gastrointestinal ROS: Completed and except as mentioned above were negative  Genito-Urinary ROS:  Completed and except as mentioned above were negative  Musculoskeletal ROS:  Completed and except as mentioned above were negative  Neurological ROS:  Completed and except as mentioned above were negative  Dermatological ROS:  Completed and except as mentioned above were negative          Physical Exam:    Vitals: /85   Pulse 67   Temp 98.2 °F (36.8 °C) (Oral)   Resp 23   Ht 5' 4\" (1.626 m)   Wt 195 lb 15.8 oz (88.9 kg)   SpO2 94%   Breastfeeding No   BMI 33.64 kg/m²     Last Body weight:   Wt Readings from Last 3 Encounters:   12/27/21 195 lb 15.8 oz (88.9 kg)       Body Mass Index : Body mass index is 33.64 kg/m².       Intake and Output summary:     Intake/Output Summary (Last 24 hours) at 12/31/2021 2254  Last data filed at 12/31/2021 1322  Gross per 24 hour   Intake 360 ml   Output 1250 ml   Net -890 ml       Physical Examination:   PHYSICAL EXAMINATION:  Vitals:    12/31/21 1600 12/31/21 1652 12/31/21 2015 12/31/21 2026   BP: (!) 155/91  138/85    Pulse: 66  67    Resp: 23 23 16 23   Temp: 97.7 °F (36.5 °C)  98.2 °F (36.8 °C)    TempSrc: Oral  Oral    SpO2: 99% 93% 96% 94%   Weight:       Height:         Constitutional: This is a well developed, well nourished, 30-34.9 - Obesity Grade I 70y.o. year old female who is alert, oriented, cooperative and in no apparent distress. Head:normocephalic and atraumatic. EENT:   SOPHIA. No conjunctival injections. Septum was midline, mucosa was without erythema, exudates or cobblestoning. No thrush was noted. Mallampati III (soft palate, base of uvula visible)  Neck: Supple without thyromegaly. No elevated JVP. Trachea was midline. Respiratory: Chest was symmetrical without dullness to percussion. Breath sounds bilaterally were clear to auscultation. There were no wheezes, rhonchi or rales. There is no intercostal retraction or use of accessory muscles. No egophony noted. Cardiovascular: Regular without murmur, clicks, gallops or rubs. Abdomen: Slightly rounded and soft without organomegaly. No rebound tenderness, rigidity or guarding was appreciated. Lymphatic: No lymphadenopathy. Musculoskeletal: Normal curvature of the spine. No gross muscle weakness. Extremities:  No lower extremity edema, ulcerations, tenderness, varicosities or erythema. Muscle size, tone and strength are normal.  No involuntary movements are noted. Skin:  Warm and dry. Good color, turgor and pigmentation. No lesions or scars.   No cyanosis or clubbing  Neurological/Psychiatric: The patient's general behavior, level of consciousness, thought content and emotional status is normal.            Laboratory findings:-    CBC:   Recent Labs     12/31/21  0442   WBC 7.4   HGB 12.0        BMP:    Recent Labs     12/29/21  0933 12/29/21  0933 12/30/21  0356 12/30/21  0356 12/31/21  0442      < > 134*   < > 134*   K 5.4*   < > 4.6   < > 4.8      < > 103   < > 104   CO2 22   < > 23   < > 22   BUN 29*   < > 26*   < > 21   CREATININE 0.45*  --  0.44*  --  0.42*   GLUCOSE 148*   < > 151*   < > 149*    < > = values in this interval not displayed. S. Calcium:  Recent Labs     12/31/21  0442   CALCIUM 8.1*     S. Ionized Calcium:No results for input(s): IONCA in the last 72 hours. S. Magnesium:  No results for input(s): MG in the last 72 hours. S. Phosphorus:No results for input(s): PHOS in the last 72 hours. S. Glucose:  No results for input(s): POCGLU in the last 72 hours. Glycosylated hemoglobin A1C: No results for input(s): LABA1C in the last 72 hours. INR:   No results for input(s): INR in the last 72 hours. Hepatic functions:   No results for input(s): ALKPHOS, ALT, AST, PROT, BILITOT, BILIDIR, LABALBU in the last 72 hours. Pancreatic functions:  No results for input(s): LACTA, AMYLASE in the last 72 hours. S. Lactic Acid:   No results for input(s): LACTA in the last 72 hours. Cardiac enzymes:No results for input(s): CKTOTAL, CKMB, CKMBINDEX, TROPONINI in the last 72 hours. BNP:No results for input(s): BNP in the last 72 hours. Lipid profile: No results for input(s): CHOL, TRIG, HDL, LDL, LDLCALC in the last 72 hours. Blood Gases: No results found for: PH, PCO2, PO2, HCO3, O2SAT  Thyroid functions: No results found for: TSH         Microbiology:    Cultures during this admission:     Blood cultures:      [] None drawn      [x] Negative             []  Positive (Details:  )  Urine Culture:        [] None drawn      [] Negative             []  Positive (Details:  )  Sputum Culture:   [] None drawn       [] Negative             []  Positive (Details:  )     SARS-CoV-2 rapid test was positive    Radiological reports:    CT CHEST PULMONARY EMBOLISM W CONTRAST    Result Date: 12/24/2021  Motion degraded study. Acute pulmonary emboli suspected particularly in the left lower lobe but not well visualized due to motion artifact. There is evidence for right ventricular strain with septal bowing suggesting a significant clot burden.  Moderate patchy ground-glass opacities involving all lobes of both lungs consistent with multifocal pneumonia typical for COVID pneumonia. Findings were discussed with Rosy Reyna at 5:39 pm on 12/24/2021. RECOMMENDATIONS: Unavailable           Assessment and Plan       IMPRESSION:   1. COVID-19    2. Dehydration    3. Tachycardia        Principal Problem:    Pneumonia due to COVID-19 virus  Active Problems:    Pulmonary embolus, left (HCC)    Postmenopausal bleeding  Resolved Problems:    * No resolved hospital problems. *       Acute hypoxic respiratory failure   Acute respiratory distress syndrome   COVID 19 infection/pneumonia   Left lower lobe segmental pulmonary embolism   Possible pulmonary artery hypertension secondary to COVID-19 pneumonia much more than any pulmonary embolism given the small magnitude of pulmonary       Plan:     Continue Airborne isolation   Continue high flow oxygen   Use BiPAP, if needed   Obtain X-ray chest as needed    Monitor input/output, with a goal of even/negative fluid balance   Patient receiving baricitinib   Continue Decadron   Monitor CRP, LDH, AST/ALT/ferritin/ D-dimer   Continue supportive care   GI/DVT prophylaxis   Glycemic control per primary service   Lovenox 1 mg/kg twice daily. Can switch to Eliquis, if no anticipated procedures   On nutrition via oral diet  Management as per guidance provided by hospital policy and prevailing evidence based medicine, during the COVID-19 pandemic emergency. This patient was evaluated in the context of the global SARS-CoV-2 (COVID-19) pandemic, which necessitated considerations that the patient either has COVID-19 infection or is at risk of infection with COVID-19. Institutional protocols and algorithms that pertain to the evaluation & management of patients with COVID-19 or those at risk for COVID-19 are in a state of rapid changes based on information released by regulatory bodies including the CDC and federal and state organizations.  These policies and algorithms were followed during the patient's care. Please note that this chart was generated using voice recognition Dragon dictation software. Although every effort was made to ensure the accuracy of this automated transcription, some errors in transcription may have occurred. .  Total critical care time caring for this patient with life threatening, unstable organ failure, including direct patient contact, management of life support systems, review of data including imaging and labs, discussions with other team members and physicians at least 27  Min so far today, excluding procedures.       Lainey Ga DO,            12/31/2021, 10:54 PM

## 2022-01-01 NOTE — PROGRESS NOTES
°C) Axillary -- 24 --   01/01/22 0415 (!) 158/96 98 °F (36.7 °C) Axillary 57 24 92 %   CRP 18-improved-  WBC 10  Blood culture still negative, chest x-ray positive diffuse pneumonia  Patient is on baricitinib, but saturation 86% she is on 90% high flow    12/28  60 FiO2 high flow, he feels better eating better but he saturating 90%,    12/29  70% FiO2 high flow, saturating 89%, she is short of breath, eating slightly, has a cough. In general she feels okay  Labs reviewed    12/30  60 FiO2 high flow, good appetite, alert oriented x3  WBC 10  Urine culture 12/26 -    12/31  96 saturation FiO2 70% on high flow, she feels better. WBC 7.4  CRP 6    1/1/22  High flow 45%, which is an improvement,  98 saturation, alert with a cough, WBC normal 9.2, tolerating her statin      Summary of relevant labs:  Labs:  .5-18-6    BNP 9968  Troponin 54  Lactic acid 3.5  Ferritin 858  WBC 14.6 -7  D-dimer 70.48    Micro:    Imaging:  CT chest bilat covid pneumonia w thick consolidations    I have personally reviewed the past medical history, past surgical history, medications, social history, and family history, and I haveupdated the database accordingly. Allergies:   Patient has no known allergies. Review of Systems:     Review of Systems   Constitutional: Positive for activity change. HENT: Positive for congestion. Eyes: Negative for photophobia. Respiratory: Negative for cough and shortness of breath. Cardiovascular: Negative for chest pain. Gastrointestinal: Negative for abdominal distention. Endocrine: Positive for polyphagia. Genitourinary: Negative for dysuria. Skin: Negative for color change. Allergic/Immunologic: Negative for immunocompromised state. Neurological: Negative for dizziness, seizures and speech difficulty. Psychiatric/Behavioral: Negative for agitation. Physical Examination :       Physical Exam  Constitutional:       Appearance: Normal appearance.  She is not ill-appearing or diaphoretic. HENT:      Head: Normocephalic and atraumatic. Nose: Nose normal.      Mouth/Throat:      Mouth: Mucous membranes are moist.   Eyes:      General: No scleral icterus. Conjunctiva/sclera: Conjunctivae normal.   Cardiovascular:      Rate and Rhythm: Normal rate and regular rhythm. Heart sounds: Normal heart sounds. No murmur heard. Pulmonary:      Breath sounds: Normal breath sounds. Abdominal:      General: There is no distension. Tenderness: There is no abdominal tenderness. Genitourinary:     Comments: No castro  Musculoskeletal:         General: No swelling, deformity or signs of injury. Cervical back: Neck supple. No rigidity. Right lower leg: No edema. Left lower leg: No edema. Skin:     General: Skin is dry. Coloration: Skin is not jaundiced or pale. Findings: No bruising, erythema, lesion or rash. Neurological:      General: No focal deficit present. Mental Status: She is alert. Mental status is at baseline. Psychiatric:         Mood and Affect: Mood normal.         Thought Content: Thought content normal.         Past Medical History:   No past medical history on file. Past Surgical  History:   No past surgical history on file.     Medications:      apixaban  10 mg Oral BID    Followed by   Colt Helms ON 1/7/2022] apixaban  5 mg Oral BID    dexamethasone  6 mg IntraVENous Daily    famotidine  20 mg Oral BID    sodium chloride flush  10 mL IntraVENous 2 times per day    baricitinib  4 mg Oral Daily       Social History:     Social History     Socioeconomic History    Marital status: Unknown     Spouse name: Not on file    Number of children: Not on file    Years of education: Not on file    Highest education level: Not on file   Occupational History    Not on file   Tobacco Use    Smoking status: Not on file    Smokeless tobacco: Not on file   Substance and Sexual Activity    Alcohol use: Not on file    Drug use: Not on file    Sexual activity: Not on file   Other Topics Concern    Not on file   Social History Narrative    Not on file     Social Determinants of Health     Financial Resource Strain:     Difficulty of Paying Living Expenses: Not on file   Food Insecurity:     Worried About Running Out of Food in the Last Year: Not on file    Jones of Food in the Last Year: Not on file   Transportation Needs:     Lack of Transportation (Medical): Not on file    Lack of Transportation (Non-Medical): Not on file   Physical Activity:     Days of Exercise per Week: Not on file    Minutes of Exercise per Session: Not on file   Stress:     Feeling of Stress : Not on file   Social Connections:     Frequency of Communication with Friends and Family: Not on file    Frequency of Social Gatherings with Friends and Family: Not on file    Attends Holiness Services: Not on file    Active Member of [address] [place] [address] [employer] or Organizations: Not on file    Attends Club or Organization Meetings: Not on file    Marital Status: Not on file   Intimate Partner Violence:     Fear of Current or Ex-Partner: Not on file    Emotionally Abused: Not on file    Physically Abused: Not on file    Sexually Abused: Not on file   Housing Stability:     Unable to Pay for Housing in the Last Year: Not on file    Number of [place] in the Last Year: Not on file    Unstable Housing in the Last Year: Not on file       Family History:   No family history on file.    Medical Decision Making:   I have independently reviewed/ordered the following labs:    CBC with Differential:   Recent Labs     12/31/21  0442 01/01/22  0836   WBC 7.4 9.2   HGB 12.0 13.6   HCT 36.5 40.4    248   LYMPHOPCT 6* 6*   MONOPCT 7 7     BMP:  Recent Labs     12/31/21  0442 01/01/22  0836   * 138   K 4.8 5.2    106   CO2 22 21   BUN 21 25*   CREATININE 0.42* 0.31*     Hepatic Function Panel:   No results for input(s): PROT, LABALBU, BILIDIR, IBILI, BILITOT, ALKPHOS, ALT, AST in the last 72 hours. No results for input(s): RPR in the last 72 hours. No results for input(s): HIV in the last 72 hours. No results for input(s): BC in the last 72 hours. Lab Results   Component Value Date    CREATININE 0.31 01/01/2022    GLUCOSE 132 01/01/2022       Detailed results: Thank you for allowing us to participate in the care of this patient. Please call with questions. This note is created with the assistance of a speech recognition program.  While intending to generate adocument that actually reflects the content of the visit, the document can still have some errors including those of syntax and sound a like substitutions which may escape proof reading. It such instances, actual meaningcan be extrapolated by contextual diversion.     Candy Godoy MD  Office: [phone]  Perfect serve / office [phone]      '

## 2022-01-01 NOTE — PROGRESS NOTES
[facility]  Office: [address], DO, Arlinarsenio Perales, DO, Pam Bend, DO, Olu Huntley Blood, DO, Juan Jean MD, Maddi Martinez MD, Tyrone Chester MD, Lucia Walker MD, Rajeev Duran MD, Ulises Gross MD, Ella Ellis MD, Rivera Grubbs, DO, Jose L Martin, DO, Jeanine Lazo MD,  Johann Galeazzi, DO, Michi Koch MD, Payton Calhoun MD, Briana Coffman MD, Davis Wells MD, Jennifer Saavedra MD, All Cox MD, Sharon Dawkins MD, [facility], [address], CNP, Ina Cottrell, CNP, Juan Manuel Santamaria, CNS, Jason Carlton, CNP, Constance Henley, CNP, Whitney Hicks, CNP, Geo Flor, CNP, Najma Tomlinson, CNP, Nuno Peña PA-C, Sally Reyes, CARY, Travis Morel, CARY, Mignon Stratton, CNP, Mariaelena Reyes, CNP, Alek Trotter, CNP, Liz Lo, CNP, Jose Sanches, CNP, Escobar Ryder, [address] [place] [address]      Daily Progress Note     Admit Date: 12/24/2021  Bed/Room No.  3021/3021-01  Admitting Physician : Tyrone Chester MD  Code Status :Full Code  Hospital Day:  LOS: 8 days   Chief Complaint:     Chief Complaint   Patient presents with    Shortness of Breath    Fall     Principal Problem:    Pneumonia due to COVID-19 virus  Active Problems:    Pulmonary embolus, left [facility])    Postmenopausal bleeding  Resolved Problems:    * No resolved hospital problems. *    Subjective : Interval History/Significant events :  01/01/22    Patient continues to complain of difficulty breathing at rest and feels fatigued. She is having cough. No change in vaginal bleeding. Vitals - Unstable afebrile  Labs -potassium 5.2, BUN 25, creatinine 0.31, WBC 9.2. Nursing notes , Consults notes reviewed. Overnight events and updates discussed with Nursing staff . Background History:         Nidhi Hopper is 70 y.o. female  Who was admitted to the hospital on 12/24/2021 for treatment of Pneumonia due to COVID-19 virus.    Patient came to the hospital with lightheadedness and dry cough for 1 week. She had episode of fall at home and family called EMS. Initial evaluation by EMS found patient having tachypnea with respirate 40 to 50/min, hypoxia SPO2 50% on room air, tachycardia. Patient was placed on nonrebreather and improved only to 73%. Patient was then placed on BiPAP. Evaluation emergency room showed positive COVID-19 test, elevated troponin, elevated proBNP. CT chest showed subsegmental PE with possible right heart strain. Vascular surgery was consulted and recommended anticoagulation without need for thrombolysis. Patient also had thrombocytopenia. Cardiology was consulted and echocardiogram obtained which was negative for RV strain with RV systolic pressure 29 mmHg. Patient was treated with heparin infusion, Decadron and started on baricitinib. Patient had vaginal bleeding on 12/30/21  PMH:  No past medical history on file. Allergies: No Known Allergies   Medications :  apixaban, 10 mg, Oral, BID   Followed by  Lucia Galeas ON 1/7/2022] apixaban, 5 mg, Oral, BID  dexamethasone, 6 mg, IntraVENous, Daily  famotidine, 20 mg, Oral, BID  sodium chloride flush, 10 mL, IntraVENous, 2 times per day  baricitinib, 4 mg, Oral, Daily        Review of Systems   Review of Systems   Constitutional: Positive for activity change, appetite change and fatigue. Negative for fever and unexpected weight change. HENT: Negative for congestion, rhinorrhea and sneezing. Eyes: Negative for visual disturbance. Respiratory: Positive for shortness of breath. Negative for cough, chest tightness and wheezing. Cardiovascular: Negative for chest pain and palpitations. Gastrointestinal: Positive for diarrhea. Negative for abdominal pain, blood in stool, constipation, nausea and vomiting. Genitourinary: Positive for vaginal bleeding. Negative for dysuria, enuresis, frequency and hematuria. Musculoskeletal: Negative for arthralgias and myalgias. Skin: Negative for rash.    Neurological: Negative for dizziness, weakness, light-headedness and headaches. Hematological: Does not bruise/bleed easily. Psychiatric/Behavioral: Negative for dysphoric mood and sleep disturbance. Objective :      Current Vitals : Temp: 97.2 °F (36.2 °C),  Pulse: 57, Resp: 25, BP: (!) 156/102, SpO2: 94 %  Last 24 Hrs Vitals   Patient Vitals for the past 24 hrs:   BP Temp Temp src Pulse Resp SpO2 Height   01/01/22 0751 -- -- -- -- 25 94 % --   01/01/22 0730 (!) 156/102 97.2 °F (36.2 °C) Axillary -- 24 -- --   01/01/22 0415 (!) 158/96 98 °F (36.7 °C) Axillary 57 24 92 % --   01/01/22 0359 -- -- -- -- 22 -- --   12/31/21 2345 (!) 153/89 97.9 °F (36.6 °C) Axillary 56 23 95 % --   12/31/21 2026 -- -- -- -- 23 94 % --   12/31/21 2015 138/85 98.2 °F (36.8 °C) Oral 67 16 96 % --   12/31/21 1652 -- -- -- -- 23 93 % --   12/31/21 1600 (!) 155/91 97.7 °F (36.5 °C) Oral 66 23 99 % --   12/31/21 1404 -- -- -- -- -- -- 5' 4\" (1.626 m)   12/31/21 1307 -- -- -- -- 21 99 % --   12/31/21 1100 132/81 98.1 °F (36.7 °C) Oral 61 23 91 % --   12/31/21 0814 -- -- -- -- 18 97 % --     Intake / output   12/31 0701 - 01/01 0700  In: -   Out: 700 [Urine:700]  Physical Exam:  Physical Exam  Vitals and nursing note reviewed. Constitutional:       General: She is not in acute distress. Appearance: She is obese. She is not diaphoretic. Interventions: Nasal cannula in place. Comments: High flow oxygen nasal cannula   HENT:      Head: Normocephalic and atraumatic. Nose:      Right Sinus: No maxillary sinus tenderness or frontal sinus tenderness. Left Sinus: No maxillary sinus tenderness or frontal sinus tenderness. Mouth/Throat:      Pharynx: No oropharyngeal exudate. Eyes:      General: No scleral icterus. Conjunctiva/sclera: Conjunctivae normal.      Pupils: Pupils are equal, round, and reactive to light. Neck:      Thyroid: No thyromegaly. Vascular: No JVD.    Cardiovascular:      Rate and Rhythm: Normal rate and regular rhythm. Pulses:           Dorsalis pedis pulses are 2+ on the right side and 2+ on the left side. Heart sounds: Normal heart sounds. No murmur heard. Pulmonary:      Effort: Pulmonary effort is normal.      Breath sounds: Normal breath sounds. No wheezing or rales. Abdominal:      Palpations: Abdomen is soft. There is no mass. Tenderness: There is no abdominal tenderness. Musculoskeletal:      Cervical back: Full passive range of motion without pain and neck supple. Lymphadenopathy:      Head:      Right side of head: No submandibular adenopathy. Left side of head: No submandibular adenopathy. Cervical: No cervical adenopathy. Skin:     General: Skin is warm. Neurological:      Mental Status: She is alert and oriented to person, place, and time. Motor: No tremor. Psychiatric:         Behavior: Behavior is cooperative. Laboratory findings:    Recent Labs     12/30/21  0356 12/31/21  0442   WBC 10.1 7.4   HGB 12.8 12.0   HCT 38.2 36.5    207     Recent Labs     12/29/21  0933 12/30/21  0356 12/31/21  0442    134* 134*   K 5.4* 4.6 4.8    103 104   CO2 22 23 22   GLUCOSE 148* 151* 149*   BUN 29* 26* 21   CREATININE 0.45* 0.44* 0.42*   CALCIUM 7.9* 8.0* 8.1*     No results for input(s): PROT, LABALBU, LABA1C, Q2NTCBU, H7GXTNH, FT4, TSH, AST, ALT, LDH, GGT, ALKPHOS, BILITOT, BILIDIR, AMMONIA, AMYLASE, LIPASE, LACTATE, CHOL, HDL, LDLCHOLESTEROL, CHOLHDLRATIO, TRIG, VLDL, BNP, TROPONINI, CKTOTAL, CKMB, CKMBINDEX, RF, JESUS in the last 72 hours. No results found for: Jeanne Devante, RBCUA, BLOODU, BACTERIA, NITRU, [address], LEUKOCYTESUR    Imaging / Clinical Data :-   CT CHEST PULMONARY EMBOLISM W CONTRAST    Result Date: 12/24/2021  Motion degraded study. Acute pulmonary emboli suspected particularly in the left lower lobe but not well visualized due to motion artifact.  There is evidence for right ventricular strain with septal bowing suggesting a significant clot burden. Moderate patchy ground-glass opacities involving all lobes of both lungs consistent with multifocal pneumonia typical for COVID pneumonia. Findings were discussed with Kenny Zurita at 5:39 pm on 12/24/2021. RECOMMENDATIONS: Unavailable        Clinical Course : unchanged  Assessment and Plan  :        Acute PE -treated with Lovenox 1 mg/kg twice daily. Changed Eliquis 12/31/21. Antitussive. COVID-19 pneumonia - Decadron , baricitinib, GI prophylaxis with Pepcid, monitor off antibiotics. ID on board. Continue airborne isolation till 1/13/2022. Acute respiratory failure with hypoxia - high flow oxygen support, high risk of worsening and intubation. Hyponatremia -resolved. Obesity -   Vaginal bleed - post menopausal . Will get TVUS when stable . Monitor hemoglobin. Diarrhea Imodium as needed. Postmenopausal bleed -consult GYN. Encourage incentive spirometry. Activity limited. Monitor hemoglobin. Discussed with gynecology resident on call. Continue to monitor vitals , Intake / output ,  Cell count , HGB , Kidney function, oxygenation  as indicated . Plan and updates discussed with patient ,  answers  explained to satisfaction.    Plan discussed with Staff Coretta PHAN     (Please note that portions of this note were completed with a voice recognition program. Efforts were made to edit the dictations but occasionally words are mis-transcribed.)      Dang Min MD  1/1/2022

## 2022-01-01 NOTE — CONSULTS
[facility]    Patient Name: Fco Grace     Patient : 1950  Room/Bed: 3021/3021-01  Admission Date/Time: 2021  3:37 PM  Primary Care Physician: No primary care provider on file. Consulting Provider: Dr. Gutierrez Most  Reason for Consult: PMB s/p anticoagulation    CC:   Chief Complaint   Patient presents with    Shortness of Breath    Fall                HPI: Fco Grace is a 70 y.o. female  who presents with COVID pneumonia and LLL pulmonary embolus on lovenox and now bridged to eliquis. She has never had a blood clot before. She is currently stable on high flow oxygen. She has not noticed the bleeding but the intermed team noted vaginal bleeding since starting anticoagulation. She states she has never had any bleeding or pink discharge since menopause that she is unsure of when but possibly 51-60years old. She has not had Ob/Gyn care since the birth of her last child in the 65s. She has had 2 full term vaginal deliveries with no complications. She denies any hx of abnormal pap smears and admits to having no care with no recent pap smears, mammograms, or any colonoscopies. She is currently not in a relationship and last had any sexual intercourse 1 year ago. She denies any hx of STDs. Patient has no Ob/Gyn currently and lives in [facility] currently with her son.        REVIEW OF SYSTEMS:   Constitutional: negative fever, negative chills, negative weight changes   HEENT: negative visual disturbances, negative headaches, negative dizziness  Breast: negative breast abnormalities, negative breast lumps, negative nipple discharge  Respiratory: negative dyspnea, negative cough, negative SOB  Cardiovascular: negative chest pain,  negative palpitations, negative arrhythmia, negative syncope   Gastrointestinal: negative abdominal pain, negative RUQ pain, negative N/V, negative diarrhea, negative constipation, negative bowel changes  Genitourinary: negative dysuria, negative hematuria, negative urinary incontinence, negative vaginal discharge, +vaginal bleeding  Dermatological: negative rash, negative pruritis, negative mole or other skin changes  Hematologic: negative bruising, negative personal/family history of DVT/PE  Immunologic/Lymphatic: negative recent illness, negative recent sick contact  Musculoskeletal: negative back pain, negative myalgias, negative arthralgias  Neurological:  negative dizziness, negative migraines, negative seizures, negative weakness  Behavior/Psych: negative depression, negative anxiety, negative SI, negative HI    _______________________________________________________________________    GYNECOLOGICAL HISTORY:  Age of Menarche: teens but unsure specific age  Age of Menopause: 49-58s     Sexually Active: not currently   STD History: denies    Pap History: denies any abnormal pap smears  Colposcopy History: denies    Permanent Sterilization: denies  Reversible Birth Control: denies   Hormone Replacement Exposure: denies    OBSTETRICAL HISTORY:   OB History    Para Term  AB Living   2 2 2 0 0 0   SAB IAB Ectopic Molar Multiple Live Births   0 0 0 0 0 0      # Outcome Date GA Lbr Alfonso/2nd Weight Sex Delivery Anes PTL Lv   2 Term      Vag-Spont      1 Term      Vag-Spont          PAST MEDICAL HISTORY:   has no past medical history on file. PAST SURGICAL HISTORY:   has no past surgical history on file.     ALLERGIES:  Allergies as of 2021    (No Known Allergies)       MEDICATIONS:  Current Facility-Administered Medications   Medication Dose Route Frequency Provider Last Rate Last Admin    benzocaine-menthol (CEPACOL SORE THROAT) lozenge 1 lozenge  1 lozenge Oral Q2H PRN Krys Foy MD        apixaban (ELIQUIS) tablet 10 mg  10 mg Oral BID Krys Foy MD   10 mg at 22 [id number]    Followed by   Shola Soni ON 2022] apixaban (ELIQUIS) tablet 5 mg  5 mg Oral BID Krys Foy MD        loperamide (IMODIUM) capsule 2 mg  2 mg Oral 4x Daily PRN Dang Min MD   2 mg at 12/30/21 1541    dexamethasone (DECADRON) injection 6 mg  6 mg IntraVENous Daily Dang Min MD   6 mg at 01/01/22 5381    famotidine (PEPCID) tablet 20 mg  20 mg Oral BID Dang Min MD   20 mg at 01/01/22 3045    benzonatate (TESSALON) capsule 200 mg  200 mg Oral TID PRN MORENITA Marcos - CNP   200 mg at 01/01/22 1043    HYDROcodone-chlorpheniramine (TUSSIONEX) 10-8 MG/5ML oral suspension 5 mL  5 mL Oral Q12H PRN Anatoliy Farrell, DO   5 mL at 01/01/22 1302    HYDROcodone-chlorpheniramine (TUSSIONEX) 10-8 MG/5ML oral suspension 5 mL  5 mL Oral Q12H PRN Anatoliy Farrell, DO   5 mL at 12/28/21 1202    sodium chloride flush 0.9 % injection 10 mL  10 mL IntraVENous 2 times per day Edin Angel MD   10 mL at 01/01/22 0908    sodium chloride flush 0.9 % injection 10 mL  10 mL IntraVENous PRN Edin Angel MD        0.9 % sodium chloride infusion  25 mL IntraVENous PRN Edin Angel MD        promethazine (PHENERGAN) tablet 12.5 mg  12.5 mg Oral Q6H PRN Edin Angel MD        Or    ondansetron (ZOFRAN) injection 4 mg  4 mg IntraVENous Q6H PRN Edin Angel MD        polyethylene glycol (GLYCOLAX) packet 17 g  17 g Oral Daily PRN Edin Angel MD        acetaminophen (TYLENOL) tablet 650 mg  650 mg Oral Q6H PRN Edin Angel MD   650 mg at 12/28/21 9254    Or    acetaminophen (TYLENOL) suppository 650 mg  650 mg Rectal Q6H PRN Edin Angel MD        baricitinib (OLUMIANT) tablet 4 mg  4 mg Oral Daily Angelina Burton MD   4 mg at 01/01/22 0907       FAMILY HISTORY:  Family History of Breast, Ovarian, Colon or Uterine Cancer:Yes breast cancer in maternal aunt  family history includes Cancer in her maternal aunt. SOCIAL HISTORY:   reports that she has been smoking cigarettes. She has never used smokeless tobacco. She reports previous alcohol use.  She reports previous drug use.    HEALTH MAINTENANCE:  Date of Last Mammogram: has never had one  Date of Last Colonoscopy: has never had one  Date of Last Bone Density: has never had one  ________________________________________________________________________                                    VITALS:  Vitals:    01/01/22 0415 01/01/22 0730 01/01/22 0751 01/01/22 0912   BP: (!) 158/96 (!) 156/102  (!) 135/93   Pulse: 57   87   Resp: 24 24 25 20   Temp: 98 °F (36.7 °C) 97.2 °F (36.2 °C)  98.3 °F (36.8 °C)   TempSrc: Axillary Axillary  Axillary   SpO2: 92%  94% 93%   Weight:       Height:                                                                                                                                      PHYSICAL EXAM:   Chaperone for Intimate Exam: Chaperone was present for entire exam  General Appearance: Alert; in no acute distress. Pleasant. Skin: Skin color, texture, turgor normal. No rashes or lesions. HEENT: Normocephalic and atraumatic, moist mucous membranes, trachea midline  Respiratory: Normal expansion. Cardiovascular: Normal rate, regular rhythm, normal S1 and S2, no murmurs, rubs or gallops. Abdomen: Soft, non-tender, no rebound, guarding, rigidity, non-distended  Pelvic Exam:   Sterile Speculum Exam:   External genitalia: Normal hair distribution, normal appearing vulva, no masses, tenderness or lesions, normal clitoris, normal urethral meatus. Vagina: Normal appearing vaginal mucosa without lesions, physiologic appearing vaginal discharge noted in the posterior vault, scant amount of dark red blood in the vault   Cervix: Normal appearing cervix without lesions. No active bleeding noted. Sterile Vaginal Exam:  Cervix: No cervical motion tenderness   Uterus: Normal size, shape, consistency and non-tender, anteverted uterus   Vagina: Normal vaginal mucosa, non-tender, no evidence of prolapse   Adnexa: Normal size, non-tender, no palpable masses  Rectal Exam: Exam declined by patient.   Musculoskeletal: No joint swelling, deformity, or tenderness. , no gross abnormalities. Extremities: Non-tender BLE and non-edematous. Psych: Oriented to time, place and person, mood and affect are within normal limits. OMM EXAM:  The patient did not complain of a Chief complaint requiring OMM. Chief Complaint:none  Structural Exam: No Interest    LAB RESULTS:    Lab Results   Component Value Date    WBC 9.2 01/01/2022    HGB 13.6 01/01/2022    HCT 40.4 01/01/2022    MCV 97.1 01/01/2022     01/01/2022       Lab Results   Component Value Date     01/01/2022    K 5.2 01/01/2022     01/01/2022    CO2 21 01/01/2022    BUN 25 01/01/2022    CREATININE 0.31 01/01/2022    GLUCOSE 132 01/01/2022    CALCIUM 8.3 01/01/2022        DIAGNOSTICS:  ECHO Complete 2D W Doppler W Color    Result Date: 12/26/2021  Transthoracic Echocardiography Report (TTE)  Patient Name Montey Gosselin      Date of Study         12/25/2021               [place]   Date of      1950  Gender                Female  Birth   Age          70 year(s)  Race                  Unknown   Room Number  3005        Height:               94 inch, 238.76 cm   Corporate ID [id number]    Weight:               186 pounds, 84.4 kg  #   Patient Annamarialyssilverio [de-identified]   BSA:       2.51 m^2   BMI:         14.8 kg/m^2  #   MR #         [id number]     Sonographer           Ramírez Escamillakey   Accession #  [id number]  Interpreting          Genesis Aggarwal                           Physician   Fellow                   Referring Nurse                           Practitioner   Interpreting             Referring Physician   Vinny Savage CNP  Type of Study   TTE procedure:2D Echocardiogram, M-Mode, Doppler, Color Doppler. Procedure Date Date: 12/25/2021 Start: 01:47 PM Study Location: [facility] Technical Quality: Adequate visualization Indications:Pulmonary embolus and Right heart strain. History / Tech.  Comments: Echo done at patient bedside. Procedure explained to patient. Covid-19 Patient Status: Inpatient Height: 94 inches Weight: 186 pounds BSA: 2.51 m^2 BMI: 14.8 kg/m^2 CONCLUSIONS Summary Global left ventricular systolic function is normal. Estimated EF 50-55%. Dilated Right ventricular with mildly impaired systolic function Estimated right ventricular systolic pressure is 45HPFB. No significant valvular regurgitation or stenosis seen. No pericardial effusion seen. Signature ----------------------------------------------------------------------------  Electronically signed by Clare Gann(Sonographer) on 12/25/2021  02:30 PM ---------------------------------------------------------------------------- ----------------------------------------------------------------------------  Electronically signed by Genesis Aggarwal(Interpreting physician) on  12/26/2021 07:52 AM ---------------------------------------------------------------------------- FINDINGS Left Atrium Left atrium is normal in size. Left Ventricle Left ventricle is normal in size. Global left ventricular systolic function is normal. Calculated ejection fraction 50% by Orantes's method. Right Atrium Right atrial dilatation. Right Ventricle Right ventricular systolic function appears mildly reduced reduced. Moderately dilated right ventricular cavity. TAPSE value of 1.65cm noted. Mitral Valve Normal mitral valve structure and function. No mitral regurgitation. Aortic Valve Aortic valve is trileaflet and opens adequately. No aortic insufficiency. Tricuspid Valve No obvious valvular abnormality. Trivial tricuspid regurgitation. No pulmonary hypertension. Estimated right ventricular systolic pressure is 81YDLT. Pulmonic Valve The pulmonic valve is normal in structure. Mild pulmonic insufficiency. Pericardial Effusion No pericardial effusion seen.  Miscellaneous E/E' average = 9.4. IVC normal diameter & inspiratory collapse indicating normal RA filling pressure . M-mode / 2D Measurements & Calculations:   LVIDd:3.7 cm(3.7 - 5.6 cm)       Diastolic THOFBK:57.8 ml  SEDDQ:1.7 cm(2.2 - 4.0 cm)       Systolic UROPNY:86.4 ml  NXYV:9.2 cm(0.6 - 1.1 cm)        Aortic Root:2.7 cm(2.0 - 3.7 cm)  LVPWd:0.9 cm(0.6 - 1.1 cm)       LA Dimension: 3.3 cm(1.9 - 4.0 cm)  Fractional Shortenin.73 %    LA volume/Index: 32.1 ml /13m^2  Calculated LVEF (%): 50.94 %     LVOT:2 cm                                   RVDd:2.6 cm   Mitral:                                 Aortic   Valve Area (P1/2-Time): 7.59 cm^2       Peak Velocity: 1.10 m/s  Peak E-Wave: 0.68 m/s                   Mean Velocity: 0.58 m/s  Peak A-Wave: 0.46 m/s                   Peak Gradient: 4.84 mmHg  E/A Ratio: 1.48                         Mean Gradient: 2 mmHg  Peak Gradient: 1.83 mmHg  Mean Gradient: 1 mmHg  Deceleration Time: 99 msec              Area (continuity): 2.77 cm^2  P1/2t: 29 msec                          AV VTI: 17.6 cm   Area (continuity): 3.43 cm^2  Mean Velocity: 0.51 m/s   Tricuspid:                              Pulmonic:   Peak TR Velocity: 2.71 m/s              Peak Velocity: 0.83 m/s  Peak TR Gradient: 29.3764 mmHg          Peak Gradient: 2.74 mmHg  Diastology / Tissue Doppler Septal Wall E' velocity:0.06 m/s Septal Wall E/E':11.5 Lateral Wall E' velocity:0.09 m/s Lateral Wall E/E':7.4    XR CHEST PORTABLE    Result Date: 2021  EXAMINATION: ONE XRAY VIEW OF THE CHEST 2021 3:50 pm COMPARISON: None. HISTORY: ORDERING SYSTEM PROVIDED HISTORY: covid TECHNOLOGIST PROVIDED HISTORY: covid Reason for Exam: upr FINDINGS: A portable upright frontal view chest radiograph was obtained. The heart is top-normal in size. The mediastinal contour and pleural spaces are within normal limits. Diffuse interstitial and alveolar opacities are present throughout the lungs bilaterally to a moderate degree. No pneumothorax or significant pleural effusion. No acute thoracic osseous abnormality.      Moderate diffuse interstitial and alveolar opacities throughout the lungs bilaterally. The finding is nonspecific although suspicious for multilobar pneumonia, including active COVID-19 viral pneumonitis. CT CHEST PULMONARY EMBOLISM W CONTRAST    Result Date: 12/24/2021  EXAMINATION: CTA OF THE CHEST 12/24/2021 4:41 pm TECHNIQUE: CTA of the chest was performed after the administration of intravenous contrast.  Multiplanar reformatted images are provided for review. MIP images are provided for review. Dose modulation, iterative reconstruction, and/or weight based adjustment of the mA/kV was utilized to reduce the radiation dose to as low as reasonably achievable. COMPARISON: None. HISTORY: ORDERING SYSTEM PROVIDED HISTORY: hypoxemia of 50 on RA and 73 on NRB at 15L; tachycardia TECHNOLOGIST PROVIDED HISTORY: hypoxemia of 50 on RA and 73 on NRB at 15L; tachycardia Decision Support Exception - unselect if not a suspected or confirmed emergency medical condition->Emergency Medical Condition (MA) Reason for Exam: hypoxemia of 50 on RA and 73 on NRB at 15L; tachycardia FINDINGS: Pulmonary Arteries: Pulmonary arteries are adequately opacified for evaluation. Study is motion degraded. There are pulmonary emboli in branches of the left lower lobe pulmonary artery. Other pulmonary emboli may be present but are not well visualized due to the respiratory motion. Main pulmonary artery is upper limits of normal in caliber. Mediastinum: There is mild right paratracheal adenopathy. There are mildly enlarged nodes in the AP window. There is evidence for right ventricular strain with septal bowing. There is no acute abnormality of the thoracic aorta. Lungs/pleura: There are patchy ground-glass opacities involving all lobes of both lungs primarily in a peripheral and subpleural distribution. There is no pneumothorax or pleural fluid. Upper Abdomen: Limited images of the upper abdomen are motion degraded and grossly unremarkable. Soft Tissues/Bones: No acute bone or soft tissue abnormality. Motion degraded study. Acute pulmonary emboli suspected particularly in the left lower lobe but not well visualized due to motion artifact. There is evidence for right ventricular strain with septal bowing suggesting a significant clot burden. Moderate patchy ground-glass opacities involving all lobes of both lungs consistent with multifocal pneumonia typical for COVID pneumonia. Findings were discussed with Guru Lara at 5:39 pm on 2021. RECOMMENDATIONS: Unavailable       ASSESSMENT & PLAN:  Jelena Kapoor is a 70 y.o. female  who presents with COVID pneumonia and LLL pulmonary embolus    - Intermed primary, continue management per primary   - ID consulted    - Ob/Gyn consulted- will sign off and to follow up as outpatient for EMB    Postmenopausal bleeding   - VSS overall on high flow oxygen    - LMP: menopause in 50-60s   - SSE: small amount of dark red blood in vault without evidence of active bright red bleeding   - SVE: normal non-tender anteverted uterus   - STD testing: declining   - Last pap smear: in the 1960s and none recently   - Last mammogram: pt has never completed   - Last colonoscopy: pt has never completed   - Last DEXA scan: pt has never completed   - Will order TVUS for tomorrow   - Bleeding stable with Hgb 13.6 and stable   - Please notify Ob/Gyn if bleeding worsens but otherwise, will follow pelvic ultrasound and pt to follow up with Ob/Gyn [address] [place] [address] as outpatient for endometrial biopsy once stable to evaluate for endometrial cancer   - High suspicion for vaginal bleeding 2/2 anticoagulation but will need tissue sampling for further workup. Recommend continued anticoagulation due to benefits outweighing risk with active PE.  Discussed with patient and patient agreeable to follow up as outpatient.    - Ob/Gyn team will sign off but will follow pelvic ultrasound    Noncompliance with medical care   - Patient has not sought medical care for several decades     FHx Breast ca   - Pt reporting maternal aunt with breast cancer but no other family hx of cancers    Tobacco use   - Encouraged cessation    BMI 33    Patient Active Problem List    Diagnosis Date Noted    Postmenopausal bleeding 12/31/2021     During hospitalization in December 2021  Outpatient follow-up recommended due to acute respiratory failure.  Pneumonia due to COVID-19 virus     Pulmonary embolus, left [facility])        Plan discussed with Dr. Yusuf Wells, who is agreeable.      Attending's Name: Dr. Kamaljit Sal DO  Ob/Gyn Resident  1/1/2022, 2:22 PM

## 2022-01-01 NOTE — PLAN OF CARE
Problem: Airway Clearance - Ineffective  Goal: Achieve or maintain patent airway  1/1/2022 0608 by Che Shetty RN  Outcome: Ongoing  12/31/2021 1814 by Mg Moralez RN  Outcome: Ongoing     Problem: Gas Exchange - Impaired  Goal: Absence of hypoxia  1/1/2022 0608 by Che Shetty RN  Outcome: Ongoing  12/31/2021 1814 by Mg Moralez RN  Outcome: Ongoing  Goal: Promote optimal lung function  1/1/2022 0608 by Che Shetty RN  Outcome: Ongoing  12/31/2021 1814 by Mg Moralez RN  Outcome: Ongoing     Problem: Breathing Pattern - Ineffective  Goal: Ability to achieve and maintain a regular respiratory rate  1/1/2022 0608 by Che Shetty RN  Outcome: Ongoing  12/31/2021 1814 by Mg Moralez RN  Outcome: Ongoing     Problem:  Body Temperature -  Risk of, Imbalanced  Goal: Ability to maintain a body temperature within defined limits  1/1/2022 0608 by Che Shetty RN  Outcome: Ongoing  12/31/2021 1814 by Mg Moralez RN  Outcome: Ongoing  Goal: Will regain or maintain usual level of consciousness  1/1/2022 0608 by Che Shetty RN  Outcome: Ongoing  12/31/2021 1814 by Mg Moralez RN  Outcome: Ongoing  Goal: Complications related to the disease process, condition or treatment will be avoided or minimized  1/1/2022 0608 by Che Shetty RN  Outcome: Ongoing  12/31/2021 1814 by Mg Moralez RN  Outcome: Ongoing     Problem: Isolation Precautions - Risk of Spread of Infection  Goal: Prevent transmission of infection  1/1/2022 0608 by Che Shetty RN  Outcome: Ongoing  12/31/2021 1814 by Mg Moralez RN  Outcome: Ongoing     Problem: Nutrition Deficits  Goal: Optimize nutritional status  1/1/2022 0608 by Che Shetty RN  Outcome: Ongoing  12/31/2021 1814 by Mg Moralez RN  Outcome: Ongoing     Problem: Risk for Fluid Volume Deficit  Goal: Maintain normal heart rhythm  1/1/2022 0608 by Che Shetty RN  Outcome: Ongoing  12/31/2021 1814 by Lisa Eaton RN  Outcome: Ongoing  Goal: Maintain absence of muscle cramping  1/1/2022 0608 by Arvell Snellen, RN  Outcome: Ongoing  12/31/2021 1814 by Lisa Eaton RN  Outcome: Ongoing  Goal: Maintain normal serum potassium, sodium, calcium, phosphorus, and pH  1/1/2022 0608 by Arvell Snellen, RN  Outcome: Ongoing  12/31/2021 1814 by Lisa Eaton RN  Outcome: Ongoing     Problem: Loneliness or Risk for Loneliness  Goal: Demonstrate positive use of time alone when socialization is not possible  1/1/2022 0608 by Arvell Snellen, RN  Outcome: Ongoing  12/31/2021 1814 by Lisa Eaton RN  Outcome: Ongoing     Problem: Fatigue  Goal: Verbalize increase energy and improved vitality  1/1/2022 0608 by Arvell Snellen, RN  Outcome: Ongoing  12/31/2021 1814 by Lisa Eaton RN  Outcome: Ongoing     Problem: Patient Education: Go to Patient Education Activity  Goal: Patient/Family Education  1/1/2022 7107 by Arvell Snellen, RN  Outcome: Ongoing  12/31/2021 1814 by Lisa Eaton RN  Outcome: Ongoing     Problem: OXYGENATION/RESPIRATORY FUNCTION  Goal: Patient will maintain patent airway  1/1/2022 0608 by Arvell Snellen, RN  Outcome: Ongoing  12/31/2021 1814 by Lisa Eaton RN  Outcome: Ongoing  Goal: Patient will achieve/maintain normal respiratory rate/effort  Description: Respiratory rate and effort will be within normal limits for the patient  1/1/2022 9774 by Arvell Snellen, RN  Outcome: Ongoing  12/31/2021 1814 by Lisa Eaton RN  Outcome: Ongoing     Problem: Skin Integrity:  Goal: Will show no infection signs and symptoms  Description: Will show no infection signs and symptoms  1/1/2022 0608 by Arvell Snellen, RN  Outcome: Ongoing  12/31/2021 1814 by Lisa Eaton RN  Outcome: Ongoing  Goal: Absence of new skin breakdown  Description: Absence of new skin breakdown  1/1/2022 0608 by Arvell Snellen, RN  Outcome: Ongoing  12/31/2021 1814 by Lisa Eaton RN  Outcome: Ongoing

## 2022-01-01 NOTE — PROGRESS NOTES
[facility] -   Infectious diseases evaluation  admission date 12/24/2021    reason for consultation:   covid +    Impression :   Current:  · covid pneumonia   · PE -  left lower lobe segmental emboli -  · bandemia  · Thrombocytopenia    Other:  ·   Discussion / summary of stay / plan of care   ·   Recommendations   · barcitinib 12/24 -  · Decadron 6 mg daily 12/24  · Anticoagulation  · Very poor outcome due to high oxygen requirement despite aggressive therapy  · CRP improved, but clinically she has not improved    Infection Control Recommendations   · [place] Precautions  · Contact Isolation   · Airborne isolation  · Droplet Isolation  Antimicrobial Stewardship Recommendations   · Off AB    Coordination ofOutpatient Care:   · Estimated Length of IV antimicrobials:  · Patient will need Midline / picc Catheter Insertion:   · Patient will need SNF:  · Patient will need outpatient wound care:     History of Present Illness:   Initial history:  Janet Ortega is a 70y.o.-year-old female Patient presented through ER via EMS after her family found her with severe shortness of breath in her shower. She had developed a cough approximately a week ago and according to the patient had a negative Covid test at that time. She was found to have an SPO2 of 50% on room air requiring BiPAP. A rapid Covid swab was positive  CTA of the chest showed segmental pulmonary embolism in the left lower lobe with possible right heart strain. An echocardiogram was completed and was negative for RV strain with RV systolic pressure of 29 mmHg  The patient was initiated on a heparin drip as well as high-dose Decadron, Rocephin and baricitinib.        Interval changes  12/31/2021   Patient Vitals for the past 8 hrs:   BP Temp Temp src Pulse Resp SpO2   12/31/21 2026 -- -- -- -- 23 94 %   12/31/21 2015 138/85 98.2 °F (36.8 °C) Oral 67 16 96 %   12/31/21 1652 -- -- -- -- 23 93 %   12/31/21 1600 icterus. Conjunctiva/sclera: Conjunctivae normal.   Cardiovascular:      Rate and Rhythm: Normal rate and regular rhythm. Heart sounds: Normal heart sounds. No murmur heard. Pulmonary:      Breath sounds: Normal breath sounds. Abdominal:      General: There is no distension. Tenderness: There is no abdominal tenderness. Genitourinary:     Comments: No castro  Musculoskeletal:         General: No swelling, deformity or signs of injury. Cervical back: Neck supple. No rigidity. Right lower leg: No edema. Left lower leg: No edema. Skin:     General: Skin is dry. Coloration: Skin is not jaundiced or pale. Findings: No bruising or erythema. Neurological:      General: No focal deficit present. Mental Status: She is alert. Mental status is at baseline. Psychiatric:         Mood and Affect: Mood normal.         Thought Content: Thought content normal.         Past Medical History:   No past medical history on file. Past Surgical  History:   No past surgical history on file.     Medications:      apixaban  10 mg Oral BID    Followed by   Iris Noel ON 1/7/2022] apixaban  5 mg Oral BID    dexamethasone  6 mg IntraVENous Daily    famotidine  20 mg Oral BID    sodium chloride flush  10 mL IntraVENous 2 times per day    baricitinib  4 mg Oral Daily       Social History:     Social History     Socioeconomic History    Marital status: Unknown     Spouse name: Not on file    Number of children: Not on file    Years of education: Not on file    Highest education level: Not on file   Occupational History    Not on file   Tobacco Use    Smoking status: Not on file    Smokeless tobacco: Not on file   Substance and Sexual Activity    Alcohol use: Not on file    Drug use: Not on file    Sexual activity: Not on file   Other Topics Concern    Not on file   Social History Narrative    Not on file     Social Determinants of Health     Financial Resource Strain:    An Scott Difficulty of Paying Living Expenses: Not on file   Food Insecurity:     Worried About Running Out of Food in the Last Year: Not on file    Ran Out of Food in the Last Year: Not on file   Transportation Needs:     Lack of Transportation (Medical): Not on file    Lack of Transportation (Non-Medical): Not on file   Physical Activity:     Days of Exercise per Week: Not on file    Minutes of Exercise per Session: Not on file   Stress:     Feeling of Stress : Not on file   Social Connections:     Frequency of Communication with Friends and Family: Not on file    Frequency of Social Gatherings with Friends and Family: Not on file    Attends Spiritism Services: Not on file    Active Member of [address] [place] [address] [employer] or Organizations: Not on file    Attends Club or Organization Meetings: Not on file    Marital Status: Not on file   Intimate Partner Violence:     Fear of Current or Ex-Partner: Not on file    Emotionally Abused: Not on file    Physically Abused: Not on file    Sexually Abused: Not on file   Housing Stability:     Unable to Pay for Housing in the Last Year: Not on file    Number of [place] in the Last Year: Not on file    Unstable Housing in the Last Year: Not on file       Family History:   No family history on file. Medical Decision Making:   I have independently reviewed/ordered the following labs:    CBC with Differential:   Recent Labs     12/30/21  0356 12/31/21  0442   WBC 10.1 7.4   HGB 12.8 12.0   HCT 38.2 36.5    207   LYMPHOPCT 7* 6*   MONOPCT 8* 7     BMP:  Recent Labs     12/30/21  0356 12/31/21  0442   * 134*   K 4.6 4.8    104   CO2 23 22   BUN 26* 21   CREATININE 0.44* 0.42*     Hepatic Function Panel:   No results for input(s): PROT, LABALBU, BILIDIR, IBILI, BILITOT, ALKPHOS, ALT, AST in the last 72 hours. No results for input(s): RPR in the last 72 hours. No results for input(s): HIV in the last 72 hours. No results for input(s): BC in the last 72 hours.   Lab Results   Component Value Date    CREATININE 0.42 12/31/2021    GLUCOSE 149 12/31/2021       Detailed results: Thank you for allowing us to participate in the care of this patient. Please call with questions. This note is created with the assistance of a speech recognition program.  While intending to generate adocument that actually reflects the content of the visit, the document can still have some errors including those of syntax and sound a like substitutions which may escape proof reading. It such instances, actual meaningcan be extrapolated by contextual diversion.     Rishi Jenkins MD  Office: [phone]  Perfect serve / office [phone]      '

## 2022-01-01 NOTE — PLAN OF CARE
Problem: Airway Clearance - Ineffective  Goal: Achieve or maintain patent airway  1/1/2022 0752 by Rosales Barton RCP  Outcome: Ongoing  1/1/2022 0752 by AMANDEEP SchwabP  Outcome: Ongoing  1/1/2022 0608 by Gian Del Castillo RN  Outcome: Ongoing  12/31/2021 1814 by Christian Marin RN  Outcome: Ongoing     Problem: Gas Exchange - Impaired  Goal: Absence of hypoxia  1/1/2022 0752 by Rosales Barton RCP  Outcome: Ongoing  1/1/2022 0752 by AMANDEEP SchwabP  Outcome: Ongoing  1/1/2022 0608 by Gian Del Castillo RN  Outcome: Ongoing  12/31/2021 1814 by Christian Marin RN  Outcome: Ongoing     Problem: Gas Exchange - Impaired  Goal: Promote optimal lung function  1/1/2022 0752 by Rosales Barton RCP  Outcome: Ongoing  1/1/2022 0752 by AMANDEEP SchwabP  Outcome: Ongoing  1/1/2022 0608 by Gian Del Castillo RN  Outcome: Ongoing  12/31/2021 1814 by Christian Marin RN  Outcome: Ongoing     Problem: Breathing Pattern - Ineffective  Goal: Ability to achieve and maintain a regular respiratory rate  1/1/2022 0752 by Rosales Barton RCP  Outcome: Ongoing  1/1/2022 0752 by Rosales Barton RCP  Outcome: Ongoing  1/1/2022 0608 by Gian Del Castillo RN  Outcome: Ongoing  12/31/2021 1814 by Christian Marin RN  Outcome: Ongoing

## 2022-01-01 NOTE — PLAN OF CARE
NONINVASIVE VENTILATION    PROVIDE OPTIMAL VENTILATION/ACCEPTABLE SPO2   IMPLEMENT NONINVASIVE VENTILATION PROTOCOL   MAINTAIN ACCEPTABLE SPO2   ASSESS SKIN INTEGRITY/BREAKDOWN SCORE   PATIENT EDUCATION AS NEEDED   BIPAP AS NEEDED       PROVIDE ADEQUATE OXYGENATION WITH ACCEPTABLE SP02/ABG'S    [x]  IDENTIFY APPROPRIATE OXYGEN THERAPY  [x]   MONITOR SP02/ABG'S AS NEEDED   [x]   PATIENT EDUCATION AS NEEDED

## 2022-01-01 NOTE — PROGRESS NOTES
Pulmonary/Critical Care Progress Note    Patient's name:  Christiana Roger  Medical Record Number: [id number]  Patient's account/billing number: [de-identified]  Patient's YOB: 1950  Age: 70 y.o. Date of Admission: 12/24/2021  3:37 PM  Date of Consult: 1/1/2022      Primary Care Physician: No primary care provider on file. Code Status: Full Code    Reason for consult: Acute hypoxemic respiratory failure secondary to COVID-19 pneumonia with ARDS along with PE involving subsegmental vessels of the left lower lobe      HISTORY OF PRESENT ILLNESS:   History was obtained from chart review and the patient. Christiana Roger is a 70 y.o. woman was admitted with complaints of shortness of breath and found to be Covid positive but also had a left lower lobe subsegmental pulmonary embolism with right ventricular strain pattern  Vascular surgery was consulted and it was felt, rightfully so, that the cause of the right ventricular strain is unlikely pulmonary embolism  Patient was admitted to the [facility] ICU  Requiring 95% oxygen via high flow  Has shortness of breath along with coughing  No wheezing  No chest pain or pressure  No hemoptysis  No orthopnea or PND    INTERVAL HISTORY:  Dramatic improvement in PF ratio. Currently on FiO2 of 45% high flow heated oxygen. Respiratory rate in the 20s. Hemodynamics are stable. I's/O- 3.5 L. Laboratory studies reviewed. No serious abnormalities. Past Medical History:  No past medical history on file. Past Surgical History:  No past surgical history on file. Allergies:    No Known Allergies      Home Meds:   Prior to Admission medications    Not on File       Family History:       Problem Relation Age of Onset    Cancer Maternal Aunt         breast         Social History:   TOBACCO:   reports that she has been smoking cigarettes. She has never used smokeless tobacco.  ETOH:   reports previous alcohol use.   DRUGS: Resp: 24 25 20 27   Temp: 97.2 °F (36.2 °C)  98.3 °F (36.8 °C)    TempSrc: Axillary  Axillary    SpO2:  94% 93% 96%   Weight:       Height:         Constitutional: This is a well developed, well nourished, 30-34.9 - Obesity Grade I 70y.o. year old female who is alert, oriented, cooperative and in no apparent distress. Head:normocephalic and atraumatic. EENT:   SOPHIA. No conjunctival injections. Septum was midline, mucosa was without erythema, exudates or cobblestoning. No thrush was noted. Mallampati III (soft palate, base of uvula visible)  Neck: Supple without thyromegaly. No elevated JVP. Trachea was midline. Respiratory: Chest was symmetrical without dullness to percussion. Breath sounds bilaterally were clear to auscultation. There were no wheezes, rhonchi or rales. There is no intercostal retraction or use of accessory muscles. No egophony noted. Cardiovascular: Regular without murmur, clicks, gallops or rubs. Abdomen: Slightly rounded and soft without organomegaly. No rebound tenderness, rigidity or guarding was appreciated. Lymphatic: No lymphadenopathy. Musculoskeletal: Normal curvature of the spine. No gross muscle weakness. Extremities:  No lower extremity edema, ulcerations, tenderness, varicosities or erythema. Muscle size, tone and strength are normal.  No involuntary movements are noted. Skin:  Warm and dry. Good color, turgor and pigmentation. No lesions or scars.   No cyanosis or clubbing  Neurological/Psychiatric: The patient's general behavior, level of consciousness, thought content and emotional status is normal.            Laboratory findings:-    CBC:   Recent Labs     01/01/22  0836   WBC 9.2   HGB 13.6        BMP:    Recent Labs     12/30/21  0356 12/30/21  0356 12/31/21  0442 12/31/21  0442 01/01/22  0836   *   < > 134*   < > 138   K 4.6   < > 4.8   < > 5.2      < > 104   < > 106   CO2 23   < > 22   < > 21   BUN 26*   < > 21   < > 25* CREATININE 0.44*  --  0.42*  --  0.31*   GLUCOSE 151*   < > 149*   < > 132*    < > = values in this interval not displayed. S. Calcium:  Recent Labs     01/01/22  0836   CALCIUM 8.3*     S. Ionized Calcium:No results for input(s): IONCA in the last 72 hours. S. Magnesium:  No results for input(s): MG in the last 72 hours. S. Phosphorus:No results for input(s): PHOS in the last 72 hours. S. Glucose:  No results for input(s): POCGLU in the last 72 hours. Glycosylated hemoglobin A1C: No results for input(s): LABA1C in the last 72 hours. INR:   No results for input(s): INR in the last 72 hours. Hepatic functions:   No results for input(s): ALKPHOS, ALT, AST, PROT, BILITOT, BILIDIR, LABALBU in the last 72 hours. Pancreatic functions:  No results for input(s): LACTA, AMYLASE in the last 72 hours. S. Lactic Acid:   No results for input(s): LACTA in the last 72 hours. Cardiac enzymes:No results for input(s): CKTOTAL, CKMB, CKMBINDEX, TROPONINI in the last 72 hours. BNP:No results for input(s): BNP in the last 72 hours. Lipid profile: No results for input(s): CHOL, TRIG, HDL, LDL, LDLCALC in the last 72 hours. Blood Gases: No results found for: PH, PCO2, PO2, HCO3, O2SAT  Thyroid functions: No results found for: TSH         Microbiology:    Cultures during this admission:     Blood cultures:      [] None drawn      [x] Negative             []  Positive (Details:  )  Urine Culture:        [] None drawn      [] Negative             []  Positive (Details:  )  Sputum Culture:   [] None drawn       [] Negative             []  Positive (Details:  )     SARS-CoV-2 rapid test was positive    Radiological reports:    CT CHEST PULMONARY EMBOLISM W CONTRAST    Result Date: 12/24/2021  Motion degraded study. Acute pulmonary emboli suspected particularly in the left lower lobe but not well visualized due to motion artifact.  There is evidence for right ventricular strain with septal bowing suggesting a significant clot burden. Moderate patchy ground-glass opacities involving all lobes of both lungs consistent with multifocal pneumonia typical for COVID pneumonia. Findings were discussed with Fer Montoya at 5:39 pm on 12/24/2021. RECOMMENDATIONS: Unavailable           Assessment and Plan       IMPRESSION:   1. COVID-19    2. Dehydration    3. Tachycardia        Principal Problem:    Pneumonia due to COVID-19 virus  Active Problems:    Pulmonary embolus, left (HCC)    Postmenopausal bleeding  Resolved Problems:    * No resolved hospital problems. *       Acute hypoxic respiratory failure   Acute respiratory distress syndrome   COVID 19 infection/pneumonia   Left lower lobe segmental pulmonary embolism   Possible pulmonary artery hypertension secondary to COVID-19 pneumonia much more than any pulmonary embolism given the small magnitude of pulmonary       Plan:     Continue Airborne isolation   Continue high flow oxygen   Use BiPAP, if needed   Obtain X-ray chest as needed    Monitor input/output, with a goal of even/negative fluid balance   Patient receiving baricitinib   Continue Decadron   Monitor CRP, LDH, AST/ALT/ferritin/ D-dimer   Continue supportive care   GI/DVT prophylaxis   Glycemic control per primary service   Lovenox 1 mg/kg twice daily. Can switch to Eliquis, if no anticipated procedures. Duration of anticoagulation for VTE associated with COVID-19 is 3 months.  Work on getting oxygen liter flow down to 4 L. Can be then discharged with home pulse oximeter. Anticipate short term.  On nutrition via oral diet  Management as per guidance provided by hospital policy and prevailing evidence based medicine, during the COVID-19 pandemic emergency. This patient was evaluated in the context of the global SARS-CoV-2 (COVID-19) pandemic, which necessitated considerations that the patient either has COVID-19 infection or is at risk of infection with COVID-19.  Institutional protocols and algorithms that pertain to the evaluation & management of patients with COVID-19 or those at risk for COVID-19 are in a state of rapid changes based on information released by regulatory bodies including the CDC and federal and state organizations. These policies and algorithms were followed during the patient's care. Please note that this chart was generated using voice recognition Dragon dictation software. Although every effort was made to ensure the accuracy of this automated transcription, some errors in transcription may have occurred. .  Total critical care time caring for this patient with life threatening, unstable organ failure, including direct patient contact, management of life support systems, review of data including imaging and labs, discussions with other team members and physicians at least 27  Min so far today, excluding procedures.       Hermann Russell DO,            1/1/2022, 6:52 PM

## 2022-01-02 ENCOUNTER — APPOINTMENT (OUTPATIENT)
Dept: ULTRASOUND IMAGING | Age: 72
DRG: 871 | End: 2022-01-02
Payer: MEDICARE

## 2022-01-02 PROBLEM — J96.01 ACUTE RESPIRATORY FAILURE WITH HYPOXIA (HCC): Status: ACTIVE | Noted: 2022-01-02

## 2022-01-02 PROBLEM — E66.9 OBESITY WITH BODY MASS INDEX GREATER THAN 30: Status: ACTIVE | Noted: 2022-01-02

## 2022-01-02 LAB
ABSOLUTE EOS #: <0.03 K/UL (ref 0–0.44)
ABSOLUTE IMMATURE GRANULOCYTE: 0.08 K/UL (ref 0–0.3)
ABSOLUTE LYMPH #: 0.91 K/UL (ref 1.1–3.7)
ABSOLUTE MONO #: 0.62 K/UL (ref 0.1–1.2)
ANION GAP SERPL CALCULATED.3IONS-SCNC: 9 MMOL/L (ref 9–17)
BASOPHILS # BLD: 1 % (ref 0–2)
BASOPHILS ABSOLUTE: 0.04 K/UL (ref 0–0.2)
BUN BLDV-MCNC: 21 MG/DL (ref 8–23)
BUN/CREAT BLD: ABNORMAL (ref 9–20)
CALCIUM SERPL-MCNC: 8.2 MG/DL (ref 8.6–10.4)
CHLORIDE BLD-SCNC: 104 MMOL/L (ref 98–107)
CO2: 19 MMOL/L (ref 20–31)
CREAT SERPL-MCNC: 0.38 MG/DL (ref 0.5–0.9)
DIFFERENTIAL TYPE: ABNORMAL
EOSINOPHILS RELATIVE PERCENT: 0 % (ref 1–4)
GFR AFRICAN AMERICAN: >60 ML/MIN
GFR NON-AFRICAN AMERICAN: >60 ML/MIN
GFR SERPL CREATININE-BSD FRML MDRD: ABNORMAL ML/MIN/{1.73_M2}
GFR SERPL CREATININE-BSD FRML MDRD: ABNORMAL ML/MIN/{1.73_M2}
GLUCOSE BLD-MCNC: 145 MG/DL (ref 65–105)
GLUCOSE BLD-MCNC: 146 MG/DL (ref 70–99)
HCT VFR BLD CALC: 41.2 % (ref 36.3–47.1)
HEMOGLOBIN: 13.6 G/DL (ref 11.9–15.1)
IMMATURE GRANULOCYTES: 1 %
LYMPHOCYTES # BLD: 11 % (ref 24–43)
MCH RBC QN AUTO: 33.1 PG (ref 25.2–33.5)
MCHC RBC AUTO-ENTMCNC: 33 G/DL (ref 28.4–34.8)
MCV RBC AUTO: 100.2 FL (ref 82.6–102.9)
MONOCYTES # BLD: 8 % (ref 3–12)
NRBC AUTOMATED: 0 PER 100 WBC
PDW BLD-RTO: 14.3 % (ref 11.8–14.4)
PLATELET # BLD: 265 K/UL (ref 138–453)
PLATELET ESTIMATE: ABNORMAL
PMV BLD AUTO: 10.8 FL (ref 8.1–13.5)
POTASSIUM SERPL-SCNC: 5.1 MMOL/L (ref 3.7–5.3)
RBC # BLD: 4.11 M/UL (ref 3.95–5.11)
RBC # BLD: ABNORMAL 10*6/UL
SEG NEUTROPHILS: 79 % (ref 36–65)
SEGMENTED NEUTROPHILS ABSOLUTE COUNT: 6.3 K/UL (ref 1.5–8.1)
SODIUM BLD-SCNC: 132 MMOL/L (ref 135–144)
WBC # BLD: 8 K/UL (ref 3.5–11.3)
WBC # BLD: ABNORMAL 10*3/UL

## 2022-01-02 PROCEDURE — 2580000003 HC RX 258: Performed by: STUDENT IN AN ORGANIZED HEALTH CARE EDUCATION/TRAINING PROGRAM

## 2022-01-02 PROCEDURE — 6360000002 HC RX W HCPCS: Performed by: NURSE PRACTITIONER

## 2022-01-02 PROCEDURE — 99233 SBSQ HOSP IP/OBS HIGH 50: CPT | Performed by: INTERNAL MEDICINE

## 2022-01-02 PROCEDURE — 6370000000 HC RX 637 (ALT 250 FOR IP): Performed by: FAMILY MEDICINE

## 2022-01-02 PROCEDURE — 2060000000 HC ICU INTERMEDIATE R&B

## 2022-01-02 PROCEDURE — 2700000000 HC OXYGEN THERAPY PER DAY

## 2022-01-02 PROCEDURE — 6360000002 HC RX W HCPCS: Performed by: FAMILY MEDICINE

## 2022-01-02 PROCEDURE — 36415 COLL VENOUS BLD VENIPUNCTURE: CPT

## 2022-01-02 PROCEDURE — 76856 US EXAM PELVIC COMPLETE: CPT

## 2022-01-02 PROCEDURE — 6370000000 HC RX 637 (ALT 250 FOR IP): Performed by: INTERNAL MEDICINE

## 2022-01-02 PROCEDURE — 82947 ASSAY GLUCOSE BLOOD QUANT: CPT

## 2022-01-02 PROCEDURE — 6370000000 HC RX 637 (ALT 250 FOR IP): Performed by: NURSE PRACTITIONER

## 2022-01-02 PROCEDURE — 85025 COMPLETE CBC W/AUTO DIFF WBC: CPT

## 2022-01-02 PROCEDURE — 80048 BASIC METABOLIC PNL TOTAL CA: CPT

## 2022-01-02 PROCEDURE — 99291 CRITICAL CARE FIRST HOUR: CPT | Performed by: INTERNAL MEDICINE

## 2022-01-02 RX ORDER — CALCIUM CARBONATE 200(500)MG
500 TABLET,CHEWABLE ORAL 3 TIMES DAILY PRN
Status: DISCONTINUED | OUTPATIENT
Start: 2022-01-02 | End: 2022-01-14 | Stop reason: HOSPADM

## 2022-01-02 RX ORDER — HYDRALAZINE HYDROCHLORIDE 20 MG/ML
10 INJECTION INTRAMUSCULAR; INTRAVENOUS EVERY 6 HOURS PRN
Status: DISCONTINUED | OUTPATIENT
Start: 2022-01-02 | End: 2022-01-02

## 2022-01-02 RX ORDER — ALPRAZOLAM 0.5 MG/1
0.5 TABLET ORAL 3 TIMES DAILY PRN
Status: DISCONTINUED | OUTPATIENT
Start: 2022-01-02 | End: 2022-01-03

## 2022-01-02 RX ORDER — ALBUTEROL SULFATE 90 UG/1
2 AEROSOL, METERED RESPIRATORY (INHALATION) EVERY 6 HOURS PRN
Status: DISCONTINUED | OUTPATIENT
Start: 2022-01-02 | End: 2022-01-14 | Stop reason: HOSPADM

## 2022-01-02 RX ADMIN — BENZOCAINE AND MENTHOL 1 LOZENGE: 15; 3.6 LOZENGE ORAL at 05:09

## 2022-01-02 RX ADMIN — Medication 5 ML: at 23:08

## 2022-01-02 RX ADMIN — APIXABAN 10 MG: 5 TABLET, FILM COATED ORAL at 21:24

## 2022-01-02 RX ADMIN — APIXABAN 10 MG: 5 TABLET, FILM COATED ORAL at 10:13

## 2022-01-02 RX ADMIN — BENZONATATE 200 MG: 100 CAPSULE ORAL at 13:45

## 2022-01-02 RX ADMIN — DEXAMETHASONE SODIUM PHOSPHATE 6 MG: 10 INJECTION INTRAMUSCULAR; INTRAVENOUS at 10:13

## 2022-01-02 RX ADMIN — BARICITINIB 4 MG: 2 TABLET, FILM COATED ORAL at 10:13

## 2022-01-02 RX ADMIN — BENZONATATE 200 MG: 100 CAPSULE ORAL at 05:09

## 2022-01-02 RX ADMIN — SODIUM CHLORIDE, PRESERVATIVE FREE 10 ML: 5 INJECTION INTRAVENOUS at 10:14

## 2022-01-02 RX ADMIN — SODIUM CHLORIDE, PRESERVATIVE FREE 10 ML: 5 INJECTION INTRAVENOUS at 21:24

## 2022-01-02 RX ADMIN — FAMOTIDINE 20 MG: 20 TABLET, FILM COATED ORAL at 21:24

## 2022-01-02 RX ADMIN — Medication 5 ML: at 04:42

## 2022-01-02 RX ADMIN — HYDRALAZINE HYDROCHLORIDE 10 MG: 20 INJECTION INTRAMUSCULAR; INTRAVENOUS at 02:56

## 2022-01-02 RX ADMIN — FAMOTIDINE 20 MG: 20 TABLET, FILM COATED ORAL at 10:13

## 2022-01-02 ASSESSMENT — PAIN SCALES - GENERAL
PAINLEVEL_OUTOF10: 0

## 2022-01-02 NOTE — PLAN OF CARE
Problem: Airway Clearance - Ineffective  Goal: Achieve or maintain patent airway  1/1/2022 2008 by Adarsh Cain RN  Outcome: Ongoing  1/1/2022 0752 by Elis Rodriguez RCP  Outcome: Ongoing  1/1/2022 0752 by Elis Rodriguez RCP  Outcome: Ongoing     Problem: Gas Exchange - Impaired  Goal: Absence of hypoxia  1/1/2022 2008 by Adarsh Cain RN  Outcome: Ongoing  1/1/2022 0752 by Elis Rodriguez RCP  Outcome: Ongoing  1/1/2022 0752 by Elis Rodriguez RCP  Outcome: Ongoing  Goal: Promote optimal lung function  1/1/2022 2008 by Adarsh Cain RN  Outcome: Ongoing  1/1/2022 0752 by Elis Rodriguez RCP  Outcome: Ongoing  1/1/2022 0752 by Elis Rodriguez RCP  Outcome: Ongoing     Problem: Breathing Pattern - Ineffective  Goal: Ability to achieve and maintain a regular respiratory rate  1/1/2022 2008 by Adarsh Cain RN  Outcome: Ongoing  1/1/2022 0752 by Elis Rodriguez RCP  Outcome: Ongoing  1/1/2022 0752 by Elis Rodriguez RCP  Outcome: Ongoing     Problem:  Body Temperature -  Risk of, Imbalanced  Goal: Ability to maintain a body temperature within defined limits  Outcome: Ongoing  Goal: Will regain or maintain usual level of consciousness  Outcome: Ongoing  Goal: Complications related to the disease process, condition or treatment will be avoided or minimized  Outcome: Ongoing     Problem: Isolation Precautions - Risk of Spread of Infection  Goal: Prevent transmission of infection  Outcome: Ongoing     Problem: Nutrition Deficits  Goal: Optimize nutritional status  Outcome: Ongoing     Problem: Risk for Fluid Volume Deficit  Goal: Maintain normal heart rhythm  Outcome: Ongoing  Goal: Maintain absence of muscle cramping  Outcome: Ongoing  Goal: Maintain normal serum potassium, sodium, calcium, phosphorus, and pH  Outcome: Ongoing     Problem: Loneliness or Risk for Loneliness  Goal: Demonstrate positive use of time alone when socialization is not possible  Outcome: Ongoing     Problem: Fatigue  Goal: Verbalize increase energy and improved vitality  Outcome: Ongoing     Problem: Patient Education: Go to Patient Education Activity  Goal: Patient/Family Education  Outcome: Ongoing     Problem: Skin Integrity:  Goal: Will show no infection signs and symptoms  Description: Will show no infection signs and symptoms  Outcome: Ongoing  Goal: Absence of new skin breakdown  Description: Absence of new skin breakdown  Outcome: Ongoing

## 2022-01-02 NOTE — PLAN OF CARE
Problem: Airway Clearance - Ineffective  Goal: Achieve or maintain patent airway  1/2/2022 1003 by Guru Soto RCP  Outcome: Ongoing     Problem: Gas Exchange - Impaired  Goal: Absence of hypoxia  1/2/2022 1003 by Guru Soto RCP  Outcome: Ongoing     Problem: Gas Exchange - Impaired  Goal: Promote optimal lung function  1/2/2022 1003 by Guru Soto RCP  Outcome: Ongoing     Problem: Breathing Pattern - Ineffective  Goal: Ability to achieve and maintain a regular respiratory rate  1/2/2022 1003 by Guru Soto RCP  Outcome: Ongoing     Problem: OXYGENATION/RESPIRATORY FUNCTION  Goal: Patient will maintain patent airway  1/2/2022 1003 by Guru Soto RCP  Outcome: Ongoing     Problem: OXYGENATION/RESPIRATORY FUNCTION  Goal: Patient will achieve/maintain normal respiratory rate/effort  Description: Respiratory rate and effort will be within normal limits for the patient  1/2/2022 1003 by Guru Soto RCP  Outcome: Ongoing     Problem: Skin Integrity:  Goal: Will show no infection signs and symptoms  Description: Will show no infection signs and symptoms  1/2/2022 1003 by Guru Soto RCP  Outcome: Ongoing     Problem: Skin Integrity:  Goal: Absence of new skin breakdown  Description: Absence of new skin breakdown  1/2/2022 1003 by Guru Soto RCP  Outcome: Ongoing   PROVIDE ADEQUATE OXYGENATION WITH ACCEPTABLE SP02/ABG'S    [x]  IDENTIFY APPROPRIATE OXYGEN THERAPY  [x]   MONITOR SP02/ABG'S AS NEEDED   [x]   PATIENT EDUCATION AS NEEDED   NONINVASIVE VENTILATION    PROVIDE OPTIMAL VENTILATION/ACCEPTABLE SPO2   IMPLEMENT NONINVASIVE VENTILATION PROTOCOL   MAINTAIN ACCEPTABLE SPO2   ASSESS SKIN INTEGRITY/BREAKDOWN SCORE   PATIENT EDUCATION AS NEEDED   BIPAP AS NEEDED

## 2022-01-02 NOTE — PLAN OF CARE
Problem: Airway Clearance - Ineffective  Goal: Achieve or maintain patent airway  1/2/2022 1811 by Alex Jarvis RN  Outcome: Ongoing  1/2/2022 1003 by Flako Fitzgerald RCP  Outcome: Ongoing     Problem: Gas Exchange - Impaired  Goal: Absence of hypoxia  1/2/2022 1811 by Alex Jarvis RN  Outcome: Ongoing  1/2/2022 1003 by Flako Fitzgerald RCP  Outcome: Ongoing  Goal: Promote optimal lung function  1/2/2022 1811 by Alex Jarvis RN  Outcome: Ongoing  1/2/2022 1003 by Flako Fitzgerald RCP  Outcome: Ongoing     Problem: Breathing Pattern - Ineffective  Goal: Ability to achieve and maintain a regular respiratory rate  1/2/2022 1811 by Alex Jarvis RN  Outcome: Ongoing  1/2/2022 1003 by Flako Fitzgerald RCP  Outcome: Ongoing     Problem: Isolation Precautions - Risk of Spread of Infection  Goal: Prevent transmission of infection  Outcome: Ongoing     Problem: Risk for Fluid Volume Deficit  Goal: Maintain normal heart rhythm  Outcome: Ongoing  Goal: Maintain absence of muscle cramping  Outcome: Ongoing  Goal: Maintain normal serum potassium, sodium, calcium, phosphorus, and pH  Outcome: Ongoing     Problem: Loneliness or Risk for Loneliness  Goal: Demonstrate positive use of time alone when socialization is not possible  Outcome: Ongoing     Problem: Fatigue  Goal: Verbalize increase energy and improved vitality  Outcome: Ongoing     Problem: OXYGENATION/RESPIRATORY FUNCTION  Goal: Patient will maintain patent airway  1/2/2022 1811 by Alex Jarvis RN  Outcome: Ongoing  1/2/2022 1003 by Flako Fitzgerald RCP  Outcome: Ongoing  Goal: Patient will achieve/maintain normal respiratory rate/effort  Description: Respiratory rate and effort will be within normal limits for the patient  1/2/2022 1811 by Alex Jarvis RN  Outcome: Ongoing  1/2/2022 1003 by Flako Fitzgerald RCP  Outcome: Ongoing     Problem: Skin Integrity:  Goal: Will show no infection signs and symptoms  Description: Will show no infection signs and symptoms  1/2/2022 1811 by Mindi Ramirez RN  Outcome: Ongoing  1/2/2022 1003 by Arnaldo Kirkpatrick RCP  Outcome: Ongoing  Goal: Absence of new skin breakdown  Description: Absence of new skin breakdown  1/2/2022 1811 by Mindi Ramirez RN  Outcome: Ongoing  1/2/2022 1003 by Arnaldo Kirkpatrick RCP  Outcome: Ongoing

## 2022-01-02 NOTE — PROGRESS NOTES
[facility]  Office: [address], DO, Ole Mayer, DO, Isadora Michael, DO, Arlin Stephenson Blood, DO, Juliet Fraser MD, Christianne Castano MD, Merari Waters MD, Yaneli Greenwood MD, Tamela John MD, Bill Dangelo MD, Pravin Thao MD, Joesph Ramírez, DO, Pankaj Coronado, DO, Kameron Negro MD,  Isabelle Quiles DO, Ryan Wiley MD, Brittany Minor MD, Luis Eduardo Burns MD, Shruti Anderson MD, Gera Hussein MD, Gibson Larios MD, Salome Samuel MD, [place] [facility], [facility], CNP, Aiden Chavez, CNP, Leonid Lai, CNS, Cecilia Tineo, CNP, Jocelin Cid, CNP, Jada Pritchett, CNP, Keyanna Velazquez, CNP, Tino Underwood, CNP, Komal Marie PA-C, Ahsan Serna, CARY, Irvin Espinoza [facility], Alexa Townsend, CNP, Dillon Espinoza, CNP, Jenny Luque, CNP, Fazal Parkinson, CNP, Claudette David, CNP, Alban Bolaños, [address] [place] [id number]    Progress Note    1/2/2022    3:36 PM    Name:   Judit Wallace  MRN:     [id number]     Acct:      [de-identified]   Room:   [address] [place] [address] Day:  9  Admit Date:  12/24/2021  3:37 PM    PCP:   No primary care provider on file. Code Status:  Full Code    Subjective:     C/C:   Chief Complaint   Patient presents with    Shortness of Breath    Fall     Interval History Status: not changed. Patient seen and examined this afternoon. Sitting up in bed. Requiring 90% FiO2 via high flow nasal cannula. Patient appears to be quite anxious. Denies any further bleeding episodes. Comparing to eat dinner. Brief History:     Judit Wallace is 70 y.o. female who was admitted to the hospital on 12/24/2021 for treatment of Pneumonia due to COVID-19 virus. Patient came to the hospital with lightheadedness and dry cough for 1 week. She had episode of fall at home and family called EMS. Initial evaluation by EMS found patient having tachypnea with respirate 40 to 50/min, hypoxia SPO2 50% on room air, tachycardia.  Patient was placed on nonrebreather and improved only to 73%. Patient was then placed on BiPAP. Evaluation emergency room showed positive COVID-19 test, elevated troponin, elevated proBNP. CT chest showed subsegmental PE with possible right heart strain. Vascular surgery was consulted and recommended anticoagulation without need for thrombolysis. Patient also had thrombocytopenia. Cardiology was consulted and echocardiogram obtained which was negative for RV strain with RV systolic pressure 29 mmHg. Patient was treated with heparin infusion, Decadron and started on baricitinib. Patient had vaginal bleeding on 12/30/21 for which OBGYN will see her for in the outpatient setting. Review of Systems:     Constitutional:  negative for chills, fevers, sweats  Respiratory: Reports cough and shortness of breath; negative for wheezing  Cardiovascular:  negative for chest pain, chest pressure/discomfort, lower extremity edema, palpitations  Gastrointestinal:  negative for abdominal pain, constipation, diarrhea, nausea, vomiting  Neurological:  negative for dizziness, headache    Medications: Allergies:  No Known Allergies    Current Meds:   Scheduled Meds:    apixaban  10 mg Oral BID    Followed by   Mihai Mann ON 1/7/2022] apixaban  5 mg Oral BID    dexamethasone  6 mg IntraVENous Daily    famotidine  20 mg Oral BID    sodium chloride flush  10 mL IntraVENous 2 times per day    baricitinib  4 mg Oral Daily     Continuous Infusions:    sodium chloride       PRN Meds: hydrALAZINE, calcium carbonate, benzocaine-menthol, loperamide, benzonatate, HYDROcodone-chlorpheniramine, HYDROcodone-chlorpheniramine, sodium chloride flush, sodium chloride, promethazine **OR** ondansetron, polyethylene glycol, acetaminophen **OR** acetaminophen    Data:     Past Medical History:   has no past medical history on file. Social History:   reports that she has been smoking cigarettes.  She has never used smokeless tobacco. She reports previous alcohol use. She reports previous drug use. Family History:   Family History   Problem Relation Age of Onset    Cancer Maternal Aunt         breast       Vitals:  /83   Pulse 53   Temp 98 °F (36.7 °C) (Oral)   Resp 22   Ht 5' 4\" (1.626 m)   Wt 195 lb 15.8 oz (88.9 kg)   SpO2 91%   Breastfeeding No   BMI 33.64 kg/m²   Temp (24hrs), Av.8 °F (36.6 °C), Min:97.2 °F (36.2 °C), Max:98.7 °F (37.1 °C)    No results for input(s): POCGLU in the last 72 hours. I/O (24Hr): Intake/Output Summary (Last 24 hours) at 2022 1536  Last data filed at 2022 1000  Gross per 24 hour   Intake 1510 ml   Output 1700 ml   Net -190 ml       Labs:  Hematology:  Recent Labs     21  0442 22  0836 22  0341   WBC 7.4 9.2 8.0   RBC 3.66* 4.16 4.11   HGB 12.0 13.6 13.6   HCT 36.5 40.4 41.2   MCV 99.7 97.1 100.2   MCH 32.8 32.7 33.1   MCHC 32.9 33.7 33.0   RDW 14.0 14.1 14.3    248 265   MPV 10.9 10.8 10.8     Chemistry:  Recent Labs     21  0442 22  0836 22  0341   * 138 132*   K 4.8 5.2 5.1    106 104   CO2 22 21 19*   GLUCOSE 149* 132* 146*   BUN 21 25* 21   CREATININE 0.42* 0.31* 0.38*   ANIONGAP 8* 11 9   LABGLOM >60 >60 >60   GFRAA >60 >60 >60   CALCIUM 8.1* 8.3* 8.2*   No results for input(s): PROT, LABALBU, LABA1C, I8QPCDN, B1IRUYA, FT4, TSH, AST, ALT, LDH, GGT, ALKPHOS, LABGGT, BILITOT, BILIDIR, AMMONIA, AMYLASE, LIPASE, LACTATE, CHOL, HDL, LDLCHOLESTEROL, CHOLHDLRATIO, TRIG, VLDL, MYM44ZF, PHENYTOIN, PHENYF, URICACID, POCGLU in the last 72 hours. ABG:  Lab Results   Component Value Date    FIO2 NOT REPORTED 2021     Lab Results   Component Value Date/Time    SPECIAL NOT REPORTED 2021 08:19 PM     Lab Results   Component Value Date/Time    CULTURE NO SIGNIFICANT GROWTH 2021 08:19 PM       Radiology:  XR CHEST PORTABLE    Result Date: 2021  Moderate diffuse interstitial and alveolar opacities throughout the lungs bilaterally. The finding is nonspecific although suspicious for multilobar pneumonia, including active COVID-19 viral pneumonitis. Physical Examination:        General appearance:  alert, cooperative and no distress  Mental Status:  oriented to person, place and time and anxious, irritable affect  HEENT: Sclera anti-icteric, EOMI, high flow nasal cannula present  Lungs:  clear to auscultation bilaterally, globally diminished breath sounds, normal effort  Heart:  regular rate and rhythm, no murmur  Abdomen:  soft, nontender, nondistended, normal bowel sounds, no masses, hepatomegaly, splenomegaly  Extremities:  no edema, redness, tenderness in the calves  Skin:  no gross lesions, rashes, induration  Psych: Appears anxious    Assessment:        Hospital Problems           Last Modified POA    * (Principal) Pneumonia due to COVID-19 virus 1/2/2022 Yes    Pulmonary embolus, left ([address] 1/2/2022 Yes    Acute respiratory failure with hypoxia ([address] 1/2/2022 Yes    Postmenopausal bleeding 1/2/2022 Yes    Overview Signed 12/31/2021  1:19 PM by Darian Quintana MD     During hospitalization in December 2021  Outpatient follow-up recommended due to acute respiratory failure. Obesity with body mass index greater than 30 1/2/2022 Yes          Plan:        Acute respiratory failure with hypoxia - multifactorial, due to #2 and #3  COVID-19 pneumonia - on Decadron day 9 today. Continue baricitinib. Add vitamin D today. Acute PE - continue anticoagulation with Eliquis  Abnormal uterine bleeding - appreciate GYN input  Obesity with BMI 33.64  Patient is quite irritable and restless-continue to redirect her and encourage her  Xanax added.   Albuterol added  Prognosis guarded    33 Shameka Paniagua DO  1/2/2022  3:36 PM

## 2022-01-02 NOTE — PLAN OF CARE
Problem: Airway Clearance - Ineffective  Goal: Achieve or maintain patent airway  1/1/2022 2147 by Otoniel Kathleen RN  Outcome: Ongoing  1/1/2022 2009 by Otoniel Garcia RN  Outcome: Ongoing  1/1/2022 2008 by Otoniel Garcia RN  Outcome: Ongoing  1/1/2022 0752 by Latasha Hue, RCP  Outcome: Ongoing  1/1/2022 0752 by [place] Harm, RCP  Outcome: Ongoing     Problem: Gas Exchange - Impaired  Goal: Absence of hypoxia  1/1/2022 2147 by Otoniel Kathleen RN  Outcome: Ongoing  1/1/2022 2009 by Otonile Garcia RN  Outcome: Ongoing  1/1/2022 2008 by Otoniel Garcia RN  Outcome: Ongoing  1/1/2022 0752 by Latasha Harm, RCP  Outcome: Ongoing  1/1/2022 0752 by [place] Harm, RCP  Outcome: Ongoing  Goal: Promote optimal lung function  1/1/2022 2147 by Otoniel Kathleen RN  Outcome: Ongoing  1/1/2022 2009 by Otoniel Garcia RN  Outcome: Ongoing  1/1/2022 2008 by Otoniel Garcia RN  Outcome: Ongoing  1/1/2022 0752 by [place] Hue, RCP  Outcome: Ongoing  1/1/2022 0752 by Latasha Harm, RCP  Outcome: Ongoing     Problem: Breathing Pattern - Ineffective  Goal: Ability to achieve and maintain a regular respiratory rate  1/1/2022 2147 by Otoniel Kathleen RN  Outcome: Ongoing  1/1/2022 2009 by Otoniel Garcia RN  Outcome: Ongoing  1/1/2022 2008 by Otoniel Garcia RN  Outcome: Ongoing  1/1/2022 0752 by Latasha Harm, RCP  Outcome: Ongoing  1/1/2022 0752 by Latasha Harm, RCP  Outcome: Ongoing     Problem:  Body Temperature -  Risk of, Imbalanced  Goal: Ability to maintain a body temperature within defined limits  1/1/2022 2147 by Otoniel Kathleen RN  Outcome: Ongoing  1/1/2022 2009 by Otoniel Garcia RN  Outcome: Ongoing  1/1/2022 2008 by Otoniel Garcia RN  Outcome: Ongoing  Goal: Will regain or maintain usual level of consciousness  1/1/2022 2147 by Otoniel Kathleen RN  Outcome: Ongoing  1/1/2022 2009 by Otoniel Garcia RN  Outcome: Ongoing  1/1/2022 2008 by Otoniel Garcia RN  Outcome: Ongoing  Goal: Complications related to the disease process, condition or treatment will be avoided or minimized  1/1/2022 2147 by Krys Garcia RN  Outcome: Ongoing  1/1/2022 2009 by Beatrice Parker RN  Outcome: Ongoing  1/1/2022 2008 by Beatrice Parker RN  Outcome: Ongoing     Problem: Isolation Precautions - Risk of Spread of Infection  Goal: Prevent transmission of infection  1/1/2022 2147 by Krys Garcia RN  Outcome: Ongoing  1/1/2022 2009 by Beatrice Parker RN  Outcome: Ongoing  1/1/2022 2008 by Beatrice Parker RN  Outcome: Ongoing     Problem: Nutrition Deficits  Goal: Optimize nutritional status  1/1/2022 2147 by Krys Garcia RN  Outcome: Ongoing  1/1/2022 2009 by Beatrice Parker RN  Outcome: Ongoing  1/1/2022 2008 by Beatrice Parker RN  Outcome: Ongoing     Problem: Risk for Fluid Volume Deficit  Goal: Maintain normal heart rhythm  1/1/2022 2147 by Krys Garcia RN  Outcome: Ongoing  1/1/2022 2009 by Beatrice Parker RN  Outcome: Ongoing  1/1/2022 2008 by Beatrice Parker RN  Outcome: Ongoing  Goal: Maintain absence of muscle cramping  1/1/2022 2147 by Krys Garcia RN  Outcome: Ongoing  1/1/2022 2009 by Beatrice Parker RN  Outcome: Ongoing  1/1/2022 2008 by Beatrice Parker RN  Outcome: Ongoing  Goal: Maintain normal serum potassium, sodium, calcium, phosphorus, and pH  1/1/2022 2147 by Krys Garcia RN  Outcome: Ongoing  1/1/2022 2009 by Beatrice Parker RN  Outcome: Ongoing  1/1/2022 2008 by Beatrice Parker RN  Outcome: Ongoing     Problem: Loneliness or Risk for Loneliness  Goal: Demonstrate positive use of time alone when socialization is not possible  1/1/2022 2147 by Krys Garcia RN  Outcome: Ongoing  1/1/2022 2009 by Beatrice Parker RN  Outcome: Ongoing  1/1/2022 2008 by Beatrice Parker RN  Outcome: Ongoing     Problem: Fatigue  Goal: Verbalize increase energy and improved vitality  1/1/2022 2147 by Krys Garcia RN  Outcome: Ongoing  1/1/2022 2009 by Beatrice Parker RN  Outcome: Ongoing  1/1/2022 2008 by Moon Dowd Luanne Conde RN  Outcome: Ongoing     Problem: Patient Education: Go to Patient Education Activity  Goal: Patient/Family Education  1/1/2022 2147 by Yue Lund RN  Outcome: Ongoing  1/1/2022 2009 by Zoey Velasquez RN  Outcome: Ongoing  1/1/2022 2008 by Zoey Velasquez RN  Outcome: Ongoing     Problem: OXYGENATION/RESPIRATORY FUNCTION  Goal: Patient will maintain patent airway  1/1/2022 2147 by Yue Lund RN  Outcome: Ongoing  1/1/2022 2009 by Zoey Velasquez RN  Outcome: Ongoing  1/1/2022 2008 by Zoey Velasquez RN  Outcome: Ongoing  Goal: Patient will achieve/maintain normal respiratory rate/effort  Description: Respiratory rate and effort will be within normal limits for the patient  1/1/2022 2147 by Yue Lund RN  Outcome: Ongoing  1/1/2022 2009 by Zoey Velasquez RN  Outcome: Ongoing  1/1/2022 2008 by Zoey Velasquez RN  Outcome: Ongoing     Problem: Skin Integrity:  Goal: Will show no infection signs and symptoms  Description: Will show no infection signs and symptoms  1/1/2022 2147 by Yue Lund RN  Outcome: Ongoing  1/1/2022 2009 by Zoey Velasquez RN  Outcome: Ongoing  1/1/2022 2008 by Zoey Velasquez RN  Outcome: Ongoing  Goal: Absence of new skin breakdown  Description: Absence of new skin breakdown  1/1/2022 2147 by Yue Lund RN  Outcome: Ongoing  1/1/2022 2009 by Zoey Velasquez RN  Outcome: Ongoing  1/1/2022 2008 by Zoey Velasquez RN  Outcome: Ongoing

## 2022-01-02 NOTE — PROGRESS NOTES
RT walked into patient's room to adjust oxygen due to her sats dropping. RT asked patient how her breathing was. Patient proceeded to tell RT to \"get the fuck out of my room. \" RT made sure O2 saturations came up to an acceptable level and left.

## 2022-01-03 LAB
ABSOLUTE EOS #: 0.07 K/UL (ref 0–0.4)
ABSOLUTE IMMATURE GRANULOCYTE: 0.07 K/UL (ref 0–0.3)
ABSOLUTE LYMPH #: 0.49 K/UL (ref 1–4.8)
ABSOLUTE MONO #: 0.35 K/UL (ref 0.1–0.8)
ANION GAP SERPL CALCULATED.3IONS-SCNC: 8 MMOL/L (ref 9–17)
BASOPHILS # BLD: 0 % (ref 0–2)
BASOPHILS ABSOLUTE: 0 K/UL (ref 0–0.2)
BUN BLDV-MCNC: 23 MG/DL (ref 8–23)
BUN/CREAT BLD: ABNORMAL (ref 9–20)
CALCIUM SERPL-MCNC: 8.3 MG/DL (ref 8.6–10.4)
CHLORIDE BLD-SCNC: 102 MMOL/L (ref 98–107)
CHOLESTEROL/HDL RATIO: 5.1
CHOLESTEROL: 192 MG/DL
CO2: 21 MMOL/L (ref 20–31)
CREAT SERPL-MCNC: 0.43 MG/DL (ref 0.5–0.9)
DIFFERENTIAL TYPE: ABNORMAL
EOSINOPHILS RELATIVE PERCENT: 1 % (ref 1–4)
ESTIMATED AVERAGE GLUCOSE: 128 MG/DL
GFR AFRICAN AMERICAN: >60 ML/MIN
GFR NON-AFRICAN AMERICAN: >60 ML/MIN
GFR SERPL CREATININE-BSD FRML MDRD: ABNORMAL ML/MIN/{1.73_M2}
GFR SERPL CREATININE-BSD FRML MDRD: ABNORMAL ML/MIN/{1.73_M2}
GLUCOSE BLD-MCNC: 130 MG/DL (ref 70–99)
HBA1C MFR BLD: 6.1 % (ref 4–6)
HCT VFR BLD CALC: 38.8 % (ref 36.3–47.1)
HDLC SERPL-MCNC: 38 MG/DL
HEMOGLOBIN: 12.7 G/DL (ref 11.9–15.1)
IMMATURE GRANULOCYTES: 1 %
LDL CHOLESTEROL: 132 MG/DL (ref 0–130)
LYMPHOCYTES # BLD: 7 % (ref 24–44)
MCH RBC QN AUTO: 32.8 PG (ref 25.2–33.5)
MCHC RBC AUTO-ENTMCNC: 32.7 G/DL (ref 28.4–34.8)
MCV RBC AUTO: 100.3 FL (ref 82.6–102.9)
MONOCYTES # BLD: 5 % (ref 1–7)
MORPHOLOGY: ABNORMAL
NRBC AUTOMATED: 0 PER 100 WBC
PDW BLD-RTO: 14.5 % (ref 11.8–14.4)
PLATELET # BLD: ABNORMAL K/UL (ref 138–453)
PLATELET ESTIMATE: ABNORMAL
PLATELET, FLUORESCENCE: 222 K/UL (ref 138–453)
PLATELET, IMMATURE FRACTION: 8 % (ref 1.1–10.3)
PMV BLD AUTO: ABNORMAL FL (ref 8.1–13.5)
POTASSIUM SERPL-SCNC: 5.2 MMOL/L (ref 3.7–5.3)
RBC # BLD: 3.87 M/UL (ref 3.95–5.11)
RBC # BLD: ABNORMAL 10*6/UL
SEG NEUTROPHILS: 86 % (ref 36–66)
SEGMENTED NEUTROPHILS ABSOLUTE COUNT: 6.02 K/UL (ref 1.8–7.7)
SODIUM BLD-SCNC: 131 MMOL/L (ref 135–144)
TRIGL SERPL-MCNC: 111 MG/DL
VLDLC SERPL CALC-MCNC: ABNORMAL MG/DL (ref 1–30)
WBC # BLD: 7 K/UL (ref 3.5–11.3)
WBC # BLD: ABNORMAL 10*3/UL

## 2022-01-03 PROCEDURE — 94660 CPAP INITIATION&MGMT: CPT

## 2022-01-03 PROCEDURE — 80061 LIPID PANEL: CPT

## 2022-01-03 PROCEDURE — 6370000000 HC RX 637 (ALT 250 FOR IP): Performed by: INTERNAL MEDICINE

## 2022-01-03 PROCEDURE — 36415 COLL VENOUS BLD VENIPUNCTURE: CPT

## 2022-01-03 PROCEDURE — 94669 MECHANICAL CHEST WALL OSCILL: CPT

## 2022-01-03 PROCEDURE — 2060000000 HC ICU INTERMEDIATE R&B

## 2022-01-03 PROCEDURE — 6360000002 HC RX W HCPCS: Performed by: INTERNAL MEDICINE

## 2022-01-03 PROCEDURE — 85055 RETICULATED PLATELET ASSAY: CPT

## 2022-01-03 PROCEDURE — 99291 CRITICAL CARE FIRST HOUR: CPT | Performed by: INTERNAL MEDICINE

## 2022-01-03 PROCEDURE — 5A09357 ASSISTANCE WITH RESPIRATORY VENTILATION, LESS THAN 24 CONSECUTIVE HOURS, CONTINUOUS POSITIVE AIRWAY PRESSURE: ICD-10-PCS | Performed by: STUDENT IN AN ORGANIZED HEALTH CARE EDUCATION/TRAINING PROGRAM

## 2022-01-03 PROCEDURE — 2700000000 HC OXYGEN THERAPY PER DAY

## 2022-01-03 PROCEDURE — 6360000002 HC RX W HCPCS: Performed by: FAMILY MEDICINE

## 2022-01-03 PROCEDURE — 83036 HEMOGLOBIN GLYCOSYLATED A1C: CPT

## 2022-01-03 PROCEDURE — 2580000003 HC RX 258: Performed by: STUDENT IN AN ORGANIZED HEALTH CARE EDUCATION/TRAINING PROGRAM

## 2022-01-03 PROCEDURE — 94150 VITAL CAPACITY TEST: CPT

## 2022-01-03 PROCEDURE — 99232 SBSQ HOSP IP/OBS MODERATE 35: CPT | Performed by: INTERNAL MEDICINE

## 2022-01-03 PROCEDURE — 85025 COMPLETE CBC W/AUTO DIFF WBC: CPT

## 2022-01-03 PROCEDURE — 94664 DEMO&/EVAL PT USE INHALER: CPT

## 2022-01-03 PROCEDURE — 6370000000 HC RX 637 (ALT 250 FOR IP): Performed by: FAMILY MEDICINE

## 2022-01-03 PROCEDURE — 94761 N-INVAS EAR/PLS OXIMETRY MLT: CPT

## 2022-01-03 PROCEDURE — 80048 BASIC METABOLIC PNL TOTAL CA: CPT

## 2022-01-03 PROCEDURE — 6370000000 HC RX 637 (ALT 250 FOR IP): Performed by: NURSE PRACTITIONER

## 2022-01-03 RX ORDER — BUDESONIDE AND FORMOTEROL FUMARATE DIHYDRATE 160; 4.5 UG/1; UG/1
2 AEROSOL RESPIRATORY (INHALATION) 2 TIMES DAILY
Status: DISCONTINUED | OUTPATIENT
Start: 2022-01-03 | End: 2022-01-14 | Stop reason: HOSPADM

## 2022-01-03 RX ORDER — ASCORBIC ACID 500 MG
500 TABLET ORAL 2 TIMES DAILY
Status: DISCONTINUED | OUTPATIENT
Start: 2022-01-03 | End: 2022-01-14 | Stop reason: HOSPADM

## 2022-01-03 RX ORDER — ZINC SULFATE 50(220)MG
50 CAPSULE ORAL DAILY
Status: DISCONTINUED | OUTPATIENT
Start: 2022-01-03 | End: 2022-01-14 | Stop reason: HOSPADM

## 2022-01-03 RX ORDER — LORAZEPAM 2 MG/ML
0.5 INJECTION INTRAMUSCULAR EVERY 6 HOURS PRN
Status: DISCONTINUED | OUTPATIENT
Start: 2022-01-03 | End: 2022-01-04

## 2022-01-03 RX ORDER — VITAMIN B COMPLEX
2000 TABLET ORAL DAILY
Status: DISCONTINUED | OUTPATIENT
Start: 2022-01-03 | End: 2022-01-14 | Stop reason: HOSPADM

## 2022-01-03 RX ADMIN — Medication 2000 UNITS: at 16:45

## 2022-01-03 RX ADMIN — SODIUM CHLORIDE, PRESERVATIVE FREE 10 ML: 5 INJECTION INTRAVENOUS at 09:17

## 2022-01-03 RX ADMIN — FAMOTIDINE 20 MG: 20 TABLET, FILM COATED ORAL at 09:17

## 2022-01-03 RX ADMIN — ZINC SULFATE 220 MG (50 MG) CAPSULE 50 MG: CAPSULE at 16:45

## 2022-01-03 RX ADMIN — APIXABAN 10 MG: 5 TABLET, FILM COATED ORAL at 21:48

## 2022-01-03 RX ADMIN — SODIUM CHLORIDE, PRESERVATIVE FREE 10 ML: 5 INJECTION INTRAVENOUS at 21:48

## 2022-01-03 RX ADMIN — FAMOTIDINE 20 MG: 20 TABLET, FILM COATED ORAL at 21:48

## 2022-01-03 RX ADMIN — OXYCODONE HYDROCHLORIDE AND ACETAMINOPHEN 500 MG: 500 TABLET ORAL at 21:48

## 2022-01-03 RX ADMIN — BARICITINIB 4 MG: 2 TABLET, FILM COATED ORAL at 09:18

## 2022-01-03 RX ADMIN — LORAZEPAM 0.5 MG: 2 INJECTION INTRAMUSCULAR; INTRAVENOUS at 16:44

## 2022-01-03 RX ADMIN — ALPRAZOLAM 0.5 MG: 0.5 TABLET ORAL at 10:25

## 2022-01-03 RX ADMIN — Medication 5 ML: at 21:48

## 2022-01-03 RX ADMIN — APIXABAN 10 MG: 5 TABLET, FILM COATED ORAL at 09:17

## 2022-01-03 RX ADMIN — DEXAMETHASONE SODIUM PHOSPHATE 6 MG: 10 INJECTION INTRAMUSCULAR; INTRAVENOUS at 09:17

## 2022-01-03 RX ADMIN — BENZONATATE 200 MG: 100 CAPSULE ORAL at 21:47

## 2022-01-03 ASSESSMENT — ENCOUNTER SYMPTOMS
ABDOMINAL DISTENTION: 0
SORE THROAT: 0
SINUS PAIN: 0
COLOR CHANGE: 0
COUGH: 0
SHORTNESS OF BREATH: 0
ABDOMINAL PAIN: 0
TROUBLE SWALLOWING: 0
DIARRHEA: 0
SHORTNESS OF BREATH: 1
VOMITING: 0
VOICE CHANGE: 0
PHOTOPHOBIA: 0
COUGH: 1
ALLERGIC/IMMUNOLOGIC NEGATIVE: 1

## 2022-01-03 ASSESSMENT — PAIN SCALES - GENERAL
PAINLEVEL_OUTOF10: 0

## 2022-01-03 NOTE — PROGRESS NOTES
PULMONARY & CRITICAL CARE MEDICINE PROGRESS NOTE     Patient:  Christiana Roger  MRN: [id number]  Admit date: 12/24/2021  Primary Care Physician: No primary care provider on file. Consulting Physician: Porfirio Lazo DO  CODE Status: Full Code  LOS: 10     SUBJECTIVE     CHIEF COMPLAINT/REASON FOR INITIAL CONSULT:   Acute respiratory failure/COVID-19 pneumonia with ARDS/pulmonary [place] involving subsegmental left lower lobe    BRIEF HOSPITAL COURSE:   The patient is a 70 y.o. female was admitted with complaints of shortness of breath and found to be Covid positive but also had a left lower lobe subsegmental pulmonary embolism with right ventricular strain pattern  Vascular surgery was consulted and it was felt, rightfully so, that the cause of the right ventricular strain is unlikely pulmonary embolism  Patient was admitted to the [facility] ICU, requiring 95% oxygen via high flow. Chest x-ray shows bilateral diffuse interstitial alveolar opacities in both lungs  Has shortness of breath along with coughing. INTERVAL HISTORY:  01/03/22  ICU events noted, chart reviewed medication seen and other events noted. Overnight patient is hemodynamically stable does not require pressor support. She continued to require high flow nasal cannula and this morning she is on 40 L and 80% maintaining saturation 90%. She did not use BiPAP last night according to patient she would rather not use of BiPAP if possible. She is hemodynamically stable T-max of 98.9 last 24 hours and saturation above 92%      REVIEW OF SYSTEMS:  Review of Systems   Constitutional: Positive for activity change and fatigue. Negative for fever. HENT: Negative for postnasal drip, sinus pain, sore throat, trouble swallowing and voice change. Eyes: Negative for photophobia and visual disturbance. Respiratory: Positive for cough and shortness of breath. Cardiovascular: Negative for chest pain, palpitations and leg swelling.    Gastrointestinal: Negative for abdominal pain, diarrhea and vomiting. Endocrine: Negative. Genitourinary: Negative for difficulty urinating, dysuria and hematuria. Musculoskeletal: Negative. Allergic/Immunologic: Negative. Neurological: Negative for dizziness, syncope, speech difficulty and headaches. Hematological: Negative for adenopathy. Does not bruise/bleed easily. Psychiatric/Behavioral: Negative. OBJECTIVE     PaO2/FiO2 RATIO:  No results for input(s): POCPO2 in the last 72 hours. FiO2 : 70 %     VITAL SIGNS:   LAST:  /88   Pulse 74   Temp 98.7 °F (37.1 °C) (Oral)   Resp 26   Ht 5' 4\" (1.626 m)   Wt 195 lb 15.8 oz (88.9 kg)   SpO2 99%   Breastfeeding No   BMI 33.64 kg/m²   8-24 HR RANGE:  TEMP Temp  Av.3 °F (36.8 °C)  Min: 97.7 °F (36.5 °C)  Max: 98.9 °F (88.8 °C)   BP Systolic (61MPR), ZZQ:781 , Min:121 , SKN:658      Diastolic (38TZH), IOY:63, Min:83, Max:97     PULSE Pulse  Av  Min: 54  Max: 74   RR Resp  Av.3  Min: 17  Max: 26   O2 SAT SpO2  Av %  Min: 87 %  Max: 99 %   OXYGEN DELIVERY O2 Flow Rate (L/min)  Av L/min  Min: 40 L/min  Max: 40 L/min        SYSTEMIC EXAMINATION:   General appearance - Ill-appearing, mild respiratory distress  Mental status - awake & alert, follows commands  Eyes - pupils equal and reactive, sclera anicteric  Mouth - mucous membranes moist, pharynx normal without lesions  Neck -short thick neck supple, no significant adenopathy, carotids upstroke normal bilaterally, no bruits  Chest - Breath sounds bilaterally were dimnished to auscultation at bases. There were bilateral intermittent crackles present without rhonchi.   There is no intercostal recession or use of accessory muscles  Heart - normal rate, regular rhythm, normal S1, S2, no murmurs, rubs, clicks or gallops  Abdomen - soft, nontender, nondistended, no masses or organomegaly  Neurological - non-focal  Extremities - peripheral pulses normal, mild pedal edema, no clubbing or cyanosis  Skin - normal coloration and turgor, no rashes, no suspicious skin lesions noted     DATA REVIEW     Medications: Current Inpatient  Scheduled Meds:   apixaban  10 mg Oral BID    Followed by   Carlo Judd ON 1/7/2022] apixaban  5 mg Oral BID    dexamethasone  6 mg IntraVENous Daily    famotidine  20 mg Oral BID    sodium chloride flush  10 mL IntraVENous 2 times per day    baricitinib  4 mg Oral Daily     Continuous Infusions:   sodium chloride         INPUT/OUTPUT:  In: 1660 [P.O.:1660]  Out: 1100 [Urine:1100]  Date 01/03/22 0000 - 01/03/22 2359   Shift 3903-6420 0272-3060 4931-6430 24 Hour Total   INTAKE   P.O.(mL/kg/hr) 100(0.1) 800  900   Shift Total(mL/kg) 100(1.1) 800(9)  900(10.1)   OUTPUT   Urine(mL/kg/hr) 600(0.8)   600   Shift Total(mL/kg) 600(6.7)   600(6.7)   Weight (kg) 88.9 88.9 88.9 88.9        LABS:  ABGs:   No results for input(s): POCPH, POCPCO2, POCPO2, POCHCO3, NNHF5OXG in the last 72 hours. CBC:   Recent Labs     01/01/22  0836 01/02/22  0341 01/03/22  0633   WBC 9.2 8.0 7.0   HGB 13.6 13.6 12.7   HCT 40.4 41.2 38.8   MCV 97.1 100.2 100.3    265 See Reflexed IPF Result   LYMPHOPCT 6* 11* 7*   RBC 4.16 4.11 3.87*   MCH 32.7 33.1 32.8   MCHC 33.7 33.0 32.7   RDW 14.1 14.3 14.5*     CRP:   No results for input(s): CRP in the last 72 hours. LDH:   No results for input(s): LDH in the last 72 hours. BMP:   Recent Labs     01/01/22  0836 01/02/22  0341 01/03/22  0633    132* 131*   K 5.2 5.1 5.2    104 102   CO2 21 19* 21   BUN 25* 21 23   CREATININE 0.31* 0.38* 0.43*   GLUCOSE 132* 146* 130*     Liver Function Test:   No results for input(s): PROT, LABALBU, ALT, AST, GGT, ALKPHOS, BILITOT in the last 72 hours. Coagulation Profile:   No results for input(s): INR, PROTIME, APTT in the last 72 hours. D-Dimer:  No results for input(s): DDIMER in the last 72 hours. Ferritin:    No results for input(s): FERRITIN in the last 72 hours.   Lactic Acid:  No results for input(s): LACTA in the last 72 hours. Cardiac Enzymes:  No results for input(s): CKTOTAL, CKMB, CKMBINDEX, TROPONINI in the last 72 hours. Invalid input(s): TROPONIN, HSTROP  BNP/ProBNP:   No results for input(s): BNP, PROBNP in the last 72 hours. Triglycerides:  Recent Labs     01/03/22  0633   TRIG 111        Microbiology:  Urine Culture:  No components found for: CURINE  Blood Culture:  No components found for: CBLOOD, CFUNGUSBL  Sputum Culture:  No components found for: CSPUTUM  No results for input(s): SPECDESC, SPECIAL, CULTURE, STATUS, ORG, CDIFFTOXPCR, CAMPYLOBPCR, SALMONELLAPC, SHIGAPCR, SHIGELLAPCR, MPNEUG, MPNEUM, LACTOQL in the last 72 hours. No results for input(s): SPUTUM, SPECDESC, SPECIAL, CULTURE, STATUS, ORG, CDIFFTOXPCR, MPNEUM, MPNEUG in the last 72 hours. Invalid input(s): Leticia Underwood, CFUNGUSBL     Pathology:    Radiology Reports:  US PELVIS COMPLETE   Final Result   3.3 cm echogenic mass in the region of the uterine body which is possibly the   obscuring the endometrium. RECOMMENDATION:   Transvaginal sonography or MRI with contrast for further workup      RECOMMENDATIONS:   Unavailable         XR CHEST PORTABLE   Final Result   Moderate diffuse interstitial and alveolar opacities throughout the lungs   bilaterally. The finding is nonspecific although suspicious for multilobar   pneumonia, including active COVID-19 viral pneumonitis. CT CHEST PULMONARY EMBOLISM W CONTRAST   Final Result   Motion degraded study. Acute pulmonary emboli suspected particularly in the   left lower lobe but not well visualized due to motion artifact. There is evidence for right ventricular strain with septal bowing suggesting   a significant clot burden. Moderate patchy ground-glass opacities involving all lobes of both lungs   consistent with multifocal pneumonia typical for COVID pneumonia. Findings were discussed with Sandy Juan at 5:39 pm on 12/24/2021. RECOMMENDATIONS:   Unavailable              Echocardiogram:   Results for orders placed during the hospital encounter of 12/24/21    ECHO Complete 2D W Doppler W Color    Narrative  Transthoracic Echocardiography Report (TTE)    Patient Name Mary Sykes      Date of Study         12/25/2021  MAX    Date of      1950  Gender                Female  Birth    Age          70 year(s)  Race                  Unknown    Room Number  3005        Height:               94 inch, 238.76 cm    Corporate ID [id number]    Weight:               186 pounds, 84.4 kg  #    Patient Krys Wilkes [de-identified]   BSA:       2.51 m^2   BMI:         14.8 kg/m^2  #    MR #         [id number]     Sonographer           Amanda Fovin    Accession #  [id number]  Interpreting          Genesis Aggarwal  Physician    Fellow                   Referring Nurse  Practitioner    Interpreting             Referring Physician   Vinny Irving CNP    Type of Study    TTE procedure:2D Echocardiogram, M-Mode, Doppler, Color Doppler. Procedure Date  Date: 12/25/2021 Start: 01:47 PM    Study Location: [facility]  Technical Quality: Adequate visualization    Indications:Pulmonary embolus and Right heart strain. History / Tech. Comments:  Echo done at patient bedside. Procedure explained to patient. Covid-19    Patient Status: Inpatient    Height: 94 inches Weight: 186 pounds BSA: 2.51 m^2 BMI: 14.8 kg/m^2    CONCLUSIONS    Summary  Global left ventricular systolic function is normal. Estimated EF 50-55%. Dilated Right ventricular with mildly impaired systolic function  Estimated right ventricular systolic pressure is 54VUQD. No significant valvular regurgitation or stenosis seen. No pericardial effusion seen.     Signature  ----------------------------------------------------------------------------  Electronically signed by Clare Baker(Sonographer) on 12/25/2021  02:30 PM  ----------------------------------------------------------------------------    ----------------------------------------------------------------------------  Electronically signed by Genesis Aggarwal(Interpreting physician) on  2021 07:52 AM  ----------------------------------------------------------------------------  FINDINGS  Left Atrium  Left atrium is normal in size. Left Ventricle  Left ventricle is normal in size. Global left ventricular systolic function  is normal. Calculated ejection fraction 50% by Orantes's method. Right Atrium  Right atrial dilatation. Right Ventricle  Right ventricular systolic function appears mildly reduced reduced. Moderately dilated right ventricular cavity. TAPSE value of 1.65cm noted. Mitral Valve  Normal mitral valve structure and function. No mitral regurgitation. Aortic Valve  Aortic valve is trileaflet and opens adequately. No aortic insufficiency. Tricuspid Valve  No obvious valvular abnormality. Trivial tricuspid regurgitation. No pulmonary hypertension. Estimated right ventricular systolic pressure is  95JEIG. Pulmonic Valve  The pulmonic valve is normal in structure. Mild pulmonic insufficiency. Pericardial Effusion  No pericardial effusion seen. Miscellaneous  E/E' average = 9.4.  IVC normal diameter & inspiratory collapse indicating normal RA filling  pressure .     M-mode / 2D Measurements & Calculations:    LVIDd:3.7 cm(3.7 - 5.6 cm)       Diastolic MFUBCM:06.4 ml  JAFOA:2.8 cm(2.2 - 4.0 cm)       Systolic FXCXIL:17.9 ml  BISZ:0.5 cm(0.6 - 1.1 cm)        Aortic Root:2.7 cm(2.0 - 3.7 cm)  LVPWd:0.9 cm(0.6 - 1.1 cm)       LA Dimension: 3.3 cm(1.9 - 4.0 cm)  Fractional Shortenin.73 %    LA volume/Index: 32.1 ml /13m^2  Calculated LVEF (%): 50.94 %     LVOT:2 cm  RVDd:2.6 cm    Mitral:                                 Aortic    Valve Area (P1/2-Time): 7.59 cm^2       Peak Velocity: 1.10 m/s  Peak E-Wave: 0.68 m/s                   Mean Velocity: 0.58 m/s  Peak A-Wave: 0.46 m/s                   Peak Gradient: 4.84 mmHg  E/A Ratio: 1.48                         Mean Gradient: 2 mmHg  Peak Gradient: 1.83 mmHg  Mean Gradient: 1 mmHg  Deceleration Time: 99 msec              Area (continuity): 2.77 cm^2  P1/2t: 29 msec                          AV VTI: 17.6 cm    Area (continuity): 3.43 cm^2  Mean Velocity: 0.51 m/s    Tricuspid:                              Pulmonic:    Peak TR Velocity: 2.71 m/s              Peak Velocity: 0.83 m/s  Peak TR Gradient: 29.3764 mmHg          Peak Gradient: 2.74 mmHg    Diastology / Tissue Doppler  Septal Wall E' velocity:0.06 m/s  Septal Wall E/E':11.5  Lateral Wall E' velocity:0.09 m/s  Lateral Wall E/E':7.4       ASSESSMENT AND PLAN     Assessment:    // Acute hypoxic respiratory failure  // Acute respiratory distress syndrome  // Bilateral multifocal pneumonia due to COVID 19 infection  // Left lower lobe segmental pulmonary embolism  // Obesity            //  Postmenopausal bleeding    Plan:    I personally interviewed/examined the patient; reviewed interval history, interpreted all available radiographic and laboratory data at the time of service. Patient is currently requiring high flow nasal cannula 40 L and 90%. Encourage use of BiPAP at night and alternate with high flow nasal cannula during the daytime. Continue supplemental oxygen to keep oxygen saturation >90%  Encourage prone position when sleeping, incentive spirometry  Continue pulmonary toilet, aspiration precautions and bronchodilators  Continue Decadron 6 mg once daily. She is on baricitinib.   On Eliquis 10 mg twice daily for 7 days then 5 mg twice daily currently on Symbicort will continue  May need intermittent diuresis if needed  Monitor I/O, electrolytes with a goal of even/negative fluid balance  Chemical DVT prophylaxis on Eliquis therapeutic dose  Antimicrobials reviewed; currently not on antibiotic  Monitor CRP, LDH, AST/ALT, D-Dimer, Ferritin periodically  Glycemic control per primary service  Physical/occupational therapy    The patient is/remains critically ill with illness/injury that acutely impairs one or more vital organ systems, such that there is a high probability of imminent or life threatening deterioration in the patient's condition. Critical care time of 35 minutes was spent (excluding procedures), in coordination of care during bedside rounds and discussion of patient care in detail, and recommendations of the team were adopted in the plan. Necessity of all invasive devices was also confirmed. Trinidad Vieira MD   Pulmonary and Critical Care Medicine           1/3/2022, 1:02 PM    This patient was evaluated in the context of the global SARS-CoV-2 (COVID-19) pandemic, which necessitated considerations that the patient either has COVID-19 infection or is at risk of infection with COVID-19. Institutional protocols and algorithms that pertain to the evaluation & management of patients with COVID-19 or those at risk for COVID-19 are in a state of rapid changes based on information released by regulatory bodies including the CDC and federal and state organizations. These policies and algorithms were followed during the patient's care. Please note that this chart was generated using voice recognition Dragon dictation software. Although every effort was made to ensure the accuracy of this automated transcription, some errors in transcription may have occurred.

## 2022-01-03 NOTE — PLAN OF CARE
Problem: Airway Clearance - Ineffective  Goal: Achieve or maintain patent airway  1/2/2022 2220 by Mary Flanagan RN  Outcome: Ongoing  1/2/2022 1811 by Shalini Meredith RN  Outcome: Ongoing  1/2/2022 1003 by Gita Lee RCP  Outcome: Ongoing     Problem: Gas Exchange - Impaired  Goal: Absence of hypoxia  1/2/2022 2220 by Mary Flanagan RN  Outcome: Ongoing  1/2/2022 1811 by Shalini Meredith RN  Outcome: Ongoing  1/2/2022 1003 by Gita Lee RCP  Outcome: Ongoing  Goal: Promote optimal lung function  1/2/2022 2220 by Mary Flanagan RN  Outcome: Ongoing  1/2/2022 1811 by Shalini Meredith RN  Outcome: Ongoing  1/2/2022 1003 by Gita Lee RCP  Outcome: Ongoing     Problem: Breathing Pattern - Ineffective  Goal: Ability to achieve and maintain a regular respiratory rate  1/2/2022 2220 by Mary Flanagan RN  Outcome: Ongoing  1/2/2022 1811 by Shalini Meredith RN  Outcome: Ongoing  1/2/2022 1003 by Gita Lee RCP  Outcome: Ongoing     Problem:  Body Temperature -  Risk of, Imbalanced  Goal: Ability to maintain a body temperature within defined limits  1/2/2022 2220 by Mary Flanagan RN  Outcome: Ongoing  1/2/2022 1811 by Shalini Meredith RN  Outcome: Ongoing  Goal: Will regain or maintain usual level of consciousness  1/2/2022 2220 by Mary Flanagan RN  Outcome: Ongoing  1/2/2022 1811 by Shalini Meredith RN  Outcome: Ongoing  Goal: Complications related to the disease process, condition or treatment will be avoided or minimized  1/2/2022 2220 by Mary Flanagan RN  Outcome: Ongoing  1/2/2022 1811 by Shalini Meredith RN  Outcome: Ongoing     Problem: Isolation Precautions - Risk of Spread of Infection  Goal: Prevent transmission of infection  1/2/2022 2220 by Mary Flanagan RN  Outcome: Ongoing  1/2/2022 1811 by Shalini Meredith RN  Outcome: Ongoing     Problem: Nutrition Deficits  Goal: Optimize nutritional status  1/2/2022 2220 by Mary Flanagan RN  Outcome: Ongoing  1/2/2022 1811 by Renea Reynoso RN  Outcome: Ongoing     Problem: Risk for Fluid Volume Deficit  Goal: Maintain normal heart rhythm  1/2/2022 2220 by Deana Barraza RN  Outcome: Ongoing  1/2/2022 1811 by Renea Reynoso RN  Outcome: Ongoing  Goal: Maintain absence of muscle cramping  1/2/2022 2220 by Deana Barraza RN  Outcome: Ongoing  1/2/2022 1811 by Renea Reynoso RN  Outcome: Ongoing  Goal: Maintain normal serum potassium, sodium, calcium, phosphorus, and pH  1/2/2022 2220 by Deana Barraza RN  Outcome: Ongoing  1/2/2022 1811 by Renea Reynoso RN  Outcome: Ongoing     Problem: Loneliness or Risk for Loneliness  Goal: Demonstrate positive use of time alone when socialization is not possible  1/2/2022 2220 by Deana Barraza RN  Outcome: Ongoing  1/2/2022 1811 by Renea Reynoso RN  Outcome: Ongoing     Problem: Fatigue  Goal: Verbalize increase energy and improved vitality  1/2/2022 2220 by Deana Barraza RN  Outcome: Ongoing  1/2/2022 1811 by Renea Reynoso RN  Outcome: Ongoing     Problem: Patient Education: Go to Patient Education Activity  Goal: Patient/Family Education  1/2/2022 2220 by Deana Barraza RN  Outcome: Ongoing  1/2/2022 1811 by Renea Reynoso RN  Outcome: Ongoing     Problem: OXYGENATION/RESPIRATORY FUNCTION  Goal: Patient will maintain patent airway  1/2/2022 2220 by Deana Barraza RN  Outcome: Ongoing  1/2/2022 1811 by Renea Reynoso RN  Outcome: Ongoing  1/2/2022 1003 by Norris Rojas RCP  Outcome: Ongoing  Goal: Patient will achieve/maintain normal respiratory rate/effort  Description: Respiratory rate and effort will be within normal limits for the patient  1/2/2022 2220 by Deana Barraza RN  Outcome: Ongoing  1/2/2022 1811 by Renea Reynoso RN  Outcome: Ongoing  1/2/2022 1003 by Norris Rojas RCP  Outcome: Ongoing     Problem: Skin Integrity:  Goal: Will show no infection signs and symptoms  Description: Will show no infection signs and symptoms  1/2/2022 2220 by Deana Barraza RN  Outcome: Ongoing  1/2/2022 1811 by Rc Mcguire RN  Outcome: Ongoing  1/2/2022 1003 by Dorothy Burns RCP  Outcome: Ongoing  Goal: Absence of new skin breakdown  Description: Absence of new skin breakdown  1/2/2022 2220 by Francisco Ahumada RN  Outcome: Ongoing  1/2/2022 1811 by Rc Mcguire RN  Outcome: Ongoing  1/2/2022 1003 by Dorothy Burns RCP  Outcome: Ongoing

## 2022-01-03 NOTE — PROGRESS NOTES
[facility]  Office: [address], DO, Ruchi Fuentes, DO, Karly Hubbard, DO, Vanessa Ignacioitage Blood, DO, Garret Whitmore MD, Angel Luis Corey MD, Gayle Dietrich MD, Austin Martinez MD, Denis Lee MD, Kendra Marie MD, Edgar Mcdonald MD, Stanley Smalls, DO, Falguni Ansari DO, Naomie Palacios MD,  Piero Castaneda DO, Keren Membreno MD, Suzanne Montemayor MD, Breanna Hargrove MD, Montrell Pepper MD, Dionne Davis MD, Светлана Villatoro MD, Maranda Epstein MD, Edward Galvin [facility], [facility], [facility], Mikey Tomlinson, [facility], Nicholas Shine, CNS, La Ramírez, CNP, Thi Romero, CNP, Handy Payton, CNP, Felton Macdonald, CNP, Mary Valadez CNP, Edith Mccarty PA-C, Artemio Primrose, [facility], Griffin Iyer, [facility], Marixa Senior, CNP, Selma Montes De Oca, CNP, Maddie Rodriguez, CNP, Hannah Henley, [facility], Lazarus Mais, CNP, Terri Gibbons, [address] [place] [address]    Progress Note    1/3/2022    2:56 PM    Name:   Brendan Rutherford  MRN:     [id number]     Acct:      [de-identified]   Room:   [address] [place] [address] Day:  10  Admit Date:  12/24/2021  3:37 PM    PCP:   No primary care provider on file. Code Status:  Full Code    Subjective:     C/C:   Chief Complaint   Patient presents with    Shortness of Breath    Fall     Interval History Status: not changed. Patient seen and examined this morning. Up to 94% FiO2 via HFNC. Patient is complaining of nasal secretions. States that otherwise, she had a \"good night. \" Tolerating small bites of food. States that the flow from the high-flow is pushing air in too quickly. Agreeable to trials of bipap. Brief History:     Brendan Rutherford is 70 y.o. female who was admitted to the hospital on 12/24/2021 for treatment of Pneumonia due to COVID-19 virus. Patient came to the hospital with lightheadedness and dry cough for 1 week. She had episode of fall at home and family called EMS.  Initial evaluation by EMS found patient having tachypnea with respirate 40 to 50/min, hypoxia SPO2 50% on room air, tachycardia. Patient was placed on nonrebreather and improved only to 73%. Patient was then placed on BiPAP. Evaluation emergency room showed positive COVID-19 test, elevated troponin, elevated proBNP. CT chest showed subsegmental PE with possible right heart strain. Vascular surgery was consulted and recommended anticoagulation without need for thrombolysis. Patient also had thrombocytopenia. Cardiology was consulted and echocardiogram obtained which was negative for RV strain with RV systolic pressure 29 mmHg. Patient was treated with heparin infusion, Decadron and started on baricitinib. Patient had vaginal bleeding on 12/30/21 for which OBGYN will see her for in the outpatient setting.    - uptrending oxygen requirements. Review of Systems:     Constitutional:  negative for chills, fevers, sweats  Respiratory: Reports cough and shortness of breath; report sinus congestion; negative for wheezing  Cardiovascular:  negative for chest pain, chest pressure/discomfort, lower extremity edema, palpitations  Gastrointestinal:  negative for abdominal pain, constipation, diarrhea, nausea, vomiting  Neurological:  negative for dizziness, headache    Medications:      Allergies:  No Known Allergies    Current Meds:   Scheduled Meds:    apixaban  10 mg Oral BID    Followed by   Libra Banegas ON 1/7/2022] apixaban  5 mg Oral BID    dexamethasone  6 mg IntraVENous Daily    famotidine  20 mg Oral BID    sodium chloride flush  10 mL IntraVENous 2 times per day    baricitinib  4 mg Oral Daily     Continuous Infusions:    sodium chloride       PRN Meds: LORazepam, calcium carbonate, albuterol sulfate HFA, ALPRAZolam, benzocaine-menthol, loperamide, benzonatate, HYDROcodone-chlorpheniramine, sodium chloride flush, sodium chloride, promethazine **OR** ondansetron, polyethylene glycol, acetaminophen **OR** acetaminophen    Data:     Past Medical History:   has no past medical history on file. Social History:   reports that she has been smoking cigarettes. She has never used smokeless tobacco. She reports previous alcohol use. She reports previous drug use. Family History:   Family History   Problem Relation Age of Onset    Cancer Maternal Aunt         breast       Vitals:  /88   Pulse 74   Temp 98.7 °F (37.1 °C) (Oral)   Resp 26   Ht 5' 4\" (1.626 m)   Wt 195 lb 15.8 oz (88.9 kg)   SpO2 99%   Breastfeeding No   BMI 33.64 kg/m²   Temp (24hrs), Av.3 °F (36.8 °C), Min:97.7 °F (36.5 °C), Max:98.9 °F (37.2 °C)    Recent Labs     22   POCGLU 145*       I/O (24Hr):     Intake/Output Summary (Last 24 hours) at 1/3/2022 1456  Last data filed at 1/3/2022 0915  Gross per 24 hour   Intake 1660 ml   Output 1100 ml   Net 560 ml       Labs:  Hematology:  Recent Labs     22  0822  0633   WBC 9.2 8.0 7.0   RBC 4.16 4.11 3.87*   HGB 13.6 13.6 12.7   HCT 40.4 41.2 38.8   MCV 97.1 100.2 100.3   MCH 32.7 33.1 32.8   MCHC 33.7 33.0 32.7   RDW 14.1 14.3 14.5*    265 See Reflexed IPF Result   MPV 10.8 10.8 NOT REPORTED     Chemistry:  Recent Labs     22  0822  0633    132* 131*   K 5.2 5.1 5.2    104 102   CO2 21 19* 21   GLUCOSE 132* 146* 130*   BUN 25* 21 23   CREATININE 0.31* 0.38* 0.43*   ANIONGAP 11 9 8*   LABGLOM >60 >60 >60   GFRAA >60 >60 >60   CALCIUM 8.3* 8.2* 8.3*     Recent Labs     22  0633   LABA1C  --  6.1*   CHOL  --  192   HDL  --  38*   LDLCHOLESTEROL  --  132*   CHOLHDLRATIO  --  5.1*   TRIG  --  111   VLDL  --  NOT REPORTED   POCGLU 145*  --      ABG:  Lab Results   Component Value Date    FIO2 NOT REPORTED 2021     Lab Results   Component Value Date/Time    SPECIAL NOT REPORTED 2021 08:19 PM     Lab Results   Component Value Date/Time    CULTURE NO SIGNIFICANT GROWTH 2021 08:19 PM       Radiology:  XR CHEST PORTABLE    Result Date: 12/26/2021  Moderate diffuse interstitial and alveolar opacities throughout the lungs bilaterally. The finding is nonspecific although suspicious for multilobar pneumonia, including active COVID-19 viral pneumonitis. Physical Examination:        General appearance:  alert, cooperative and no distress  Mental Status:  oriented to person, place and time and anxious affect  HEENT: Sclera anti-icteric, EOMI, high flow nasal cannula present  Lungs:  clear to auscultation bilaterally, globally diminished breath sounds, normal effort  Heart:  regular rate and rhythm, no murmur  Abdomen:  soft, nontender, nondistended, normal bowel sounds, no masses, hepatomegaly, splenomegaly  Extremities:  no edema, redness, tenderness in the calves  Skin:  no gross lesions, rashes, induration  Psych: Appears anxious    Assessment:        Hospital Problems           Last Modified POA    * (Principal) Pneumonia due to COVID-19 virus 1/2/2022 Yes    Pulmonary embolus, left ([address] 1/2/2022 Yes    Acute respiratory failure with hypoxia ([address] 1/2/2022 Yes    Postmenopausal bleeding 1/2/2022 Yes    Overview Signed 12/31/2021  1:19 PM by Katelyn Pat MD     During hospitalization in December 2021  Outpatient follow-up recommended due to acute respiratory failure. Obesity with body mass index greater than 30 1/2/2022 Yes          Plan:        Acute respiratory failure with hypoxia - multifactorial, due to #2 and #3. Alternate HFNC with Bipap. COVID-19 pneumonia - on Decadron day 10 today. Continue baricitinib. Add vitamin D/C/zinc today. Acute PE - continue anticoagulation with Eliquis  Abnormal uterine bleeding - appreciate GYN input. F/u o/p  Obesity with BMI 33.64  Patient is quite irritable and restless-continue to redirect her and encourage her  Ativan added. Stop Xanax. Albuterol prn, start symbicort  Prognosis guarded  Will need LTAC at discharge. Appreciate CM's assistance in initiating these discussions.  Patient states that she will not be going to a nursing home at d/c. May be a difficult situation moving forward.     Malathi Sears, DO  1/3/2022  2:56 PM

## 2022-01-03 NOTE — PLAN OF CARE
Problem: Airway Clearance - Ineffective  Goal: Achieve or maintain patent airway  Outcome: Ongoing     Problem: Gas Exchange - Impaired  Goal: Absence of hypoxia  Outcome: Ongoing  Goal: Promote optimal lung function  Outcome: Ongoing     Problem: Breathing Pattern - Ineffective  Goal: Ability to achieve and maintain a regular respiratory rate  Outcome: Ongoing     Problem: OXYGENATION/RESPIRATORY FUNCTION  Goal: Patient will maintain patent airway  Outcome: Ongoing  Goal: Patient will achieve/maintain normal respiratory rate/effort  Description: Respiratory rate and effort will be within normal limits for the patient  Outcome: Ongoing     Problem: Skin Integrity:  Goal: Will show no infection signs and symptoms  Description: Will show no infection signs and symptoms  Outcome: Ongoing  Goal: Absence of new skin breakdown  Description: Absence of new skin breakdown  Outcome: Ongoing

## 2022-01-03 NOTE — PROGRESS NOTES
[facility] -   Infectious diseases evaluation  admission date 12/24/2021    reason for consultation:   covid +    Impression :   Current:  · covid pneumonia   · PE -  left lower lobe segmental emboli -  · bandemia  · Thrombocytopenia    Other:  ·   Discussion / summary of stay / plan of care   ·   Recommendations   · barcitinib 12/24 -  · Decadron 6 mg daily 12/24  · Anticoagulation  · Very poor outcome due to high oxygen requirement despite aggressive therapy  · CRP improved, but clinically she is slow to improve    Infection Control Recommendations   · [place] Precautions  · Contact Isolation   · Airborne isolation  · Droplet Isolation  Antimicrobial Stewardship Recommendations   · Off AB    Coordination ofOutpatient Care:   · Estimated Length of IV antimicrobials:  · Patient will need Midline / picc Catheter Insertion:   · Patient will need SNF:  · Patient will need outpatient wound care:     History of Present Illness:   Initial history:  Lukas Harris is a 70y.o.-year-old female Patient presented through ER via EMS after her family found her with severe shortness of breath in her shower. She had developed a cough approximately a week ago and according to the patient had a negative Covid test at that time. She was found to have an SPO2 of 50% on room air requiring BiPAP. A rapid Covid swab was positive  CTA of the chest showed segmental pulmonary embolism in the left lower lobe with possible right heart strain. An echocardiogram was completed and was negative for RV strain with RV systolic pressure of 29 mmHg  The patient was initiated on a heparin drip as well as high-dose Decadron, Rocephin and baricitinib.        Interval changes  1/3/2022   Patient Vitals for the past 8 hrs:   BP Temp Temp src Pulse Resp SpO2   01/03/22 1559 124/85 98.1 °F (36.7 °C) Axillary 76 23 100 %   01/03/22 1222 -- -- -- -- 26 --   01/03/22 1100 121/88 98.7 °F (37.1 °C) Oral 74 17 99 %   CRP 18-improved-  WBC 10  Blood culture still negative, chest x-ray positive diffuse pneumonia  Patient is on baricitinib, but saturation 86% she is on 90% high flow    12/28  60 FiO2 high flow, he feels better eating better but he saturating 90%,    12/29  70% FiO2 high flow, saturating 89%, she is short of breath, eating slightly, has a cough. In general she feels okay  Labs reviewed    12/30  60 FiO2 high flow, good appetite, alert oriented x3  WBC 10  Urine culture 12/26 -    12/31  96 saturation FiO2 70% on high flow, she feels better. WBC 7.4  CRP 6    1/1/22  High flow 45%, which is an improvement,  98 saturation, alert with a cough, WBC normal 9.2, tolerating her statin    1/3/22  Patient is on BiPAP 70%, saturating 99 and alert appropriate      Summary of relevant labs:  Labs:  .5-18-6    BNP 9968  Troponin 54  Lactic acid 3.5  Ferritin 858  WBC 14.6 -7  D-dimer 70.48    Micro:    Imaging:  CT chest bilat covid pneumonia w thick consolidations    I have personally reviewed the past medical history, past surgical history, medications, social history, and family history, and I haveupdated the database accordingly. Allergies:   Patient has no known allergies. Review of Systems:     Review of Systems   Constitutional: Positive for activity change. HENT: Positive for congestion. Eyes: Negative for photophobia. Respiratory: Negative for cough and shortness of breath. Cardiovascular: Negative for chest pain. Gastrointestinal: Negative for abdominal distention. Endocrine: Positive for polyphagia. Genitourinary: Negative for dysuria. Skin: Negative for color change. Allergic/Immunologic: Negative for immunocompromised state. Neurological: Negative for dizziness, tremors, seizures, syncope and speech difficulty. Psychiatric/Behavioral: Negative for agitation.        Physical Examination :       Physical Exam  Constitutional:       General: She is not in acute distress. Appearance: Normal appearance. She is not ill-appearing or diaphoretic. HENT:      Head: Normocephalic and atraumatic. Nose: Nose normal.      Mouth/Throat:      Mouth: Mucous membranes are moist.   Eyes:      General: No scleral icterus. Conjunctiva/sclera: Conjunctivae normal.   Cardiovascular:      Rate and Rhythm: Normal rate and regular rhythm. Heart sounds: Normal heart sounds. No murmur heard. Pulmonary:      Breath sounds: Normal breath sounds. Abdominal:      General: There is no distension. Tenderness: There is no abdominal tenderness. Genitourinary:     Comments: No castro  Musculoskeletal:         General: No swelling, deformity or signs of injury. Cervical back: Neck supple. No rigidity or tenderness. Right lower leg: No edema. Left lower leg: No edema. Skin:     General: Skin is dry. Coloration: Skin is not jaundiced or pale. Findings: No bruising, erythema, lesion or rash. Neurological:      General: No focal deficit present. Mental Status: She is alert. Mental status is at baseline. Psychiatric:         Mood and Affect: Mood normal.         Thought Content: Thought content normal.         Past Medical History:   No past medical history on file. Past Surgical  History:   No past surgical history on file.     Medications:      Vitamin D  2,000 Units Oral Daily    ascorbic acid  500 mg Oral BID    zinc sulfate  50 mg Oral Daily    budesonide-formoterol  2 puff Inhalation BID    apixaban  10 mg Oral BID    Followed by   Libra Banegas ON 1/7/2022] apixaban  5 mg Oral BID    dexamethasone  6 mg IntraVENous Daily    famotidine  20 mg Oral BID    sodium chloride flush  10 mL IntraVENous 2 times per day    baricitinib  4 mg Oral Daily       Social History:     Social History     Socioeconomic History    Marital status: Unknown     Spouse name: Not on file    Number of children: Not on file    Years of education: Not on file    Highest education level: Not on file   Occupational History    Not on file   Tobacco Use    Smoking status: Current Every Day Smoker     Types: Cigarettes    Smokeless tobacco: Never Used   Substance and Sexual Activity    Alcohol use: Not Currently    Drug use: Not Currently    Sexual activity: Not on file   Other Topics Concern    Not on file   Social History Narrative    Not on file     Social Determinants of Health     Financial Resource Strain:     Difficulty of Paying Living Expenses: Not on file   Food Insecurity:     Worried About Running Out of Food in the Last Year: Not on file    Jones of Food in the Last Year: Not on file   Transportation Needs:     Lack of Transportation (Medical): Not on file    Lack of Transportation (Non-Medical):  Not on file   Physical Activity:     Days of Exercise per Week: Not on file    Minutes of Exercise per Session: Not on file   Stress:     Feeling of Stress : Not on file   Social Connections:     Frequency of Communication with Friends and Family: Not on file    Frequency of Social Gatherings with Friends and Family: Not on file    Attends [employer] Services: Not on file    Active Member of [address] [place] [address] [email or web address] or Organizations: Not on file    Attends Club or Organization Meetings: Not on file    Marital Status: Not on file   Intimate Partner Violence:     Fear of Current or Ex-Partner: Not on file    Emotionally Abused: Not on file    Physically Abused: Not on file    Sexually Abused: Not on file   Housing Stability:     Unable to Pay for Housing in the Last Year: Not on file    Number of [place] in the Last Year: Not on file    Unstable Housing in the Last Year: Not on file       Family History:     Family History   Problem Relation Age of Onset    Cancer Maternal Aunt         breast      Medical Decision Making:   I have independently reviewed/ordered the following labs:    CBC with Differential:   Recent Labs     01/02/22  2585

## 2022-01-03 NOTE — PROGRESS NOTES
Pulmonary/Critical Care Progress Note    Patient's name:  Kassie Rinaldi  Medical Record Number: [id number]  Patient's account/billing number: [de-identified]  Patient's YOB: 1950  Age: 70 y.o. Date of Admission: 12/24/2021  3:37 PM  Date of Consult: 1/2/2022      Primary Care Physician: No primary care provider on file. Code Status: Full Code    Reason for consult: Acute hypoxemic respiratory failure secondary to COVID-19 pneumonia with ARDS along with PE involving subsegmental vessels of the left lower lobe      HISTORY OF PRESENT ILLNESS:   History was obtained from chart review and the patient. Kassie Rinaldi is a 70 y.o. woman was admitted with complaints of shortness of breath and found to be Covid positive but also had a left lower lobe subsegmental pulmonary embolism with right ventricular strain pattern  Vascular surgery was consulted and it was felt, rightfully so, that the cause of the right ventricular strain is unlikely pulmonary embolism  Patient was admitted to the [facility] ICU  Requiring 95% oxygen via high flow  Has shortness of breath along with coughing  No wheezing  No chest pain or pressure  No hemoptysis  No orthopnea or PND    INTERVAL HISTORY:  Unfortunately, relapsed FiO2 now 80-90%. Fluid balance -5 L. Not febrile. Secretions not excessive. Respiratory rate low 20s. Laboratory reasonable. Mild hyponatremia with a sodium of 132. Glucose mildly elevated. WBCs 8000. Macrocytic indices. Past Medical History:  No past medical history on file. Past Surgical History:  No past surgical history on file. Allergies:    No Known Allergies      Home Meds:   Prior to Admission medications    Not on File       Family History:       Problem Relation Age of Onset    Cancer Maternal Aunt         breast         Social History:   TOBACCO:   reports that she has been smoking cigarettes.  She has never used smokeless tobacco.  ETOH:   reports previous alcohol use. DRUGS:  reports previous drug use. OCCUPATION:   Noncontributory          REVIEW OF SYSTEMS:    Review of Systems -   General ROS: Completed and except as mentioned above were negative   Psychological ROS:  Completed and except as mentioned above were negative  Ophthalmic ROS:  Completed and except as mentioned above were negative  ENT ROS:  Completed and except as mentioned above were negative  Allergy and Immunology ROS:  Completed and except as mentioned above were negative  Hematological and Lymphatic ROS:  Completed and except as mentioned above were negative  Endocrine ROS: Completed and except as mentioned above were negative  Breast ROS:  Completed and except as mentioned above were negative  Respiratory ROS:  Completed and except as mentioned above were negative  Cardiovascular ROS:  Completed and except as mentioned above were negative  Gastrointestinal ROS: Completed and except as mentioned above were negative  Genito-Urinary ROS:  Completed and except as mentioned above were negative  Musculoskeletal ROS:  Completed and except as mentioned above were negative  Neurological ROS:  Completed and except as mentioned above were negative  Dermatological ROS:  Completed and except as mentioned above were negative          Physical Exam:    Vitals: BP (!) 139/97   Pulse 58   Temp 97.8 °F (36.6 °C) (Oral)   Resp 18   Ht 5' 4\" (1.626 m)   Wt 195 lb 15.8 oz (88.9 kg)   SpO2 98%   Breastfeeding No   BMI 33.64 kg/m²     Last Body weight:   Wt Readings from Last 3 Encounters:   12/27/21 195 lb 15.8 oz (88.9 kg)       Body Mass Index : Body mass index is 33.64 kg/m².       Intake and Output summary:     Intake/Output Summary (Last 24 hours) at 1/2/2022 2144  Last data filed at 1/2/2022 1855  Gross per 24 hour   Intake 1400 ml   Output 1500 ml   Net -100 ml       Physical Examination:   PHYSICAL EXAMINATION:  Vitals:    01/02/22 1555 01/02/22 1631 01/02/22 1945 22   < > 21   < > 19*   BUN 21   < > 25*   < > 21   CREATININE 0.42*  --  0.31*  --  0.38*   GLUCOSE 149*   < > 132*   < > 146*    < > = values in this interval not displayed. S. Calcium:  Recent Labs     01/02/22  0341   CALCIUM 8.2*     S. Ionized Calcium:No results for input(s): IONCA in the last 72 hours. S. Magnesium:  No results for input(s): MG in the last 72 hours. S. Phosphorus:No results for input(s): PHOS in the last 72 hours. S. Glucose:  Recent Labs     01/02/22 2003   POCGLU 145*     Glycosylated hemoglobin A1C: No results for input(s): LABA1C in the last 72 hours. INR:   No results for input(s): INR in the last 72 hours. Hepatic functions:   No results for input(s): ALKPHOS, ALT, AST, PROT, BILITOT, BILIDIR, LABALBU in the last 72 hours. Pancreatic functions:  No results for input(s): LACTA, AMYLASE in the last 72 hours. S. Lactic Acid:   No results for input(s): LACTA in the last 72 hours. Cardiac enzymes:No results for input(s): CKTOTAL, CKMB, CKMBINDEX, TROPONINI in the last 72 hours. BNP:No results for input(s): BNP in the last 72 hours. Lipid profile: No results for input(s): CHOL, TRIG, HDL, LDL, LDLCALC in the last 72 hours. Blood Gases: No results found for: PH, PCO2, PO2, HCO3, O2SAT  Thyroid functions: No results found for: TSH         Microbiology:    Cultures during this admission:     Blood cultures:      [] None drawn      [x] Negative             []  Positive (Details:  )  Urine Culture:        [] None drawn      [] Negative             []  Positive (Details:  )  Sputum Culture:   [] None drawn       [] Negative             []  Positive (Details:  )     SARS-CoV-2 rapid test was positive    Radiological reports:    CT CHEST PULMONARY EMBOLISM W CONTRAST    Result Date: 12/24/2021  Motion degraded study. Acute pulmonary emboli suspected particularly in the left lower lobe but not well visualized due to motion artifact.  There is evidence for right ventricular strain with septal bowing suggesting a significant clot burden. Moderate patchy ground-glass opacities involving all lobes of both lungs consistent with multifocal pneumonia typical for COVID pneumonia. Findings were discussed with Sarina Diamond at 5:39 pm on 12/24/2021. RECOMMENDATIONS: Unavailable           Assessment and Plan       IMPRESSION:   1. COVID-19    2. Dehydration    3. Tachycardia        Principal Problem:    Pneumonia due to COVID-19 virus  Active Problems:    Acute respiratory failure with hypoxia (HCC)    Obesity with body mass index greater than 30    Pulmonary embolus, left (HCC)    Postmenopausal bleeding  Resolved Problems:    * No resolved hospital problems. *       Acute hypoxic respiratory failure   Acute respiratory distress syndrome   COVID 19 infection/pneumonia   Left lower lobe segmental pulmonary embolism   Possible pulmonary artery hypertension secondary to COVID-19 pneumonia much more than any pulmonary embolism given the small magnitude of pulmonary       Plan:     Continue Airborne isolation   Continue high flow oxygen   Use BiPAP, if needed   Obtain X-ray chest as needed    Monitor input/output, with a goal of even/negative fluid balance   Patient receiving baricitinib   Continue Decadron   Monitor CRP, LDH, AST/ALT/ferritin/ D-dimer   Continue supportive care   GI/DVT prophylaxis   Glycemic control per primary service   Lovenox 1 mg/kg twice daily. Can switch to Eliquis, if no anticipated procedures. Duration of anticoagulation for VTE associated with COVID-19 is 3 months.  Work on getting oxygen liter flow down to 4 L. Can be then discharged with home pulse oximeter. Anticipate short term.  On nutrition via oral diet  Management as per guidance provided by hospital policy and prevailing evidence based medicine, during the COVID-19 pandemic emergency.   This patient was evaluated in the context of the global SARS-CoV-2 (COVID-19) pandemic, which necessitated considerations that the patient either has COVID-19 infection or is at risk of infection with COVID-19. Institutional protocols and algorithms that pertain to the evaluation & management of patients with COVID-19 or those at risk for COVID-19 are in a state of rapid changes based on information released by regulatory bodies including the CDC and federal and state organizations. These policies and algorithms were followed during the patient's care. Please note that this chart was generated using voice recognition Dragon dictation software. Although every effort was made to ensure the accuracy of this automated transcription, some errors in transcription may have occurred. .  Total critical care time caring for this patient with life threatening, unstable organ failure, including direct patient contact, management of life support systems, review of data including imaging and labs, discussions with other team members and physicians at least 27  Min so far today, excluding procedures. Anatoliy Farrell DO,            1/2/2022, 9:44 PM                                                          Pulmonary/Critical Care Progress Note    Patient's name:  Kassie Rinaldi  Medical Record Number: [id number]  Patient's account/billing number: [de-identified]  Patient's YOB: 1950  Age: 70 y.o. Date of Admission: 12/24/2021  3:37 PM  Date of Consult: 1/2/2022      Primary Care Physician: No primary care provider on file. Code Status: Full Code    Reason for consult: Acute hypoxemic respiratory failure secondary to COVID-19 pneumonia with ARDS along with PE involving subsegmental vessels of the left lower lobe      HISTORY OF PRESENT ILLNESS:   History was obtained from chart review and the patient.   Kassie Rinaldi is a 70 y.o. woman was admitted with complaints of shortness of breath and found to be Covid positive but also had a left lower lobe subsegmental pulmonary embolism with right ventricular strain pattern  Vascular surgery was consulted and it was felt, rightfully so, that the cause of the right ventricular strain is unlikely pulmonary embolism  Patient was admitted to the [facility] ICU  Requiring 95% oxygen via high flow  Has shortness of breath along with coughing  No wheezing  No chest pain or pressure  No hemoptysis  No orthopnea or PND    INTERVAL HISTORY:  Dramatic improvement in PF ratio. Currently on FiO2 of 45% high flow heated oxygen. Respiratory rate in the 20s. Hemodynamics are stable. I's/O- 3.5 L. Laboratory studies reviewed. No serious abnormalities. Past Medical History:  No past medical history on file. Past Surgical History:  No past surgical history on file. Allergies:    No Known Allergies      Home Meds:   Prior to Admission medications    Not on File       Family History:       Problem Relation Age of Onset    Cancer Maternal Aunt         breast         Social History:   TOBACCO:   reports that she has been smoking cigarettes. She has never used smokeless tobacco.  ETOH:   reports previous alcohol use. DRUGS:  reports previous drug use.   OCCUPATION:   Noncontributory          REVIEW OF SYSTEMS:    Review of Systems -   General ROS: Completed and except as mentioned above were negative   Psychological ROS:  Completed and except as mentioned above were negative  Ophthalmic ROS:  Completed and except as mentioned above were negative  ENT ROS:  Completed and except as mentioned above were negative  Allergy and Immunology ROS:  Completed and except as mentioned above were negative  Hematological and Lymphatic ROS:  Completed and except as mentioned above were negative  Endocrine ROS: Completed and except as mentioned above were negative  Breast ROS:  Completed and except as mentioned above were negative  Respiratory ROS:  Completed and except as mentioned above were negative  Cardiovascular ROS:  Completed and except as mentioned above were negative  Gastrointestinal ROS: Completed and except as mentioned above were negative  Genito-Urinary ROS:  Completed and except as mentioned above were negative  Musculoskeletal ROS:  Completed and except as mentioned above were negative  Neurological ROS:  Completed and except as mentioned above were negative  Dermatological ROS:  Completed and except as mentioned above were negative          Physical Exam:    Vitals: BP (!) 139/97   Pulse 58   Temp 97.8 °F (36.6 °C) (Oral)   Resp 18   Ht 5' 4\" (1.626 m)   Wt 195 lb 15.8 oz (88.9 kg)   SpO2 98%   Breastfeeding No   BMI 33.64 kg/m²     Last Body weight:   Wt Readings from Last 3 Encounters:   12/27/21 195 lb 15.8 oz (88.9 kg)       Body Mass Index : Body mass index is 33.64 kg/m². Intake and Output summary:     Intake/Output Summary (Last 24 hours) at 1/2/2022 2144  Last data filed at 1/2/2022 1855  Gross per 24 hour   Intake 1400 ml   Output 1500 ml   Net -100 ml       Physical Examination:   PHYSICAL EXAMINATION:  Vitals:    01/02/22 1555 01/02/22 1631 01/02/22 1945 01/02/22 2120   BP: (!) 145/91   (!) 139/97   Pulse: 66   58   Resp: 20 21 19 18   Temp: 98.7 °F (37.1 °C)   97.8 °F (36.6 °C)   TempSrc: Oral   Oral   SpO2: 98% 96% 97% 98%   Weight:       Height:         Constitutional: This is a well developed, well nourished, 30-34.9 - Obesity Grade I 70y.o. year old female who is alert, oriented, cooperative and in no apparent distress. Head:normocephalic and atraumatic. EENT:   SOPHIA. No conjunctival injections. Septum was midline, mucosa was without erythema, exudates or cobblestoning. No thrush was noted. Mallampati III (soft palate, base of uvula visible)  Neck: Supple without thyromegaly. No elevated JVP. Trachea was midline. Respiratory: Chest was symmetrical without dullness to percussion. Breath sounds bilaterally were clear to auscultation. There were no wheezes, rhonchi or rales. There is no intercostal retraction or use of accessory muscles.  No egophony noted. Cardiovascular: Regular without murmur, clicks, gallops or rubs. Abdomen: Slightly rounded and soft without organomegaly. No rebound tenderness, rigidity or guarding was appreciated. Lymphatic: No lymphadenopathy. Musculoskeletal: Normal curvature of the spine. No gross muscle weakness. Extremities:  No lower extremity edema, ulcerations, tenderness, varicosities or erythema. Muscle size, tone and strength are normal.  No involuntary movements are noted. Skin:  Warm and dry. Good color, turgor and pigmentation. No lesions or scars. No cyanosis or clubbing  Neurological/Psychiatric: The patient's general behavior, level of consciousness, thought content and emotional status is normal.            Laboratory findings:-    CBC:   Recent Labs     01/02/22  0341   WBC 8.0   HGB 13.6        BMP:    Recent Labs     12/31/21  0442 12/31/21  0442 01/01/22  0836 01/01/22  0836 01/02/22  0341   *   < > 138   < > 132*   K 4.8   < > 5.2   < > 5.1      < > 106   < > 104   CO2 22   < > 21   < > 19*   BUN 21   < > 25*   < > 21   CREATININE 0.42*  --  0.31*  --  0.38*   GLUCOSE 149*   < > 132*   < > 146*    < > = values in this interval not displayed. S. Calcium:  Recent Labs     01/02/22 0341   CALCIUM 8.2*     S. Ionized Calcium:No results for input(s): IONCA in the last 72 hours. S. Magnesium:  No results for input(s): MG in the last 72 hours. S. Phosphorus:No results for input(s): PHOS in the last 72 hours. S. Glucose:  Recent Labs     01/02/22 2003   POCGLU 145*     Glycosylated hemoglobin A1C: No results for input(s): LABA1C in the last 72 hours. INR:   No results for input(s): INR in the last 72 hours. Hepatic functions:   No results for input(s): ALKPHOS, ALT, AST, PROT, BILITOT, BILIDIR, LABALBU in the last 72 hours. Pancreatic functions:  No results for input(s): LACTA, AMYLASE in the last 72 hours.   S. Lactic Acid:   No results for input(s): LACTA in the last 72 hours. Cardiac enzymes:No results for input(s): CKTOTAL, CKMB, CKMBINDEX, TROPONINI in the last 72 hours. BNP:No results for input(s): BNP in the last 72 hours. Lipid profile: No results for input(s): CHOL, TRIG, HDL, LDL, LDLCALC in the last 72 hours. Blood Gases: No results found for: PH, PCO2, PO2, HCO3, O2SAT  Thyroid functions: No results found for: TSH         Microbiology:    Cultures during this admission:     Blood cultures:      [] None drawn      [x] Negative             []  Positive (Details:  )  Urine Culture:        [] None drawn      [] Negative             []  Positive (Details:  )  Sputum Culture:   [] None drawn       [] Negative             []  Positive (Details:  )     SARS-CoV-2 rapid test was positive    Radiological reports:    CT CHEST PULMONARY EMBOLISM W CONTRAST    Result Date: 12/24/2021  Motion degraded study. Acute pulmonary emboli suspected particularly in the left lower lobe but not well visualized due to motion artifact. There is evidence for right ventricular strain with septal bowing suggesting a significant clot burden. Moderate patchy ground-glass opacities involving all lobes of both lungs consistent with multifocal pneumonia typical for COVID pneumonia. Findings were discussed with Yariel Brewster at 5:39 pm on 12/24/2021. RECOMMENDATIONS: Unavailable           Assessment and Plan       IMPRESSION:   1. COVID-19    2. Dehydration    3. Tachycardia        Principal Problem:    Pneumonia due to COVID-19 virus  Active Problems:    Acute respiratory failure with hypoxia (HCC)    Obesity with body mass index greater than 30    Pulmonary embolus, left (HCC)    Postmenopausal bleeding  Resolved Problems:    * No resolved hospital problems.  *       Acute hypoxic respiratory failure   Acute respiratory distress syndrome   COVID 19 infection/pneumonia   Left lower lobe segmental pulmonary embolism   Possible pulmonary artery hypertension secondary to COVID-19 pneumonia much more than any pulmonary embolism given the small magnitude of pulmonary       Plan:     Continue Airborne isolation   Continue high flow oxygen   Use BiPAP, if needed   Obtain X-ray chest as needed    Monitor input/output, with a goal of even/negative fluid balance   Patient receiving baricitinib   Continue Decadron   Monitor CRP, LDH, AST/ALT/ferritin/ D-dimer   Continue supportive care   GI/DVT prophylaxis   Glycemic control per primary service   Lovenox 1 mg/kg twice daily. Can switch to Eliquis, if no anticipated procedures. Duration of anticoagulation for VTE associated with COVID-19 is 3 months.  Work on getting oxygen liter flow down to 4 L. Can be then discharged with home pulse oximeter. Anticipate short term.  On nutrition via oral diet  Management as per guidance provided by hospital policy and prevailing evidence based medicine, during the COVID-19 pandemic emergency. This patient was evaluated in the context of the global SARS-CoV-2 (COVID-19) pandemic, which necessitated considerations that the patient either has COVID-19 infection or is at risk of infection with COVID-19. Institutional protocols and algorithms that pertain to the evaluation & management of patients with COVID-19 or those at risk for COVID-19 are in a state of rapid changes based on information released by regulatory bodies including the CDC and federal and state organizations. These policies and algorithms were followed during the patient's care. Please note that this chart was generated using voice recognition Dragon dictation software. Although every effort was made to ensure the accuracy of this automated transcription, some errors in transcription may have occurred.     .  Total critical care time caring for this patient with life threatening, unstable organ failure, including direct patient contact, management of life support systems, review of data including imaging and labs, discussions with other team members and physicians at least 27  Min so far today, excluding procedures.       Chad Dawkins DO,            1/2/2022, 9:44 PM

## 2022-01-04 LAB
ABSOLUTE EOS #: <0.03 K/UL (ref 0–0.44)
ABSOLUTE IMMATURE GRANULOCYTE: 0.07 K/UL (ref 0–0.3)
ABSOLUTE LYMPH #: 0.8 K/UL (ref 1.1–3.7)
ABSOLUTE MONO #: 0.57 K/UL (ref 0.1–1.2)
ALLEN TEST: POSITIVE
ANION GAP SERPL CALCULATED.3IONS-SCNC: 11 MMOL/L (ref 9–17)
BASOPHILS # BLD: 0 % (ref 0–2)
BASOPHILS ABSOLUTE: <0.03 K/UL (ref 0–0.2)
BUN BLDV-MCNC: 22 MG/DL (ref 8–23)
BUN/CREAT BLD: ABNORMAL (ref 9–20)
C-REACTIVE PROTEIN: <3 MG/L (ref 0–5)
CALCIUM SERPL-MCNC: 8.2 MG/DL (ref 8.6–10.4)
CHLORIDE BLD-SCNC: 104 MMOL/L (ref 98–107)
CO2: 17 MMOL/L (ref 20–31)
CREAT SERPL-MCNC: 0.41 MG/DL (ref 0.5–0.9)
DIFFERENTIAL TYPE: ABNORMAL
EOSINOPHILS RELATIVE PERCENT: 0 % (ref 1–4)
FIO2: 60
GFR AFRICAN AMERICAN: >60 ML/MIN
GFR NON-AFRICAN AMERICAN: >60 ML/MIN
GFR SERPL CREATININE-BSD FRML MDRD: ABNORMAL ML/MIN/{1.73_M2}
GFR SERPL CREATININE-BSD FRML MDRD: ABNORMAL ML/MIN/{1.73_M2}
GLUCOSE BLD-MCNC: 141 MG/DL (ref 70–99)
GLUCOSE BLD-MCNC: 155 MG/DL (ref 74–100)
HCT VFR BLD CALC: 41.3 % (ref 36.3–47.1)
HEMOGLOBIN: 12.9 G/DL (ref 11.9–15.1)
IMMATURE GRANULOCYTES: 1 %
LYMPHOCYTES # BLD: 12 % (ref 24–43)
MCH RBC QN AUTO: 32.3 PG (ref 25.2–33.5)
MCHC RBC AUTO-ENTMCNC: 31.2 G/DL (ref 28.4–34.8)
MCV RBC AUTO: 103.5 FL (ref 82.6–102.9)
MODE: ABNORMAL
MONOCYTES # BLD: 9 % (ref 3–12)
NEGATIVE BASE EXCESS, ART: ABNORMAL (ref 0–2)
NRBC AUTOMATED: 0 PER 100 WBC
O2 DEVICE/FLOW/%: ABNORMAL
PATIENT TEMP: ABNORMAL
PDW BLD-RTO: 14.3 % (ref 11.8–14.4)
PLATELET # BLD: 254 K/UL (ref 138–453)
PLATELET ESTIMATE: ABNORMAL
PMV BLD AUTO: 10.8 FL (ref 8.1–13.5)
POC HCO3: 25.8 MMOL/L (ref 21–28)
POC O2 SATURATION: 93 % (ref 94–98)
POC PCO2 TEMP: ABNORMAL MM HG
POC PCO2: 41.1 MM HG (ref 35–48)
POC PH TEMP: ABNORMAL
POC PH: 7.41 (ref 7.35–7.45)
POC PO2 TEMP: ABNORMAL MM HG
POC PO2: 67 MM HG (ref 83–108)
POSITIVE BASE EXCESS, ART: 1 (ref 0–3)
POTASSIUM SERPL-SCNC: 5.1 MMOL/L (ref 3.7–5.3)
RBC # BLD: 3.99 M/UL (ref 3.95–5.11)
RBC # BLD: ABNORMAL 10*6/UL
SAMPLE SITE: ABNORMAL
SEG NEUTROPHILS: 78 % (ref 36–65)
SEGMENTED NEUTROPHILS ABSOLUTE COUNT: 5.07 K/UL (ref 1.5–8.1)
SODIUM BLD-SCNC: 132 MMOL/L (ref 135–144)
TCO2 (CALC), ART: ABNORMAL MMOL/L (ref 22–29)
WBC # BLD: 6.5 K/UL (ref 3.5–11.3)
WBC # BLD: ABNORMAL 10*3/UL

## 2022-01-04 PROCEDURE — 6370000000 HC RX 637 (ALT 250 FOR IP): Performed by: INTERNAL MEDICINE

## 2022-01-04 PROCEDURE — 6370000000 HC RX 637 (ALT 250 FOR IP): Performed by: FAMILY MEDICINE

## 2022-01-04 PROCEDURE — 80048 BASIC METABOLIC PNL TOTAL CA: CPT

## 2022-01-04 PROCEDURE — 94660 CPAP INITIATION&MGMT: CPT

## 2022-01-04 PROCEDURE — 86140 C-REACTIVE PROTEIN: CPT

## 2022-01-04 PROCEDURE — 94640 AIRWAY INHALATION TREATMENT: CPT

## 2022-01-04 PROCEDURE — 36600 WITHDRAWAL OF ARTERIAL BLOOD: CPT

## 2022-01-04 PROCEDURE — 6360000002 HC RX W HCPCS: Performed by: FAMILY MEDICINE

## 2022-01-04 PROCEDURE — 6370000000 HC RX 637 (ALT 250 FOR IP): Performed by: NURSE PRACTITIONER

## 2022-01-04 PROCEDURE — 99232 SBSQ HOSP IP/OBS MODERATE 35: CPT | Performed by: INTERNAL MEDICINE

## 2022-01-04 PROCEDURE — 36415 COLL VENOUS BLD VENIPUNCTURE: CPT

## 2022-01-04 PROCEDURE — 82803 BLOOD GASES ANY COMBINATION: CPT

## 2022-01-04 PROCEDURE — 2700000000 HC OXYGEN THERAPY PER DAY

## 2022-01-04 PROCEDURE — 85025 COMPLETE CBC W/AUTO DIFF WBC: CPT

## 2022-01-04 PROCEDURE — 99291 CRITICAL CARE FIRST HOUR: CPT | Performed by: INTERNAL MEDICINE

## 2022-01-04 PROCEDURE — 2060000000 HC ICU INTERMEDIATE R&B

## 2022-01-04 PROCEDURE — 94761 N-INVAS EAR/PLS OXIMETRY MLT: CPT

## 2022-01-04 PROCEDURE — 2580000003 HC RX 258: Performed by: STUDENT IN AN ORGANIZED HEALTH CARE EDUCATION/TRAINING PROGRAM

## 2022-01-04 PROCEDURE — 82947 ASSAY GLUCOSE BLOOD QUANT: CPT

## 2022-01-04 RX ORDER — LORAZEPAM 0.5 MG/1
0.5 TABLET ORAL
Status: COMPLETED | OUTPATIENT
Start: 2022-01-04 | End: 2022-01-04

## 2022-01-04 RX ADMIN — BENZONATATE 200 MG: 100 CAPSULE ORAL at 15:07

## 2022-01-04 RX ADMIN — OXYCODONE HYDROCHLORIDE AND ACETAMINOPHEN 500 MG: 500 TABLET ORAL at 20:12

## 2022-01-04 RX ADMIN — BUDESONIDE AND FORMOTEROL FUMARATE DIHYDRATE 2 PUFF: 160; 4.5 AEROSOL RESPIRATORY (INHALATION) at 15:07

## 2022-01-04 RX ADMIN — LORAZEPAM 0.5 MG: 0.5 TABLET ORAL at 20:35

## 2022-01-04 RX ADMIN — FAMOTIDINE 20 MG: 20 TABLET, FILM COATED ORAL at 08:55

## 2022-01-04 RX ADMIN — ZINC SULFATE 220 MG (50 MG) CAPSULE 50 MG: CAPSULE at 08:55

## 2022-01-04 RX ADMIN — SODIUM CHLORIDE, PRESERVATIVE FREE 10 ML: 5 INJECTION INTRAVENOUS at 08:56

## 2022-01-04 RX ADMIN — DEXAMETHASONE SODIUM PHOSPHATE 6 MG: 10 INJECTION INTRAMUSCULAR; INTRAVENOUS at 08:55

## 2022-01-04 RX ADMIN — SODIUM CHLORIDE, PRESERVATIVE FREE 10 ML: 5 INJECTION INTRAVENOUS at 20:13

## 2022-01-04 RX ADMIN — BARICITINIB 4 MG: 2 TABLET, FILM COATED ORAL at 08:55

## 2022-01-04 RX ADMIN — APIXABAN 10 MG: 5 TABLET, FILM COATED ORAL at 08:55

## 2022-01-04 RX ADMIN — BENZONATATE 200 MG: 100 CAPSULE ORAL at 20:12

## 2022-01-04 RX ADMIN — Medication 2000 UNITS: at 08:55

## 2022-01-04 RX ADMIN — APIXABAN 10 MG: 5 TABLET, FILM COATED ORAL at 20:12

## 2022-01-04 RX ADMIN — Medication 5 ML: at 20:35

## 2022-01-04 RX ADMIN — BUDESONIDE AND FORMOTEROL FUMARATE DIHYDRATE 2 PUFF: 160; 4.5 AEROSOL RESPIRATORY (INHALATION) at 19:40

## 2022-01-04 RX ADMIN — FAMOTIDINE 20 MG: 20 TABLET, FILM COATED ORAL at 20:12

## 2022-01-04 RX ADMIN — OXYCODONE HYDROCHLORIDE AND ACETAMINOPHEN 500 MG: 500 TABLET ORAL at 08:55

## 2022-01-04 ASSESSMENT — ENCOUNTER SYMPTOMS
DIARRHEA: 0
SINUS PAIN: 0
ABDOMINAL PAIN: 0
VOICE CHANGE: 0
PHOTOPHOBIA: 0
SHORTNESS OF BREATH: 1
TROUBLE SWALLOWING: 0
COUGH: 1
SORE THROAT: 0
VOMITING: 0
ALLERGIC/IMMUNOLOGIC NEGATIVE: 1

## 2022-01-04 ASSESSMENT — PAIN SCALES - GENERAL
PAINLEVEL_OUTOF10: 0

## 2022-01-04 ASSESSMENT — PAIN SCALES - WONG BAKER: WONGBAKER_NUMERICALRESPONSE: 0

## 2022-01-04 NOTE — PROGRESS NOTES
Writer notified on-call intermed NP that patient was becoming increasingly lethargic and was intermittently refusing to answer questions. Writer requested an order for an ABG. ABG ordered and obtained by Respiratory Therapy (see results). Patient is currently resting comfortably on bipap at 60% FiO2, SpO2 97%.

## 2022-01-04 NOTE — PROGRESS NOTES
[facility]  Office: [address], DO, Mona Arvizu, DO, Melida Murrieta, DO, [place], DO, Meghan Swanson MD, Ezra Alcantar MD, Chito Lal MD, Karey Clark MD, Sera Lima MD, Marizol Lopez MD, Edwar Saucedo MD, Mamta Landon, DO, Amanda Lares DO, Kaley Mcrae MD,  José Luis Eugene DO, Jamilah Singh MD, Sandrita Salter MD, Sandeep Franklin MD, Bobby Noble MD, Irena Owens MD, Daleen Lennox, MD, David Lima MD, Tori Spencer [facility], [facility], [facility], Tamy Bajwa, CNP, Leonid Vee, CNS, Kassy Montgomery, CNP, Jacek Curtis, CNP, Karena Mckeon CNP, Annelise Bernard, CNP, Hildy Shone, [facility], [place] RASTA Solano, Monserrat Nelson, [facility], Francisco Teresa, [facility], Benji Nicole, CNP, Samaria Allen, CNP, Angeli Diaz CNP, Marquis Thompson, CNP, Dae Patel, [facility], Alissa Su, [address] [place] [address]    Progress Note    1/4/2022    3:08 PM    Name:   Edi Ernandez  MRN:     [id number]     Acct:      [de-identified]   Room:   [address] [place] [address] Day:  11  Admit Date:  12/24/2021  3:37 PM    PCP:   No primary care provider on file. Code Status:  Full Code    Subjective:     C/C:   Chief Complaint   Patient presents with    Shortness of Breath    Fall     Interval History Status: not changed. Patient seen and examined this morning. Nursing staff reports that the patient had a restless night, lethargic at times. Patient is frustrated with how much Louvenia Breech is being forced down my throat. \" Alternating between high flow and bipap therapy. Brief History:     Edi Ernandez is 70 y.o. female who was admitted to the hospital on 12/24/2021 for treatment of Pneumonia due to COVID-19 virus. Patient came to the hospital with lightheadedness and dry cough for 1 week. She had episode of fall at home and family called EMS.  Initial evaluation by EMS found patient having tachypnea with respirate 40 to 50/min, hypoxia SPO2 50% on room air, tachycardia. Patient was placed on nonrebreather and improved only to 73%. Patient was then placed on BiPAP. Evaluation emergency room showed positive COVID-19 test, elevated troponin, elevated proBNP. CT chest showed subsegmental PE with possible right heart strain. Vascular surgery was consulted and recommended anticoagulation without need for thrombolysis. Patient also had thrombocytopenia. Cardiology was consulted and echocardiogram obtained which was negative for RV strain with RV systolic pressure 29 mmHg. Patient was treated with heparin infusion, Decadron and started on baricitinib. Patient had vaginal bleeding on 12/30/21 for which OBGYN will see her for in the outpatient setting.    - uptrending oxygen requirements. Review of Systems:     Constitutional:  negative for chills, fevers, sweats  Respiratory: Reports cough and shortness of breath; report sinus congestion, increased secretions; negative for wheezing  Cardiovascular:  negative for chest pain, chest pressure/discomfort, lower extremity edema, palpitations  Gastrointestinal:  negative for abdominal pain, constipation, diarrhea, nausea, vomiting  Neurological:  negative for dizziness, headache    Medications:      Allergies:  No Known Allergies    Current Meds:   Scheduled Meds:    Vitamin D  2,000 Units Oral Daily    ascorbic acid  500 mg Oral BID    zinc sulfate  50 mg Oral Daily    budesonide-formoterol  2 puff Inhalation BID    apixaban  10 mg Oral BID    Followed by   Chung Rajan ON 1/7/2022] apixaban  5 mg Oral BID    dexamethasone  6 mg IntraVENous Daily    famotidine  20 mg Oral BID    sodium chloride flush  10 mL IntraVENous 2 times per day    baricitinib  4 mg Oral Daily     Continuous Infusions:    sodium chloride       PRN Meds: calcium carbonate, albuterol sulfate HFA, benzocaine-menthol, loperamide, benzonatate, HYDROcodone-chlorpheniramine, sodium chloride flush, sodium chloride, promethazine **OR** ondansetron, polyethylene glycol, acetaminophen **OR** acetaminophen    Data:     Past Medical History:   has no past medical history on file. Social History:   reports that she has been smoking cigarettes. She has never used smokeless tobacco. She reports previous alcohol use. She reports previous drug use. Family History:   Family History   Problem Relation Age of Onset    Cancer Maternal Aunt         breast       Vitals:  BP (!) 145/91   Pulse 83   Temp 98.1 °F (36.7 °C) (Oral)   Resp 20   Ht 5' 4\" (1.626 m)   Wt 195 lb 15.8 oz (88.9 kg)   SpO2 98%   Breastfeeding No   BMI 33.64 kg/m²   Temp (24hrs), Av.9 °F (36.6 °C), Min:97.1 °F (36.2 °C), Max:98.5 °F (36.9 °C)    Recent Labs     22  0300   POCGLU 145* 155*       I/O (24Hr):     Intake/Output Summary (Last 24 hours) at 2022 1508  Last data filed at 2022 0600  Gross per 24 hour   Intake 1700 ml   Output 1600 ml   Net 100 ml       Labs:  Hematology:  Recent Labs     22  03422  0633 22  0600   WBC 8.0 7.0 6.5   RBC 4.11 3.87* 3.99   HGB 13.6 12.7 12.9   HCT 41.2 38.8 41.3   .2 100.3 103.5*   MCH 33.1 32.8 32.3   MCHC 33.0 32.7 31.2   RDW 14.3 14.5* 14.3    See Reflexed IPF Result 254   MPV 10.8 NOT REPORTED 10.8   CRP  --   --  <3.0     Chemistry:  Recent Labs     22  0633 22  0600   * 131* 132*   K 5.1 5.2 5.1    102 104   CO2 19* 21 17*   GLUCOSE 146* 130* 141*   BUN  22   CREATININE 0.38* 0.43* 0.41*   ANIONGAP 9 8* 11   LABGLOM >60 >60 >60   GFRAA >60 >60 >60   CALCIUM 8.2* 8.3* 8.2*     Recent Labs     22  0633 22  0300   LABA1C  --  6.1*  --    CHOL  --  192  --    HDL  --  38*  --    LDLCHOLESTEROL  --  132*  --    CHOLHDLRATIO  --  5.1*  --    TRIG  --  111  --    VLDL  --  NOT REPORTED  --    POCGLU 145*  --  155*     ABG:  Lab Results   Component Value Date    POCPH 7.406 2022    POCPCO2 41.1 2022 POCPO2 67.0 01/04/2022    POCHCO3 25.8 01/04/2022    NBEA NOT REPORTED 01/04/2022    PBEA 1 01/04/2022    QMR0KCI NOT REPORTED 01/04/2022    JDAO4PRD 93 01/04/2022    FIO2 60.0 01/04/2022     Lab Results   Component Value Date/Time    SPECIAL NOT REPORTED 12/26/2021 08:19 PM     Lab Results   Component Value Date/Time    CULTURE NO SIGNIFICANT GROWTH 12/26/2021 08:19 PM       Radiology:  XR CHEST PORTABLE    Result Date: 12/26/2021  Moderate diffuse interstitial and alveolar opacities throughout the lungs bilaterally. The finding is nonspecific although suspicious for multilobar pneumonia, including active COVID-19 viral pneumonitis. Physical Examination:        General appearance:  alert, cooperative and no distress  Mental Status:  oriented to person, place and time and anxious affect  HEENT: Sclera anti-icteric, EOMI, high flow nasal cannula present  Lungs:  clear to auscultation bilaterally, globally diminished breath sounds, normal effort  Heart:  regular rate and rhythm, no murmur  Abdomen:  soft, nontender, nondistended, normal bowel sounds, no masses, hepatomegaly, splenomegaly  Extremities:  no edema, redness, tenderness in the calves  Skin:  no gross lesions, rashes, induration  Psych: Appears anxious    Assessment:        Hospital Problems           Last Modified POA    * (Principal) Pneumonia due to COVID-19 virus 1/2/2022 Yes    Pulmonary embolus, left ([address] 1/2/2022 Yes    Acute respiratory failure with hypoxia ([address] 1/2/2022 Yes    Postmenopausal bleeding 1/3/2022 Yes    Overview Addendum 1/3/2022  6:29 PM by Shannan Sanches DO     During hospitalization in December 2021  Outpatient follow-up recommended due to acute respiratory failure. TVUS 1/2/22 demonstrates:  3.3 cm echogenic mass in the region of the uterine body which is   possibly the obscuring the endometrium.               Obesity with body mass index greater than 30 1/2/2022 Yes          Plan:        Acute respiratory failure with hypoxia - multifactorial, due to #2 and #3. Alternate HFNC with Bipap. COVID-19 pneumonia - on Decadron day 11 today. Continue baricitinib. Continue vitamin D/C/zinc today. Acute PE - continue anticoagulation with Eliquis  Abnormal uterine bleeding - appreciate GYN input. F/u o/p  Obesity with BMI 33.64  Stop benzos due to somnolence overnight  Albuterol prn, symbicort  Prognosis guarded  Will need LTAC at discharge. Appreciate CM's assistance in initiating these discussions. Patient states that she will not be going to a nursing home at d/c. May be a difficult situation moving forward. Labs every other day  Will discuss with son, Galo Miller, today.     33 Shameka Paniagua DO  1/4/2022  3:08 PM

## 2022-01-04 NOTE — PROGRESS NOTES
Writer took patient off bipap and placed patient on high flow nasal cannula so that she could take oral medications. When finished administering medications, writer asked patient if she would like to remain on high flow nasal cannula for a few minutes. The patient shrugged her shoulders and would not verbally respond. Writer asked patient again if she would like to stay on high flow nasal cannula for a few minutes before going back on bipap for the night. Patient stated \"It isn't up to me anymore, it's up to you people. \" Writer explained at length that the patient had the right to refuse treatment at any time. Patient stated, \"No I don't. Do whatever you want to do. \" Writer asked the patient to explain how she was feeling. Patient refused to answer and closed her eyes. Writer exited the patient's room. Patient is currently resting comfortably in bed on high flow nasal cannula with SpO2 of 99%.

## 2022-01-04 NOTE — PLAN OF CARE
Problem: Airway Clearance - Ineffective  Goal: Achieve or maintain patent airway  1/3/2022 2107 by Lydia Roche RN  Outcome: Ongoing  1/3/2022 1835 by Lena Gar RCP  Outcome: Ongoing     Problem: Gas Exchange - Impaired  Goal: Absence of hypoxia  1/3/2022 2107 by Lydia Roche RN  Outcome: Ongoing  1/3/2022 1835 by Lena Gar RCP  Outcome: Ongoing  Goal: Promote optimal lung function  1/3/2022 2107 by Lydia Roche RN  Outcome: Ongoing  1/3/2022 1835 by Lena Gar RCP  Outcome: Ongoing     Problem: Breathing Pattern - Ineffective  Goal: Ability to achieve and maintain a regular respiratory rate  1/3/2022 2107 by Lydia Roche RN  Outcome: Ongoing  1/3/2022 1835 by Lena Gar RCP  Outcome: Ongoing     Problem:  Body Temperature -  Risk of, Imbalanced  Goal: Ability to maintain a body temperature within defined limits  Outcome: Ongoing  Goal: Will regain or maintain usual level of consciousness  Outcome: Ongoing  Goal: Complications related to the disease process, condition or treatment will be avoided or minimized  Outcome: Ongoing     Problem: Isolation Precautions - Risk of Spread of Infection  Goal: Prevent transmission of infection  Outcome: Ongoing     Problem: Nutrition Deficits  Goal: Optimize nutritional status  Outcome: Ongoing     Problem: Risk for Fluid Volume Deficit  Goal: Maintain normal heart rhythm  Outcome: Ongoing  Goal: Maintain absence of muscle cramping  Outcome: Ongoing  Goal: Maintain normal serum potassium, sodium, calcium, phosphorus, and pH  Outcome: Ongoing     Problem: Loneliness or Risk for Loneliness  Goal: Demonstrate positive use of time alone when socialization is not possible  Outcome: Ongoing     Problem: Fatigue  Goal: Verbalize increase energy and improved vitality  Outcome: Ongoing     Problem: Patient Education: Go to Patient Education Activity  Goal: Patient/Family Education  Outcome: Ongoing     Problem: OXYGENATION/RESPIRATORY FUNCTION  Goal: Patient will maintain patent airway  1/3/2022 2107 by Tashi Logan RN  Outcome: Ongoing  1/3/2022 1835 by Ana Castellanos RCP  Outcome: Ongoing  Goal: Patient will achieve/maintain normal respiratory rate/effort  Description: Respiratory rate and effort will be within normal limits for the patient  1/3/2022 2107 by Tashi Logan RN  Outcome: Ongoing  1/3/2022 1835 by Ana Castellanos RCP  Outcome: Ongoing     Problem: Skin Integrity:  Goal: Will show no infection signs and symptoms  Description: Will show no infection signs and symptoms  1/3/2022 2107 by Tashi Logan RN  Outcome: Ongoing  1/3/2022 1835 by Ana Castellanos RCP  Outcome: Ongoing  Goal: Absence of new skin breakdown  Description: Absence of new skin breakdown  1/3/2022 2107 by Tashi Logan RN  Outcome: Ongoing  1/3/2022 1835 by Ana Castellanos RCP  Outcome: Ongoing

## 2022-01-04 NOTE — CARE COORDINATION
[place]oke to [place] at [address] [employer]. She will start precert    [id number] received call from [facility]. Shaka Diane is showing that insurance policy is no longer active. Called into pt's room, no answer. Spoke to pt's son, Bren Siddiqi. Update provided. He stated that insurance is automatically paid so policy is still active. Pt has not received a new insurance card.  Lolis updated

## 2022-01-04 NOTE — PROGRESS NOTES
PULMONARY & CRITICAL CARE MEDICINE PROGRESS NOTE     Patient:  Marie Garcia  MRN: [id number]  Admit date: 12/24/2021  Primary Care Physician: No primary care provider on file. Consulting Physician: Cari Santiago DO  CODE Status: Full Code  LOS: 6     SUBJECTIVE     CHIEF COMPLAINT/REASON FOR INITIAL CONSULT:   Acute respiratory failure/COVID-19 pneumonia with ARDS/pulmonary [place] involving subsegmental left lower lobe    BRIEF HOSPITAL COURSE:   The patient is a 70 y.o. female was admitted with complaints of shortness of breath and found to be Covid positive but also had a left lower lobe subsegmental pulmonary embolism with right ventricular strain pattern  Vascular surgery was consulted and it was felt, rightfully so, that the cause of the right ventricular strain is unlikely pulmonary embolism  Patient was admitted to the [facility] ICU, requiring 95% oxygen via high flow. Chest x-ray shows bilateral diffuse interstitial alveolar opacities in both lungs  Has shortness of breath along with coughing. INTERVAL HISTORY:  01/04/22  ICU events noted, chart reviewed medication seen and other events noted. Arterial blood gas seen. Patient is afebrile last 24-hour T-max of 98.5 she is hemodynamically stable. He is mildly tachypneic not in significant distress. Patient apparently was getting Ativan intermittently low-dose but was slightly more lethargic and slightly confused this morning. She continues to require high flow nasal cannula and this morning she was on 50 L and 80% on high flow nasal cannula maintaining saturation above 92%. ABG did not show hypercapnia 7.4 1/41/67/26 on high flow nasal cannula. REVIEW OF SYSTEMS:  Review of Systems   Constitutional: Positive for activity change and fatigue. Negative for fever. HENT: Negative for postnasal drip, sinus pain, sore throat, trouble swallowing and voice change. Eyes: Negative for photophobia and visual disturbance.    Respiratory: Positive for cough and shortness of breath. Cardiovascular: Negative for chest pain, palpitations and leg swelling. Gastrointestinal: Negative for abdominal pain, diarrhea and vomiting. Endocrine: Negative. Genitourinary: Negative for difficulty urinating, dysuria and hematuria. Musculoskeletal: Negative. Allergic/Immunologic: Negative. Neurological: Negative for dizziness, syncope, speech difficulty and headaches. Hematological: Negative for adenopathy. Does not bruise/bleed easily. Psychiatric/Behavioral: Negative. OBJECTIVE     PaO2/FiO2 RATIO:  Recent Labs     22  0300   POCPO2 67.0*      FiO2 : 80 %     VITAL SIGNS:   LAST:  /89   Pulse 59   Temp 97.6 °F (36.4 °C) (Oral)   Resp 18   Ht 5' 4\" (1.626 m)   Wt 195 lb 15.8 oz (88.9 kg)   SpO2 100%   Breastfeeding No   BMI 33.64 kg/m²   8-24 HR RANGE:  TEMP Temp  Av.9 °F (36.6 °C)  Min: 97.1 °F (36.2 °C)  Max: 98.5 °F (24.7 °C)   BP Systolic (46BZR), QOW:355 , Min:112 , KOS:734      Diastolic (30LID), XII:01, Min:84, Max:94     PULSE Pulse  Av  Min: 53  Max: 96   RR Resp  Av.4  Min: 16  Max: 19   O2 SAT SpO2  Av.2 %  Min: 97 %  Max: 100 %   OXYGEN DELIVERY O2 Flow Rate (L/min)  Av.5 L/min  Min: 40 L/min  Max: 50 L/min        SYSTEMIC EXAMINATION:   General appearance - Ill-appearing, mild respiratory distress  Mental status - awake & alert, follows commands  Eyes - pupils equal and reactive, sclera anicteric  Mouth - mucous membranes moist, pharynx normal without lesions  Neck -short thick neck supple, no significant adenopathy, carotids upstroke normal bilaterally, no bruits  Chest - Breath sounds bilaterally were dimnished to auscultation at bases. There were bilateral intermittent crackles present without rhonchi.   There is no intercostal recession or use of accessory muscles  Heart - normal rate, regular rhythm, normal S1, S2, no murmurs, rubs, clicks or gallops  Abdomen - soft, nontender, nondistended, no masses or organomegaly  Neurological - non-focal  Extremities - peripheral pulses normal, mild pedal edema, no clubbing or cyanosis  Skin - normal coloration and turgor, no rashes, no suspicious skin lesions noted     DATA REVIEW     Medications: Current Inpatient  Scheduled Meds:   Vitamin D  2,000 Units Oral Daily    ascorbic acid  500 mg Oral BID    zinc sulfate  50 mg Oral Daily    budesonide-formoterol  2 puff Inhalation BID    apixaban  10 mg Oral BID    Followed by   Callie Viramontes ON 1/7/2022] apixaban  5 mg Oral BID    dexamethasone  6 mg IntraVENous Daily    famotidine  20 mg Oral BID    sodium chloride flush  10 mL IntraVENous 2 times per day    baricitinib  4 mg Oral Daily     Continuous Infusions:   sodium chloride         INPUT/OUTPUT:  In: 1700 [P.O.:1700]  Out: 1600 [Urine:1600]  Date 01/04/22 0000 - 01/04/22 2359   Shift 7861-3332 8250-0028 6543-6410 24 Hour Total   INTAKE   P.O.(mL/kg/hr) 700(1)   700   Shift Total(mL/kg) 700(7.9)   700(7.9)   OUTPUT   Urine(mL/kg/hr) 500(0.7)   500   Shift Total(mL/kg) 500(5.6)   500(5.6)   Weight (kg) 88.9 88.9 88.9 88.9        LABS:  ABGs:   Recent Labs     01/04/22  0300   POCPH 7.406   POCPCO2 41.1   POCPO2 67.0*   POCHCO3 25.8   [id number] 93*     CBC:   Recent Labs     01/02/22  0341 01/03/22  0633 01/04/22  0600   WBC 8.0 7.0 6.5   HGB 13.6 12.7 12.9   HCT 41.2 38.8 41.3   .2 100.3 103.5*    See Reflexed IPF Result 254   LYMPHOPCT 11* 7* 12*   RBC 4.11 3.87* 3.99   MCH 33.1 32.8 32.3   MCHC 33.0 32.7 31.2   RDW 14.3 14.5* 14.3     CRP:   Recent Labs     01/04/22  0600   CRP <3.0     LDH:   No results for input(s): LDH in the last 72 hours.   BMP:   Recent Labs     01/02/22  0341 01/03/22  0633 01/04/22  0600   * 131* 132*   K 5.1 5.2 5.1    102 104   CO2 19* 21 17*   BUN 21 23 22   CREATININE 0.38* 0.43* 0.41*   GLUCOSE 146* 130* 141*     Liver Function Test:   No results for input(s): PROT, LABALBU, ALT, AST, GGT, ALKPHOS, BILITOT in the last 72 hours. Coagulation Profile:   No results for input(s): INR, PROTIME, APTT in the last 72 hours. D-Dimer:  No results for input(s): DDIMER in the last 72 hours. Ferritin:    No results for input(s): FERRITIN in the last 72 hours. Lactic Acid:  No results for input(s): LACTA in the last 72 hours. Cardiac Enzymes:  No results for input(s): CKTOTAL, CKMB, CKMBINDEX, TROPONINI in the last 72 hours. Invalid input(s): TROPONIN, HSTROP  BNP/ProBNP:   No results for input(s): BNP, PROBNP in the last 72 hours. Triglycerides:  Recent Labs     01/03/22  0633   TRIG 111        Microbiology:  Urine Culture:  No components found for: CURINE  Blood Culture:  No components found for: CBLOOD, CFUNGUSBL  Sputum Culture:  No components found for: CSPUTUM  No results for input(s): SPECDESC, SPECIAL, CULTURE, STATUS, ORG, CDIFFTOXPCR, CAMPYLOBPCR, SALMONELLAPC, SHIGAPCR, SHIGELLAPCR, MPNEUG, MPNEUM, LACTOQL in the last 72 hours. No results for input(s): SPUTUM, SPECDESC, SPECIAL, CULTURE, STATUS, ORG, CDIFFTOXPCR, MPNEUM, MPNEUG in the last 72 hours. Invalid input(s): Muriel Ohms, CFUNGUSBL     Pathology:    Radiology Reports:  US PELVIS COMPLETE   Final Result   3.3 cm echogenic mass in the region of the uterine body which is possibly the   obscuring the endometrium. RECOMMENDATION:   Transvaginal sonography or MRI with contrast for further workup      RECOMMENDATIONS:   Unavailable         XR CHEST PORTABLE   Final Result   Moderate diffuse interstitial and alveolar opacities throughout the lungs   bilaterally. The finding is nonspecific although suspicious for multilobar   pneumonia, including active COVID-19 viral pneumonitis. CT CHEST PULMONARY EMBOLISM W CONTRAST   Final Result   Motion degraded study. Acute pulmonary emboli suspected particularly in the   left lower lobe but not well visualized due to motion artifact.       There is evidence for right ventricular strain with septal bowing suggesting   a significant clot burden. Moderate patchy ground-glass opacities involving all lobes of both lungs   consistent with multifocal pneumonia typical for COVID pneumonia. Findings were discussed with Cassandra Avilez at 5:39 pm on 12/24/2021. RECOMMENDATIONS:   Unavailable              Echocardiogram:   Results for orders placed during the hospital encounter of 12/24/21    ECHO Complete 2D W Doppler W Color    Narrative  Transthoracic Echocardiography Report (TTE)    Patient Name Lionel Murillo      Date of Study         12/25/2021  MAX    Date of      1950  Gender                Female  Birth    Age          70 year(s)  Race                  Unknown    Room Number  3005        Height:               94 inch, 238.76 cm    Corporate ID [id number]    Weight:               186 pounds, 84.4 kg  #    Patient [place] [de-identified]   BSA:       2.51 m^2   BMI:         14.8 kg/m^2  #    MR #         [id number]     Sonographer           Michael Billing    Accession #  [id number]  Interpreting          Genesis Aggarwal  Physician    Fellow                   Referring Nurse  Practitioner    Interpreting             Referring Physician   Vinny Fall CNP    Type of Study    TTE procedure:2D Echocardiogram, M-Mode, Doppler, Color Doppler. Procedure Date  Date: 12/25/2021 Start: 01:47 PM    Study Location: [facility]  Technical Quality: Adequate visualization    Indications:Pulmonary embolus and Right heart strain. History / Tech. Comments:  Echo done at patient bedside. Procedure explained to patient. Covid-19    Patient Status: Inpatient    Height: 94 inches Weight: 186 pounds BSA: 2.51 m^2 BMI: 14.8 kg/m^2    CONCLUSIONS    Summary  Global left ventricular systolic function is normal. Estimated EF 50-55%.   Dilated Right ventricular with mildly impaired systolic function  Estimated right ventricular systolic pressure is [id number]. No significant valvular regurgitation or stenosis seen. No pericardial effusion seen. Signature  ----------------------------------------------------------------------------  Electronically signed by Clare Urena(Sonographer) on 12/25/2021  02:30 PM  ----------------------------------------------------------------------------    ----------------------------------------------------------------------------  Electronically signed by Genesis Aggarwal(Interpreting physician) on  12/26/2021 07:52 AM  ----------------------------------------------------------------------------  FINDINGS  Left Atrium  Left atrium is normal in size. Left Ventricle  Left ventricle is normal in size. Global left ventricular systolic function  is normal. Calculated ejection fraction 50% by Orantes's method. Right Atrium  Right atrial dilatation. Right Ventricle  Right ventricular systolic function appears mildly reduced reduced. Moderately dilated right ventricular cavity. TAPSE value of 1.65cm noted. Mitral Valve  Normal mitral valve structure and function. No mitral regurgitation. Aortic Valve  Aortic valve is trileaflet and opens adequately. No aortic insufficiency. Tricuspid Valve  No obvious valvular abnormality. Trivial tricuspid regurgitation. No pulmonary hypertension. Estimated right ventricular systolic pressure is  63OLPE. Pulmonic Valve  The pulmonic valve is normal in structure. Mild pulmonic insufficiency. Pericardial Effusion  No pericardial effusion seen. Miscellaneous  E/E' average = 9.4.  IVC normal diameter & inspiratory collapse indicating normal RA filling  pressure .     M-mode / 2D Measurements & Calculations:    LVIDd:3.7 cm(3.7 - 5.6 cm)       Diastolic ZNKJSU:84.8 ml  MQKIL:4.2 cm(2.2 - 4.0 cm)       Systolic CRKPPA:05.3 ml  DNGP:7.1 cm(0.6 - 1.1 cm)        Aortic Root:2.7 cm(2.0 - 3.7 cm)  LVPWd:0.9 cm(0.6 - 1.1 cm)       LA Dimension: 3.3 cm(1.9 - 4.0 cm)  Fractional Shortenin.73 %    LA volume/Index: 32.1 ml /13m^2  Calculated LVEF (%): 50.94 %     LVOT:2 cm  RVDd:2.6 cm    Mitral:                                 Aortic    Valve Area (P1/2-Time): 7.59 cm^2       Peak Velocity: 1.10 m/s  Peak E-Wave: 0.68 m/s                   Mean Velocity: 0.58 m/s  Peak A-Wave: 0.46 m/s                   Peak Gradient: 4.84 mmHg  E/A Ratio: 1.48                         Mean Gradient: 2 mmHg  Peak Gradient: 1.83 mmHg  Mean Gradient: 1 mmHg  Deceleration Time: 99 msec              Area (continuity): 2.77 cm^2  P1/2t: 29 msec                          AV VTI: 17.6 cm    Area (continuity): 3.43 cm^2  Mean Velocity: 0.51 m/s    Tricuspid:                              Pulmonic:    Peak TR Velocity: 2.71 m/s              Peak Velocity: 0.83 m/s  Peak TR Gradient: 29.3764 mmHg          Peak Gradient: 2.74 mmHg    Diastology / Tissue Doppler  Septal Wall E' velocity:0.06 m/s  Septal Wall E/E':11.5  Lateral Wall E' velocity:0.09 m/s  Lateral Wall E/E':7.4       ASSESSMENT AND PLAN     Assessment:    // Acute hypoxic respiratory failure  // Acute respiratory distress syndrome  // Bilateral multifocal pneumonia due to COVID 19 infection  // Left lower lobe segmental pulmonary embolism  // Obesity            //  Postmenopausal bleeding    Plan:    I personally interviewed/examined the patient; reviewed interval history, interpreted all available radiographic and laboratory data at the time of service. Patient is currently requiring high flow nasal cannula 50 L and 80 %. Encourage use of BiPAP at night and alternate with high flow nasal cannula during the daytime. Patient did use BiPAP last night 18/10/70 percent FiO2  Continue supplemental oxygen to keep oxygen saturation >90%  Encourage prone position when sleeping, incentive spirometry  Continue pulmonary toilet, aspiration precautions and bronchodilators  Repeat chest x-ray tomorrow  Avoid benzodiazepine.   Continue Decadron 6 mg once daily. She is on baricitinib. On Eliquis 10 mg twice daily for 7 days then 5 mg twice daily   Ccurrently on Symbicort will continue  May need intermittent diuresis if needed  Monitor I/O, electrolytes with a goal of even/negative fluid balance  Chemical DVT prophylaxis on Eliquis therapeutic dose  Antimicrobials reviewed; currently not on antibiotic  Monitor CRP, LDH, AST/ALT, D-Dimer, Ferritin periodically  Glycemic control per primary service  Physical/occupational therapy    Discussed with respiratory therapist and nursing staff. Discussed with Dr. Annie Pizarro    The patient is/remains critically ill with illness/injury that acutely impairs one or more vital organ systems, such that there is a high probability of imminent or life threatening deterioration in the patient's condition. Critical care time of 35 minutes was spent (excluding procedures), in coordination of care during bedside rounds and discussion of patient care in detail, and recommendations of the team were adopted in the plan. Necessity of all invasive devices was also confirmed. Kathren Brunner, MD   Pulmonary and Critical Care Medicine           1/4/2022, 12:07 PM    This patient was evaluated in the context of the global SARS-CoV-2 (COVID-19) pandemic, which necessitated considerations that the patient either has COVID-19 infection or is at risk of infection with COVID-19. Institutional protocols and algorithms that pertain to the evaluation & management of patients with COVID-19 or those at risk for COVID-19 are in a state of rapid changes based on information released by regulatory bodies including the CDC and federal and state organizations. These policies and algorithms were followed during the patient's care. Please note that this chart was generated using voice recognition Dragon dictation software. Although every effort was made to ensure the accuracy of this automated transcription, some errors in transcription may have occurred.

## 2022-01-04 NOTE — PROGRESS NOTES
Writer was notified by [employer] that the patient's SpO2 was 85%. Writer entered patient's room and found her bipap partially disconnected. Writer reconnected patient's bipap and her SpO2 recovered to 95% after several minutes. Writer obtained vitals and attempted to speak to patient. Patient refused to answer questions and pointed to the door. Writer asked, \"Do you want me to leave? \" Patient nodded in response. Writer notified patient that she would return shortly for assessment and to give medications. Patient did not respond, and continued to point at the door. Writer ensured patient's oxygen saturation was stable and left the room.

## 2022-01-05 ENCOUNTER — APPOINTMENT (OUTPATIENT)
Dept: GENERAL RADIOLOGY | Age: 72
DRG: 871 | End: 2022-01-05
Payer: MEDICARE

## 2022-01-05 PROCEDURE — 99291 CRITICAL CARE FIRST HOUR: CPT | Performed by: INTERNAL MEDICINE

## 2022-01-05 PROCEDURE — 97166 OT EVAL MOD COMPLEX 45 MIN: CPT

## 2022-01-05 PROCEDURE — 2580000003 HC RX 258: Performed by: STUDENT IN AN ORGANIZED HEALTH CARE EDUCATION/TRAINING PROGRAM

## 2022-01-05 PROCEDURE — 6370000000 HC RX 637 (ALT 250 FOR IP): Performed by: INTERNAL MEDICINE

## 2022-01-05 PROCEDURE — 97535 SELF CARE MNGMENT TRAINING: CPT

## 2022-01-05 PROCEDURE — 99232 SBSQ HOSP IP/OBS MODERATE 35: CPT | Performed by: INTERNAL MEDICINE

## 2022-01-05 PROCEDURE — 94640 AIRWAY INHALATION TREATMENT: CPT

## 2022-01-05 PROCEDURE — 97162 PT EVAL MOD COMPLEX 30 MIN: CPT

## 2022-01-05 PROCEDURE — 71045 X-RAY EXAM CHEST 1 VIEW: CPT

## 2022-01-05 PROCEDURE — 6370000000 HC RX 637 (ALT 250 FOR IP): Performed by: FAMILY MEDICINE

## 2022-01-05 PROCEDURE — 2060000000 HC ICU INTERMEDIATE R&B

## 2022-01-05 PROCEDURE — 6360000002 HC RX W HCPCS: Performed by: FAMILY MEDICINE

## 2022-01-05 PROCEDURE — 2700000000 HC OXYGEN THERAPY PER DAY

## 2022-01-05 PROCEDURE — 94761 N-INVAS EAR/PLS OXIMETRY MLT: CPT

## 2022-01-05 PROCEDURE — 99233 SBSQ HOSP IP/OBS HIGH 50: CPT | Performed by: INTERNAL MEDICINE

## 2022-01-05 PROCEDURE — 94660 CPAP INITIATION&MGMT: CPT

## 2022-01-05 PROCEDURE — 97530 THERAPEUTIC ACTIVITIES: CPT

## 2022-01-05 RX ADMIN — OXYCODONE HYDROCHLORIDE AND ACETAMINOPHEN 500 MG: 500 TABLET ORAL at 09:06

## 2022-01-05 RX ADMIN — APIXABAN 10 MG: 5 TABLET, FILM COATED ORAL at 09:06

## 2022-01-05 RX ADMIN — APIXABAN 10 MG: 5 TABLET, FILM COATED ORAL at 19:49

## 2022-01-05 RX ADMIN — FAMOTIDINE 20 MG: 20 TABLET, FILM COATED ORAL at 09:07

## 2022-01-05 RX ADMIN — FAMOTIDINE 20 MG: 20 TABLET, FILM COATED ORAL at 19:50

## 2022-01-05 RX ADMIN — Medication 5 ML: at 09:05

## 2022-01-05 RX ADMIN — BARICITINIB 4 MG: 2 TABLET, FILM COATED ORAL at 09:06

## 2022-01-05 RX ADMIN — DEXAMETHASONE SODIUM PHOSPHATE 6 MG: 10 INJECTION INTRAMUSCULAR; INTRAVENOUS at 09:06

## 2022-01-05 RX ADMIN — ZINC SULFATE 220 MG (50 MG) CAPSULE 50 MG: CAPSULE at 09:06

## 2022-01-05 RX ADMIN — BUDESONIDE AND FORMOTEROL FUMARATE DIHYDRATE 2 PUFF: 160; 4.5 AEROSOL RESPIRATORY (INHALATION) at 08:31

## 2022-01-05 RX ADMIN — Medication 2000 UNITS: at 09:06

## 2022-01-05 RX ADMIN — SODIUM CHLORIDE, PRESERVATIVE FREE 10 ML: 5 INJECTION INTRAVENOUS at 09:06

## 2022-01-05 RX ADMIN — BUDESONIDE AND FORMOTEROL FUMARATE DIHYDRATE 2 PUFF: 160; 4.5 AEROSOL RESPIRATORY (INHALATION) at 19:48

## 2022-01-05 RX ADMIN — OXYCODONE HYDROCHLORIDE AND ACETAMINOPHEN 500 MG: 500 TABLET ORAL at 19:49

## 2022-01-05 RX ADMIN — SODIUM CHLORIDE, PRESERVATIVE FREE 10 ML: 5 INJECTION INTRAVENOUS at 19:50

## 2022-01-05 ASSESSMENT — ENCOUNTER SYMPTOMS
ABDOMINAL DISTENTION: 0
SHORTNESS OF BREATH: 0
DIARRHEA: 0
COLOR CHANGE: 0
PHOTOPHOBIA: 0
TROUBLE SWALLOWING: 0
SHORTNESS OF BREATH: 1
VOMITING: 0
EYE PAIN: 0
VOICE CHANGE: 0
SINUS PAIN: 0
COUGH: 0
ABDOMINAL PAIN: 0
COUGH: 1
SORE THROAT: 0
ALLERGIC/IMMUNOLOGIC NEGATIVE: 1

## 2022-01-05 ASSESSMENT — PAIN SCALES - GENERAL
PAINLEVEL_OUTOF10: 0
PAINLEVEL_OUTOF10: 0

## 2022-01-05 NOTE — PROGRESS NOTES
[facility] -   Infectious diseases evaluation  admission date 12/24/2021    reason for consultation:   covid +    Impression :   Current:  · covid pneumonia   · PE -  left lower lobe segmental emboli -  · bandemia  · Thrombocytopenia    Other:  ·   Discussion / summary of stay / plan of care   ·   Recommendations   · barcitinib 12/24 -  · Decadron 6 mg daily 12/24  · Anticoagulation  · CRP down to normal,   · Oxygenation improving    Isolate until 1/13/22    Infection Control Recommendations   · [place] Precautions  · Contact Isolation   · Airborne isolation  · Droplet Isolation  Antimicrobial Stewardship Recommendations   · Off AB    Coordination ofOutpatient Care:   · Estimated Length of IV antimicrobials:  · Patient will need Midline / picc Catheter Insertion:   · Patient will need SNF:  · Patient will need outpatient wound care:     History of Present Illness:   Initial history:  Lukas Harris is a 70y.o.-year-old female Patient presented through ER via EMS after her family found her with severe shortness of breath in her shower. She had developed a cough approximately a week ago and according to the patient had a negative Covid test at that time. She was found to have an SPO2 of 50% on room air requiring BiPAP. A rapid Covid swab was positive  CTA of the chest showed segmental pulmonary embolism in the left lower lobe with possible right heart strain. An echocardiogram was completed and was negative for RV strain with RV systolic pressure of 29 mmHg  The patient was initiated on a heparin drip as well as high-dose Decadron, Rocephin and baricitinib.        Interval changes  1/5/2022   Patient Vitals for the past 8 hrs:   Resp SpO2 Height   01/05/22 1436 -- -- 5' 4\" (1.626 m)   01/05/22 1236 17 96 % --   01/05/22 0832 19 96 % --   CRP 18-improved-  WBC 10  Blood culture still negative, chest x-ray positive diffuse pneumonia  Patient is on baricitinib, but saturation 86% she is on 90% high flow    12/28  60 FiO2 high flow, he feels better eating better but he saturating 90%,    12/29  70% FiO2 high flow, saturating 89%, she is short of breath, eating slightly, has a cough. In general she feels okay  Labs reviewed    12/30  60 FiO2 high flow, good appetite, alert oriented x3  WBC 10  Urine culture 12/26 -    12/31  96 saturation FiO2 70% on high flow, she feels better. WBC 7.4  CRP 6    1/1/22  High flow 45%, which is an improvement,  98 saturation, alert with a cough, WBC normal 9.2, tolerating her statin    1/3/22  Patient is on BiPAP 70%, saturating 99 and alert appropriate    1/4  High flow improved 70%, saturating 96%, no fever, looks better  CRP nose normal    1/5  Chest x-ray has not changed, saturating 91% on 57% high flow  Oriented x3, still has a cough  In general better      Summary of relevant labs:  Labs:  .5-18-6-3    BNP 9968  Troponin 54  Lactic acid 3.5  Ferritin 858  WBC 14.6 -7  D-dimer 70.48    Micro:    Imaging:  CT chest bilat covid pneumonia w thick consolidations    I have personally reviewed the past medical history, past surgical history, medications, social history, and family history, and I haveupdated the database accordingly. Allergies:   Patient has no known allergies. Review of Systems:     Review of Systems   Constitutional: Positive for activity change. Negative for diaphoresis. HENT: Positive for congestion. Eyes: Negative for photophobia and pain. Respiratory: Negative for cough and shortness of breath. Cardiovascular: Negative for chest pain. Gastrointestinal: Negative for abdominal distention. Endocrine: Negative for heat intolerance, polydipsia and polyphagia. Genitourinary: Negative for dysuria. Musculoskeletal: Negative for arthralgias. Skin: Negative for color change. Allergic/Immunologic: Negative for immunocompromised state.    Neurological: Negative for dizziness, tremors, Social History:     Social History     Socioeconomic History    Marital status: Unknown     Spouse name: Not on file    Number of children: Not on file    Years of education: Not on file    Highest education level: Not on file   Occupational History    Not on file   Tobacco Use    Smoking status: Current Every Day Smoker     Types: Cigarettes    Smokeless tobacco: Never Used   Substance and Sexual Activity    Alcohol use: Not Currently    Drug use: Not Currently    Sexual activity: Not on file   Other Topics Concern    Not on file   Social History Narrative    Not on file     Social Determinants of Health     Financial Resource Strain:     Difficulty of Paying Living Expenses: Not on file   Food Insecurity:     Worried About Running Out of Food in the Last Year: Not on file    Jones of Food in the Last Year: Not on file   Transportation Needs:     Lack of Transportation (Medical): Not on file    Lack of Transportation (Non-Medical):  Not on file   Physical Activity:     Days of Exercise per Week: Not on file    Minutes of Exercise per Session: Not on file   Stress:     Feeling of Stress : Not on file   Social Connections:     Frequency of Communication with Friends and Family: Not on file    Frequency of Social Gatherings with Friends and Family: Not on file    Attends Protestant Services: Not on file    Active Member of [address] [place] [address] [employer] or Organizations: Not on file    Attends Club or Organization Meetings: Not on file    Marital Status: Not on file   Intimate Partner Violence:     Fear of Current or Ex-Partner: Not on file    Emotionally Abused: Not on file    Physically Abused: Not on file    Sexually Abused: Not on file   Housing Stability:     Unable to Pay for Housing in the Last Year: Not on file    Number of [place] in the Last Year: Not on file    Unstable Housing in the Last Year: Not on file       Family History:     Family History   Problem Relation Age of Onset    Cancer Maternal Aunt         breast      Medical Decision Making:   I have independently reviewed/ordered the following labs:    CBC with Differential:   Recent Labs     01/03/22  0633 01/04/22  0600   WBC 7.0 6.5   HGB 12.7 12.9   HCT 38.8 41.3   PLT See Reflexed IPF Result 254   LYMPHOPCT 7* 12*   MONOPCT 5 9     BMP:  Recent Labs     01/03/22  0633 01/04/22  0600   * 132*   K 5.2 5.1    104   CO2 21 17*   BUN 23 22   CREATININE 0.43* 0.41*     Hepatic Function Panel:   No results for input(s): PROT, LABALBU, BILIDIR, IBILI, BILITOT, ALKPHOS, ALT, AST in the last 72 hours. No results for input(s): RPR in the last 72 hours. No results for input(s): HIV in the last 72 hours. No results for input(s): BC in the last 72 hours. Lab Results   Component Value Date    CREATININE 0.41 01/04/2022    GLUCOSE 141 01/04/2022       Detailed results: Thank you for allowing us to participate in the care of this patient. Please call with questions. This note is created with the assistance of a speech recognition program.  While intending to generate adocument that actually reflects the content of the visit, the document can still have some errors including those of syntax and sound a like substitutions which may escape proof reading. It such instances, actual meaningcan be extrapolated by contextual diversion.     Charles Lemus MD  Office: [phone]  Perfect serve / office [phone]      '

## 2022-01-05 NOTE — PROGRESS NOTES
[facility]  Office: [address], DO, Nilton Crocker, DO, Chuckie Thompson, DO, Luz Barriga Blood, DO, Debra Joy MD, Vaishali Aguilar MD, Justin Cueva MD, Tisha Winter MD, Vivienne Carias MD, Shirin Staton MD, Kee Guillen MD, Haskell Leventhal, DO, José Liao, DO, Cheryle Bumpers, MD,  Jyothi Osborn, DO, Cindi Liz MD, Jeffrey Kelly MD, [place] Reza MD, Kane Vidal MD, Rob Marx MD, Kalvin Spurling, MD, [place] Goodwill, MD, Cma Clay, [facility], [facility], [facility], Irena Clayton, CNP, Isabel Vega, CNS, Dorothea Cobb, CNP, Marita Clark, CNP, Cynthia Portillo, CNP, Cortney Proctor, CNP, William Khalil, CNP, Rudy Parker PA-C, Saira Holloway, [facility], Shane Zhang, [facility], Sarah Beth Lockett, CNP, Bogdan Wolfe, CNP, Keyla Castro, CNP, Aleksandra Bhandari, CNP, Ebony Cortez, CNP, Thad Dave, [address] [place] [address]    Progress Note    1/5/2022    4:11 PM    Name:   Lukas Harris  MRN:     [id number]     Acct:      [de-identified]   Room:   [address] [place] [address] Day:  12  Admit Date:  12/24/2021  3:37 PM    PCP:   No primary care provider on file. Code Status:  Full Code    Subjective:     C/C:   Chief Complaint   Patient presents with    Shortness of Breath    Fall     Interval History Status: not changed. Patient seen and examined this afternoon. Patient more awake and alert today. Work with physical therapy this morning. Down to 70% FiO2 via high flow nasal cannula. Does appear more refreshed. Tolerating diet. Not having very good bowel movements. Vitals are otherwise stable. Brief History:     Lukas Harris is 70 y.o. female who was admitted to the hospital on 12/24/2021 for treatment of Pneumonia due to COVID-19 virus. Patient came to the hospital with lightheadedness and dry cough for 1 week. She had episode of fall at home and family called EMS.  Initial evaluation by EMS found patient having tachypnea with respirate 40 to 50/min, hypoxia SPO2 50% on room air, tachycardia. Patient was placed on nonrebreather and improved only to 73%. Patient was then placed on BiPAP. Evaluation emergency room showed positive COVID-19 test, elevated troponin, elevated proBNP. CT chest showed subsegmental PE with possible right heart strain. Vascular surgery was consulted and recommended anticoagulation without need for thrombolysis. Patient also had thrombocytopenia. Cardiology was consulted and echocardiogram obtained which was negative for RV strain with RV systolic pressure 29 mmHg. Patient was treated with heparin infusion, Decadron and started on baricitinib. Patient had vaginal bleeding on 12/30/21 for which OBGYN will see her for in the outpatient setting.    - uptrending oxygen requirements. Review of Systems:     Constitutional:  negative for chills, fevers, sweats  Respiratory: Reports cough and shortness of breath; report sinus congestion, increased secretions; negative for wheezing  Cardiovascular:  negative for chest pain, chest pressure/discomfort, lower extremity edema, palpitations  Gastrointestinal: reports constipation; negative for abdominal pain, nausea, vomiting  Neurological:  negative for dizziness, headache    Medications:      Allergies:  No Known Allergies    Current Meds:   Scheduled Meds:    Vitamin D  2,000 Units Oral Daily    ascorbic acid  500 mg Oral BID    zinc sulfate  50 mg Oral Daily    budesonide-formoterol  2 puff Inhalation BID    apixaban  10 mg Oral BID    Followed by   Bonita Gayle ON 1/7/2022] apixaban  5 mg Oral BID    dexamethasone  6 mg IntraVENous Daily    famotidine  20 mg Oral BID    sodium chloride flush  10 mL IntraVENous 2 times per day    baricitinib  4 mg Oral Daily     Continuous Infusions:    sodium chloride       PRN Meds: calcium carbonate, albuterol sulfate HFA, benzocaine-menthol, loperamide, benzonatate, HYDROcodone-chlorpheniramine, sodium chloride flush, sodium chloride, promethazine **OR** ondansetron, polyethylene glycol, acetaminophen **OR** acetaminophen    Data:     Past Medical History:   has no past medical history on file. Social History:   reports that she has been smoking cigarettes. She has never used smokeless tobacco. She reports previous alcohol use. She reports previous drug use. Family History:   Family History   Problem Relation Age of Onset    Cancer Maternal Aunt         breast       Vitals:  /83   Pulse 57   Temp 96.8 °F (36 °C) (Oral)   Resp 21   Ht 5' 4\" (1.626 m)   Wt 195 lb 15.8 oz (88.9 kg)   SpO2 90%   Breastfeeding No   BMI 33.64 kg/m²   Temp (24hrs), Av.4 °F (36.3 °C), Min:96.8 °F (36 °C), Max:97.9 °F (36.6 °C)    Recent Labs     22  0300   POCGLU 145* 155*       I/O (24Hr):     Intake/Output Summary (Last 24 hours) at 2022 1611  Last data filed at 2022 0538  Gross per 24 hour   Intake --   Output 1400 ml   Net -1400 ml       Labs:  Hematology:  Recent Labs     22  0600   WBC 7.0 6.5   RBC 3.87* 3.99   HGB 12.7 12.9   HCT 38.8 41.3   .3 103.5*   MCH 32.8 32.3   MCHC 32.7 31.2   RDW 14.5* 14.3   PLT See Reflexed IPF Result 254   MPV NOT REPORTED 10.8   CRP  --  <3.0     Chemistry:  Recent Labs     22  0600   * 132*   K 5.2 5.1    104   CO2 21 17*   GLUCOSE 130* 141*   BUN 23 22   CREATININE 0.43* 0.41*   ANIONGAP 8* 11   LABGLOM >60 >60   GFRAA >60 >60   CALCIUM 8.3* 8.2*     Recent Labs     2233 22  0300   LABA1C  --  6.1*  --    CHOL  --  192  --    HDL  --  38*  --    LDLCHOLESTEROL  --  132*  --    CHOLHDLRATIO  --  5.1*  --    TRIG  --  111  --    VLDL  --  NOT REPORTED  --    POCGLU 145*  --  155*     ABG:  Lab Results   Component Value Date    POCPH 7.406 2022    POCPCO2 41.1 2022    POCPO2 67.0 2022    POCHCO3 25.8 2022    NBEA NOT REPORTED 2022    PBEA 1 2022 BCM4CMN NOT REPORTED 01/04/2022    WQOH6RHA 93 01/04/2022    FIO2 60.0 01/04/2022     Lab Results   Component Value Date/Time    SPECIAL NOT REPORTED 12/26/2021 08:19 PM     Lab Results   Component Value Date/Time    CULTURE NO SIGNIFICANT GROWTH 12/26/2021 08:19 PM       Radiology:  XR CHEST PORTABLE    Result Date: 12/26/2021  Moderate diffuse interstitial and alveolar opacities throughout the lungs bilaterally. The finding is nonspecific although suspicious for multilobar pneumonia, including active COVID-19 viral pneumonitis. Physical Examination:        General appearance:  alert, cooperative and no distress, somewhat disheveled  female sitting up in bed. Mental Status:  oriented to person, place and time and anxious affect  HEENT: Sclera anti-icteric, EOMI, high flow nasal cannula present  Lungs:  clear to auscultation bilaterally, globally diminished breath sounds, normal effort  Heart:  regular rate and rhythm, no murmur  Abdomen:  soft, nontender, nondistended, normal bowel sounds, no masses, hepatomegaly, splenomegaly  Extremities:  no edema, redness, tenderness in the calves  Skin:  no gross lesions, rashes, induration  Psych: Appears anxious    Assessment:        Hospital Problems           Last Modified POA    * (Principal) Pneumonia due to COVID-19 virus 1/2/2022 Yes    Pulmonary embolus, left ([address] 1/2/2022 Yes    Acute respiratory failure with hypoxia ([address] 1/2/2022 Yes    Postmenopausal bleeding 1/3/2022 Yes    Overview Addendum 1/3/2022  6:29 PM by Adalberto Paez, DO     During hospitalization in December 2021  Outpatient follow-up recommended due to acute respiratory failure. TVUS 1/2/22 demonstrates:  3.3 cm echogenic mass in the region of the uterine body which is   possibly the obscuring the endometrium. Obesity with body mass index greater than 30 1/2/2022 Yes          Plan:        Acute respiratory failure with hypoxia - multifactorial, due to #2 and #3. Alternate HFNC with Bipap. COVID-19 pneumonia - on Decadron day 12 today. Continue baricitinib. Continue vitamin D/C/zinc today. Acute PE - continue anticoagulation with Eliquis  Abnormal uterine bleeding - appreciate GYN input. F/u o/p  Obesity with BMI 33.64  Avoid benzos  Albuterol prn, symbicort  Prognosis somewhat improved today. SNF/LTAC at d/c  PT/Ot  Labs every other day  Discusse with son, Rafita Lomax, on 1/4.     33 Shameka Paniagua DO  1/5/2022  4:11 PM

## 2022-01-05 NOTE — PROGRESS NOTES
Physical Therapy    Facility/Department: [facility]  Initial Assessment    NAME: Jordi Rod  : 1950  MRN: [id number]    Date of Service: 2022  Chief Complaint   Patient presents with    Shortness of Breath    Fall      Discharge Recommendations:  Patient would benefit from continued therapy after discharge   PT Equipment Recommendations  Equipment Needed: No (CTA)    Assessment   Body structures, Functions, Activity limitations: Decreased functional mobility ; Decreased posture;Decreased endurance;Decreased strength;Decreased balance;Decreased safe awareness  Assessment: Pt with mobility deficits requiring min-A to perform bed mobility, pt SpO2 desats with bed mobility or manual muscle testing at the EOB from 95% to 85% and requires ~5-6 minutes of pursed lip breathing to recover. Pt would be unsafe to return to prior living arrangements upon discharge. Pt would benefit from additional therapy upon discharge to assist in return to prior level of functional independence. Prognosis: Good  Decision Making: Medium Complexity  PT Education: Goals;Transfer Training;PT Role;Functional Mobility Training;Plan of Care;General Safety  Patient Education: educated on the importance of pursed lip breathing with activity. REQUIRES PT FOLLOW UP: Yes  Activity Tolerance  Activity Tolerance: Patient limited by endurance; Patient limited by fatigue  Activity Tolerance: Pt's SpO2 ranged from 95%-85% during today's session, drops quickly with bed mobility or MMT, recovers after ~5-6 minutes of pursed lip breathing. Patient Diagnosis(es): The primary encounter diagnosis was COVID-19. Diagnoses of Dehydration and Tachycardia were also pertinent to this visit. has no past medical history on file. has no past surgical history on file.     Restrictions  Restrictions/Precautions  Restrictions/Precautions: Up as Tolerated,Isolation,Contact Precautions (+COVID)  Required Braces or Orthoses?: No  Position Activity Restriction  Other position/activity restrictions: up with assistance  Vision/Hearing  Vision: Impaired  Vision Exceptions: Cataracts  Hearing: Within functional limits     Subjective  General  Patient assessed for rehabilitation services?: Yes  Response To Previous Treatment: Not applicable  Family / Caregiver Present: No  Follows Commands: Within Functional Limits  Subjective  Subjective: Pt supine in bed and agreeable to therapy, RN agreeable to therapy, pt pleasant and cooperative throughout today's session.   Pain Screening  Patient Currently in Pain: Denies  Vital Signs  Patient Currently in Pain: Denies       Social/Functional History  Social/Functional History  Lives With: Son  Type of Home: Apartment  Home Layout: Two level  Home Access: Stairs to enter without rails  Entrance Stairs - Number of Steps: 3  Bathroom Shower/Tub: Tub/Shower unit  Bathroom Toilet: Handicap height  Bathroom Equipment:  (reports none)  Home Equipment:  (no DME use)  ADL Assistance: Independent  Homemaking Assistance: Independent  Homemaking Responsibilities: Yes (shares with son)  Meal Prep Responsibility: Primary  Laundry Responsibility: Primary  Cleaning Responsibility: Primary  Shopping Responsibility: Primary  Ambulation Assistance: Independent  Transfer Assistance: Independent  Active : No  Patient's  Info: walks or son drives  Occupation: Full time employment  Type of occupation:   Leisure & Hobbies: watching TV  Additional Comments: Pt reports son that typically lives with the pt works but is able to assist when not working, pt has another son who could also help  Cognition   Cognition  Overall Cognitive Status: WFL    Objective     Observation/Palpation  Posture: Fair    AROM RLE (degrees)  RLE AROM: WFL  AROM LLE (degrees)  LLE AROM : WFL  AROM RUE (degrees)  RUE General AROM: Co-eval with OT, see OT note for UE detail  AROM LUE (degrees)  LUE General AROM: Co-eval with OT, see OT note for UE detail  Strength RLE  Strength RLE: WFL  Comment: Grossly 4-/5  Strength LLE  Strength LLE: WFL  Comment: Grossly 4-/5  Strength RUE  Comment: Co-eval with OT, see OT note for UE detail  Strength LUE  Comment: Co-eval with OT, see OT note for UE detail     Sensation  Overall Sensation Status: WFL (pt denies any numbness/tingling)  Bed mobility  Supine to Sit: Contact guard assistance  Sit to Supine: Minimal assistance (for B LE progression)  Scooting: Contact guard assistance  Comment: verbal cues for sequencing, bed mobility performed with the HOB elevated ~30 degrees without use of handrails. Static/dynamic sitting balance challenged in unsupported sitting x ~15 minutes this date. Transfers  Comment: Unsafe to attempt transfers secondary to SpO2 dropping to 85% with supine<>sit transfer, 5+ minutes of pursed lip breathing required to recover  Ambulation  Ambulation?: No  Stairs/Curb  Stairs?: No     Balance  Posture: Fair  Sitting - Static: Fair;+  Sitting - Dynamic: Fair  Comments: sitting balance assessed at the EOB.         Plan   Plan  Times per week: 5x  Times per day: Daily  Current Treatment Recommendations: Strengthening,Transfer Training,Endurance Training,ROM,Balance Training,Gait Training,Functional Mobility Training,Safety Education & Training,Home Exercise Program,Stair training,Patient/Caregiver Education & Training,Equipment Evaluation, Education, & procurement,Positioning  Safety Devices  Type of devices: Patient at risk for falls,Left in bed,Call light within reach,Gait belt,Nurse notified  Restraints  Initially in place: No                                                 AM-PAC Score  AM-PAC Inpatient Mobility Raw Score : 11 (01/05/22 1411)  AM-PAC Inpatient T-Scale Score : 33.86 (01/05/22 1411)  Mobility Inpatient CMS 0-100% Score: 72.57 (01/05/22 1411)  Mobility Inpatient CMS G-Code Modifier : CL (01/05/22 1411)          Goals  Short term goals  Time Frame for Short term goals: 14 visits  Short term goal 1: Pt will ambulate 75 feet with a RW and SBA to increase functional independence. Short term goal 2: Pt will negotiate 3 stairs with no handrails and SBA to allow the pt to enter prior living arrangements. Short term goal 3: Pt will demonstrate fair+ standing balance to decrease fall risk. Short term goal 4: Pt will tolerate a 35 minute therapy session to promote increased endurance. Short term goal 5: Pt will perform sit<>stand transfer with SBA to increase functional independence.        Therapy Time   Individual Concurrent Group Co-treatment   Time In 8732         Time Out 1027         Minutes 40         Timed Code Treatment Minutes: 8 Minutes       Sharon Stephenson, PT

## 2022-01-05 NOTE — PROGRESS NOTES
Constitutional: Positive for activity change and fatigue. Negative for fever. HENT: Negative for postnasal drip, sinus pain, sore throat, trouble swallowing and voice change. Eyes: Negative for photophobia and visual disturbance. Respiratory: Positive for cough and shortness of breath. Cardiovascular: Negative for chest pain, palpitations and leg swelling. Gastrointestinal: Negative for abdominal pain, diarrhea and vomiting. Endocrine: Negative. Genitourinary: Negative for difficulty urinating, dysuria and hematuria. Musculoskeletal: Negative. Allergic/Immunologic: Negative. Neurological: Negative for dizziness, syncope, speech difficulty and headaches. Hematological: Negative for adenopathy. Does not bruise/bleed easily. Psychiatric/Behavioral: Negative. OBJECTIVE     PaO2/FiO2 RATIO:  Recent Labs     22  0300   POCPO2 67.0*      FiO2 : 85 %     VITAL SIGNS:   LAST:  /83   Pulse 57   Temp 96.8 °F (36 °C) (Oral)   Resp 19   Ht 5' 4\" (1.626 m)   Wt 195 lb 15.8 oz (88.9 kg)   SpO2 96%   Breastfeeding No   BMI 33.64 kg/m²   8-24 HR RANGE:  TEMP Temp  Av.6 °F (36.4 °C)  Min: 96.8 °F (36 °C)  Max: 98.1 °F (73.4 °C)   BP Systolic (75LXJ), G , Min:117 , MKM:597      Diastolic (73NBE), PJH:47, Min:75, Max:91     PULSE Pulse  Av.9  Min: 57  Max: 84   RR Resp  Av  Min: 15  Max: 20   O2 SAT SpO2  Av.3 %  Min: 96 %  Max: 100 %   OXYGEN DELIVERY O2 Flow Rate (L/min)  Av L/min  Min: 40 L/min  Max: 40 L/min        SYSTEMIC EXAMINATION:   General appearance - Ill-appearing, mild respiratory distress  Mental status - awake & alert, follows commands  Eyes - pupils equal and reactive, sclera anicteric  Mouth - mucous membranes moist, pharynx normal without lesions  Neck -short thick neck supple, no significant adenopathy, carotids upstroke normal bilaterally, no bruits  Chest - Breath sounds bilaterally were dimnished to auscultation at bases. There were bilateral intermittent crackles present without rhonchi. There is no intercostal recession or use of accessory muscles  Heart - normal rate, regular rhythm, normal S1, S2, no murmurs, rubs, clicks or gallops  Abdomen - soft, nontender, nondistended, no masses or organomegaly  Neurological - non-focal  Extremities - peripheral pulses normal, mild pedal edema, no clubbing or cyanosis  Skin - normal coloration and turgor, no rashes, no suspicious skin lesions noted     DATA REVIEW     Medications: Current Inpatient  Scheduled Meds:   Vitamin D  2,000 Units Oral Daily    ascorbic acid  500 mg Oral BID    zinc sulfate  50 mg Oral Daily    budesonide-formoterol  2 puff Inhalation BID    apixaban  10 mg Oral BID    Followed by   Brynn Pablo ON 1/7/2022] apixaban  5 mg Oral BID    dexamethasone  6 mg IntraVENous Daily    famotidine  20 mg Oral BID    sodium chloride flush  10 mL IntraVENous 2 times per day    baricitinib  4 mg Oral Daily     Continuous Infusions:   sodium chloride         INPUT/OUTPUT:  In: -   Out: 1400 [Urine:1400]  Date 01/05/22 0000 - 01/05/22 2359   Shift 9930-1628 3943-7207 8298-7959 24 Hour Total   INTAKE   Shift Total(mL/kg)       OUTPUT   Urine(mL/kg/hr) 300(0.4)   300   Shift Total(mL/kg) 300(3.4)   300(3.4)   Weight (kg) 88.9 88.9 88.9 88.9        LABS:  ABGs:   Recent Labs     01/04/22  0300   POCPH 7.406   POCPCO2 41.1   POCPO2 67.0*   POCHCO3 25.8   WQDP6INC 93*     CBC:   Recent Labs     01/03/22  0633 01/04/22  0600   WBC 7.0 6.5   HGB 12.7 12.9   HCT 38.8 41.3   .3 103.5*   PLT See Reflexed IPF Result 254   LYMPHOPCT 7* 12*   RBC 3.87* 3.99   MCH 32.8 32.3   MCHC 32.7 31.2   RDW 14.5* 14.3     CRP:   Recent Labs     01/04/22  0600   CRP <3.0     LDH:   No results for input(s): LDH in the last 72 hours.   BMP:   Recent Labs     01/03/22  0633 01/04/22  0600   * 132*   K 5.2 5.1    104   CO2 21 17*   BUN 23 22   CREATININE 0.43* 0.41*   GLUCOSE 130* 141* study.  Acute pulmonary emboli suspected particularly in the   left lower lobe but not well visualized due to motion artifact. There is evidence for right ventricular strain with septal bowing suggesting   a significant clot burden. Moderate patchy ground-glass opacities involving all lobes of both lungs   consistent with multifocal pneumonia typical for COVID pneumonia. Findings were discussed with Maritza Stevens at 5:39 pm on 12/24/2021. RECOMMENDATIONS:   Unavailable              Echocardiogram:   Results for orders placed during the hospital encounter of 12/24/21    ECHO Complete 2D W Doppler W Color    Narrative  Transthoracic Echocardiography Report (TTE)    Patient Name Chalo Chatman      Date of Study         12/25/2021  MAX    Date of      1950  Gender                Female  Birth    Age          70 year(s)  Race                  Unknown    Room Number  3005        Height:               94 inch, 238.76 cm    Corporate ID [id number]    Weight:               186 pounds, 84.4 kg  #    Patient [place] [de-identified]   BSA:       2.51 m^2   BMI:         14.8 kg/m^2  #    MR #         [id number]     Sonographer           Jeffery Li    Accession #  [id number]  Interpreting          Genesis Aggarwal  Physician    Fellow                   Referring Nurse  Practitioner    Interpreting             Referring Physician   Syliva Goltz,  Fellow                                         CNP    Type of Study    TTE procedure:2D Echocardiogram, M-Mode, Doppler, Color Doppler. Procedure Date  Date: 12/25/2021 Start: 01:47 PM    Study Location: [facility]  Technical Quality: Adequate visualization    Indications:Pulmonary embolus and Right heart strain. History / Tech. Comments:  Echo done at patient bedside. Procedure explained to patient.   Covid-19    Patient Status: Inpatient    Height: 94 inches Weight: 186 pounds BSA: 2.51 m^2 BMI: 14.8 kg/m^2    CONCLUSIONS    Summary  Global left ventricular systolic function is normal. Estimated EF 50-55%. Dilated Right ventricular with mildly impaired systolic function  Estimated right ventricular systolic pressure is 38CZXJ. No significant valvular regurgitation or stenosis seen. No pericardial effusion seen. Signature  ----------------------------------------------------------------------------  Electronically signed by Clare Lockett(Sonographer) on 12/25/2021  02:30 PM  ----------------------------------------------------------------------------    ----------------------------------------------------------------------------  Electronically signed by Genesis Aggarwal(Interpreting physician) on  12/26/2021 07:52 AM  ----------------------------------------------------------------------------  FINDINGS  Left Atrium  Left atrium is normal in size. Left Ventricle  Left ventricle is normal in size. Global left ventricular systolic function  is normal. Calculated ejection fraction 50% by Orantes's method. Right Atrium  Right atrial dilatation. Right Ventricle  Right ventricular systolic function appears mildly reduced reduced. Moderately dilated right ventricular cavity. TAPSE value of 1.65cm noted. Mitral Valve  Normal mitral valve structure and function. No mitral regurgitation. Aortic Valve  Aortic valve is trileaflet and opens adequately. No aortic insufficiency. Tricuspid Valve  No obvious valvular abnormality. Trivial tricuspid regurgitation. No pulmonary hypertension. Estimated right ventricular systolic pressure is  67LXIN. Pulmonic Valve  The pulmonic valve is normal in structure. Mild pulmonic insufficiency. Pericardial Effusion  No pericardial effusion seen. Miscellaneous  E/E' average = 9.4.  IVC normal diameter & inspiratory collapse indicating normal RA filling  pressure .     M-mode / 2D Measurements & Calculations:    LVIDd:3.7 cm(3.7 - 5.6 cm)       Diastolic Volume:26.7 ml  LVIDs:2.6 cm(2.2 - 4.0 cm)       Systolic AFVVBL:02.0 ml  CNBB:6.3 cm(0.6 - 1.1 cm)        Aortic Root:2.7 cm(2.0 - 3.7 cm)  LVPWd:0.9 cm(0.6 - 1.1 cm)       LA Dimension: 3.3 cm(1.9 - 4.0 cm)  Fractional Shortenin.73 %    LA volume/Index: 32.1 ml /13m^2  Calculated LVEF (%): 50.94 %     LVOT:2 cm  RVDd:2.6 cm    Mitral:                                 Aortic    Valve Area (P1/2-Time): 7.59 cm^2       Peak Velocity: 1.10 m/s  Peak E-Wave: 0.68 m/s                   Mean Velocity: 0.58 m/s  Peak A-Wave: 0.46 m/s                   Peak Gradient: 4.84 mmHg  E/A Ratio: 1.48                         Mean Gradient: 2 mmHg  Peak Gradient: 1.83 mmHg  Mean Gradient: 1 mmHg  Deceleration Time: 99 msec              Area (continuity): 2.77 cm^2  P1/2t: 29 msec                          AV VTI: 17.6 cm    Area (continuity): 3.43 cm^2  Mean Velocity: 0.51 m/s    Tricuspid:                              Pulmonic:    Peak TR Velocity: 2.71 m/s              Peak Velocity: 0.83 m/s  Peak TR Gradient: 29.3764 mmHg          Peak Gradient: 2.74 mmHg    Diastology / Tissue Doppler  Septal Wall E' velocity:0.06 m/s  Septal Wall E/E':11.5  Lateral Wall E' velocity:0.09 m/s  Lateral Wall E/E':7.4       ASSESSMENT AND PLAN     Assessment:    // Acute hypoxic respiratory failure  // Acute respiratory distress syndrome  // Bilateral multifocal pneumonia due to COVID 19 infection  // Left lower lobe segmental pulmonary embolism  // Obesity            //  Postmenopausal bleeding    Plan:    I personally interviewed/examined the patient; reviewed interval history, interpreted all available radiographic and laboratory data at the time of service. Patient is currently requiring high flow nasal cannula 40 L and 80 to 85 %. Encourage use of BiPAP at night and alternate with high flow nasal cannula during the daytime.   Patient did use BiPAP last night 18/10/70 percent FiO2  Continue supplemental oxygen to keep oxygen saturation >90%  Encourage prone position when sleeping, incentive spirometry  Continue pulmonary toilet, aspiration precautions and bronchodilators  Repeat chest x-ray tomorrow  Avoid benzodiazepine. Continue Decadron 6 mg once daily. She is on baricitinib. On Eliquis 10 mg twice daily for 7 days then 5 mg twice daily   Ccurrently on Symbicort and will continue  May need intermittent diuresis if needed  Monitor I/O, electrolytes with a goal of even/negative fluid balance  Chemical DVT prophylaxis on Eliquis therapeutic dose  Antimicrobials reviewed; currently not on antibiotic  Monitor CRP, LDH, AST/ALT, D-Dimer, Ferritin periodically  Glycemic control per primary service  Physical/occupational therapy    Discussed with respiratory therapist and nursing staff. Discussed with Dr. Argenis Kowalski    The patient is/remains critically ill with illness/injury that acutely impairs one or more vital organ systems, such that there is a high probability of imminent or life threatening deterioration in the patient's condition. Critical care time of 35 minutes was spent (excluding procedures), in coordination of care during bedside rounds and discussion of patient care in detail, and recommendations of the team were adopted in the plan. Necessity of all invasive devices was also confirmed. Elizabeth Llamas MD   Pulmonary and Critical Care Medicine           1/5/2022, 12:08 PM    This patient was evaluated in the context of the global SARS-CoV-2 (COVID-19) pandemic, which necessitated considerations that the patient either has COVID-19 infection or is at risk of infection with COVID-19. Institutional protocols and algorithms that pertain to the evaluation & management of patients with COVID-19 or those at risk for COVID-19 are in a state of rapid changes based on information released by regulatory bodies including the CDC and federal and state organizations. These policies and algorithms were followed during the patient's care.     Please note that this chart was generated using voice recognition Dragon dictation software. Although every effort was made to ensure the accuracy of this automated transcription, some errors in transcription may have occurred.

## 2022-01-05 NOTE — PROGRESS NOTES
Comprehensive Nutrition Assessment    Type and Reason for Visit:  Reassess    Nutrition Recommendations/Plan: Continue current diet. Will provide Ensure Enlive oral supplements with meals. Encourage/monitor PO intakes as tolerated. Monitor labs, weights, and plan of care. Nutrition Assessment:  Pt has been eating variable amounts of her meals. This morning pt really did not eat much of breakfast but ate more than 50% of lunch. Noted pt taking fluids well per chart review - will provide oral supplements with meal trays. Last BM 1/2. Labs reviewed. Meds include: Decadron. Malnutrition Assessment:  Malnutrition Status:  Insufficient data    Context:  Acute Illness     Findings of the 6 clinical characteristics of malnutrition:  Energy Intake:  Mild decrease in energy intake  Weight Loss:  Unable to assess     Body Fat Loss:  1 - Mild body fat loss Orbital   Muscle Mass Loss:  Unable to assess  Fluid Accumulation:  1 - Mild Extremities   Strength:  Not Performed    Estimated Daily Nutrient Needs:  Energy (kcal):  20 kcal/kg = 1800 kcals/day; Weight Used for Energy Requirements:  Current     Protein (g):  1.2-1.5 gm/kg = 65-80 gm pro/day; Weight Used for Protein Requirements:  Ideal        Fluid (ml/day):  2000 mL/day or per MD; Method Used for Fluid Requirements:  1 ml/kcal      Nutrition Related Findings:  Labs/Meds reviewed. Last BM 1/2. Wounds:  None       Current Nutrition Therapies:    ADULT DIET; Regular; Low Fat/Low Chol/High Fiber/LOUISE; Low Sodium (2 gm)    Anthropometric Measures:  · Height: 5' 4\" (162.6 cm)  · Current Body Weight: 195 lb 15.8 oz (88.9 kg)   · Admission Body Weight: 188 lb 0.8 oz (85.3 kg)    · Ideal Body Weight: 120 lbs; % Ideal Body Weight 163.3 %   · BMI: 33.6  · BMI Categories: Obese Class 1 (BMI 30.0-34. 9)       Nutrition Diagnosis:   · Inadequate oral intake related to  (appetite; current medical condition) as evidenced by intake 0-25%,intake 26-50%,intake 51-75% (variable PO intakes; need for ONS)    Nutrition Interventions:   Food and/or Nutrient Delivery:  Continue Current Diet,Start Oral Nutrition Supplement  Nutrition Education/Counseling:  No recommendation at this time   Coordination of Nutrition Care:  Continue to monitor while inpatient    Goals:  Oral intakes to meet at least 75% of estimated nutrition needs. Nutrition Monitoring and Evaluation:   Behavioral-Environmental Outcomes:  None Identified   Food/Nutrient Intake Outcomes:  Food and Nutrient Intake,Supplement Intake  Physical Signs/Symptoms Outcomes:  Biochemical Data,GI Status,Nutrition Focused Physical Findings,Skin,Weight     Discharge Planning:     Too soon to determine     Electronically signed by Johanna Canela RD, LD on 1/5/22 at 2:42 PM EST    Contact: [phone]

## 2022-01-05 NOTE — PROGRESS NOTES
Occupational Therapy   Occupational Therapy Initial Assessment  Date: 2022   Patient Name: Dede Burch  MRN: [id number]     : 1950    Date of Service: 2022  Chief Complaint   Patient presents with    Shortness of Breath    Fall     Discharge Recommendations:  Patient would benefit from continued therapy after discharge     Assessment   Performance deficits / Impairments: Decreased functional mobility ; Decreased ADL status; Decreased endurance;Decreased high-level IADLs;Decreased safe awareness;Decreased balance;Decreased strength  Assessment: Patient supine upon arrival, completing bed mobility at [facility] to sit EOB. Pt sat EOB for ~32 min engaging in intermittent dynamic tasks, limited to progress activity and engage in ADL tasks. Pt educated on pursed lip breathing and implementation of rest breaks to maintain O2 >90%. Pt retired to supine d/t SpO2 dropping to 84%, able to recover with increased time. Pt educated on B UE exercises to complete to improve B UE strength as SpO2 >90% with good return. Patient would benefit from continued acute OT services to address functional deficits through skilled intervention of ADL and IADL compensatory training, balance, safety and transfer training, education of EC/WS techs and implementation, use of AE/DME to increase independence, and strengthening activities to improve function for ADLs to promote functional outcomes.    Prognosis: Good  Decision Making: Medium Complexity  Patient Education: OT role, OT POC, purpose of evaluation, activity promotion, pursed lip breathing, implementation of rest breaks - good/fair return  REQUIRES OT FOLLOW UP: Yes  Activity Tolerance  Activity Tolerance: Patient limited by fatigue;Treatment limited secondary to medical complications (free text)  Activity Tolerance: High requirement for O2; drops in SpO2 with functional activity  Safety Devices  Safety Devices in place: Yes  Type of devices: Call light within reach;Nurse notified; Left in bed;Bed alarm in place  Restraints  Initially in place: No         Patient Diagnosis(es): The primary encounter diagnosis was COVID-19. Diagnoses of Dehydration and Tachycardia were also pertinent to this visit. has no past medical history on file. has no past surgical history on file. Restrictions  Restrictions/Precautions  Restrictions/Precautions: Up as Tolerated,Isolation,Contact Precautions (+COVID)  Required Braces or Orthoses?: No  Position Activity Restriction  Other position/activity restrictions: up with assistance    Subjective   General  Patient assessed for rehabilitation services?: Yes  Family / Caregiver Present: No  General Comment  Comments: RN ok'd patient for OT/PT evaluation. Pt pleasant and cooperative throughout.   Patient Currently in Pain: Denies  Vital Signs  Resp: 17  Patient Currently in Pain: Denies  Oxygen Therapy  SpO2: 96 %  O2 Device: Heated high flow cannula  FiO2 : (S) 70 %  O2 Flow Rate (L/min): 40 L/min    Social/Functional History  Social/Functional History  Lives With: Son  Type of Home: Apartment  Home Layout: Two level  Home Access: Stairs to enter without rails  Entrance Stairs - Number of Steps: 3  Bathroom Shower/Tub: Tub/Shower unit  Bathroom Toilet: Handicap height  Bathroom Equipment:  (reports none)  Home Equipment:  (no DME use)  ADL Assistance: Independent  Homemaking Assistance: Independent  Homemaking Responsibilities: Yes (shares with son)  Meal Prep Responsibility: Primary  Laundry Responsibility: Primary  Cleaning Responsibility: Primary  Shopping Responsibility: Primary  Ambulation Assistance: Independent  Transfer Assistance: Independent  Active : No  Patient's  Info: walks or son drives  Occupation: Full time employment  Type of occupation:   Leisure & Hobbies: watching TV  Additional Comments: Pt reports son that typically lives with the pt works but is able to assist when not working, pt has another son who Mobility Training,Balance Training    AM-PAC Score        AM-PAC Inpatient Daily Activity Raw Score: 16 (01/05/22 1339)  AM-PAC Inpatient ADL T-Scale Score : 35.96 (01/05/22 1339)  ADL Inpatient CMS 0-100% Score: 53.32 (01/05/22 1339)  ADL Inpatient CMS G-Code Modifier : CK (01/05/22 1339)    Goals  Short term goals  Time Frame for Short term goals: Patient will, by discharge  Short term goal 1: demo UB ADLs at Supervision  Short term goal [facility]  Short term goal 3: demo 10+ min of dynamic standing balance at SBA to engage in ADLs  Short term goal 4: demo use of pursed lip breathing independently with s/s of SOB  Short term goal 5: demo independence in B UE HEP to improve B strength for functional tasks     Therapy Time   Individual Concurrent Group Co-treatment   Time In 0947         Time Out 1027         Minutes 40         Timed Code Treatment Minutes: 23 Minutes     Je Salazar, OTR/L

## 2022-01-05 NOTE — PROGRESS NOTES
[facility] -   Infectious diseases evaluation  admission date 12/24/2021    reason for consultation:   covid +    Impression :   Current:  · covid pneumonia   · PE -  left lower lobe segmental emboli -  · bandemia  · Thrombocytopenia    Other:  ·   Discussion / summary of stay / plan of care   ·   Recommendations   · barcitinib 12/24 -  · Decadron 6 mg daily 12/24  · Anticoagulation  · CRP down to normal,   · Oxygenation improving    Infection Control Recommendations   · [place] Precautions  · Contact Isolation   · Airborne isolation  · Droplet Isolation  Antimicrobial Stewardship Recommendations   · Off AB    Coordination ofOutpatient Care:   · Estimated Length of IV antimicrobials:  · Patient will need Midline / picc Catheter Insertion:   · Patient will need SNF:  · Patient will need outpatient wound care:     History of Present Illness:   Initial history:  Bentley Leon is a 70y.o.-year-old female Patient presented through ER via EMS after her family found her with severe shortness of breath in her shower. She had developed a cough approximately a week ago and according to the patient had a negative Covid test at that time. She was found to have an SPO2 of 50% on room air requiring BiPAP. A rapid Covid swab was positive  CTA of the chest showed segmental pulmonary embolism in the left lower lobe with possible right heart strain. An echocardiogram was completed and was negative for RV strain with RV systolic pressure of 29 mmHg  The patient was initiated on a heparin drip as well as high-dose Decadron, Rocephin and baricitinib.        Interval changes  1/5/2022   Patient Vitals for the past 8 hrs:   BP Temp Temp src Pulse Resp SpO2   01/05/22 0029 (!) 126/90 -- -- 71 17 --   01/04/22 2327 -- -- -- 64 17 97 %   01/04/22 2214 -- -- -- -- -- 96 %   01/04/22 2213 -- -- -- -- -- 96 %   01/04/22 2050 -- -- -- 70 21 94 %   01/04/22 2017 117/75 97.9 °F (36.6 °C) Oral 72 20 --   01/04/22 1941 -- -- -- -- 20 96 %   01/04/22 1939 -- -- -- -- 20 96 %   01/04/22 1819 -- -- -- -- 15 92 %   CRP 18-improved-  WBC 10  Blood culture still negative, chest x-ray positive diffuse pneumonia  Patient is on baricitinib, but saturation 86% she is on 90% high flow    12/28  60 FiO2 high flow, he feels better eating better but he saturating 90%,    12/29  70% FiO2 high flow, saturating 89%, she is short of breath, eating slightly, has a cough. In general she feels okay  Labs reviewed    12/30  60 FiO2 high flow, good appetite, alert oriented x3  WBC 10  Urine culture 12/26 -    12/31  96 saturation FiO2 70% on high flow, she feels better. WBC 7.4  CRP 6    1/1/22  High flow 45%, which is an improvement,  98 saturation, alert with a cough, WBC normal 9.2, tolerating her statin    1/3/22  Patient is on BiPAP 70%, saturating 99 and alert appropriate    1/4  High flow improved 70%, saturating 96%, no fever, looks better  CRP nose normal      Summary of relevant labs:  Labs:  .5-18-6-3    BNP 9968  Troponin 54  Lactic acid 3.5  Ferritin 858  WBC 14.6 -7  D-dimer 70.48    Micro:    Imaging:  CT chest bilat covid pneumonia w thick consolidations    I have personally reviewed the past medical history, past surgical history, medications, social history, and family history, and I haveupdated the database accordingly. Allergies:   Patient has no known allergies. Review of Systems:     Review of Systems   Constitutional: Positive for activity change. Negative for diaphoresis. HENT: Positive for congestion. Eyes: Negative for photophobia and pain. Respiratory: Negative for cough and shortness of breath. Cardiovascular: Negative for chest pain. Gastrointestinal: Negative for abdominal distention. Endocrine: Negative for polyphagia. Genitourinary: Negative for dysuria. Skin: Negative for color change. Allergic/Immunologic: Negative for immunocompromised state. Neurological: Negative for dizziness, tremors, seizures, syncope and speech difficulty. Psychiatric/Behavioral: Negative for agitation. Physical Examination :       Physical Exam  Constitutional:       Appearance: Normal appearance. She is not toxic-appearing or diaphoretic. HENT:      Head: Normocephalic and atraumatic. Nose: Nose normal.      Mouth/Throat:      Mouth: Mucous membranes are moist.   Eyes:      General: No scleral icterus. Conjunctiva/sclera: Conjunctivae normal.   Cardiovascular:      Rate and Rhythm: Normal rate and regular rhythm. Heart sounds: Normal heart sounds. No murmur heard. No friction rub. Pulmonary:      Breath sounds: Normal breath sounds. Abdominal:      General: There is no distension. Tenderness: There is no abdominal tenderness. Genitourinary:     Comments: No castro  Musculoskeletal:         General: No swelling, deformity or signs of injury. Cervical back: Neck supple. No rigidity or tenderness. Right lower leg: No edema. Left lower leg: No edema. Skin:     General: Skin is dry. Coloration: Skin is not jaundiced or pale. Findings: No bruising, erythema, lesion or rash. Neurological:      General: No focal deficit present. Mental Status: She is alert. Mental status is at baseline. Psychiatric:         Mood and Affect: Mood normal.         Thought Content: Thought content normal.         Past Medical History:   No past medical history on file. Past Surgical  History:   No past surgical history on file.     Medications:      Vitamin D  2,000 Units Oral Daily    ascorbic acid  500 mg Oral BID    zinc sulfate  50 mg Oral Daily    budesonide-formoterol  2 puff Inhalation BID    apixaban  10 mg Oral BID    Followed by   Carlo Judd ON 1/7/2022] apixaban  5 mg Oral BID    dexamethasone  6 mg IntraVENous Daily    famotidine  20 mg Oral BID    sodium chloride flush  10 mL IntraVENous 2 times per day    baricitinib  4 mg Oral Daily       Social History:     Social History     Socioeconomic History    Marital status: Unknown     Spouse name: Not on file    Number of children: Not on file    Years of education: Not on file    Highest education level: Not on file   Occupational History    Not on file   Tobacco Use    Smoking status: Current Every Day Smoker     Types: Cigarettes    Smokeless tobacco: Never Used   Substance and Sexual Activity    Alcohol use: Not Currently    Drug use: Not Currently    Sexual activity: Not on file   Other Topics Concern    Not on file   Social History Narrative    Not on file     Social Determinants of Health     Financial Resource Strain:     Difficulty of Paying Living Expenses: Not on file   Food Insecurity:     Worried About Running Out of Food in the Last Year: Not on file    Jones of Food in the Last Year: Not on file   Transportation Needs:     Lack of Transportation (Medical): Not on file    Lack of Transportation (Non-Medical):  Not on file   Physical Activity:     Days of Exercise per Week: Not on file    Minutes of Exercise per Session: Not on file   Stress:     Feeling of Stress : Not on file   Social Connections:     Frequency of Communication with Friends and Family: Not on file    Frequency of Social Gatherings with Friends and Family: Not on file    Attends Church Services: Not on file    Active Member of [address] [place] [address] [employer] or Organizations: Not on file    Attends Club or Organization Meetings: Not on file    Marital Status: Not on file   Intimate Partner Violence:     Fear of Current or Ex-Partner: Not on file    Emotionally Abused: Not on file    Physically Abused: Not on file    Sexually Abused: Not on file   Housing Stability:     Unable to Pay for Housing in the Last Year: Not on file    Number of [place] in the Last Year: Not on file    Unstable Housing in the Last Year: Not on file       Family History:     Family History   Problem

## 2022-01-05 NOTE — PLAN OF CARE
Nutrition Problem #1: Inadequate oral intake  Intervention: Food and/or Nutrient Delivery: Continue Current Diet,Start Oral Nutrition Supplement  Nutritional Goals: Oral intakes to meet at least 75% of estimated nutrition needs.

## 2022-01-06 LAB
ABSOLUTE EOS #: 0 K/UL (ref 0–0.44)
ABSOLUTE IMMATURE GRANULOCYTE: 0.07 K/UL (ref 0–0.3)
ABSOLUTE LYMPH #: 0.66 K/UL (ref 1.1–3.7)
ABSOLUTE MONO #: 0.4 K/UL (ref 0.1–1.2)
ANION GAP SERPL CALCULATED.3IONS-SCNC: 6 MMOL/L (ref 9–17)
BASOPHILS # BLD: 0 % (ref 0–2)
BASOPHILS ABSOLUTE: 0 K/UL (ref 0–0.2)
BUN BLDV-MCNC: 17 MG/DL (ref 8–23)
BUN/CREAT BLD: ABNORMAL (ref 9–20)
CALCIUM SERPL-MCNC: 7.9 MG/DL (ref 8.6–10.4)
CHLORIDE BLD-SCNC: 102 MMOL/L (ref 98–107)
CO2: 24 MMOL/L (ref 20–31)
CREAT SERPL-MCNC: 0.35 MG/DL (ref 0.5–0.9)
DIFFERENTIAL TYPE: ABNORMAL
EOSINOPHILS RELATIVE PERCENT: 0 % (ref 1–4)
GFR AFRICAN AMERICAN: >60 ML/MIN
GFR NON-AFRICAN AMERICAN: >60 ML/MIN
GFR SERPL CREATININE-BSD FRML MDRD: ABNORMAL ML/MIN/{1.73_M2}
GFR SERPL CREATININE-BSD FRML MDRD: ABNORMAL ML/MIN/{1.73_M2}
GLUCOSE BLD-MCNC: 130 MG/DL (ref 70–99)
HCT VFR BLD CALC: 35.5 % (ref 36.3–47.1)
HEMOGLOBIN: 11.9 G/DL (ref 11.9–15.1)
IMMATURE GRANULOCYTES: 1 %
LYMPHOCYTES # BLD: 10 % (ref 24–43)
MCH RBC QN AUTO: 33.8 PG (ref 25.2–33.5)
MCHC RBC AUTO-ENTMCNC: 33.5 G/DL (ref 28.4–34.8)
MCV RBC AUTO: 100.9 FL (ref 82.6–102.9)
MONOCYTES # BLD: 6 % (ref 3–12)
MORPHOLOGY: NORMAL
NRBC AUTOMATED: 0 PER 100 WBC
PDW BLD-RTO: 14.2 % (ref 11.8–14.4)
PLATELET # BLD: 241 K/UL (ref 138–453)
PLATELET ESTIMATE: ABNORMAL
PMV BLD AUTO: 10.7 FL (ref 8.1–13.5)
POTASSIUM SERPL-SCNC: 4.2 MMOL/L (ref 3.7–5.3)
RBC # BLD: 3.52 M/UL (ref 3.95–5.11)
RBC # BLD: ABNORMAL 10*6/UL
SEG NEUTROPHILS: 83 % (ref 36–65)
SEGMENTED NEUTROPHILS ABSOLUTE COUNT: 5.47 K/UL (ref 1.5–8.1)
SODIUM BLD-SCNC: 132 MMOL/L (ref 135–144)
WBC # BLD: 6.6 K/UL (ref 3.5–11.3)
WBC # BLD: ABNORMAL 10*3/UL

## 2022-01-06 PROCEDURE — 6370000000 HC RX 637 (ALT 250 FOR IP): Performed by: INTERNAL MEDICINE

## 2022-01-06 PROCEDURE — 2060000000 HC ICU INTERMEDIATE R&B

## 2022-01-06 PROCEDURE — 76937 US GUIDE VASCULAR ACCESS: CPT

## 2022-01-06 PROCEDURE — 80048 BASIC METABOLIC PNL TOTAL CA: CPT

## 2022-01-06 PROCEDURE — 85025 COMPLETE CBC W/AUTO DIFF WBC: CPT

## 2022-01-06 PROCEDURE — 2580000003 HC RX 258: Performed by: STUDENT IN AN ORGANIZED HEALTH CARE EDUCATION/TRAINING PROGRAM

## 2022-01-06 PROCEDURE — 99232 SBSQ HOSP IP/OBS MODERATE 35: CPT | Performed by: INTERNAL MEDICINE

## 2022-01-06 PROCEDURE — 6370000000 HC RX 637 (ALT 250 FOR IP): Performed by: NURSE PRACTITIONER

## 2022-01-06 PROCEDURE — 94660 CPAP INITIATION&MGMT: CPT

## 2022-01-06 PROCEDURE — 2700000000 HC OXYGEN THERAPY PER DAY

## 2022-01-06 PROCEDURE — 6360000002 HC RX W HCPCS: Performed by: FAMILY MEDICINE

## 2022-01-06 PROCEDURE — 99291 CRITICAL CARE FIRST HOUR: CPT | Performed by: INTERNAL MEDICINE

## 2022-01-06 PROCEDURE — 36415 COLL VENOUS BLD VENIPUNCTURE: CPT

## 2022-01-06 PROCEDURE — 6370000000 HC RX 637 (ALT 250 FOR IP): Performed by: FAMILY MEDICINE

## 2022-01-06 RX ADMIN — SODIUM CHLORIDE, PRESERVATIVE FREE 10 ML: 5 INJECTION INTRAVENOUS at 20:40

## 2022-01-06 RX ADMIN — BARICITINIB 4 MG: 2 TABLET, FILM COATED ORAL at 08:26

## 2022-01-06 RX ADMIN — Medication 2000 UNITS: at 08:25

## 2022-01-06 RX ADMIN — APIXABAN 10 MG: 5 TABLET, FILM COATED ORAL at 20:39

## 2022-01-06 RX ADMIN — OXYCODONE HYDROCHLORIDE AND ACETAMINOPHEN 500 MG: 500 TABLET ORAL at 20:40

## 2022-01-06 RX ADMIN — BUDESONIDE AND FORMOTEROL FUMARATE DIHYDRATE 2 PUFF: 160; 4.5 AEROSOL RESPIRATORY (INHALATION) at 08:25

## 2022-01-06 RX ADMIN — DEXAMETHASONE SODIUM PHOSPHATE 6 MG: 10 INJECTION INTRAMUSCULAR; INTRAVENOUS at 08:25

## 2022-01-06 RX ADMIN — SODIUM CHLORIDE, PRESERVATIVE FREE 10 ML: 5 INJECTION INTRAVENOUS at 08:26

## 2022-01-06 RX ADMIN — FAMOTIDINE 20 MG: 20 TABLET, FILM COATED ORAL at 20:39

## 2022-01-06 RX ADMIN — ZINC SULFATE 220 MG (50 MG) CAPSULE 50 MG: CAPSULE at 08:26

## 2022-01-06 RX ADMIN — APIXABAN 10 MG: 5 TABLET, FILM COATED ORAL at 08:26

## 2022-01-06 RX ADMIN — OXYCODONE HYDROCHLORIDE AND ACETAMINOPHEN 500 MG: 500 TABLET ORAL at 08:26

## 2022-01-06 RX ADMIN — BENZONATATE 200 MG: 100 CAPSULE ORAL at 15:29

## 2022-01-06 RX ADMIN — Medication 5 ML: at 20:40

## 2022-01-06 RX ADMIN — FAMOTIDINE 20 MG: 20 TABLET, FILM COATED ORAL at 08:25

## 2022-01-06 RX ADMIN — Medication 5 ML: at 08:25

## 2022-01-06 RX ADMIN — BUDESONIDE AND FORMOTEROL FUMARATE DIHYDRATE 2 PUFF: 160; 4.5 AEROSOL RESPIRATORY (INHALATION) at 20:39

## 2022-01-06 ASSESSMENT — ENCOUNTER SYMPTOMS
COUGH: 1
VOMITING: 0
SORE THROAT: 0
DIARRHEA: 0
VOICE CHANGE: 0
SINUS PAIN: 0
ALLERGIC/IMMUNOLOGIC NEGATIVE: 1
PHOTOPHOBIA: 0
SHORTNESS OF BREATH: 1
ABDOMINAL PAIN: 0
TROUBLE SWALLOWING: 0

## 2022-01-06 ASSESSMENT — PAIN SCALES - GENERAL
PAINLEVEL_OUTOF10: 0

## 2022-01-06 NOTE — PLAN OF CARE
Problem: Airway Clearance - Ineffective  Goal: Achieve or maintain patent airway  1/6/2022 1734 by Nidhi Klein RN  Outcome: Ongoing  1/6/2022 0601 by Nanda Nesbitt RN  Outcome: Ongoing     Problem: Gas Exchange - Impaired  Goal: Absence of hypoxia  1/6/2022 1734 by Nidhi Klein RN  Outcome: Ongoing  1/6/2022 0601 by Nanda Nesbitt RN  Outcome: Ongoing  Goal: Promote optimal lung function  1/6/2022 1734 by Nidhi Klein RN  Outcome: Ongoing  1/6/2022 0601 by Nanda Nesbitt RN  Outcome: Ongoing     Problem: Breathing Pattern - Ineffective  Goal: Ability to achieve and maintain a regular respiratory rate  1/6/2022 1734 by Nidhi Klein RN  Outcome: Ongoing  1/6/2022 0601 by Nanda Nesbitt RN  Outcome: Ongoing     Problem:  Body Temperature -  Risk of, Imbalanced  Goal: Ability to maintain a body temperature within defined limits  1/6/2022 1734 by Nidhi Klein RN  Outcome: Ongoing  1/6/2022 0601 by Nanda Nesbitt RN  Outcome: Ongoing  Goal: Will regain or maintain usual level of consciousness  1/6/2022 1734 by Nidhi Klein RN  Outcome: Ongoing  1/6/2022 0601 by Nanda Nesbitt RN  Outcome: Ongoing  Goal: Complications related to the disease process, condition or treatment will be avoided or minimized  1/6/2022 1734 by Nidhi Klein RN  Outcome: Ongoing  1/6/2022 0601 by Nanda Nesbitt RN  Outcome: Ongoing     Problem: Isolation Precautions - Risk of Spread of Infection  Goal: Prevent transmission of infection  1/6/2022 1734 by Nidhi Klein RN  Outcome: Ongoing  1/6/2022 0601 by Nanda Nesbitt RN  Outcome: Ongoing     Problem: Nutrition Deficits  Goal: Optimize nutritional status  1/6/2022 1734 by Nidhi Klein RN  Outcome: Ongoing  1/6/2022 0601 by Nanda Nesbitt RN  Outcome: Ongoing     Problem: Risk for Fluid Volume Deficit  Goal: Maintain normal heart rhythm  1/6/2022 1734 by Nidhi Klein RN  Outcome: Ongoing  1/6/2022 0601 by Melinda Burgos Afia Thomas RN  Outcome: Ongoing  Goal: Maintain absence of muscle cramping  1/6/2022 1734 by Nidhi Klein RN  Outcome: Ongoing  1/6/2022 0601 by Nanda Nesbitt RN  Outcome: Ongoing  Goal: Maintain normal serum potassium, sodium, calcium, phosphorus, and pH  1/6/2022 1734 by Nidhi Klein RN  Outcome: Ongoing  1/6/2022 0601 by Nanda Nesbitt RN  Outcome: Ongoing     Problem: Loneliness or Risk for Loneliness  Goal: Demonstrate positive use of time alone when socialization is not possible  1/6/2022 1734 by Nidhi Klein RN  Outcome: Ongoing  1/6/2022 0601 by Nanda Nesbitt RN  Outcome: Ongoing     Problem: Fatigue  Goal: Verbalize increase energy and improved vitality  1/6/2022 1734 by Nidhi Klein RN  Outcome: Ongoing  1/6/2022 0601 by Nanda Nesbitt RN  Outcome: Ongoing     Problem: Patient Education: Go to Patient Education Activity  Goal: Patient/Family Education  1/6/2022 1734 by Nidhi Klein RN  Outcome: Ongoing  1/6/2022 0601 by Nanda Nesbitt RN  Outcome: Ongoing     Problem: OXYGENATION/RESPIRATORY FUNCTION  Goal: Patient will maintain patent airway  1/6/2022 1734 by Nidhi Klein RN  Outcome: Ongoing  1/6/2022 0601 by Nanda Nesbitt RN  Outcome: Ongoing  Goal: Patient will achieve/maintain normal respiratory rate/effort  Description: Respiratory rate and effort will be within normal limits for the patient  1/6/2022 1734 by Nidhi Klein RN  Outcome: Ongoing  1/6/2022 0601 by Nanda Nesbitt RN  Outcome: Ongoing     Problem: Skin Integrity:  Goal: Will show no infection signs and symptoms  Description: Will show no infection signs and symptoms  1/6/2022 1734 by Nidhi Klein RN  Outcome: Ongoing  1/6/2022 0601 by Nanda Nesbitt RN  Outcome: Ongoing  Goal: Absence of new skin breakdown  Description: Absence of new skin breakdown  1/6/2022 1734 by Nidhi Klein RN  Outcome: Ongoing  1/6/2022 0601 by Nanda Nesbitt RN  Outcome: Ongoing     Problem: Nutrition  Goal: Optimal nutrition therapy  1/6/2022 1734 by Matthew Edward RN  Outcome: Ongoing  1/6/2022 0601 by Roseline Gray RN  Outcome: Ongoing

## 2022-01-06 NOTE — PROGRESS NOTES
[facility]  Office: [address], DO, Sherri Calvin, DO, Colleen Urrutia, DO, Sarthak Huddleston Blood, DO, Enrique Austin MD, Melisa Gill MD, Shaun Villatoro MD, Moira Sylvester MD, Bonnie Hodgkin, MD, Sveta Huggins MD, Francisco Simpson MD, Jojo Galo, DO, Shahida Ames, DO, Олег Roberts MD,  Erick Segovia DO, Cris Quinn MD, Magaly Fraser MD, Allie Aguillon MD, David Taveras MD, Nimco Langston MD, Carmine Cruz MD, Catia Perrin MD, Jaden Castaneda [facility], [facility], CNP, Evelyn Acosta, CNP, Mansi Monteiro, CNS, Tati Medina, CNP, Lizzy Krishna, CNP, Sophia Vieira, CNP, Kirsten Joshi, CNP, Torers Burrell CNP, Radonna Aschoff, PA-C, Ninfa Prieto [facility], Rodger Mckeon [facility], Janneth Carlos, CNP, Sue De Leon, CNP, Robert Sears CNP, Scott Loyola, CNP, Nik Li CNP, Albert Gross, [address] [place] [address]    Progress Note    1/6/2022    4:12 PM    Name:   Bentley Leon  MRN:     [id number]     Acct:      [de-identified]   Room:   [address] [place] [address] Day:  15  Admit Date:  12/24/2021  3:37 PM    PCP:   No primary care provider on file. Code Status:  Full Code    Subjective:     C/C:   Chief Complaint   Patient presents with    Shortness of Breath    Fall     Interval History Status: not changed. Patient seen and examined this afternoon. Having to have a bowel movement. Down to 65% FiO2. Feeling somewhat improved. Brief History:     Bentley Leon is 70 y.o. female who was admitted to the hospital on 12/24/2021 for treatment of Pneumonia due to COVID-19 virus. Patient came to the hospital with lightheadedness and dry cough for 1 week. She had episode of fall at home and family called EMS. Initial evaluation by EMS found patient having tachypnea with respirate 40 to 50/min, hypoxia SPO2 50% on room air, tachycardia. Patient was placed on nonrebreather and improved only to 73%. Patient was then placed on BiPAP. Evaluation emergency room showed positive COVID-19 test, elevated troponin, elevated proBNP. CT chest showed subsegmental PE with possible right heart strain. Vascular surgery was consulted and recommended anticoagulation without need for thrombolysis. Patient also had thrombocytopenia. Cardiology was consulted and echocardiogram obtained which was negative for RV strain with RV systolic pressure 29 mmHg. Patient was treated with heparin infusion, Decadron and started on baricitinib. Patient had vaginal bleeding on 12/30/21 for which OBGYN will see her for in the outpatient setting.    - uptrending oxygen requirements. Review of Systems:     Constitutional:  negative for chills, fevers, sweats  Respiratory: Reports cough and shortness of breath; report sinus congestion, increased secretions; negative for wheezing  Cardiovascular:  negative for chest pain, chest pressure/discomfort, lower extremity edema, palpitations  Gastrointestinal: reports constipation; negative for abdominal pain, nausea, vomiting  Neurological:  negative for dizziness, headache    Medications: Allergies:  No Known Allergies    Current Meds:   Scheduled Meds:    Vitamin D  2,000 Units Oral Daily    ascorbic acid  500 mg Oral BID    zinc sulfate  50 mg Oral Daily    budesonide-formoterol  2 puff Inhalation BID    apixaban  10 mg Oral BID    Followed by   Libra Banegas ON 1/7/2022] apixaban  5 mg Oral BID    dexamethasone  6 mg IntraVENous Daily    famotidine  20 mg Oral BID    sodium chloride flush  10 mL IntraVENous 2 times per day     Continuous Infusions:    sodium chloride       PRN Meds: calcium carbonate, albuterol sulfate HFA, benzocaine-menthol, loperamide, benzonatate, HYDROcodone-chlorpheniramine, sodium chloride flush, sodium chloride, promethazine **OR** ondansetron, polyethylene glycol, acetaminophen **OR** acetaminophen    Data:     Past Medical History:   has no past medical history on file.     Social History: although suspicious for multilobar pneumonia, including active COVID-19 viral pneumonitis. Physical Examination:        General appearance:  alert, cooperative and no distress, somewhat disheveled  female sitting up in bed. Mental Status:  oriented to person, place and time and anxious affect  HEENT: Sclera anti-icteric, EOMI, high flow nasal cannula present  Lungs:  clear to auscultation bilaterally, globally diminished breath sounds, normal effort  Heart:  regular rate and rhythm, no murmur  Abdomen:  soft, nontender, nondistended, normal bowel sounds, no masses, hepatomegaly, splenomegaly  Extremities:  no edema, redness, tenderness in the calves  Skin:  no gross lesions, rashes, induration  Psych: Appears anxious    Assessment:        Hospital Problems           Last Modified POA    * (Principal) Pneumonia due to COVID-19 virus 1/2/2022 Yes    Pulmonary embolus, left ([address] 1/2/2022 Yes    Acute respiratory failure with hypoxia ([address] 1/2/2022 Yes    Postmenopausal bleeding 1/3/2022 Yes    Overview Addendum 1/3/2022  6:29 PM by Kaye Proctor DO     During hospitalization in December 2021  Outpatient follow-up recommended due to acute respiratory failure. TVUS 1/2/22 demonstrates:  3.3 cm echogenic mass in the region of the uterine body which is   possibly the obscuring the endometrium. Obesity with body mass index greater than 30 1/2/2022 Yes          Plan:        Acute respiratory failure with hypoxia - multifactorial, due to #2 and #3. Alternate HFNC with Bipap. On 65% FiO2. Downtrending. COVID-19 pneumonia - on Decadron day 13 today. Continue baricitinib. Continue vitamin D/C/zinc today. Acute PE - continue anticoagulation with Eliquis  Abnormal uterine bleeding - appreciate GYN input. F/u o/p  Obesity with BMI 33.64  Avoid benzos  Albuterol prn, symbicort  PT/Ot  Labs every other day  Discusse with son, Leandro Sung, on 1/4.   Ultimately, she really is appropriate for d/c to [facility] today for further weaning. Await bed/insurance auth.     33 Shameka Paniagua DO  1/6/2022  4:12 PM

## 2022-01-06 NOTE — CARE COORDINATION
Transitional planning-call from [place] at [facility]-trying to verify patient's insurance. Faxed insurance information to [facility]. HIFLO 60% 35L.

## 2022-01-06 NOTE — PROGRESS NOTES
PULMONARY & CRITICAL CARE MEDICINE PROGRESS NOTE     Patient:  Christiana Roger  MRN: [id number]  Admit date: 12/24/2021  Primary Care Physician: No primary care provider on file. Consulting Physician: Porfirio Lazo DO  CODE Status: Full Code  LOS: 15     SUBJECTIVE     CHIEF COMPLAINT/REASON FOR INITIAL CONSULT:   Acute respiratory failure/COVID-19 pneumonia with ARDS/pulmonary [place] involving subsegmental left lower lobe    BRIEF HOSPITAL COURSE:   The patient is a 70 y.o. female was admitted with complaints of shortness of breath and found to be Covid positive but also had a left lower lobe subsegmental pulmonary embolism with right ventricular strain pattern  Vascular surgery was consulted and it was felt, rightfully so, that the cause of the right ventricular strain is unlikely pulmonary embolism  Patient was admitted to the [facility] ICU, requiring 95% oxygen via high flow. Chest x-ray shows bilateral diffuse interstitial alveolar opacities in both lungs  Has shortness of breath along with coughing. INTERVAL HISTORY:  01/06/22  Overnight events noted, chart reviewed, available labs seen and medications reviewed. Overnight patient remained afebrile, T-max is 98.2 respiratory rate is in low to mid 20s and she is hemodynamically stable. She remains on high flow nasal cannula this morning she was on 70% and 40 L wean from 85% yesterday. She did use BiPAP overnight BiPAP setting 16/10/70 percent. Patient denies any change in her breathing she does complain of difficulty swallowing when she eat because of fluid from high flow nasal cannula and cough denies any worsening shortness of breath remain on bed.   Chest x-ray on 01/05/2021 shows low lung volume bilateral pulm infiltrate as before more on the left as compared to right slightly better aeration of left lung as compared to chest x-ray on 12/26/2021  ABG on 01/04/2021 did not show hypercapnia and it was 7.4 1/41/67/26 on high flow nasal cannula. REVIEW OF SYSTEMS:  Review of Systems   Constitutional: Positive for activity change and fatigue. Negative for fever. HENT: Negative for postnasal drip, sinus pain, sore throat, trouble swallowing and voice change. Eyes: Negative for photophobia and visual disturbance. Respiratory: Positive for cough and shortness of breath. Cardiovascular: Negative for chest pain, palpitations and leg swelling. Gastrointestinal: Negative for abdominal pain, diarrhea and vomiting. Endocrine: Negative. Genitourinary: Negative for difficulty urinating, dysuria and hematuria. Musculoskeletal: Negative. Allergic/Immunologic: Negative. Neurological: Negative for dizziness, syncope, speech difficulty and headaches. Hematological: Negative for adenopathy. Does not bruise/bleed easily. Psychiatric/Behavioral: Negative.         OBJECTIVE     PaO2/FiO2 RATIO:  Recent Labs     22  0300   POCPO2 67.0*      FiO2 : (S) 60 % (SpO2 95%)     VITAL SIGNS:   LAST:  /80   Pulse 50   Temp 98.2 °F (36.8 °C) (Oral)   Resp 22   Ht 5' 4\" (1.626 m)   Wt 195 lb 15.8 oz (88.9 kg)   SpO2 96%   Breastfeeding No   BMI 33.64 kg/m²   8-24 HR RANGE:  TEMP Temp  Av.2 °F (36.8 °C)  Min: 98 °F (36.7 °C)  Max: 98.2 °F (46.2 °C)   BP Systolic (86XZT), EIY:461 , Min:117 , LJ      Diastolic (76RPK), IU, Min:79, Max:82     PULSE Pulse  Av.3  Min: 46  Max: 63   RR Resp  Av.3  Min: 15  Max: 24   O2 SAT SpO2  Av.7 %  Min: 95 %  Max: 96 %   OXYGEN DELIVERY O2 Flow Rate (L/min)  Av.3 L/min  Min: 35 L/min  Max: 40 L/min        SYSTEMIC EXAMINATION:   General appearance - Ill-appearing, mild respiratory distress  Mental status - awake & alert, follows commands  Eyes - pupils equal and reactive, sclera anicteric  Mouth - mucous membranes moist, pharynx normal without lesions  Neck -short thick neck supple, no significant adenopathy, carotids upstroke normal bilaterally, no bruits  Chest - Breath sounds bilaterally were dimnished to auscultation at bases. There were bilateral intermittent crackles present without rhonchi. There is no intercostal recession or use of accessory muscles  Heart - normal rate, regular rhythm, normal S1, S2, no murmurs, rubs, clicks or gallops  Abdomen - soft, nontender, nondistended, no masses or organomegaly  Neurological - non-focal  Extremities - peripheral pulses normal, mild pedal edema, no clubbing or cyanosis  Skin - normal coloration and turgor, no rashes, no suspicious skin lesions noted     DATA REVIEW     Medications: Current Inpatient  Scheduled Meds:   Vitamin D  2,000 Units Oral Daily    ascorbic acid  500 mg Oral BID    zinc sulfate  50 mg Oral Daily    budesonide-formoterol  2 puff Inhalation BID    apixaban  10 mg Oral BID    Followed by   Shola Soni ON 1/7/2022] apixaban  5 mg Oral BID    dexamethasone  6 mg IntraVENous Daily    famotidine  20 mg Oral BID    sodium chloride flush  10 mL IntraVENous 2 times per day     Continuous Infusions:   sodium chloride         INPUT/OUTPUT:  In: -   Out: 1850 [Urine:1850]  Date 01/06/22 0000 - 01/06/22 2359   Shift 1427-4524 7548-6610 4556-5503 24 Hour Total   INTAKE   Shift Total(mL/kg)       OUTPUT   Urine(mL/kg/hr)  1000  1000   Shift Total(mL/kg)  1000(11.2)  1000(11.2)   Weight (kg) 88.9 88.9 88.9 88.9        LABS:  ABGs:   Recent Labs     01/04/22  0300   POCPH 7.406   POCPCO2 41.1   POCPO2 67.0*   POCHCO3 25.8   NLVB4JCO 93*     CBC:   Recent Labs     01/04/22  0600 01/06/22  0701   WBC 6.5 6.6   HGB 12.9 11.9   HCT 41.3 35.5*   .5* 100.9    241   LYMPHOPCT 12* 10*   RBC 3.99 3.52*   MCH 32.3 33.8*   MCHC 31.2 33.5   RDW 14.3 14.2     CRP:   Recent Labs     01/04/22  0600   CRP <3.0     LDH:   No results for input(s): LDH in the last 72 hours.   BMP:   Recent Labs     01/04/22  0600 01/06/22  0701   * 132*   K 5.1 4.2    102   CO2 17* 24   BUN 22 17   CREATININE 0.41* 0.35*   GLUCOSE 141* 130*     Liver Function Test:   No results for input(s): PROT, LABALBU, ALT, AST, GGT, ALKPHOS, BILITOT in the last 72 hours. Coagulation Profile:   No results for input(s): INR, PROTIME, APTT in the last 72 hours. D-Dimer:  No results for input(s): DDIMER in the last 72 hours. Ferritin:    No results for input(s): FERRITIN in the last 72 hours. Lactic Acid:  No results for input(s): LACTA in the last 72 hours. Cardiac Enzymes:  No results for input(s): CKTOTAL, CKMB, CKMBINDEX, TROPONINI in the last 72 hours. Invalid input(s): TROPONIN, HSTROP  BNP/ProBNP:   No results for input(s): BNP, PROBNP in the last 72 hours. Triglycerides:  No results for input(s): TRIG in the last 72 hours. Microbiology:  Urine Culture:  No components found for: CURINE  Blood Culture:  No components found for: CBLOOD, CFUNGUSBL  Sputum Culture:  No components found for: CSPUTUM  No results for input(s): SPECDESC, SPECIAL, CULTURE, STATUS, ORG, CDIFFTOXPCR, CAMPYLOBPCR, SALMONELLAPC, SHIGAPCR, SHIGELLAPCR, MPNEUG, MPNEUM, LACTOQL in the last 72 hours. No results for input(s): SPUTUM, SPECDESC, SPECIAL, CULTURE, STATUS, ORG, CDIFFTOXPCR, MPNEUM, MPNEUG in the last 72 hours. Invalid input(s): CURINE, CBLOOD, CFUNGUSBL     Pathology:    Radiology Reports:  XR CHEST PORTABLE   Final Result   No significant change in bilateral airspace disease. US PELVIS COMPLETE   Final Result   3.3 cm echogenic mass in the region of the uterine body which is possibly the   obscuring the endometrium. RECOMMENDATION:   Transvaginal sonography or MRI with contrast for further workup      RECOMMENDATIONS:   Unavailable         XR CHEST PORTABLE   Final Result   Moderate diffuse interstitial and alveolar opacities throughout the lungs   bilaterally. The finding is nonspecific although suspicious for multilobar   pneumonia, including active COVID-19 viral pneumonitis.          CT CHEST PULMONARY EMBOLISM W CONTRAST   Final Result   Motion degraded study. Acute pulmonary emboli suspected particularly in the   left lower lobe but not well visualized due to motion artifact. There is evidence for right ventricular strain with septal bowing suggesting   a significant clot burden. Moderate patchy ground-glass opacities involving all lobes of both lungs   consistent with multifocal pneumonia typical for COVID pneumonia. Findings were discussed with Jose White at 5:39 pm on 12/24/2021. RECOMMENDATIONS:   Unavailable              Echocardiogram:   Results for orders placed during the hospital encounter of 12/24/21    ECHO Complete 2D W Doppler W Color    Narrative  Transthoracic Echocardiography Report (TTE)    Patient Name Lewis Patino      Date of Study         12/25/2021  [place]    Date of      1950  Gender                Female  Birth    Age          70 year(s)  Race                  Unknown    Room Number  3005        Height:               94 inch, 238.76 cm    Corporate ID [id number]    Weight:               186 pounds, 84.4 kg  #    Patient [place] [de-identified]   BSA:       2.51 m^2   BMI:         14.8 kg/m^2  #    MR #         [id number]     Sonographer           Pravinsho Farley    Accession #  [id number]  Interpreting          Genesis Aggarwal  Physician    Fellow                   Referring Nurse  Practitioner    Interpreting             Referring Physician   Lolita Cordova,  Fellow                                         CNP    Type of Study    TTE procedure:2D Echocardiogram, M-Mode, Doppler, Color Doppler. Procedure Date  Date: 12/25/2021 Start: 01:47 PM    Study Location: [facility]  Technical Quality: Adequate visualization    Indications:Pulmonary embolus and Right heart strain. History / Tech. Comments:  Echo done at patient bedside. Procedure explained to patient.   Covid-19    Patient Status: Inpatient    Height: 94 inches Weight: 186 pounds BSA: 2.51 m^2 BMI: 14.8 kg/m^2    CONCLUSIONS    Summary  Global left ventricular systolic function is normal. Estimated EF 50-55%. Dilated Right ventricular with mildly impaired systolic function  Estimated right ventricular systolic pressure is [id number]. No significant valvular regurgitation or stenosis seen. No pericardial effusion seen. Signature  ----------------------------------------------------------------------------  Electronically signed by Clare Randall(Sonographer) on 12/25/2021  02:30 PM  ----------------------------------------------------------------------------    ----------------------------------------------------------------------------  Electronically signed by Genesis Aggarwal(Interpreting physician) on  12/26/2021 07:52 AM  ----------------------------------------------------------------------------  FINDINGS  Left Atrium  Left atrium is normal in size. Left Ventricle  Left ventricle is normal in size. Global left ventricular systolic function  is normal. Calculated ejection fraction 50% by Orantes's method. Right Atrium  Right atrial dilatation. Right Ventricle  Right ventricular systolic function appears mildly reduced reduced. Moderately dilated right ventricular cavity. TAPSE value of 1.65cm noted. Mitral Valve  Normal mitral valve structure and function. No mitral regurgitation. Aortic Valve  Aortic valve is trileaflet and opens adequately. No aortic insufficiency. Tricuspid Valve  No obvious valvular abnormality. Trivial tricuspid regurgitation. No pulmonary hypertension. Estimated right ventricular systolic pressure is  47PIQM. Pulmonic Valve  The pulmonic valve is normal in structure. Mild pulmonic insufficiency. Pericardial Effusion  No pericardial effusion seen. Miscellaneous  E/E' average = 9.4.  IVC normal diameter & inspiratory collapse indicating normal RA filling  pressure .     M-mode / 2D Measurements & Calculations:    LVIDd:3.7 cm(3.7 - 5.6 cm)       Diastolic Volume:26.7 ml  LVIDs:2.6 cm(2.2 - 4.0 cm)       Systolic POCJXX:13.5 ml  FDMS:7.1 cm(0.6 - 1.1 cm)        Aortic Root:2.7 cm(2.0 - 3.7 cm)  LVPWd:0.9 cm(0.6 - 1.1 cm)       LA Dimension: 3.3 cm(1.9 - 4.0 cm)  Fractional Shortenin.73 %    LA volume/Index: 32.1 ml /13m^2  Calculated LVEF (%): 50.94 %     LVOT:2 cm  RVDd:2.6 cm    Mitral:                                 Aortic    Valve Area (P1/2-Time): 7.59 cm^2       Peak Velocity: 1.10 m/s  Peak E-Wave: 0.68 m/s                   Mean Velocity: 0.58 m/s  Peak A-Wave: 0.46 m/s                   Peak Gradient: 4.84 mmHg  E/A Ratio: 1.48                         Mean Gradient: 2 mmHg  Peak Gradient: 1.83 mmHg  Mean Gradient: 1 mmHg  Deceleration Time: 99 msec              Area (continuity): 2.77 cm^2  P1/2t: 29 msec                          AV VTI: 17.6 cm    Area (continuity): 3.43 cm^2  Mean Velocity: 0.51 m/s    Tricuspid:                              Pulmonic:    Peak TR Velocity: 2.71 m/s              Peak Velocity: 0.83 m/s  Peak TR Gradient: 29.3764 mmHg          Peak Gradient: 2.74 mmHg    Diastology / Tissue Doppler  Septal Wall E' velocity:0.06 m/s  Septal Wall E/E':11.5  Lateral Wall E' velocity:0.09 m/s  Lateral Wall E/E':7.4       ASSESSMENT AND PLAN     Assessment:    // Acute hypoxic respiratory failure  // Acute respiratory distress syndrome  // Bilateral multifocal pneumonia due to COVID 19 infection  // Left lower lobe segmental pulmonary embolism  // Obesity            //  Postmenopausal bleeding    Plan:    I personally interviewed/examined the patient; reviewed interval history, interpreted all available radiographic and laboratory data at the time of service. Patient is currently requiring high flow nasal cannula 40 L and 70 %. We will wean flow to 30 L and then wean FiO2. Encourage use of BiPAP at night and alternate with high flow nasal cannula during the daytime.   Patient did use BiPAP last night 18/10/70 percent FiO2  Continue supplemental oxygen to keep oxygen saturation >90%  Encourage prone position when sleeping, incentive spirometry  Continue pulmonary toilet, aspiration precautions and bronchodilators  Chest x-ray from 01/05/2021 seen repeat chest x-ray as needed  Avoid benzodiazepine. Continue Decadron 6 mg once daily for 10 days. She is on baricitinib. On Eliquis 10 mg twice daily for 7 days then 5 mg twice daily   Ccurrently on Symbicort and will continue  May need intermittent diuresis if needed  Monitor I/O, electrolytes with a goal of even/negative fluid balance  Chemical DVT prophylaxis on Eliquis therapeutic dose  Antimicrobials reviewed; currently not on antibiotic  Monitor CRP, LDH, AST/ALT, D-Dimer, Ferritin periodically  Glycemic control per primary service  Physical/occupational therapy    Discussed with respiratory therapist and nursing staff. The patient is/remains critically ill with illness/injury that acutely impairs one or more vital organ systems, such that there is a high probability of imminent or life threatening deterioration in the patient's condition. Critical care time of 35 minutes was spent (excluding procedures), in coordination of care during bedside rounds and discussion of patient care in detail, and recommendations of the team were adopted in the plan. Necessity of all invasive devices was also confirmed. James Zavaleta MD   Pulmonary and Critical Care Medicine           1/6/2022, 1:48 PM    This patient was evaluated in the context of the global SARS-CoV-2 (COVID-19) pandemic, which necessitated considerations that the patient either has COVID-19 infection or is at risk of infection with COVID-19. Institutional protocols and algorithms that pertain to the evaluation & management of patients with COVID-19 or those at risk for COVID-19 are in a state of rapid changes based on information released by regulatory bodies including the CDC and federal and state organizations.  These policies and

## 2022-01-06 NOTE — PLAN OF CARE
Problem: Airway Clearance - Ineffective  Goal: Achieve or maintain patent airway  1/6/2022 0601 by Tianna Maria RN  Outcome: Ongoing  1/5/2022 2128 by Osbaldo Mohamud RCP  Outcome: Ongoing     Problem: Gas Exchange - Impaired  Goal: Absence of hypoxia  1/6/2022 0601 by Tianna Maria RN  Outcome: Ongoing  1/5/2022 2128 by Osbaldo Mohamud RCP  Outcome: Ongoing  Goal: Promote optimal lung function  1/6/2022 0601 by Tianna Maria RN  Outcome: Ongoing  1/5/2022 2128 by Osbaldo Mohamud RCP  Outcome: Ongoing     Problem: Breathing Pattern - Ineffective  Goal: Ability to achieve and maintain a regular respiratory rate  1/6/2022 0601 by Tianna Maria RN  Outcome: Ongoing  1/5/2022 2128 by Osbaldo Mohamud RCP  Outcome: Ongoing     Problem:  Body Temperature -  Risk of, Imbalanced  Goal: Ability to maintain a body temperature within defined limits  Outcome: Ongoing  Goal: Will regain or maintain usual level of consciousness  Outcome: Ongoing  Goal: Complications related to the disease process, condition or treatment will be avoided or minimized  Outcome: Ongoing     Problem: Isolation Precautions - Risk of Spread of Infection  Goal: Prevent transmission of infection  Outcome: Ongoing     Problem: Nutrition Deficits  Goal: Optimize nutritional status  Outcome: Ongoing     Problem: Risk for Fluid Volume Deficit  Goal: Maintain normal heart rhythm  Outcome: Ongoing  Goal: Maintain absence of muscle cramping  Outcome: Ongoing  Goal: Maintain normal serum potassium, sodium, calcium, phosphorus, and pH  Outcome: Ongoing     Problem: Loneliness or Risk for Loneliness  Goal: Demonstrate positive use of time alone when socialization is not possible  Outcome: Ongoing     Problem: Fatigue  Goal: Verbalize increase energy and improved vitality  Outcome: Ongoing     Problem: Patient Education: Go to Patient Education Activity  Goal: Patient/Family Education  Outcome: Ongoing     Problem: OXYGENATION/RESPIRATORY FUNCTION  Goal: Patient will maintain patent airway  Outcome: Ongoing  Goal: Patient will achieve/maintain normal respiratory rate/effort  Description: Respiratory rate and effort will be within normal limits for the patient  Outcome: Ongoing     Problem: Skin Integrity:  Goal: Will show no infection signs and symptoms  Description: Will show no infection signs and symptoms  Outcome: Ongoing  Goal: Absence of new skin breakdown  Description: Absence of new skin breakdown  Outcome: Ongoing     Problem: Nutrition  Goal: Optimal nutrition therapy  Outcome: Ongoing

## 2022-01-07 PROCEDURE — 99291 CRITICAL CARE FIRST HOUR: CPT | Performed by: INTERNAL MEDICINE

## 2022-01-07 PROCEDURE — 2700000000 HC OXYGEN THERAPY PER DAY

## 2022-01-07 PROCEDURE — 97110 THERAPEUTIC EXERCISES: CPT

## 2022-01-07 PROCEDURE — 6370000000 HC RX 637 (ALT 250 FOR IP): Performed by: FAMILY MEDICINE

## 2022-01-07 PROCEDURE — 6360000002 HC RX W HCPCS: Performed by: FAMILY MEDICINE

## 2022-01-07 PROCEDURE — 2060000000 HC ICU INTERMEDIATE R&B

## 2022-01-07 PROCEDURE — 99231 SBSQ HOSP IP/OBS SF/LOW 25: CPT | Performed by: INTERNAL MEDICINE

## 2022-01-07 PROCEDURE — 99232 SBSQ HOSP IP/OBS MODERATE 35: CPT | Performed by: INTERNAL MEDICINE

## 2022-01-07 PROCEDURE — 94660 CPAP INITIATION&MGMT: CPT

## 2022-01-07 PROCEDURE — 6370000000 HC RX 637 (ALT 250 FOR IP): Performed by: INTERNAL MEDICINE

## 2022-01-07 PROCEDURE — 2580000003 HC RX 258: Performed by: STUDENT IN AN ORGANIZED HEALTH CARE EDUCATION/TRAINING PROGRAM

## 2022-01-07 RX ADMIN — ZINC SULFATE 220 MG (50 MG) CAPSULE 50 MG: CAPSULE at 08:46

## 2022-01-07 RX ADMIN — FAMOTIDINE 20 MG: 20 TABLET, FILM COATED ORAL at 08:45

## 2022-01-07 RX ADMIN — Medication 2000 UNITS: at 08:45

## 2022-01-07 RX ADMIN — OXYCODONE HYDROCHLORIDE AND ACETAMINOPHEN 500 MG: 500 TABLET ORAL at 21:35

## 2022-01-07 RX ADMIN — APIXABAN 5 MG: 5 TABLET, FILM COATED ORAL at 08:45

## 2022-01-07 RX ADMIN — BUDESONIDE AND FORMOTEROL FUMARATE DIHYDRATE 2 PUFF: 160; 4.5 AEROSOL RESPIRATORY (INHALATION) at 08:47

## 2022-01-07 RX ADMIN — SODIUM CHLORIDE, PRESERVATIVE FREE 10 ML: 5 INJECTION INTRAVENOUS at 22:21

## 2022-01-07 RX ADMIN — Medication 5 ML: at 08:46

## 2022-01-07 RX ADMIN — Medication 5 ML: at 21:35

## 2022-01-07 RX ADMIN — SODIUM CHLORIDE, PRESERVATIVE FREE 10 ML: 5 INJECTION INTRAVENOUS at 08:46

## 2022-01-07 RX ADMIN — APIXABAN 5 MG: 5 TABLET, FILM COATED ORAL at 21:35

## 2022-01-07 RX ADMIN — FAMOTIDINE 20 MG: 20 TABLET, FILM COATED ORAL at 21:35

## 2022-01-07 RX ADMIN — OXYCODONE HYDROCHLORIDE AND ACETAMINOPHEN 500 MG: 500 TABLET ORAL at 08:46

## 2022-01-07 RX ADMIN — DEXAMETHASONE SODIUM PHOSPHATE 6 MG: 10 INJECTION INTRAMUSCULAR; INTRAVENOUS at 08:46

## 2022-01-07 ASSESSMENT — ENCOUNTER SYMPTOMS
VOMITING: 0
DIARRHEA: 0
ALLERGIC/IMMUNOLOGIC NEGATIVE: 1
ABDOMINAL DISTENTION: 0
TROUBLE SWALLOWING: 0
SINUS PAIN: 0
EYE PAIN: 0
COUGH: 1
SHORTNESS OF BREATH: 0
COUGH: 0
SORE THROAT: 0
ABDOMINAL PAIN: 0
COLOR CHANGE: 0
VOICE CHANGE: 0
SHORTNESS OF BREATH: 1
PHOTOPHOBIA: 0

## 2022-01-07 ASSESSMENT — PAIN SCALES - GENERAL
PAINLEVEL_OUTOF10: 0

## 2022-01-07 NOTE — PROGRESS NOTES
Writer educated pt on importance of turning on sides. Writer noticed that due to urine incontinence, pt sacrum started to become very red. Writer applied zinc cream and a mepi. Pt told writer that she \"understood she needed to self turn to make sure she doesn't get a pressure wound. \" Myles Cardoso will continue to monitor.

## 2022-01-07 NOTE — PLAN OF CARE
[phone] by Nidhi Klein RN  Outcome: Ongoing     Problem: Risk for Fluid Volume Deficit  Goal: Maintain normal heart rhythm  1/7/2022 0616 by Nanda Nesbitt RN  Outcome: Ongoing  1/6/2022 1734 by Nidhi Klein RN  Outcome: Ongoing  Goal: Maintain absence of muscle cramping  1/7/2022 0616 by Nanda Nesbitt RN  Outcome: Ongoing  1/6/2022 1734 by Nidhi Klein RN  Outcome: Ongoing  Goal: Maintain normal serum potassium, sodium, calcium, phosphorus, and pH  1/7/2022 0616 by Nanda Nesbitt RN  Outcome: Ongoing  1/6/2022 1734 by Nidhi Klein RN  Outcome: Ongoing     Problem: Loneliness or Risk for Loneliness  Goal: Demonstrate positive use of time alone when socialization is not possible  1/7/2022 0616 by Nanda Nesbitt RN  Outcome: Ongoing  1/6/2022 1734 by Nidhi Klein RN  Outcome: Ongoing     Problem: Fatigue  Goal: Verbalize increase energy and improved vitality  1/7/2022 0616 by Nanda Nesbitt RN  Outcome: Ongoing  1/6/2022 1734 by Nidhi Klein RN  Outcome: Ongoing     Problem: Patient Education: Go to Patient Education Activity  Goal: Patient/Family Education  1/7/2022 [id number] by Nanda Nesbitt RN  Outcome: Ongoing  1/6/2022 1734 by Nidhi Klein RN  Outcome: Ongoing     Problem: OXYGENATION/RESPIRATORY FUNCTION  Goal: Patient will maintain patent airway  1/7/2022 0616 by Nanda Nesbitt RN  Outcome: Ongoing  1/6/2022 1734 by Nidhi Klein RN  Outcome: Ongoing  Goal: Patient will achieve/maintain normal respiratory rate/effort  Description: Respiratory rate and effort will be within normal limits for the patient  1/7/2022 0616 by Nanda Nesbitt RN  Outcome: Ongoing  1/6/2022 1734 by Nidhi Klein RN  Outcome: Ongoing     Problem: Skin Integrity:  Goal: Will show no infection signs and symptoms  Description: Will show no infection signs and symptoms  1/7/2022 0616 by Nanda Nesbitt RN  Outcome: Ongoing  1/6/2022 1734 by Nidhi Klein RN  Outcome: Ongoing  Goal: Absence of new skin breakdown  Description: Absence of new skin breakdown  1/7/2022 0616 by Reno Johnson RN  Outcome: Ongoing  1/6/2022 1734 by Damion Michael RN  Outcome: Ongoing     Problem: Nutrition  Goal: Optimal nutrition therapy  1/7/2022 0616 by Reno Johnson RN  Outcome: Ongoing  1/6/2022 1734 by Damion Michael RN  Outcome: Ongoing

## 2022-01-07 NOTE — PROGRESS NOTES
[facility] -   Infectious diseases evaluation  admission date 12/24/2021    reason for consultation:   covid +    Impression :   Current:  · covid pneumonia   · PE -  left lower lobe segmental emboli -  · bandemia  · Thrombocytopenia    Other:  ·   Discussion / summary of stay / plan of care   ·   Recommendations   · barcitinib 12/24 -  · Decadron 6 mg daily 12/24  · Anticoagulation  · CRP down to normal,   · Oxygenation improving    Isolate until 1/13/22    Infection Control Recommendations   · [place] Precautions  · Contact Isolation   · Airborne isolation  · Droplet Isolation  Antimicrobial Stewardship Recommendations   · Off AB    Coordination ofOutpatient Care:   · Estimated Length of IV antimicrobials:  · Patient will need Midline / picc Catheter Insertion:   · Patient will need SNF:  · Patient will need outpatient wound care:     History of Present Illness:   Initial history:  Janet Ortega is a 70y.o.-year-old female Patient presented through ER via EMS after her family found her with severe shortness of breath in her shower. She had developed a cough approximately a week ago and according to the patient had a negative Covid test at that time. She was found to have an SPO2 of 50% on room air requiring BiPAP. A rapid Covid swab was positive  CTA of the chest showed segmental pulmonary embolism in the left lower lobe with possible right heart strain. An echocardiogram was completed and was negative for RV strain with RV systolic pressure of 29 mmHg  The patient was initiated on a heparin drip as well as high-dose Decadron, Rocephin and baricitinib.        Interval changes  1/7/2022   Patient Vitals for the past 8 hrs:   BP Temp Temp src Pulse Resp SpO2   01/07/22 0000 (!) 126/91 98 °F (36.7 °C) Axillary 70 20 96 %   01/06/22 2105 -- -- -- -- 28 --   01/06/22 2036 (!) 147/96 97.7 °F (36.5 °C) Oral 97 19 95 %   CRP 18-improved-  WBC 10  Blood culture still negative, chest x-ray positive diffuse pneumonia  Patient is on baricitinib, but saturation 86% she is on 90% high flow    12/28  60 FiO2 high flow, he feels better eating better but he saturating 90%,    12/29  70% FiO2 high flow, saturating 89%, she is short of breath, eating slightly, has a cough. In general she feels okay  Labs reviewed    12/30  60 FiO2 high flow, good appetite, alert oriented x3  WBC 10  Urine culture 12/26 -    12/31  96 saturation FiO2 70% on high flow, she feels better. WBC 7.4  CRP 6    1/1/22  High flow 45%, which is an improvement,  98 saturation, alert with a cough, WBC normal 9.2, tolerating her statin    1/3/22  Patient is on BiPAP 70%, saturating 99 and alert appropriate    1/4  High flow improved 70%, saturating 96%, no fever, looks better  CRP nose normal    1/5  Chest x-ray has not changed, saturating 91% on 57% high flow  Oriented x3, still has a cough  In general better    1/6  97 saturation BiPAP 60, pulse 87  CRP less than 3  No positive culture    Summary of relevant labs:  Labs:  .5-18-6-3    BNP 9968  Troponin 54  Lactic acid 3.5  Ferritin 858  WBC 14.6 -7  D-dimer 70.48    Micro:    Imaging:  CT chest bilat covid pneumonia w thick consolidations    I have personally reviewed the past medical history, past surgical history, medications, social history, and family history, and I haveupdated the database accordingly. Allergies:   Patient has no known allergies. Review of Systems:     Review of Systems   Constitutional: Positive for activity change. Negative for diaphoresis. HENT: Positive for congestion. Eyes: Negative for photophobia and pain. Respiratory: Negative for cough and shortness of breath. Cardiovascular: Negative for chest pain. Gastrointestinal: Negative for abdominal distention. Endocrine: Negative for heat intolerance, polydipsia and polyphagia. Genitourinary: Negative for dysuria.    Musculoskeletal: Negative for arthralgias. Skin: Negative for color change. Allergic/Immunologic: Negative for immunocompromised state. Neurological: Negative for dizziness, tremors, seizures, syncope and speech difficulty. Psychiatric/Behavioral: Negative for agitation. Physical Examination :       Physical Exam  Constitutional:       General: She is not in acute distress. Appearance: Normal appearance. She is not ill-appearing. HENT:      Head: Normocephalic and atraumatic. Nose: Nose normal.      Mouth/Throat:      Mouth: Mucous membranes are moist.   Eyes:      General: No scleral icterus. Conjunctiva/sclera: Conjunctivae normal.   Cardiovascular:      Rate and Rhythm: Normal rate and regular rhythm. Heart sounds: Normal heart sounds. No murmur heard. No friction rub. Pulmonary:      Breath sounds: Normal breath sounds. Abdominal:      General: There is no distension. Tenderness: There is no abdominal tenderness. Genitourinary:     Comments: No castro  Musculoskeletal:         General: No swelling, deformity or signs of injury. Cervical back: Neck supple. No rigidity or tenderness. Right lower leg: No edema. Left lower leg: No edema. Skin:     General: Skin is dry. Coloration: Skin is not jaundiced or pale. Findings: No bruising, erythema, lesion or rash. Neurological:      General: No focal deficit present. Mental Status: She is alert. Mental status is at baseline. Psychiatric:         Mood and Affect: Mood normal.         Thought Content: Thought content normal.         Past Medical History:   No past medical history on file. Past Surgical  History:   No past surgical history on file.     Medications:      Vitamin D  2,000 Units Oral Daily    ascorbic acid  500 mg Oral BID    zinc sulfate  50 mg Oral Daily    budesonide-formoterol  2 puff Inhalation BID    apixaban  5 mg Oral BID    dexamethasone  6 mg IntraVENous Daily    famotidine  20 mg Oral BID  sodium chloride flush  10 mL IntraVENous 2 times per day       Social History:     Social History     Socioeconomic History    Marital status: Unknown     Spouse name: Not on file    Number of children: Not on file    Years of education: Not on file    Highest education level: Not on file   Occupational History    Not on file   Tobacco Use    Smoking status: Current Every Day Smoker     Types: Cigarettes    Smokeless tobacco: Never Used   Substance and Sexual Activity    Alcohol use: Not Currently    Drug use: Not Currently    Sexual activity: Not on file   Other Topics Concern    Not on file   Social History Narrative    Not on file     Social Determinants of Health     Financial Resource Strain:     Difficulty of Paying Living Expenses: Not on file   Food Insecurity:     Worried About Running Out of Food in the Last Year: Not on file    Jones of Food in the Last Year: Not on file   Transportation Needs:     Lack of Transportation (Medical): Not on file    Lack of Transportation (Non-Medical):  Not on file   Physical Activity:     Days of Exercise per Week: Not on file    Minutes of Exercise per Session: Not on file   Stress:     Feeling of Stress : Not on file   Social Connections:     Frequency of Communication with Friends and Family: Not on file    Frequency of Social Gatherings with Friends and Family: Not on file    Attends Yazidism Services: Not on file    Active Member of [address] [place] [address] [employer] or Organizations: Not on file    Attends Club or Organization Meetings: Not on file    Marital Status: Not on file   Intimate Partner Violence:     Fear of Current or Ex-Partner: Not on file    Emotionally Abused: Not on file    Physically Abused: Not on file    Sexually Abused: Not on file   Housing Stability:     Unable to Pay for Housing in the Last Year: Not on file    Number of [place] in the Last Year: Not on file    Unstable Housing in the Last Year: Not on file       Family History: Family History   Problem Relation Age of Onset    Cancer Maternal Aunt         breast      Medical Decision Making:   I have independently reviewed/ordered the following labs:    CBC with Differential:   Recent Labs     01/04/22  0600 01/06/22  0701   WBC 6.5 6.6   HGB 12.9 11.9   HCT 41.3 35.5*    241   LYMPHOPCT 12* 10*   MONOPCT 9 6     BMP:  Recent Labs     01/04/22  0600 01/06/22  0701   * 132*   K 5.1 4.2    102   CO2 17* 24   BUN 22 17   CREATININE 0.41* 0.35*     Hepatic Function Panel:   No results for input(s): PROT, LABALBU, BILIDIR, IBILI, BILITOT, ALKPHOS, ALT, AST in the last 72 hours. No results for input(s): RPR in the last 72 hours. No results for input(s): HIV in the last 72 hours. No results for input(s): BC in the last 72 hours. Lab Results   Component Value Date    CREATININE 0.35 01/06/2022    GLUCOSE 130 01/06/2022       Detailed results: Thank you for allowing us to participate in the care of this patient. Please call with questions. This note is created with the assistance of a speech recognition program.  While intending to generate adocument that actually reflects the content of the visit, the document can still have some errors including those of syntax and sound a like substitutions which may escape proof reading. It such instances, actual meaningcan be extrapolated by contextual diversion.     Iván Batres MD  Office: [phone]  Perfect serve / office [phone]      '

## 2022-01-07 NOTE — PLAN OF CARE
Problem: Airway Clearance - Ineffective  Goal: Achieve or maintain patent airway  1/6/2022 2116 by Milagros Bergeron RCP  Outcome: Ongoing     Problem: Gas Exchange - Impaired  Goal: Absence of hypoxia  1/6/2022 2116 by Milagros Bergeron RCP  Outcome: Ongoing     Problem: Gas Exchange - Impaired  Goal: Promote optimal lung function  1/6/2022 2116 by Milagros Bergeron RCP  Outcome: Ongoing     Problem: Breathing Pattern - Ineffective  Goal: Ability to achieve and maintain a regular respiratory rate  1/6/2022 2116 by Milagros Bergeron RCP  Outcome: Ongoing    NON INVASIVE VENTILATION     PROVIDE OPTIMAL VENTILATION/ACCEPTABLE SP02  IMPLEMENT NON INVASIVE VENTILATION PROTOCOL  ASSESSMENT SKIN INTEGRITY  PATIENT EDUCATION AS NEEDED  BIPAP AS NEEDED

## 2022-01-07 NOTE — PROGRESS NOTES
on 2021 did not show hypercapnia and it was 7.4 / on high flow nasal cannula. REVIEW OF SYSTEMS:  Review of Systems   Constitutional: Positive for activity change and fatigue. Negative for fever. HENT: Negative for postnasal drip, sinus pain, sore throat, trouble swallowing and voice change. Eyes: Negative for photophobia and visual disturbance. Respiratory: Positive for cough and shortness of breath. Cardiovascular: Negative for chest pain, palpitations and leg swelling. Gastrointestinal: Negative for abdominal pain, diarrhea and vomiting. Endocrine: Negative. Genitourinary: Negative for difficulty urinating, dysuria and hematuria. Musculoskeletal: Negative. Allergic/Immunologic: Negative. Neurological: Negative for dizziness, syncope, speech difficulty and headaches. Hematological: Negative for adenopathy. Does not bruise/bleed easily. Psychiatric/Behavioral: Negative. OBJECTIVE     PaO2/FiO2 RATIO:  No results for input(s): POCPO2 in the last 72 hours.    FiO2 : (S) 60 % (SpO2 86%)     VITAL SIGNS:   LAST:  BP (!) 149/61   Pulse 57   Temp 98 °F (36.7 °C) (Oral)   Resp 20   Ht 5' 4\" (1.626 m)   Wt 195 lb 15.8 oz (88.9 kg)   SpO2 90%   Breastfeeding No   BMI 33.64 kg/m²   8-24 HR RANGE:  TEMP Temp  Av °F (36.7 °C)  Min: 97.7 °F (36.5 °C)  Max: 98.2 °F (81.0 °C)   BP Systolic (43WEE), JPA:037 , Min:124 , KOE:161      Diastolic (31UHO), UWW:79, Min:61, Max:96     PULSE Pulse  Av  Min: 57  Max: 106   RR Resp  Av.7  Min: 19  Max: 20   O2 SAT SpO2  Av.7 %  Min: 90 %  Max: 100 %   OXYGEN DELIVERY O2 Flow Rate (L/min)  Av.7 L/min  Min: 30 L/min  Max: 35 L/min        SYSTEMIC EXAMINATION:   General appearance - Ill-appearing, mild respiratory distress  Mental status - awake & alert, follows commands  Eyes - pupils equal and reactive, sclera anicteric  Mouth - mucous membranes moist, pharynx normal without lesions  Neck -short thick neck supple, no significant adenopathy, carotids upstroke normal bilaterally, no bruits  Chest - Breath sounds bilaterally were dimnished to auscultation at bases. There were bilateral intermittent crackles present without rhonchi. There is no intercostal recession or use of accessory muscles  Heart - normal rate, regular rhythm, normal S1, S2, no murmurs, rubs, clicks or gallops  Abdomen - soft, nontender, nondistended, no masses or organomegaly  Neurological - non-focal  Extremities - peripheral pulses normal, mild pedal edema, no clubbing or cyanosis  Skin - normal coloration and turgor, no rashes, no suspicious skin lesions noted     DATA REVIEW     Medications: Current Inpatient  Scheduled Meds:   Vitamin D  2,000 Units Oral Daily    ascorbic acid  500 mg Oral BID    zinc sulfate  50 mg Oral Daily    budesonide-formoterol  2 puff Inhalation BID    apixaban  5 mg Oral BID    famotidine  20 mg Oral BID    sodium chloride flush  10 mL IntraVENous 2 times per day     Continuous Infusions:   sodium chloride         INPUT/OUTPUT:  In: -   Out: 1550 [Urine:1550]       LABS:  ABGs:   No results for input(s): POCPH, POCPCO2, POCPO2, POCHCO3, PFLD4COC in the last 72 hours. CBC:   Recent Labs     01/06/22  0701   WBC 6.6   HGB 11.9   HCT 35.5*   .9      LYMPHOPCT 10*   RBC 3.52*   MCH 33.8*   MCHC 33.5   RDW 14.2     CRP:   No results for input(s): CRP in the last 72 hours. LDH:   No results for input(s): LDH in the last 72 hours. BMP:   Recent Labs     01/06/22  0701   *   K 4.2      CO2 24   BUN 17   CREATININE 0.35*   GLUCOSE 130*     Liver Function Test:   No results for input(s): PROT, LABALBU, ALT, AST, GGT, ALKPHOS, BILITOT in the last 72 hours. Coagulation Profile:   No results for input(s): INR, PROTIME, APTT in the last 72 hours. D-Dimer:  No results for input(s): DDIMER in the last 72 hours. Ferritin:    No results for input(s): FERRITIN in the last 72 hours.   Lactic Acid:  No results for input(s): LACTA in the last 72 hours. Cardiac Enzymes:  No results for input(s): CKTOTAL, CKMB, CKMBINDEX, TROPONINI in the last 72 hours. Invalid input(s): TROPONIN, HSTROP  BNP/ProBNP:   No results for input(s): BNP, PROBNP in the last 72 hours. Triglycerides:  No results for input(s): TRIG in the last 72 hours. Microbiology:  Urine Culture:  No components found for: CURINE  Blood Culture:  No components found for: CBLOOD, CFUNGUSBL  Sputum Culture:  No components found for: CSPUTUM  No results for input(s): SPECDESC, SPECIAL, CULTURE, STATUS, ORG, CDIFFTOXPCR, CAMPYLOBPCR, SALMONELLAPC, SHIGAPCR, SHIGELLAPCR, MPNEUG, MPNEUM, LACTOQL in the last 72 hours. No results for input(s): SPUTUM, SPECDESC, SPECIAL, CULTURE, STATUS, ORG, CDIFFTOXPCR, MPNEUM, MPNEUG in the last 72 hours. Invalid input(s): CURINE, CBLOOD, CFUNGUSBL     Pathology:    Radiology Reports:  XR CHEST PORTABLE   Final Result   No significant change in bilateral airspace disease. US PELVIS COMPLETE   Final Result   3.3 cm echogenic mass in the region of the uterine body which is possibly the   obscuring the endometrium. RECOMMENDATION:   Transvaginal sonography or MRI with contrast for further workup      RECOMMENDATIONS:   Unavailable         XR CHEST PORTABLE   Final Result   Moderate diffuse interstitial and alveolar opacities throughout the lungs   bilaterally. The finding is nonspecific although suspicious for multilobar   pneumonia, including active COVID-19 viral pneumonitis. CT CHEST PULMONARY EMBOLISM W CONTRAST   Final Result   Motion degraded study. Acute pulmonary emboli suspected particularly in the   left lower lobe but not well visualized due to motion artifact. There is evidence for right ventricular strain with septal bowing suggesting   a significant clot burden.       Moderate patchy ground-glass opacities involving all lobes of both lungs   consistent with multifocal pneumonia typical for COVID pneumonia. Findings were discussed with Tiffany Elliott at 5:39 pm on 12/24/2021. RECOMMENDATIONS:   Unavailable              Echocardiogram:   Results for orders placed during the hospital encounter of 12/24/21    ECHO Complete 2D W Doppler W Color    Narrative  Transthoracic Echocardiography Report (TTE)    Patient Name Amalia Barraza      Date of Study         12/25/2021  MAX    Date of      1950  Gender                Female  Birth    Age          70 year(s)  Race                  Unknown    Room Number  3005        Height:               94 inch, 238.76 cm    Corporate ID [id number]    Weight:               186 pounds, 84.4 kg  #    Patient Maral Brocks [de-identified]   BSA:       2.51 m^2   BMI:         14.8 kg/m^2  #    MR #         [id number]     Sonographer           [facility]    Accession #  [id number]  Interpreting          Genesis Aggarwal  Physician    Fellow                   Referring Nurse  Practitioner    Interpreting             Referring Physician   Savanna Hodgson,  Fellow                                         CNP    Type of Study    TTE procedure:2D Echocardiogram, M-Mode, Doppler, Color Doppler. Procedure Date  Date: 12/25/2021 Start: 01:47 PM    Study Location: [facility]  Technical Quality: Adequate visualization    Indications:Pulmonary embolus and Right heart strain. History / Tech. Comments:  Echo done at patient bedside. Procedure explained to patient. Covid-19    Patient Status: Inpatient    Height: 94 inches Weight: 186 pounds BSA: 2.51 m^2 BMI: 14.8 kg/m^2    CONCLUSIONS    Summary  Global left ventricular systolic function is normal. Estimated EF 50-55%. Dilated Right ventricular with mildly impaired systolic function  Estimated right ventricular systolic pressure is 39IROV. No significant valvular regurgitation or stenosis seen.   No pericardial effusion seen.    Signature  ----------------------------------------------------------------------------  Electronically signed by Clare Cox(Sonographer) on 2021  02:30 PM  ----------------------------------------------------------------------------    ----------------------------------------------------------------------------  Electronically signed by Genesis Aggarwal(Interpreting physician) on  2021 07:52 AM  ----------------------------------------------------------------------------  FINDINGS  Left Atrium  Left atrium is normal in size. Left Ventricle  Left ventricle is normal in size. Global left ventricular systolic function  is normal. Calculated ejection fraction 50% by Orantes's method. Right Atrium  Right atrial dilatation. Right Ventricle  Right ventricular systolic function appears mildly reduced reduced. Moderately dilated right ventricular cavity. TAPSE value of 1.65cm noted. Mitral Valve  Normal mitral valve structure and function. No mitral regurgitation. Aortic Valve  Aortic valve is trileaflet and opens adequately. No aortic insufficiency. Tricuspid Valve  No obvious valvular abnormality. Trivial tricuspid regurgitation. No pulmonary hypertension. Estimated right ventricular systolic pressure is  99JMHQ. Pulmonic Valve  The pulmonic valve is normal in structure. Mild pulmonic insufficiency. Pericardial Effusion  No pericardial effusion seen. Miscellaneous  E/E' average = 9.4.  IVC normal diameter & inspiratory collapse indicating normal RA filling  pressure .     M-mode / 2D Measurements & Calculations:    LVIDd:3.7 cm(3.7 - 5.6 cm)       Diastolic QPOOLD:80.2 ml  RJOOJ:0.1 cm(2.2 - 4.0 cm)       Systolic DKKDCF:30.9 ml  ZUKN:3.7 cm(0.6 - 1.1 cm)        Aortic Root:2.7 cm(2.0 - 3.7 cm)  LVPWd:0.9 cm(0.6 - 1.1 cm)       LA Dimension: 3.3 cm(1.9 - 4.0 cm)  Fractional Shortenin.73 %    LA volume/Index: 32.1 ml /13m^2  Calculated LVEF (%): 50.94 %     LVOT:2 cm  RVDd:2.6 cm    Mitral:                                 Aortic    Valve Area (P1/2-Time): 7.59 cm^2       Peak Velocity: 1.10 m/s  Peak E-Wave: 0.68 m/s                   Mean Velocity: 0.58 m/s  Peak A-Wave: 0.46 m/s                   Peak Gradient: 4.84 mmHg  E/A Ratio: 1.48                         Mean Gradient: 2 mmHg  Peak Gradient: 1.83 mmHg  Mean Gradient: 1 mmHg  Deceleration Time: 99 msec              Area (continuity): 2.77 cm^2  P1/2t: 29 msec                          AV VTI: 17.6 cm    Area (continuity): 3.43 cm^2  Mean Velocity: 0.51 m/s    Tricuspid:                              Pulmonic:    Peak TR Velocity: 2.71 m/s              Peak Velocity: 0.83 m/s  Peak TR Gradient: 29.3764 mmHg          Peak Gradient: 2.74 mmHg    Diastology / Tissue Doppler  Septal Wall E' velocity:0.06 m/s  Septal Wall E/E':11.5  Lateral Wall E' velocity:0.09 m/s  Lateral Wall E/E':7.4       ASSESSMENT AND PLAN     Assessment:    // Acute hypoxic respiratory failure  // Acute respiratory distress syndrome  // Bilateral multifocal pneumonia due to COVID 19 infection  // Left lower lobe segmental pulmonary embolism  // Obesity            //  Postmenopausal bleeding    Plan:    I personally interviewed/examined the patient; reviewed interval history, interpreted all available radiographic and laboratory data at the time of service. Patient is currently requiring high flow nasal cannula 30 L and 60 %. We will try to wean high flow nasal cannula. Encourage use of BiPAP at night and alternate with high flow nasal cannula during the daytime. Patient did use BiPAP last night 16/10/60 percent FiO2  Continue supplemental oxygen to keep oxygen saturation >90%  Encourage prone position when sleeping, incentive spirometry  Continue pulmonary toilet, aspiration precautions and bronchodilators  Chest x-ray to be done as needed  Avoid benzodiazepine. Continue Decadron 6 mg once daily for 10 days.   Last dose on 01/07/2022  She is on baricitinib. On Eliquis 10 mg twice daily for 7 days (finished) then 5 mg twice daily   Ccurrently on Symbicort and will continue  May need intermittent diuresis if needed  Monitor I/O, electrolytes with a goal of even/negative fluid balance  Chemical DVT prophylaxis on Eliquis therapeutic dose  Antimicrobials reviewed; currently not on antibiotic  Monitor CRP, LDH, AST/ALT, D-Dimer, Ferritin periodically/as needed  Glycemic control per primary service  Physical/occupational therapy    Discussed with respiratory therapist and nursing staff. The patient is/remains critically ill with illness/injury that acutely impairs one or more vital organ systems, such that there is a high probability of imminent or life threatening deterioration in the patient's condition. Critical care time of 35 minutes was spent (excluding procedures), in coordination of care during bedside rounds and discussion of patient care in detail, and recommendations of the team were adopted in the plan. Necessity of all invasive devices was also confirmed. Rajeev Marshall MD   Pulmonary and Critical Care Medicine           1/7/2022, 1:53 PM    This patient was evaluated in the context of the global SARS-CoV-2 (COVID-19) pandemic, which necessitated considerations that the patient either has COVID-19 infection or is at risk of infection with COVID-19. Institutional protocols and algorithms that pertain to the evaluation & management of patients with COVID-19 or those at risk for COVID-19 are in a state of rapid changes based on information released by regulatory bodies including the CDC and federal and state organizations. These policies and algorithms were followed during the patient's care. Please note that this chart was generated using voice recognition Dragon dictation software. Although every effort was made to ensure the accuracy of this automated transcription, some errors in transcription may have occurred.

## 2022-01-07 NOTE — PLAN OF CARE
Problem: Airway Clearance - Ineffective  Goal: Achieve or maintain patent airway  1/7/2022 1745 by Radha Dawkins RN  Outcome: Ongoing  1/7/2022 0616 by Malcolm Guerra RN  Outcome: Ongoing     Problem: Gas Exchange - Impaired  Goal: Absence of hypoxia  1/7/2022 1745 by Radha Dawkins RN  Outcome: Ongoing  1/7/2022 0616 by Malcolm Guerra RN  Outcome: Ongoing  Goal: Promote optimal lung function  1/7/2022 1745 by Radha Dawkins RN  Outcome: Ongoing  1/7/2022 0616 by Malcolm Guerra RN  Outcome: Ongoing     Problem: Breathing Pattern - Ineffective  Goal: Ability to achieve and maintain a regular respiratory rate  1/7/2022 1745 by Radha Dawkins RN  Outcome: Ongoing  1/7/2022 0616 by Malcolm Guerra RN  Outcome: Ongoing     Problem:  Body Temperature -  Risk of, Imbalanced  Goal: Ability to maintain a body temperature within defined limits  1/7/2022 1745 by Radha Dawkins RN  Outcome: Ongoing  1/7/2022 0616 by Malcolm Guerra RN  Outcome: Ongoing  Goal: Will regain or maintain usual level of consciousness  1/7/2022 1745 by Radha Dawkins RN  Outcome: Ongoing  1/7/2022 0616 by Malcolm Guerra RN  Outcome: Ongoing  Goal: Complications related to the disease process, condition or treatment will be avoided or minimized  1/7/2022 1745 by Radha Dawkins RN  Outcome: Ongoing  1/7/2022 0616 by Malcolm Guerra RN  Outcome: Ongoing     Problem: Isolation Precautions - Risk of Spread of Infection  Goal: Prevent transmission of infection  1/7/2022 1745 by Radha Dawkins RN  Outcome: Ongoing  1/7/2022 0616 by Malcolm Guerra RN  Outcome: Ongoing     Problem: Nutrition Deficits  Goal: Optimize nutritional status  1/7/2022 1745 by Radha Dawkins RN  Outcome: Ongoing  1/7/2022 0616 by Malcolm Guerra RN  Outcome: Ongoing     Problem: Risk for Fluid Volume Deficit  Goal: Maintain normal heart rhythm  1/7/2022 1745 by Radha Dawkins RN  Outcome: Ongoing  1/7/2022 0616 by Mario Najjar Ann Krueger RN  Outcome: Ongoing  Goal: Maintain absence of muscle cramping  1/7/2022 1745 by Levander Brunner, RN  Outcome: Ongoing  1/7/2022 0616 by Bradly Vicente RN  Outcome: Ongoing  Goal: Maintain normal serum potassium, sodium, calcium, phosphorus, and pH  1/7/2022 1745 by Levander Brunner, RN  Outcome: Ongoing  1/7/2022 0616 by Bradly Vicente RN  Outcome: Ongoing     Problem: Loneliness or Risk for Loneliness  Goal: Demonstrate positive use of time alone when socialization is not possible  1/7/2022 1745 by Levander Brunner, RN  Outcome: Ongoing  1/7/2022 0616 by Bradly Vicente RN  Outcome: Ongoing     Problem: Fatigue  Goal: Verbalize increase energy and improved vitality  1/7/2022 1745 by Levander Brunner, RN  Outcome: Ongoing  1/7/2022 0616 by Bradly Vicente RN  Outcome: Ongoing     Problem: Patient Education: Go to Patient Education Activity  Goal: Patient/Family Education  1/7/2022 1745 by Levander Brunner, RN  Outcome: Ongoing  1/7/2022 0616 by Bradly Vicente RN  Outcome: Ongoing     Problem: OXYGENATION/RESPIRATORY FUNCTION  Goal: Patient will maintain patent airway  1/7/2022 1745 by Levander Brunner, RN  Outcome: Ongoing  1/7/2022 0616 by Bradly Vicente RN  Outcome: Ongoing  Goal: Patient will achieve/maintain normal respiratory rate/effort  Description: Respiratory rate and effort will be within normal limits for the patient  1/7/2022 1745 by Levander Brunner, RN  Outcome: Ongoing  1/7/2022 0616 by Bradly Vicente RN  Outcome: Ongoing     Problem: Skin Integrity:  Goal: Will show no infection signs and symptoms  Description: Will show no infection signs and symptoms  1/7/2022 1745 by Levander Brunner, RN  Outcome: Ongoing  1/7/2022 0616 by Bradly Vicente RN  Outcome: Ongoing  Goal: Absence of new skin breakdown  Description: Absence of new skin breakdown  1/7/2022 1745 by Levander Brunner, RN  Outcome: Ongoing  1/7/2022 0616 by Bradly Vicente RN  Outcome: Ongoing     Problem: Nutrition  Goal: Optimal nutrition therapy  1/7/2022 1745 by Sonam Mclaughlin RN  Outcome: Ongoing  1/7/2022 0616 by Ranjana Acevedo RN  Outcome: Ongoing

## 2022-01-07 NOTE — PROGRESS NOTES
Physical Therapy  Facility/Department: [facility] 3  Daily Treatment Note  NAME: Kassie Rinaldi  : 1950  MRN: [id number]    Date of Service: 2022    Discharge Recommendations:  Patient would benefit from continued therapy after discharge        Assessment   Body structures, Functions, Activity limitations: Decreased functional mobility ; Decreased posture;Decreased endurance;Decreased strength;Decreased balance;Decreased safe awareness  Assessment: Pt with mobility deficits requiring min-A to perform bed mobility, pt SpO2 desats with bed mobility 91% to 85%; positioned pt in supine to recover. Pt performed supine exercises with frequent rest breaks due to desats. Pt would be unsafe to return to prior living arrangements upon discharge. Pt would benefit from additional therapy upon discharge to assist in return to prior level of functional independence. Prognosis: Good  PT Education: Goals;Transfer Training;PT Role;Functional Mobility Training;Plan of Care;General Safety  REQUIRES PT FOLLOW UP: Yes  Activity Tolerance  Activity Tolerance: Patient limited by endurance; Patient limited by fatigue  Activity Tolerance: Pt's SpO2 ranged from 95%-85% during today's session, drops quickly with bed mobility. Pt positioned in supine to recover with SpO2. Patient Diagnosis(es): The primary encounter diagnosis was COVID-19. Diagnoses of Dehydration and Tachycardia were also pertinent to this visit. has no past medical history on file. has no past surgical history on file. Restrictions  Restrictions/Precautions  Restrictions/Precautions: Up as Tolerated,Isolation,Contact Precautions  Required Braces or Orthoses?: No  Position Activity Restriction  Other position/activity restrictions: up with assistance  Subjective   General  Chart Reviewed: Yes  Response To Previous Treatment: Patient with no complaints from previous session.   Subjective  Subjective: Pt supine in bed and agreeable to therapy, RN agreeable to therapy, pt pleasant and cooperative throughout today's session. General Comment  Comments: Pt retired to supine in bed with call light. pt on [place]. RN notified. Pt states, \"L LE isn't moving like the R LE. It's heavy. \"  Pain Screening  Patient Currently in Pain: Denies  Vital Signs  Patient Currently in Pain: Denies       Orientation  Orientation  Overall Orientation Status: Within Functional Limits  Cognition   Cognition  Overall Cognitive Status: WFL  Objective   Bed mobility  Supine to Sit: Contact guard assistance  Sit to Supine: Minimal assistance  Scooting: Contact guard assistance  Comment: HOB elevated about 30 degrees. Pt's SpO2 decreased from 91% to 85% upon sitting EOB x 30 seconds. Pt returned to supine and recovered within 2 minutes >90%  Transfers  Sit to Stand: Unable to assess  Stand to sit: Unable to assess  Comment: Unsafe to attempt transfers secondary to SpO2 dropping to 85% with supine<>sit transfer,  Ambulation  Ambulation?: No     Balance  Posture: Fair  Sitting - Static: Fair;+  Sitting - Dynamic: Fair  Comments: sitting balance assessed at the EOB. Exercises  Quad Sets: x 10 reps  Heelslides: x 10 reps  Gluteal Sets: x 10 reps  Hip Abduction: x 10 reps  Ankle Pumps: x 10 reps  Comments: Performed supine therapeutic exercises x 10 reps with AAROM to L LE with heel slides and hip abduction. Pt needs frequent rest breaks due to decreasing SpO2 during exercises. Pt takes about 1-2 minutes to recover after 1 set of 10 reps. AM-PAC Score  AM-PAC Inpatient Mobility Raw Score : 11 (01/07/22 1425)  AM-PAC Inpatient T-Scale Score : 33.86 (01/07/22 1425)  Mobility Inpatient CMS 0-100% Score: 72.57 (01/07/22 1425)  Mobility Inpatient CMS G-Code Modifier : CL (01/07/22 1425)          Goals  Short term goals  Time Frame for Short term goals: 14 visits  Short term goal 1: Pt will ambulate 75 feet with a RW and SBA to increase functional independence.   Short term goal 2: Pt will negotiate 3 stairs with no handrails and SBA to allow the pt to enter prior living arrangements. Short term goal 3: Pt will demonstrate fair+ standing balance to decrease fall risk. Short term goal 4: Pt will tolerate a 35 minute therapy session to promote increased endurance. Short term goal 5: Pt will perform sit<>stand transfer with SBA to increase functional independence.     Plan    Plan  Times per week: 5x  Times per day: Daily  Current Treatment Recommendations: Strengthening,Transfer Training,Endurance Training,ROM,Balance Training,Gait Training,Functional Mobility Training,Safety Education & Training,Home Exercise Program,Stair training,Patient/Caregiver Education & Training,Equipment Evaluation, Education, & procurement,Positioning  Safety Devices  Type of devices: Patient at risk for falls,Left in bed,Call light within reach,Gait belt,Nurse notified  Restraints  Initially in place: No     Therapy Time   Individual Concurrent Group Co-treatment   Time In 1350         Time Out 1420         Minutes 30         Timed Code Treatment Minutes: 69 Shameka Gonzalez

## 2022-01-07 NOTE — PROGRESS NOTES
[facility]  Office: [address], DO, Nilton Crocker, DO, Chuckie Thompson, DO, Luz Rocha, DO, Debra Joy MD, Vaishali Aguilar MD, Justin Cueva MD, Tisha Winter MD, Vivienne Carias MD, Shirin Staton MD, Kee Guillen MD, Haskell Leventhal, DO, José Liao, DO, Cheryle Bumpers, MD,  Jyothi Osborn, DO, Cindi Liz MD, Jeffrey Kelly MD, [place] Reza MD, Kane Vidal MD, Rob Marx MD, Kalvin Spurling, MD, Saint Goodwill, MD, Cam Clay, [facility], [facility], [facility], Irena Clayton, CNP, Isabel Vega, CNS, Dorothea Cobb, CNP, Marita Clark, CNP, Cynthia Portillo, CNP, Cortney Proctor, CNP, William Khalil, CNP, Rudy Parker PA-C, Saira Holloway, [facility], Shane Zhang, [facility], Sarah Beth Lockett, CNP, Bogdan Wolfe, CNP, Keyla Castro, CNP, Aleksandra Bhandari, CNP, Ebony Cortez, CNP, Thad Dave, [address] [place] [address]    Progress Note    1/7/2022    5:20 PM    Name:   Lukas Harris  MRN:     [id number]     Acct:      [de-identified]   Room:   [address] [place] [address] Day:  15  Admit Date:  12/24/2021  3:37 PM    PCP:   No primary care provider on file. Code Status:  Full Code    Subjective:     C/C:   Chief Complaint   Patient presents with    Shortness of Breath    Fall     Interval History Status: not changed. Patient seen and examined this afternoon. No acute events overnight. Still on 65% FiO2 via HFNC. Reports that she had a bowel movement. Working with PT/OT. Reports that she is weak. Tolerating diet. Brief History:     Lukas Harris is 70 y.o. female who was admitted to the hospital on 12/24/2021 for treatment of Pneumonia due to COVID-19 virus. Patient came to the hospital with lightheadedness and dry cough for 1 week. She had episode of fall at home and family called EMS. Initial evaluation by EMS found patient having tachypnea with respirate 40 to 50/min, hypoxia SPO2 50% on room air, tachycardia.  Patient was placed on nonrebreather and improved only to 73%. Patient was then placed on BiPAP. Evaluation emergency room showed positive COVID-19 test, elevated troponin, elevated proBNP. CT chest showed subsegmental PE with possible right heart strain. Vascular surgery was consulted and recommended anticoagulation without need for thrombolysis. Patient also had thrombocytopenia. Cardiology was consulted and echocardiogram obtained which was negative for RV strain with RV systolic pressure 29 mmHg. Patient was treated with heparin infusion, Decadron and started on baricitinib. Patient had vaginal bleeding on 12/30/21 for which OBGYN will see her for in the outpatient setting.    - uptrending oxygen requirements. Review of Systems:     Constitutional: reports weakness and fatigue; negative for chills, fevers, sweats  Respiratory: Reports cough and shortness of breath are improving; report sinus congestion, increased secretions; negative for wheezing  Cardiovascular:  negative for chest pain, chest pressure/discomfort, lower extremity edema, palpitations  Gastrointestinal: reports resolution of constipation; negative for abdominal pain, nausea, vomiting  Neurological:  negative for dizziness, headache    Medications:      Allergies:  No Known Allergies    Current Meds:   Scheduled Meds:    Vitamin D  2,000 Units Oral Daily    ascorbic acid  500 mg Oral BID    zinc sulfate  50 mg Oral Daily    budesonide-formoterol  2 puff Inhalation BID    apixaban  5 mg Oral BID    famotidine  20 mg Oral BID    sodium chloride flush  10 mL IntraVENous 2 times per day     Continuous Infusions:    sodium chloride       PRN Meds: calcium carbonate, albuterol sulfate HFA, benzocaine-menthol, loperamide, benzonatate, HYDROcodone-chlorpheniramine, sodium chloride flush, sodium chloride, promethazine **OR** ondansetron, polyethylene glycol, acetaminophen **OR** acetaminophen    Data:     Past Medical History:   has no past medical history on file.    Social History:   reports that she has been smoking cigarettes. She has never used smokeless tobacco. She reports previous alcohol use. She reports previous drug use. Family History:   Family History   Problem Relation Age of Onset    Cancer Maternal Aunt         breast       Vitals:  /79   Pulse 79   Temp 97.7 °F (36.5 °C) (Oral)   Resp 19   Ht 5' 4\" (1.626 m)   Wt 195 lb 15.8 oz (88.9 kg)   SpO2 97%   Breastfeeding No   BMI 33.64 kg/m²   Temp (24hrs), Av.9 °F (36.6 °C), Min:97.7 °F (36.5 °C), Max:98.2 °F (36.8 °C)    No results for input(s): POCGLU in the last 72 hours. I/O (24Hr): Intake/Output Summary (Last 24 hours) at 2022 1720  Last data filed at 2022 2000  Gross per 24 hour   Intake --   Output 550 ml   Net -550 ml       Labs:  Hematology:  Recent Labs     22  0701   WBC 6.6   RBC 3.52*   HGB 11.9   HCT 35.5*   .9   MCH 33.8*   MCHC 33.5   RDW 14.2      MPV 10.7     Chemistry:  Recent Labs     22  0701   *   K 4.2      CO2 24   GLUCOSE 130*   BUN 17   CREATININE 0.35*   ANIONGAP 6*   LABGLOM >60   GFRAA >60   CALCIUM 7.9*     No results for input(s): PROT, LABALBU, LABA1C, D3TQBOV, S7BMKNV, FT4, TSH, AST, ALT, LDH, GGT, ALKPHOS, LABGGT, BILITOT, BILIDIR, AMMONIA, AMYLASE, LIPASE, LACTATE, CHOL, HDL, LDLCHOLESTEROL, CHOLHDLRATIO, TRIG, VLDL, WYH39FC, PHENYTOIN, PHENYF, URICACID, POCGLU in the last 72 hours.   ABG:  Lab Results   Component Value Date    POCPH 7.406 2022    POCPCO2 41.1 2022    POCPO2 67.0 2022    POCHCO3 25.8 2022    NBEA NOT REPORTED 2022    PBEA 1 2022    HLN0ECF NOT REPORTED 2022    HIMN3GRO 93 2022    FIO2 60.0 2022     Lab Results   Component Value Date/Time    SPECIAL NOT REPORTED 2021 08:19 PM     Lab Results   Component Value Date/Time    CULTURE NO SIGNIFICANT GROWTH 2021 08:19 PM       Radiology:  XR CHEST PORTABLE    Result Date: 12/26/2021  Moderate diffuse interstitial and alveolar opacities throughout the lungs bilaterally. The finding is nonspecific although suspicious for multilobar pneumonia, including active COVID-19 viral pneumonitis. Physical Examination:        General appearance:  alert, cooperative and no distress, somewhat disheveled  female sitting up in bed. Mental Status:  oriented to person, place and time and anxious affect  HEENT: Sclera anti-icteric, EOMI, high flow nasal cannula present  Lungs:  clear to auscultation bilaterally, globally diminished breath sounds, normal effort  Heart:  regular rate and rhythm, no murmur  Abdomen:  soft, nontender, nondistended, normal bowel sounds, no masses, hepatomegaly, splenomegaly  Extremities:  no edema, redness, tenderness in the calves  Skin:  no gross lesions, rashes, induration  Psych: Appears anxious    Assessment:        Hospital Problems           Last Modified POA    * (Principal) Pneumonia due to COVID-19 virus 1/2/2022 Yes    Pulmonary embolus, left ([address] 1/2/2022 Yes    Acute respiratory failure with hypoxia ([address] 1/2/2022 Yes    Postmenopausal bleeding 1/3/2022 Yes    Overview Addendum 1/3/2022  6:29 PM by Heather Conde DO     During hospitalization in December 2021  Outpatient follow-up recommended due to acute respiratory failure. TVUS 1/2/22 demonstrates:  3.3 cm echogenic mass in the region of the uterine body which is   possibly the obscuring the endometrium. Obesity with body mass index greater than 30 1/2/2022 Yes          Plan:        Acute respiratory failure with hypoxia - multifactorial, due to #2 and #3. Alternate HFNC with Bipap. On 65% FiO2. Stable. COVID-19 pneumonia - on Decadron day 13 today. Continue baricitinib. Continue vitamin D/C/zinc today. Acute PE - continue anticoagulation with Eliquis  Abnormal uterine bleeding - appreciate GYN input.  F/u o/p  Obesity with BMI 33.64  Avoid benzos  [facility]  [facility]  Labs every other day  Discusse with son, Zhen Fitch, on 1/4. Ultimately, she really is appropriate for d/c to LTAC once her oxygen level is appropriate for transfer to LTAC. Await bed/insurance auth.     Cleopatra Brooks, DO  1/7/2022  5:20 PM

## 2022-01-08 LAB
ABSOLUTE EOS #: 0 K/UL (ref 0–0.4)
ABSOLUTE IMMATURE GRANULOCYTE: 0.09 K/UL (ref 0–0.3)
ABSOLUTE LYMPH #: 1.14 K/UL (ref 1–4.8)
ABSOLUTE MONO #: 0.7 K/UL (ref 0.1–0.8)
ANION GAP SERPL CALCULATED.3IONS-SCNC: 9 MMOL/L (ref 9–17)
BASOPHILS # BLD: 0 % (ref 0–2)
BASOPHILS ABSOLUTE: 0 K/UL (ref 0–0.2)
BUN BLDV-MCNC: 19 MG/DL (ref 8–23)
BUN/CREAT BLD: ABNORMAL (ref 9–20)
CALCIUM SERPL-MCNC: 8.1 MG/DL (ref 8.6–10.4)
CHLORIDE BLD-SCNC: 100 MMOL/L (ref 98–107)
CO2: 25 MMOL/L (ref 20–31)
CREAT SERPL-MCNC: 0.33 MG/DL (ref 0.5–0.9)
DIFFERENTIAL TYPE: ABNORMAL
EOSINOPHILS RELATIVE PERCENT: 0 % (ref 1–4)
GFR AFRICAN AMERICAN: >60 ML/MIN
GFR NON-AFRICAN AMERICAN: >60 ML/MIN
GFR SERPL CREATININE-BSD FRML MDRD: ABNORMAL ML/MIN/{1.73_M2}
GFR SERPL CREATININE-BSD FRML MDRD: ABNORMAL ML/MIN/{1.73_M2}
GLUCOSE BLD-MCNC: 133 MG/DL (ref 70–99)
HCT VFR BLD CALC: 35.9 % (ref 36.3–47.1)
HEMOGLOBIN: 11.9 G/DL (ref 11.9–15.1)
IMMATURE GRANULOCYTES: 1 %
LYMPHOCYTES # BLD: 13 % (ref 24–44)
MCH RBC QN AUTO: 33.5 PG (ref 25.2–33.5)
MCHC RBC AUTO-ENTMCNC: 33.1 G/DL (ref 28.4–34.8)
MCV RBC AUTO: 101.1 FL (ref 82.6–102.9)
MONOCYTES # BLD: 8 % (ref 1–7)
MORPHOLOGY: NORMAL
NRBC AUTOMATED: 0 PER 100 WBC
PDW BLD-RTO: 14.1 % (ref 11.8–14.4)
PLATELET # BLD: 208 K/UL (ref 138–453)
PLATELET ESTIMATE: ABNORMAL
PMV BLD AUTO: 10.3 FL (ref 8.1–13.5)
POTASSIUM SERPL-SCNC: 4.8 MMOL/L (ref 3.7–5.3)
RBC # BLD: 3.55 M/UL (ref 3.95–5.11)
RBC # BLD: ABNORMAL 10*6/UL
SEG NEUTROPHILS: 78 % (ref 36–66)
SEGMENTED NEUTROPHILS ABSOLUTE COUNT: 6.87 K/UL (ref 1.8–7.7)
SODIUM BLD-SCNC: 134 MMOL/L (ref 135–144)
WBC # BLD: 8.8 K/UL (ref 3.5–11.3)
WBC # BLD: ABNORMAL 10*3/UL

## 2022-01-08 PROCEDURE — 2060000000 HC ICU INTERMEDIATE R&B

## 2022-01-08 PROCEDURE — 6370000000 HC RX 637 (ALT 250 FOR IP): Performed by: FAMILY MEDICINE

## 2022-01-08 PROCEDURE — 6360000002 HC RX W HCPCS: Performed by: INTERNAL MEDICINE

## 2022-01-08 PROCEDURE — 99231 SBSQ HOSP IP/OBS SF/LOW 25: CPT | Performed by: INTERNAL MEDICINE

## 2022-01-08 PROCEDURE — 80048 BASIC METABOLIC PNL TOTAL CA: CPT

## 2022-01-08 PROCEDURE — 6370000000 HC RX 637 (ALT 250 FOR IP): Performed by: INTERNAL MEDICINE

## 2022-01-08 PROCEDURE — 36415 COLL VENOUS BLD VENIPUNCTURE: CPT

## 2022-01-08 PROCEDURE — 6370000000 HC RX 637 (ALT 250 FOR IP): Performed by: NURSE PRACTITIONER

## 2022-01-08 PROCEDURE — 2580000003 HC RX 258: Performed by: STUDENT IN AN ORGANIZED HEALTH CARE EDUCATION/TRAINING PROGRAM

## 2022-01-08 PROCEDURE — 85025 COMPLETE CBC W/AUTO DIFF WBC: CPT

## 2022-01-08 PROCEDURE — 2580000003 HC RX 258: Performed by: NURSE PRACTITIONER

## 2022-01-08 PROCEDURE — 94660 CPAP INITIATION&MGMT: CPT

## 2022-01-08 PROCEDURE — 99291 CRITICAL CARE FIRST HOUR: CPT | Performed by: INTERNAL MEDICINE

## 2022-01-08 PROCEDURE — 2700000000 HC OXYGEN THERAPY PER DAY

## 2022-01-08 PROCEDURE — 99232 SBSQ HOSP IP/OBS MODERATE 35: CPT | Performed by: INTERNAL MEDICINE

## 2022-01-08 RX ORDER — FUROSEMIDE 10 MG/ML
20 INJECTION INTRAMUSCULAR; INTRAVENOUS ONCE
Status: COMPLETED | OUTPATIENT
Start: 2022-01-08 | End: 2022-01-08

## 2022-01-08 RX ORDER — CODEINE PHOSPHATE AND GUAIFENESIN 10; 100 MG/5ML; MG/5ML
10 SOLUTION ORAL EVERY 4 HOURS PRN
Status: DISCONTINUED | OUTPATIENT
Start: 2022-01-08 | End: 2022-01-14 | Stop reason: HOSPADM

## 2022-01-08 RX ORDER — 0.9 % SODIUM CHLORIDE 0.9 %
1000 INTRAVENOUS SOLUTION INTRAVENOUS ONCE
Status: COMPLETED | OUTPATIENT
Start: 2022-01-08 | End: 2022-01-08

## 2022-01-08 RX ORDER — GUAIFENESIN DEXTROMETHORPHAN HYDROBROMIDE ORAL SOLUTION 10; 100 MG/5ML; MG/5ML
10 SOLUTION ORAL EVERY 4 HOURS PRN
Status: DISCONTINUED | OUTPATIENT
Start: 2022-01-08 | End: 2022-01-08

## 2022-01-08 RX ADMIN — APIXABAN 5 MG: 5 TABLET, FILM COATED ORAL at 09:32

## 2022-01-08 RX ADMIN — Medication 5 ML: at 09:37

## 2022-01-08 RX ADMIN — FUROSEMIDE 20 MG: 10 INJECTION, SOLUTION INTRAMUSCULAR; INTRAVENOUS at 16:45

## 2022-01-08 RX ADMIN — SODIUM CHLORIDE, PRESERVATIVE FREE 10 ML: 5 INJECTION INTRAVENOUS at 09:32

## 2022-01-08 RX ADMIN — BUDESONIDE AND FORMOTEROL FUMARATE DIHYDRATE 2 PUFF: 160; 4.5 AEROSOL RESPIRATORY (INHALATION) at 20:24

## 2022-01-08 RX ADMIN — BUDESONIDE AND FORMOTEROL FUMARATE DIHYDRATE 2 PUFF: 160; 4.5 AEROSOL RESPIRATORY (INHALATION) at 09:32

## 2022-01-08 RX ADMIN — OXYCODONE HYDROCHLORIDE AND ACETAMINOPHEN 500 MG: 500 TABLET ORAL at 20:25

## 2022-01-08 RX ADMIN — FAMOTIDINE 20 MG: 20 TABLET, FILM COATED ORAL at 20:25

## 2022-01-08 RX ADMIN — GUAIFENESIN AND CODEINE PHOSPHATE 10 ML: 100; 10 SOLUTION ORAL at 21:30

## 2022-01-08 RX ADMIN — APIXABAN 5 MG: 5 TABLET, FILM COATED ORAL at 20:24

## 2022-01-08 RX ADMIN — ZINC SULFATE 220 MG (50 MG) CAPSULE 50 MG: CAPSULE at 09:32

## 2022-01-08 RX ADMIN — BENZONATATE 200 MG: 100 CAPSULE ORAL at 16:45

## 2022-01-08 RX ADMIN — SODIUM CHLORIDE, PRESERVATIVE FREE 10 ML: 5 INJECTION INTRAVENOUS at 20:24

## 2022-01-08 RX ADMIN — BENZONATATE 200 MG: 100 CAPSULE ORAL at 09:36

## 2022-01-08 RX ADMIN — FAMOTIDINE 20 MG: 20 TABLET, FILM COATED ORAL at 09:32

## 2022-01-08 RX ADMIN — OXYCODONE HYDROCHLORIDE AND ACETAMINOPHEN 500 MG: 500 TABLET ORAL at 09:32

## 2022-01-08 RX ADMIN — Medication 2000 UNITS: at 09:32

## 2022-01-08 RX ADMIN — SODIUM CHLORIDE 1000 ML: 9 INJECTION, SOLUTION INTRAVENOUS at 06:57

## 2022-01-08 ASSESSMENT — ENCOUNTER SYMPTOMS
PHOTOPHOBIA: 0
DIARRHEA: 0
SORE THROAT: 0
SHORTNESS OF BREATH: 0
VOMITING: 0
TROUBLE SWALLOWING: 0
ALLERGIC/IMMUNOLOGIC NEGATIVE: 1
COLOR CHANGE: 0
ABDOMINAL PAIN: 0
SINUS PAIN: 0
EYE PAIN: 0
VOICE CHANGE: 0
COUGH: 1
COUGH: 0
SHORTNESS OF BREATH: 1
EYE DISCHARGE: 0
ABDOMINAL DISTENTION: 0

## 2022-01-08 ASSESSMENT — PAIN SCALES - GENERAL
PAINLEVEL_OUTOF10: 0
PAINLEVEL_OUTOF10: 0

## 2022-01-08 NOTE — PLAN OF CARE
1745 by Mg Moralez RN  Outcome: Ongoing     Problem: Risk for Fluid Volume Deficit  Goal: Maintain normal heart rhythm  1/7/2022 2339 by Marysol Caraballo RN  Outcome: Ongoing  1/7/2022 1745 by Mg Moralez RN  Outcome: Ongoing  Goal: Maintain absence of muscle cramping  1/7/2022 2339 by Marysol Caraballo RN  Outcome: Ongoing  1/7/2022 1745 by Mg Moralez RN  Outcome: Ongoing  Goal: Maintain normal serum potassium, sodium, calcium, phosphorus, and pH  1/7/2022 2339 by Marysol Caraballo RN  Outcome: Ongoing  1/7/2022 1745 by Mg Moralez RN  Outcome: Ongoing     Problem: Loneliness or Risk for Loneliness  Goal: Demonstrate positive use of time alone when socialization is not possible  1/7/2022 2339 by Marysol Caraballo RN  Outcome: Ongoing  1/7/2022 1745 by Mg Moralez RN  Outcome: Ongoing     Problem: Fatigue  Goal: Verbalize increase energy and improved vitality  1/7/2022 2339 by Marysol Caraballo RN  Outcome: Ongoing  1/7/2022 1745 by Mg Moralez RN  Outcome: Ongoing     Problem: Patient Education: Go to Patient Education Activity  Goal: Patient/Family Education  1/7/2022 2339 by Marysol Caraballo RN  Outcome: Ongoing  1/7/2022 1745 by Mg Moralez RN  Outcome: Ongoing     Problem: OXYGENATION/RESPIRATORY FUNCTION  Goal: Patient will maintain patent airway  1/7/2022 2339 by Marysol Caraballo RN  Outcome: Ongoing  1/7/2022 2206 by Monse Raymundo RCP  Outcome: Ongoing  1/7/2022 1745 by Mg Moralez RN  Outcome: Ongoing  Goal: Patient will achieve/maintain normal respiratory rate/effort  Description: Respiratory rate and effort will be within normal limits for the patient  1/7/2022 2339 by Marysol Caraballo RN  Outcome: Ongoing  1/7/2022 2206 by Monse Raymundo RCP  Outcome: Ongoing  1/7/2022 1745 by Mg Moralez RN  Outcome: Ongoing     Problem: Skin Integrity:  Goal: Will show no infection signs and symptoms  Description: Will show no infection signs and symptoms  1/7/2022 2339 by Saul Ignacio RN  Outcome: Ongoing  1/7/2022 1745 by Sonam Mclaughlin RN  Outcome: Ongoing  Goal: Absence of new skin breakdown  Description: Absence of new skin breakdown  1/7/2022 2339 by Saul Ignacio RN  Outcome: Ongoing  1/7/2022 1745 by Sonam Mclaughlin RN  Outcome: Ongoing     Problem: Nutrition  Goal: Optimal nutrition therapy  1/7/2022 2339 by Saul Ignacio RN  Outcome: Ongoing  1/7/2022 1745 by Sonam Mclaughlin RN  Outcome: Ongoing

## 2022-01-08 NOTE — PROGRESS NOTES
PULMONARY & CRITICAL CARE MEDICINE PROGRESS NOTE     Patient:  Leah Brooke  MRN: [id number]  Admit date: 12/24/2021  Primary Care Physician: No primary care provider on file. Consulting Physician: Brenda Gifford DO  CODE Status: Full Code  LOS: 13     SUBJECTIVE     CHIEF COMPLAINT/REASON FOR INITIAL CONSULT:   Acute respiratory failure/COVID-19 pneumonia with ARDS/pulmonary [place] involving subsegmental left lower lobe    BRIEF HOSPITAL COURSE:   The patient is a 70 y.o. female was admitted with complaints of shortness of breath and found to be Covid positive but also had a left lower lobe subsegmental pulmonary embolism with right ventricular strain pattern  Vascular surgery was consulted and it was felt, rightfully so, that the cause of the right ventricular strain is unlikely pulmonary embolism  Patient was admitted to the [facility] ICU, requiring 95% oxygen via high flow. Chest x-ray shows bilateral diffuse interstitial alveolar opacities in both lungs  Has shortness of breath along with coughing. INTERVAL HISTORY:  01/08/22  Overnight events noted, chart reviewed, available labs seen and medications reviewed. Last 24-hour patient is afebrile T-max of 98.8 respiratory rate is usually low to mid 20s. She remains on high flow nasal cannula between 65 to 70% FiO2 and flow of 30 to 40 L currently she is on 40 L high flow. She is afebrile hemodynamically stable last 24 hours  She does have complaints of cough cough is associated sometimes with sputum production. She denies dysphagia aspiration coughing or choking on swallowing. She does complain of shortness of breath when she was moved on bed. She did use BiPAP overnight according to patient. On BiPAP 16/10/50 percent at night did not require BiPAP during the daytime currently.   Labs today show sodium 134 BUN 19 creatinine 0.33 bicarbonate 25 WBC 8.8 hemoglobin 11.9 platelet 050    Chest x-ray on 01/05/2021 shows low lung volume bilateral pulm infiltrate as before more on the left as compared to right slightly better aeration of left lung as compared to chest x-ray on 2021  ABG on 2021 did not show hypercapnia and it was 7.4  on high flow nasal cannula. REVIEW OF SYSTEMS:  Review of Systems   Constitutional: Positive for activity change and fatigue. Negative for fever. HENT: Negative for postnasal drip, sinus pain, sore throat, trouble swallowing and voice change. Eyes: Negative for photophobia and visual disturbance. Respiratory: Positive for cough and shortness of breath. Cardiovascular: Negative for chest pain, palpitations and leg swelling. Gastrointestinal: Negative for abdominal pain, diarrhea and vomiting. Endocrine: Negative. Genitourinary: Negative for difficulty urinating, dysuria and hematuria. Musculoskeletal: Negative. Allergic/Immunologic: Negative. Neurological: Negative for dizziness, syncope, speech difficulty and headaches. Hematological: Negative for adenopathy. Does not bruise/bleed easily. Psychiatric/Behavioral: Negative. OBJECTIVE     PaO2/FiO2 RATIO:  No results for input(s): POCPO2 in the last 72 hours.    FiO2 : 70 %     VITAL SIGNS:   LAST:  /75   Pulse 117   Temp 98.8 °F (37.1 °C) (Oral)   Resp 21   Ht 5' 4\" (1.626 m)   Wt 195 lb 15.8 oz (88.9 kg)   SpO2 96%   Breastfeeding No   BMI 33.64 kg/m²   8-24 HR RANGE:  TEMP Temp  Av.4 °F (36.9 °C)  Min: 97.9 °F (36.6 °C)  Max: 98.8 °F (40.1 °C)   BP Systolic (69NRX), RSD:946 , Min:115 , ESU:590      Diastolic (53IZS), TPW:07, Min:75, Max:97     PULSE Pulse  Av.5  Min: 54  Max: 117   RR Resp  Av  Min: 18  Max: 25   O2 SAT SpO2  Av.7 %  Min: 87 %  Max: 96 %   OXYGEN DELIVERY O2 Flow Rate (L/min)  Av L/min  Min: 30 L/min  Max: 40 L/min        SYSTEMIC EXAMINATION:   General appearance - Ill-appearing, mild respiratory distress  Mental status - awake & alert, follows commands  Eyes - pupils equal and reactive, sclera anicteric  Mouth - mucous membranes moist, pharynx normal without lesions  Neck -short thick neck supple, no significant adenopathy, carotids upstroke normal bilaterally, no bruits  Chest - Breath sounds bilaterally were dimnished to auscultation at bases. There were bilateral intermittent crackles present without rhonchi. There is no intercostal recession or use of accessory muscles  Heart - normal rate, regular rhythm, normal S1, S2, no murmurs, rubs, clicks or gallops  Abdomen - soft, nontender, nondistended, no masses or organomegaly  Neurological - non-focal  Extremities - peripheral pulses normal, mild pedal edema, no clubbing or cyanosis  Skin - normal coloration and turgor, no rashes, no suspicious skin lesions noted     DATA REVIEW     Medications: Current Inpatient  Scheduled Meds:   Vitamin D  2,000 Units Oral Daily    ascorbic acid  500 mg Oral BID    zinc sulfate  50 mg Oral Daily    budesonide-formoterol  2 puff Inhalation BID    apixaban  5 mg Oral BID    famotidine  20 mg Oral BID    sodium chloride flush  10 mL IntraVENous 2 times per day     Continuous Infusions:   sodium chloride         INPUT/OUTPUT:  In: 240 [P.O.:240]  Out: 350 [Urine:350]  Date 01/08/22 0000 - 01/08/22 2359   Shift 7597-6783 1520-1652 3487-5295 24 Hour Total   INTAKE   P.O.(mL/kg/hr)  240  240   Shift Total(mL/kg)  240(2.7)  240(2.7)   OUTPUT   Urine(mL/kg/hr) 0(0) 350  350   Shift Total(mL/kg) 0(0) 350(3.9)  350(3.9)   Weight (kg) 88.9 88.9 88.9 88.9        LABS:  ABGs:   No results for input(s): POCPH, POCPCO2, POCPO2, POCHCO3, QVPX0OUP in the last 72 hours. CBC:   Recent Labs     01/06/22  0701 01/08/22  0746   WBC 6.6 8.8   HGB 11.9 11.9   HCT 35.5* 35.9*   .9 101.1    208   LYMPHOPCT 10* 13*   RBC 3.52* 3.55*   MCH 33.8* 33.5   MCHC 33.5 33.1   RDW 14.2 14.1     CRP:   No results for input(s): CRP in the last 72 hours.   LDH:   No results for input(s): LDH in the last 72 hours. BMP:   Recent Labs     01/06/22  0701 01/08/22  0746   * 134*   K 4.2 4.8    100   CO2 24 25   BUN 17 19   CREATININE 0.35* 0.33*   GLUCOSE 130* 133*     Liver Function Test:   No results for input(s): PROT, LABALBU, ALT, AST, GGT, ALKPHOS, BILITOT in the last 72 hours. Coagulation Profile:   No results for input(s): INR, PROTIME, APTT in the last 72 hours. D-Dimer:  No results for input(s): DDIMER in the last 72 hours. Ferritin:    No results for input(s): FERRITIN in the last 72 hours. Lactic Acid:  No results for input(s): LACTA in the last 72 hours. Cardiac Enzymes:  No results for input(s): CKTOTAL, CKMB, CKMBINDEX, TROPONINI in the last 72 hours. Invalid input(s): TROPONIN, HSTROP  BNP/ProBNP:   No results for input(s): BNP, PROBNP in the last 72 hours. Triglycerides:  No results for input(s): TRIG in the last 72 hours. Microbiology:  Urine Culture:  No components found for: CURINE  Blood Culture:  No components found for: CBLOOD, CFUNGUSBL  Sputum Culture:  No components found for: CSPUTUM  No results for input(s): SPECDESC, SPECIAL, CULTURE, STATUS, ORG, CDIFFTOXPCR, CAMPYLOBPCR, SALMONELLAPC, SHIGAPCR, SHIGELLAPCR, MPNEUG, MPNEUM, LACTOQL in the last 72 hours. No results for input(s): SPUTUM, SPECDESC, SPECIAL, CULTURE, STATUS, ORG, CDIFFTOXPCR, MPNEUM, MPNEUG in the last 72 hours. Invalid input(s): CURINE, CBLOOD, CFUNGUSBL     Pathology:    Radiology Reports:  XR CHEST PORTABLE   Final Result   No significant change in bilateral airspace disease. US PELVIS COMPLETE   Final Result   3.3 cm echogenic mass in the region of the uterine body which is possibly the   obscuring the endometrium. RECOMMENDATION:   Transvaginal sonography or MRI with contrast for further workup      RECOMMENDATIONS:   Unavailable         XR CHEST PORTABLE   Final Result   Moderate diffuse interstitial and alveolar opacities throughout the lungs   bilaterally.   The finding is nonspecific although suspicious for multilobar   pneumonia, including active COVID-19 viral pneumonitis. CT CHEST PULMONARY EMBOLISM W CONTRAST   Final Result   Motion degraded study. Acute pulmonary emboli suspected particularly in the   left lower lobe but not well visualized due to motion artifact. There is evidence for right ventricular strain with septal bowing suggesting   a significant clot burden. Moderate patchy ground-glass opacities involving all lobes of both lungs   consistent with multifocal pneumonia typical for COVID pneumonia. Findings were discussed with Sheeba Lincoln at 5:39 pm on 12/24/2021. RECOMMENDATIONS:   Unavailable              Echocardiogram:   Results for orders placed during the hospital encounter of 12/24/21    ECHO Complete 2D W Doppler W Color    Narrative  Transthoracic Echocardiography Report (TTE)    Patient Name Cata Fair      Date of Study         12/25/2021  [place]    Date of      1950  Gender                Female  Birth    Age          70 year(s)  Race                  Unknown    Room Number  3005        Height:               94 inch, 238.76 cm    Corporate ID [id number]    Weight:               186 pounds, 84.4 kg  #    Patient [place] [de-identified]   BSA:       2.51 m^2   BMI:         14.8 kg/m^2  #    MR #         [id number]     Sonographer           [facility]    Accession #  [id number]  Interpreting          Genesis Aggarwal  Physician    Fellow                   Referring Nurse  Practitioner    Interpreting             Referring Physician   Manuelito Bush,  Fellow                                         CNP    Type of Study    TTE procedure:2D Echocardiogram, M-Mode, Doppler, Color Doppler. Procedure Date  Date: 12/25/2021 Start: 01:47 PM    Study Location: [facility]  Technical Quality: Adequate visualization    Indications:Pulmonary embolus and Right heart strain. History / Tech.  Comments:  Echo done at patient bedside. Procedure explained to patient. Covid-19    Patient Status: Inpatient    Height: 94 inches Weight: 186 pounds BSA: 2.51 m^2 BMI: 14.8 kg/m^2    CONCLUSIONS    Summary  Global left ventricular systolic function is normal. Estimated EF 50-55%. Dilated Right ventricular with mildly impaired systolic function  Estimated right ventricular systolic pressure is 21YTUI. No significant valvular regurgitation or stenosis seen. No pericardial effusion seen. Signature  ----------------------------------------------------------------------------  Electronically signed by Clare Ivory(Sonographer) on 12/25/2021  02:30 PM  ----------------------------------------------------------------------------    ----------------------------------------------------------------------------  Electronically signed by Genesis Aggarwal(Interpreting physician) on  12/26/2021 07:52 AM  ----------------------------------------------------------------------------  FINDINGS  Left Atrium  Left atrium is normal in size. Left Ventricle  Left ventricle is normal in size. Global left ventricular systolic function  is normal. Calculated ejection fraction 50% by Orantes's method. Right Atrium  Right atrial dilatation. Right Ventricle  Right ventricular systolic function appears mildly reduced reduced. Moderately dilated right ventricular cavity. TAPSE value of 1.65cm noted. Mitral Valve  Normal mitral valve structure and function. No mitral regurgitation. Aortic Valve  Aortic valve is trileaflet and opens adequately. No aortic insufficiency. Tricuspid Valve  No obvious valvular abnormality. Trivial tricuspid regurgitation. No pulmonary hypertension. Estimated right ventricular systolic pressure is  23KROG. Pulmonic Valve  The pulmonic valve is normal in structure. Mild pulmonic insufficiency. Pericardial Effusion  No pericardial effusion seen.     Miscellaneous  E/E' average = 9.4.  IVC normal diameter & inspiratory collapse indicating normal RA filling  pressure . M-mode / 2D Measurements & Calculations:    LVIDd:3.7 cm(3.7 - 5.6 cm)       Diastolic BGGVAT:10.0 ml  ELTJB:1.5 cm(2.2 - 4.0 cm)       Systolic RJSBSO:53.6 ml  ZEVY:9.7 cm(0.6 - 1.1 cm)        Aortic Root:2.7 cm(2.0 - 3.7 cm)  LVPWd:0.9 cm(0.6 - 1.1 cm)       LA Dimension: 3.3 cm(1.9 - 4.0 cm)  Fractional Shortenin.73 %    LA volume/Index: 32.1 ml /13m^2  Calculated LVEF (%): 50.94 %     LVOT:2 cm  RVDd:2.6 cm    Mitral:                                 Aortic    Valve Area (P1/2-Time): 7.59 cm^2       Peak Velocity: 1.10 m/s  Peak E-Wave: 0.68 m/s                   Mean Velocity: 0.58 m/s  Peak A-Wave: 0.46 m/s                   Peak Gradient: 4.84 mmHg  E/A Ratio: 1.48                         Mean Gradient: 2 mmHg  Peak Gradient: 1.83 mmHg  Mean Gradient: 1 mmHg  Deceleration Time: 99 msec              Area (continuity): 2.77 cm^2  P1/2t: 29 msec                          AV VTI: 17.6 cm    Area (continuity): 3.43 cm^2  Mean Velocity: 0.51 m/s    Tricuspid:                              Pulmonic:    Peak TR Velocity: 2.71 m/s              Peak Velocity: 0.83 m/s  Peak TR Gradient: 29.3764 mmHg          Peak Gradient: 2.74 mmHg    Diastology / Tissue Doppler  Septal Wall E' velocity:0.06 m/s  Septal Wall E/E':11.5  Lateral Wall E' velocity:0.09 m/s  Lateral Wall E/E':7.4       ASSESSMENT AND PLAN     Assessment:    // Acute hypoxic respiratory failure  // Acute respiratory distress syndrome  // Bilateral multifocal pneumonia due to COVID 19 infection  // Left lower lobe segmental pulmonary embolism  // Obesity            //  Postmenopausal bleeding    Plan:    I personally interviewed/examined the patient; reviewed interval history, interpreted all available radiographic and laboratory data at the time of service. Patient is currently requiring high flow nasal cannula 40 L and 60 to 70 %. We will try to wean high flow nasal cannula.   Encourage use of BiPAP at night and alternate with high flow nasal cannula during the daytime. Patient did use BiPAP last night 16/10/50 percent FiO2  Continue supplemental oxygen to keep oxygen saturation >90%  Encourage prone position when sleeping, incentive spirometry  Continue pulmonary toilet, aspiration precautions and bronchodilators  Chest x-ray to be done as needed  Avoid benzodiazepine. Finished Decadron 6 mg once daily for 10 days. Last dose on 01/07/2022  She was on baricitinib per infectious disease  She is on Eliquis 5 mg twice daily finish 10 mg twice daily for 1 week  Currently on Symbicort and will continue  May need intermittent diuresis we will give Lasix 20 mg IV today  Monitor I/O, electrolytes with a goal of even/negative fluid balance  Chemical DVT prophylaxis on Eliquis therapeutic dose  Antimicrobials reviewed; currently not on antibiotic  Monitor CRP, LDH, AST/ALT, D-Dimer, Ferritin periodically/as needed  Glycemic control per primary service  Physical/occupational therapy    Discussed with respiratory therapist and nursing staff. The patient is/remains critically ill with illness/injury that acutely impairs one or more vital organ systems, such that there is a high probability of imminent or life threatening deterioration in the patient's condition. Critical care time of 30 minutes was spent (excluding procedures), in coordination of care during bedside rounds and discussion of patient care in detail, and recommendations of the team were adopted in the plan. Necessity of all invasive devices was also confirmed. Mario Hays MD   Pulmonary and Critical Care Medicine           1/8/2022, 3:57 PM    This patient was evaluated in the context of the global SARS-CoV-2 (COVID-19) pandemic, which necessitated considerations that the patient either has COVID-19 infection or is at risk of infection with COVID-19.  Institutional protocols and algorithms that pertain to the evaluation & management of patients with COVID-19 or those at risk for COVID-19 are in a state of rapid changes based on information released by regulatory bodies including the CDC and federal and state organizations. These policies and algorithms were followed during the patient's care. Please note that this chart was generated using voice recognition Dragon dictation software. Although every effort was made to ensure the accuracy of this automated transcription, some errors in transcription may have occurred.

## 2022-01-08 NOTE — PROGRESS NOTES
respirate 40 to 50/min, hypoxia SPO2 50% on room air, tachycardia. Patient was placed on nonrebreather and improved only to 73%. Patient was then placed on BiPAP. Evaluation emergency room showed positive COVID-19 test, elevated troponin, elevated proBNP. CT chest showed subsegmental PE with possible right heart strain. Vascular surgery was consulted and recommended anticoagulation without need for thrombolysis. Patient also had thrombocytopenia. Cardiology was consulted and echocardiogram obtained which was negative for RV strain with RV systolic pressure 29 mmHg. Patient was treated with heparin infusion, Decadron and started on baricitinib. Patient had vaginal bleeding on 12/30/21 for which OBGYN will see her for in the outpatient setting.    - uptrending oxygen requirements. - oxygen improving. Appropriate for d/c once oxygen levels become acceptable. Review of Systems:     Constitutional: reports weakness and fatigue are slowly improving; negative for chills, fevers, sweats  Respiratory: Reports cough and shortness of breath are improving; report sinus congestion, improvement of secretions; negative for wheezing  Cardiovascular:  negative for chest pain, chest pressure/discomfort, lower extremity edema, palpitations  Gastrointestinal: reports resolution of constipation; negative for abdominal pain, nausea, vomiting  Neurological:  negative for dizziness, headache    Medications:      Allergies:  No Known Allergies    Current Meds:   Scheduled Meds:    sodium chloride  1,000 mL IntraVENous Once    Vitamin D  2,000 Units Oral Daily    ascorbic acid  500 mg Oral BID    zinc sulfate  50 mg Oral Daily    budesonide-formoterol  2 puff Inhalation BID    apixaban  5 mg Oral BID    famotidine  20 mg Oral BID    sodium chloride flush  10 mL IntraVENous 2 times per day     Continuous Infusions:    sodium chloride       PRN Meds: calcium carbonate, albuterol sulfate HFA, benzocaine-menthol, loperamide, benzonatate, HYDROcodone-chlorpheniramine, sodium chloride flush, sodium chloride, promethazine **OR** ondansetron, polyethylene glycol, acetaminophen **OR** acetaminophen    Data:     Past Medical History:   has no past medical history on file. Social History:   reports that she has been smoking cigarettes. She has never used smokeless tobacco. She reports previous alcohol use. She reports previous drug use. Family History:   Family History   Problem Relation Age of Onset    Cancer Maternal Aunt         breast       Vitals:  BP (!) 130/93   Pulse 54   Temp 97.9 °F (36.6 °C) (Oral)   Resp 13   Ht 5' 4\" (1.626 m)   Wt 195 lb 15.8 oz (88.9 kg)   SpO2 96%   Breastfeeding No   BMI 33.64 kg/m²   Temp (24hrs), Av.1 °F (36.7 °C), Min:97.7 °F (36.5 °C), Max:98.6 °F (37 °C)    No results for input(s): POCGLU in the last 72 hours. I/O (24Hr): Intake/Output Summary (Last 24 hours) at 2022 0835  Last data filed at 2022 2117  Gross per 24 hour   Intake --   Output 0 ml   Net 0 ml       Labs:  Hematology:  Recent Labs     22  0701 22  0746   WBC 6.6 8.8   RBC 3.52* 3.55*   HGB 11.9 11.9   HCT 35.5* 35.9*   .9 101.1   MCH 33.8* 33.5   MCHC 33.5 33.1   RDW 14.2 14.1    208   MPV 10.7 10.3     Chemistry:  Recent Labs     22  0701   *   K 4.2      CO2 24   GLUCOSE 130*   BUN 17   CREATININE 0.35*   ANIONGAP 6*   LABGLOM >60   GFRAA >60   CALCIUM 7.9*     No results for input(s): PROT, LABALBU, LABA1C, G4XQNEW, G7LVXBB, FT4, TSH, AST, ALT, LDH, GGT, ALKPHOS, LABGGT, BILITOT, BILIDIR, AMMONIA, AMYLASE, LIPASE, LACTATE, CHOL, HDL, LDLCHOLESTEROL, CHOLHDLRATIO, TRIG, VLDL, WZD30OG, PHENYTOIN, PHENYF, URICACID, POCGLU in the last 72 hours.   ABG:  Lab Results   Component Value Date    POCPH 7.406 2022    POCPCO2 41.1 2022    POCPO2 67.0 2022    POCHCO3 25.8 2022    NBEA NOT REPORTED 2022    PBEA 1 2022    YHS2IKQ NOT REPORTED 01/04/2022    WUOH4PYG 93 01/04/2022    FIO2 60.0 01/04/2022     Lab Results   Component Value Date/Time    SPECIAL NOT REPORTED 12/26/2021 08:19 PM     Lab Results   Component Value Date/Time    CULTURE NO SIGNIFICANT GROWTH 12/26/2021 08:19 PM       Radiology:  XR CHEST PORTABLE    Result Date: 12/26/2021  Moderate diffuse interstitial and alveolar opacities throughout the lungs bilaterally. The finding is nonspecific although suspicious for multilobar pneumonia, including active COVID-19 viral pneumonitis. Physical Examination:        General appearance:  alert, cooperative and no distress, somewhat disheveled  female lying supine in bed  Mental Status:  oriented to person, place and time and anxious affect  HEENT: Sclera anti-icteric, EOMI, bipap mask present  Lungs:  clear to auscultation bilaterally, globally diminished breath sounds, normal effort  Heart:  regular rate and rhythm, no murmur  Abdomen:  soft, nontender, nondistended, normal bowel sounds, no masses, hepatomegaly, splenomegaly  Extremities:  no edema, redness, tenderness in the calves  Skin:  no gross lesions, rashes, induration  Psych: Appears anxious    Assessment:        Hospital Problems           Last Modified POA    * (Principal) Pneumonia due to COVID-19 virus 1/2/2022 Yes    Pulmonary embolus, left ([address] 1/2/2022 Yes    Acute respiratory failure with hypoxia ([address] 1/2/2022 Yes    Postmenopausal bleeding 1/3/2022 Yes    Overview Addendum 1/3/2022  6:29 PM by Loki Gresham DO     During hospitalization in December 2021  Outpatient follow-up recommended due to acute respiratory failure. TVUS 1/2/22 demonstrates:  3.3 cm echogenic mass in the region of the uterine body which is   possibly the obscuring the endometrium. Obesity with body mass index greater than 30 1/2/2022 Yes          Plan:        Acute respiratory failure with hypoxia - multifactorial, due to #2 and #3. Alternate HFNC with Bipap.  On 45% FiO2 via bipap. Continue to wean. Improving. COVID-19 pneumonia  - Completed decadron. Continue baricitinib. Continue vitamin D/C/zinc.   Acute PE - continue anticoagulation with Eliquis  Abnormal uterine bleeding - appreciate GYN input. F/u o/p  Obesity with BMI 33.64  Avoid benzos  Albuterol prn, symbicort  PT/OT  Labs every other day  Discusse with son, Bren Siddiqi, on 1/4. Ultimately, she is appropriate for d/c to LTAC once her oxygen level is appropriate for transfer to LTAC. Await bed/insurance auth. Discussed with patient and CM this morning.     33 Shameka Paniagua DO  1/8/2022  8:35 AM

## 2022-01-08 NOTE — PROGRESS NOTES
[facility] -   Infectious diseases evaluation  admission date 12/24/2021    reason for consultation:   covid +    Impression :   Current:  · covid pneumonia   · PE -  left lower lobe segmental emboli -  · bandemia  · Thrombocytopenia    Other:  ·   Discussion / summary of stay / plan of care   ·   Recommendations   · barcitinib 12/24 -  · Decadron 6 mg daily 12/24  · Anticoagulation  · CRP down to normal,   · Oxygenation improving    Isolate until 1/13/22    Infection Control Recommendations   · [place] Precautions  · Contact Isolation   · Airborne isolation  · Droplet Isolation  Antimicrobial Stewardship Recommendations   · Off AB    Coordination ofOutpatient Care:   · Estimated Length of IV antimicrobials:  · Patient will need Midline / picc Catheter Insertion:   · Patient will need SNF:  · Patient will need outpatient wound care:     History of Present Illness:   Initial history:  Nabila Franklin is a 70y.o.-year-old female Patient presented through ER via EMS after her family found her with severe shortness of breath in her shower. She had developed a cough approximately a week ago and according to the patient had a negative Covid test at that time. She was found to have an SPO2 of 50% on room air requiring BiPAP. A rapid Covid swab was positive  CTA of the chest showed segmental pulmonary embolism in the left lower lobe with possible right heart strain. An echocardiogram was completed and was negative for RV strain with RV systolic pressure of 29 mmHg  The patient was initiated on a heparin drip as well as high-dose Decadron, Rocephin and baricitinib.     12/28  60 FiO2 high flow, he feels better eating better but he saturating 90%,    12/29  70% FiO2 high flow, saturating 89%, she is short of breath, eating slightly, has a cough.   In general she feels okay  Labs reviewed    12/30  60 FiO2 high flow, good appetite, alert oriented x3  WBC 10  Urine culture 12/26 -    12/31  96 saturation FiO2 70% on high flow, she feels better. WBC 7.4  CRP 6    1/1/22  High flow 45%, which is an improvement,  98 saturation, alert with a cough, WBC normal 9.2, tolerating her statin    1/3/22  Patient is on BiPAP 70%, saturating 99 and alert appropriate    1/4  High flow improved 70%, saturating 96%, no fever, looks better  CRP nose normal    1/5  Chest x-ray has not changed, saturating 91% on 57% high flow  Oriented x3, still has a cough  In general better    1/6  97 saturation BiPAP 60, pulse 87  CRP less than 3  No positive culture    1/7  98 saturation 50% FiO2 high flow, oriented x3  CRP less than 3      Interval changes  1/8/2022   Patient Vitals for the past 8 hrs:   BP Temp Temp src Pulse Resp SpO2   01/08/22 1004 -- -- -- -- -- (!) 87 %   01/08/22 0923 -- -- -- -- -- 92 %   01/08/22 0922 (!) 140/78 98.8 °F (37.1 °C) Oral 79 18 --   01/08/22 0908 -- -- -- -- 21 93 %   01/08/22 0859 -- -- -- 86 23 93 %   01/08/22 0543 -- -- -- -- -- 96 %   01/08/22 0319 (!) 130/93 97.9 °F (36.6 °C) Oral 54 13 --     Afebrile  Vital signs stable  SPO2 92%    The patient remains on high flow nasal cannula with 65% FiO2 and 30 L/min    She is alert and oriented    Summary of relevant labs:  Labs:  .5-18-6-3    BNP 9968  Troponin 54  Lactic acid 3.5  Ferritin 858  WBC 14.6 -7  D-dimer 70.48    Micro:  Covid positive  Imaging:  CT chest bilat covid pneumonia w thick consolidations    I have personally reviewed the past medical history, past surgical history, medications, social history, and family history, and I haveupdated the database accordingly. Allergies:   Patient has no known allergies. Review of Systems:     Review of Systems   Constitutional: Positive for activity change. Negative for chills and diaphoresis. HENT: Positive for congestion. Eyes: Negative for photophobia, pain and discharge. Respiratory: Negative for cough and shortness of breath. Cardiovascular: Negative for chest pain. Gastrointestinal: Negative for abdominal distention. Endocrine: Negative for heat intolerance, polydipsia and polyphagia. Genitourinary: Negative for dysuria. Musculoskeletal: Negative for arthralgias. Skin: Negative for color change. Allergic/Immunologic: Negative for immunocompromised state. Neurological: Negative for dizziness, tremors, seizures, syncope and speech difficulty. Psychiatric/Behavioral: Negative for agitation. Physical Examination :       Physical Exam  Constitutional:       General: She is not in acute distress. Appearance: Normal appearance. She is not ill-appearing or diaphoretic. HENT:      Head: Normocephalic and atraumatic. Nose: Nose normal.      Mouth/Throat:      Mouth: Mucous membranes are moist.   Eyes:      General: No scleral icterus. Conjunctiva/sclera: Conjunctivae normal.   Cardiovascular:      Rate and Rhythm: Normal rate and regular rhythm. Heart sounds: Normal heart sounds. No murmur heard. No friction rub. No gallop. Pulmonary:      Breath sounds: Normal breath sounds. Abdominal:      General: There is no distension. Tenderness: There is no abdominal tenderness. Genitourinary:     Comments: No castro  Musculoskeletal:         General: No swelling, deformity or signs of injury. Cervical back: Neck supple. No rigidity or tenderness. Right lower leg: No edema. Left lower leg: No edema. Skin:     General: Skin is dry. Coloration: Skin is not jaundiced or pale. Findings: No bruising, erythema, lesion or rash. Neurological:      General: No focal deficit present. Mental Status: She is alert. Mental status is at baseline. Psychiatric:         Mood and Affect: Mood normal.         Thought Content: Thought content normal.         Past Medical History:   No past medical history on file.     Past Surgical  History:   No past surgical history on file.    Medications:      Vitamin D  2,000 Units Oral Daily    ascorbic acid  500 mg Oral BID    zinc sulfate  50 mg Oral Daily    budesonide-formoterol  2 puff Inhalation BID    apixaban  5 mg Oral BID    famotidine  20 mg Oral BID    sodium chloride flush  10 mL IntraVENous 2 times per day       Social History:     Social History     Socioeconomic History    Marital status: Unknown     Spouse name: Not on file    Number of children: Not on file    Years of education: Not on file    Highest education level: Not on file   Occupational History    Not on file   Tobacco Use    Smoking status: Current Every Day Smoker     Types: Cigarettes    Smokeless tobacco: Never Used   Substance and Sexual Activity    Alcohol use: Not Currently    Drug use: Not Currently    Sexual activity: Not on file   Other Topics Concern    Not on file   Social History Narrative    Not on file     Social Determinants of Health     Financial Resource Strain:     Difficulty of Paying Living Expenses: Not on file   Food Insecurity:     Worried About Running Out of Food in the Last Year: Not on file    Jones of Food in the Last Year: Not on file   Transportation Needs:     Lack of Transportation (Medical): Not on file    Lack of Transportation (Non-Medical):  Not on file   Physical Activity:     Days of Exercise per Week: Not on file    Minutes of Exercise per Session: Not on file   Stress:     Feeling of Stress : Not on file   Social Connections:     Frequency of Communication with Friends and Family: Not on file    Frequency of Social Gatherings with Friends and Family: Not on file    Attends Yazidism Services: Not on file    Active Member of Clubs or Organizations: Not on file    Attends Club or Organization Meetings: Not on file    Marital Status: Not on file   Intimate Partner Violence:     Fear of Current or Ex-Partner: Not on file    Emotionally Abused: Not on file    Physically Abused: Not on file   [facility]

## 2022-01-08 NOTE — PLAN OF CARE
Problem: OXYGENATION/RESPIRATORY FUNCTION  Goal: Patient will maintain patent airway  1/8/2022 0909 by Elsy Garza RCP  Outcome: Ongoing     Problem: OXYGENATION/RESPIRATORY FUNCTION  Goal: Patient will achieve/maintain normal respiratory rate/effort  Description: Respiratory rate and effort will be within normal limits for the patient  1/8/2022 0909 by Elsy Garza RCP  Outcome: Ongoing

## 2022-01-08 NOTE — PROGRESS NOTES
[facility] -   Infectious diseases evaluation  admission date 12/24/2021    reason for consultation:   covid +    Impression :   Current:  · covid pneumonia   · PE -  left lower lobe segmental emboli -  · bandemia  · Thrombocytopenia    Other:  ·   Discussion / summary of stay / plan of care   ·   Recommendations   · barcitinib 12/24 -  · Decadron 6 mg daily 12/24  · Anticoagulation  · CRP down to normal,   · Oxygenation improving    Isolate until 1/13/22    Infection Control Recommendations   · [place] Precautions  · Contact Isolation   · Airborne isolation  · Droplet Isolation  Antimicrobial Stewardship Recommendations   · Off AB    Coordination ofOutpatient Care:   · Estimated Length of IV antimicrobials:  · Patient will need Midline / picc Catheter Insertion:   · Patient will need SNF:  · Patient will need outpatient wound care:     History of Present Illness:   Initial history:  Nidhi Hopper is a 70y.o.-year-old female Patient presented through ER via EMS after her family found her with severe shortness of breath in her shower. She had developed a cough approximately a week ago and according to the patient had a negative Covid test at that time. She was found to have an SPO2 of 50% on room air requiring BiPAP. A rapid Covid swab was positive  CTA of the chest showed segmental pulmonary embolism in the left lower lobe with possible right heart strain. An echocardiogram was completed and was negative for RV strain with RV systolic pressure of 29 mmHg  The patient was initiated on a heparin drip as well as high-dose Decadron, Rocephin and baricitinib.        Interval changes  1/8/2022   Patient Vitals for the past 8 hrs:   BP Resp SpO2   01/07/22 2359 -- -- 94 %   01/07/22 2135 (!) 149/92 -- --   01/07/22 2018 -- 22 --   CRP 18-improved-  WBC 10  Blood culture still negative, chest x-ray positive diffuse pneumonia  Patient is on baricitinib, but saturation 86% she is on 90% high flow    12/28  60 FiO2 high flow, he feels better eating better but he saturating 90%,    12/29  70% FiO2 high flow, saturating 89%, she is short of breath, eating slightly, has a cough. In general she feels okay  Labs reviewed    12/30  60 FiO2 high flow, good appetite, alert oriented x3  WBC 10  Urine culture 12/26 -    12/31  96 saturation FiO2 70% on high flow, she feels better. WBC 7.4  CRP 6    1/1/22  High flow 45%, which is an improvement,  98 saturation, alert with a cough, WBC normal 9.2, tolerating her statin    1/3/22  Patient is on BiPAP 70%, saturating 99 and alert appropriate    1/4  High flow improved 70%, saturating 96%, no fever, looks better  CRP nose normal    1/5  Chest x-ray has not changed, saturating 91% on 57% high flow  Oriented x3, still has a cough  In general better    1/6  97 saturation BiPAP 60, pulse 87  CRP less than 3  No positive culture    1/7  98 saturation 50% FiO2 high flow, oriented x3  CRP less than 3    Summary of relevant labs:  Labs:  .5-18-6-3    BNP 9968  Troponin 54  Lactic acid 3.5  Ferritin 858  WBC 14.6 -7  D-dimer 70.48    Micro:  Covid positive  Imaging:  CT chest bilat covid pneumonia w thick consolidations    I have personally reviewed the past medical history, past surgical history, medications, social history, and family history, and I haveupdated the database accordingly. Allergies:   Patient has no known allergies. Review of Systems:     Review of Systems   Constitutional: Positive for activity change. Negative for chills and diaphoresis. HENT: Positive for congestion. Eyes: Negative for photophobia, pain and discharge. Respiratory: Negative for cough and shortness of breath. Cardiovascular: Negative for chest pain. Gastrointestinal: Negative for abdominal distention. Endocrine: Negative for heat intolerance, polydipsia and polyphagia. Genitourinary: Negative for dysuria.    Musculoskeletal: Negative for arthralgias. Skin: Negative for color change. Allergic/Immunologic: Negative for immunocompromised state. Neurological: Negative for dizziness, tremors, seizures, syncope and speech difficulty. Psychiatric/Behavioral: Negative for agitation. Physical Examination :       Physical Exam  Constitutional:       General: She is not in acute distress. Appearance: Normal appearance. She is not ill-appearing or diaphoretic. HENT:      Head: Normocephalic and atraumatic. Nose: Nose normal.      Mouth/Throat:      Mouth: Mucous membranes are moist.   Eyes:      General: No scleral icterus. Conjunctiva/sclera: Conjunctivae normal.   Cardiovascular:      Rate and Rhythm: Normal rate and regular rhythm. Heart sounds: Normal heart sounds. No murmur heard. No friction rub. No gallop. Pulmonary:      Breath sounds: Normal breath sounds. Abdominal:      General: There is no distension. Tenderness: There is no abdominal tenderness. Genitourinary:     Comments: No castro  Musculoskeletal:         General: No swelling, deformity or signs of injury. Cervical back: Neck supple. No rigidity or tenderness. Right lower leg: No edema. Left lower leg: No edema. Skin:     General: Skin is dry. Coloration: Skin is not jaundiced or pale. Findings: No bruising, erythema, lesion or rash. Neurological:      General: No focal deficit present. Mental Status: She is alert. Mental status is at baseline. Psychiatric:         Mood and Affect: Mood normal.         Thought Content: Thought content normal.         Past Medical History:   No past medical history on file. Past Surgical  History:   No past surgical history on file.     Medications:      Vitamin D  2,000 Units Oral Daily    ascorbic acid  500 mg Oral BID    zinc sulfate  50 mg Oral Daily    budesonide-formoterol  2 puff Inhalation BID    apixaban  5 mg Oral BID    famotidine  20 mg Oral BID    sodium chloride flush  10 mL IntraVENous 2 times per day       Social History:     Social History     Socioeconomic History    Marital status: Unknown     Spouse name: Not on file    Number of children: Not on file    Years of education: Not on file    Highest education level: Not on file   Occupational History    Not on file   Tobacco Use    Smoking status: Current Every Day Smoker     Types: Cigarettes    Smokeless tobacco: Never Used   Substance and Sexual Activity    Alcohol use: Not Currently    Drug use: Not Currently    Sexual activity: Not on file   Other Topics Concern    Not on file   Social History Narrative    Not on file     Social Determinants of Health     Financial Resource Strain:     Difficulty of Paying Living Expenses: Not on file   Food Insecurity:     Worried About Running Out of Food in the Last Year: Not on file    Jones of Food in the Last Year: Not on file   Transportation Needs:     Lack of Transportation (Medical): Not on file    Lack of Transportation (Non-Medical):  Not on file   Physical Activity:     Days of Exercise per Week: Not on file    Minutes of Exercise per Session: Not on file   Stress:     Feeling of Stress : Not on file   Social Connections:     Frequency of Communication with Friends and Family: Not on file    Frequency of Social Gatherings with Friends and Family: Not on file    Attends Alevism Services: Not on file    Active Member of [address] [place] [address] [employer] or Organizations: Not on file    Attends Club or Organization Meetings: Not on file    Marital Status: Not on file   Intimate Partner Violence:     Fear of Current or Ex-Partner: Not on file    Emotionally Abused: Not on file    Physically Abused: Not on file    Sexually Abused: Not on file   Housing Stability:     Unable to Pay for Housing in the Last Year: Not on file    Number of [place] in the Last Year: Not on file    Unstable Housing in the Last Year: Not on file       Family History: Family History   Problem Relation Age of Onset    Cancer Maternal Aunt         breast      Medical Decision Making:   I have independently reviewed/ordered the following labs:    CBC with Differential:   Recent Labs     01/06/22  0701   WBC 6.6   HGB 11.9   HCT 35.5*      LYMPHOPCT 10*   MONOPCT 6     BMP:  Recent Labs     01/06/22  0701   *   K 4.2      CO2 24   BUN 17   CREATININE 0.35*     Hepatic Function Panel:   No results for input(s): PROT, LABALBU, BILIDIR, IBILI, BILITOT, ALKPHOS, ALT, AST in the last 72 hours. No results for input(s): RPR in the last 72 hours. No results for input(s): HIV in the last 72 hours. No results for input(s): BC in the last 72 hours. Lab Results   Component Value Date    CREATININE 0.35 01/06/2022    GLUCOSE 130 01/06/2022       Detailed results: Thank you for allowing us to participate in the care of this patient. Please call with questions. This note is created with the assistance of a speech recognition program.  While intending to generate adocument that actually reflects the content of the visit, the document can still have some errors including those of syntax and sound a like substitutions which may escape proof reading. It such instances, actual meaningcan be extrapolated by contextual diversion.     Lyn Wiggins MD  Office: [phone]  Perfect serve / office [phone]      '

## 2022-01-08 NOTE — PLAN OF CARE
Problem: Airway Clearance - Ineffective  Goal: Achieve or maintain patent airway  1/7/2022 2206 by Candelaria Meza RCP  Outcome: Ongoing  1/7/2022 1745 by Damion Michael RN  Outcome: Ongoing     Problem: Gas Exchange - Impaired  Goal: Absence of hypoxia  1/7/2022 2206 by Candelaria Meza RCP  Outcome: Ongoing  1/7/2022 1745 by Damion Michael RN  Outcome: Ongoing     Problem: Gas Exchange - Impaired  Goal: Promote optimal lung function  1/7/2022 2206 by Candelaria Meza RCP  Outcome: Ongoing  1/7/2022 1745 by Damion Michael RN  Outcome: Ongoing     Problem: Breathing Pattern - Ineffective  Goal: Ability to achieve and maintain a regular respiratory rate  1/7/2022 2206 by Candelaria Meza RCP  Outcome: Ongoing  1/7/2022 1745 by Damion Michael RN  Outcome: Ongoing     Problem: OXYGENATION/RESPIRATORY FUNCTION  Goal: Patient will maintain patent airway  1/7/2022 2206 by Candelaria Meza RCP  Outcome: Ongoing  1/7/2022 1745 by Damion Michael RN  Outcome: Ongoing     Problem: OXYGENATION/RESPIRATORY FUNCTION  Goal: Patient will achieve/maintain normal respiratory rate/effort  Description: Respiratory rate and effort will be within normal limits for the patient  1/7/2022 2206 by Candelaria Meza RCP  Outcome: Ongoing  1/7/2022 1745 by Damion Michael RN  Outcome: Ongoing     Problem: Skin Integrity:  Goal: Will show no infection signs and symptoms  Description: Will show no infection signs and symptoms  1/7/2022 1745 by Damion Michael RN  Outcome: Ongoing

## 2022-01-09 PROCEDURE — 99232 SBSQ HOSP IP/OBS MODERATE 35: CPT | Performed by: INTERNAL MEDICINE

## 2022-01-09 PROCEDURE — 2580000003 HC RX 258: Performed by: STUDENT IN AN ORGANIZED HEALTH CARE EDUCATION/TRAINING PROGRAM

## 2022-01-09 PROCEDURE — 2060000000 HC ICU INTERMEDIATE R&B

## 2022-01-09 PROCEDURE — 2700000000 HC OXYGEN THERAPY PER DAY

## 2022-01-09 PROCEDURE — 97530 THERAPEUTIC ACTIVITIES: CPT

## 2022-01-09 PROCEDURE — 6370000000 HC RX 637 (ALT 250 FOR IP): Performed by: FAMILY MEDICINE

## 2022-01-09 PROCEDURE — 6370000000 HC RX 637 (ALT 250 FOR IP): Performed by: INTERNAL MEDICINE

## 2022-01-09 PROCEDURE — 6370000000 HC RX 637 (ALT 250 FOR IP): Performed by: NURSE PRACTITIONER

## 2022-01-09 PROCEDURE — 99231 SBSQ HOSP IP/OBS SF/LOW 25: CPT | Performed by: INTERNAL MEDICINE

## 2022-01-09 PROCEDURE — 94761 N-INVAS EAR/PLS OXIMETRY MLT: CPT

## 2022-01-09 PROCEDURE — 97110 THERAPEUTIC EXERCISES: CPT

## 2022-01-09 PROCEDURE — 99291 CRITICAL CARE FIRST HOUR: CPT | Performed by: INTERNAL MEDICINE

## 2022-01-09 RX ADMIN — BUDESONIDE AND FORMOTEROL FUMARATE DIHYDRATE 2 PUFF: 160; 4.5 AEROSOL RESPIRATORY (INHALATION) at 20:26

## 2022-01-09 RX ADMIN — ZINC SULFATE 220 MG (50 MG) CAPSULE 50 MG: CAPSULE at 08:32

## 2022-01-09 RX ADMIN — BENZONATATE 200 MG: 100 CAPSULE ORAL at 12:25

## 2022-01-09 RX ADMIN — OXYCODONE HYDROCHLORIDE AND ACETAMINOPHEN 500 MG: 500 TABLET ORAL at 20:24

## 2022-01-09 RX ADMIN — FAMOTIDINE 20 MG: 20 TABLET, FILM COATED ORAL at 20:23

## 2022-01-09 RX ADMIN — OXYCODONE HYDROCHLORIDE AND ACETAMINOPHEN 500 MG: 500 TABLET ORAL at 08:32

## 2022-01-09 RX ADMIN — SODIUM CHLORIDE, PRESERVATIVE FREE 10 ML: 5 INJECTION INTRAVENOUS at 08:32

## 2022-01-09 RX ADMIN — SODIUM CHLORIDE, PRESERVATIVE FREE 10 ML: 5 INJECTION INTRAVENOUS at 20:46

## 2022-01-09 RX ADMIN — APIXABAN 5 MG: 5 TABLET, FILM COATED ORAL at 08:31

## 2022-01-09 RX ADMIN — APIXABAN 5 MG: 5 TABLET, FILM COATED ORAL at 20:24

## 2022-01-09 RX ADMIN — Medication 2000 UNITS: at 08:32

## 2022-01-09 RX ADMIN — FAMOTIDINE 20 MG: 20 TABLET, FILM COATED ORAL at 08:32

## 2022-01-09 RX ADMIN — BENZONATATE 200 MG: 100 CAPSULE ORAL at 20:24

## 2022-01-09 RX ADMIN — GUAIFENESIN AND CODEINE PHOSPHATE 10 ML: 100; 10 SOLUTION ORAL at 12:25

## 2022-01-09 RX ADMIN — BUDESONIDE AND FORMOTEROL FUMARATE DIHYDRATE 2 PUFF: 160; 4.5 AEROSOL RESPIRATORY (INHALATION) at 08:33

## 2022-01-09 RX ADMIN — GUAIFENESIN AND CODEINE PHOSPHATE 10 ML: 100; 10 SOLUTION ORAL at 20:24

## 2022-01-09 RX ADMIN — GUAIFENESIN AND CODEINE PHOSPHATE 10 ML: 100; 10 SOLUTION ORAL at 06:45

## 2022-01-09 ASSESSMENT — PAIN SCALES - GENERAL
PAINLEVEL_OUTOF10: 0

## 2022-01-09 ASSESSMENT — ENCOUNTER SYMPTOMS
ABDOMINAL PAIN: 0
EYE DISCHARGE: 0
VOICE CHANGE: 0
VOMITING: 0
SHORTNESS OF BREATH: 1
ALLERGIC/IMMUNOLOGIC NEGATIVE: 1
ABDOMINAL DISTENTION: 0
TROUBLE SWALLOWING: 0
PHOTOPHOBIA: 0
SHORTNESS OF BREATH: 0
COUGH: 1
COUGH: 0
EYE PAIN: 0
DIARRHEA: 0
SORE THROAT: 0
COLOR CHANGE: 0
SINUS PAIN: 0

## 2022-01-09 NOTE — PROGRESS NOTES
[facility]  Office: [address], DO, Maribeth Mccoy, DO, Marco Leone, DO, Castillo Murrayfalguni Blood, DO, Kieran Briseno MD, Ana Moon MD, Elton Crowley MD, Shelly Tse MD, Mesfin Landeros MD, Marielos Clayton MD, Erick Jara MD, Aleksandra Miranda, DO, Zahra Martinez, DO, Harrietta Goldberg, MD,  Ted Arango DO, Akua Bose MD, Jose A Fernandez MD, Yanet Heart MD, Vijay Ortega MD, Harry Bill MD, Ronnie Hoff MD, Ferny Sherman MD, Magdaleno Ring, [facility], [facility], CNP, Edgar Siegel, CNP, Christine Salgado, CNS, Jermaine Caballero, CNP, Eric Jaffe, CNP, Rosalee Fitch, CNP, Ronda Carmichael, CNP, Enrike Quintana, CNP, Bridgett Grace PA-C, Enedelia Singh, [facility], Jake Wright [facility], Catia Lynch, CNP, Sujit Álvarezh, CNP, José Pinto, [facility], Adam Murphy, CNP, Leeanne Quinn, CNP, Cheko Viramontes, [address] [place] [address]    Progress Note    1/9/2022    2:30 PM    Name:   Jelena Kapoor  MRN:     [id number]     Acct:      [de-identified]   Room:   [address] [place] [address] Day:  12  Admit Date:  12/24/2021  3:37 PM    PCP:   No primary care provider on file. Code Status:  Full Code    Subjective:     C/C:   Chief Complaint   Patient presents with    Shortness of Breath    Fall     Interval History Status: not changed. Patient seen and examined this morning. No acute events overnight. On 60% FiO2 via HFNC. Actively using her incentive spirometer today. Doing bedside exercises. Brief History:     Jelena Kapoor is 70 y.o. female who was admitted to the hospital on 12/24/2021 for treatment of Pneumonia due to COVID-19 virus. Patient came to the hospital with lightheadedness and dry cough for 1 week. She had episode of fall at home and family called EMS. Initial evaluation by EMS found patient having tachypnea with respirate 40 to 50/min, hypoxia SPO2 50% on room air, tachycardia. Patient was placed on nonrebreather and improved only to 73%. Patient was then placed on BiPAP. Evaluation emergency room showed positive COVID-19 test, elevated troponin, elevated proBNP. CT chest showed subsegmental PE with possible right heart strain. Vascular surgery was consulted and recommended anticoagulation without need for thrombolysis. Patient also had thrombocytopenia. Cardiology was consulted and echocardiogram obtained which was negative for RV strain with RV systolic pressure 29 mmHg. Patient was treated with heparin infusion, Decadron and started on baricitinib. Patient had vaginal bleeding on 12/30/21 for which OBGYN will see her for in the outpatient setting.    - uptrending oxygen requirements. - oxygen improving. Appropriate for d/c once oxygen levels become acceptable. Review of Systems:     Constitutional: reports weakness and fatigue are slowly improving; negative for chills, fevers, sweats  Respiratory: Reports cough and shortness of breath are improving; report sinus congestion, improvement of secretions; negative for wheezing  Cardiovascular:  negative for chest pain, chest pressure/discomfort, lower extremity edema, palpitations  Gastrointestinal: reports resolution of constipation; negative for abdominal pain, nausea, vomiting  Neurological:  negative for dizziness, headache    Medications:      Allergies:  No Known Allergies    Current Meds:   Scheduled Meds:    Vitamin D  2,000 Units Oral Daily    ascorbic acid  500 mg Oral BID    zinc sulfate  50 mg Oral Daily    budesonide-formoterol  2 puff Inhalation BID    apixaban  5 mg Oral BID    famotidine  20 mg Oral BID    sodium chloride flush  10 mL IntraVENous 2 times per day     Continuous Infusions:    sodium chloride       PRN Meds: guaiFENesin-codeine, calcium carbonate, albuterol sulfate HFA, benzocaine-menthol, loperamide, benzonatate, sodium chloride flush, sodium chloride, promethazine **OR** ondansetron, polyethylene glycol, acetaminophen **OR** acetaminophen    Data:     Past Medical History:   has no past medical history on file. Social History:   reports that she has been smoking cigarettes. She has never used smokeless tobacco. She reports previous alcohol use. She reports previous drug use. Family History:   Family History   Problem Relation Age of Onset    Cancer Maternal Aunt         breast       Vitals:  /72   Pulse 116   Temp 99 °F (37.2 °C) (Oral)   Resp 22   Ht 5' 4\" (1.626 m)   Wt 195 lb 15.8 oz (88.9 kg)   SpO2 91%   Breastfeeding No   BMI 33.64 kg/m²   Temp (24hrs), Av.1 °F (36.7 °C), Min:96.7 °F (35.9 °C), Max:99 °F (37.2 °C)    No results for input(s): POCGLU in the last 72 hours. I/O (24Hr): Intake/Output Summary (Last 24 hours) at 2022 1430  Last data filed at 2022 1229  Gross per 24 hour   Intake 1110 ml   Output 1000 ml   Net 110 ml       Labs:  Hematology:  Recent Labs     22  0746   WBC 8.8   RBC 3.55*   HGB 11.9   HCT 35.9*   .1   MCH 33.5   MCHC 33.1   RDW 14.1      MPV 10.3     Chemistry:  Recent Labs     22  0746   *   K 4.8      CO2 25   GLUCOSE 133*   BUN 19   CREATININE 0.33*   ANIONGAP 9   LABGLOM >60   GFRAA >60   CALCIUM 8.1*     No results for input(s): PROT, LABALBU, LABA1C, C5BTVST, R3UEWVZ, FT4, TSH, AST, ALT, LDH, GGT, ALKPHOS, LABGGT, BILITOT, BILIDIR, AMMONIA, AMYLASE, LIPASE, LACTATE, CHOL, HDL, LDLCHOLESTEROL, CHOLHDLRATIO, TRIG, VLDL, UZV82RI, PHENYTOIN, PHENYF, URICACID, POCGLU in the last 72 hours.   ABG:  Lab Results   Component Value Date    POCPH 7.406 2022    POCPCO2 41.1 2022    POCPO2 67.0 2022    POCHCO3 25.8 2022    NBEA NOT REPORTED 2022    PBEA 1 2022    JBX2OUM NOT REPORTED 2022    FSEY6PAZ 93 2022    FIO2 60.0 2022     Lab Results   Component Value Date/Time    SPECIAL NOT REPORTED 2021 08:19 PM     Lab Results   Component Value Date/Time    CULTURE NO SIGNIFICANT GROWTH 2021 08:19 PM Radiology:  XR CHEST PORTABLE    Result Date: 12/26/2021  Moderate diffuse interstitial and alveolar opacities throughout the lungs bilaterally. The finding is nonspecific although suspicious for multilobar pneumonia, including active COVID-19 viral pneumonitis. Physical Examination:        General appearance:  alert, cooperative and no distress, somewhat disheveled  female sitting up in chair at bedside  Mental Status:  oriented to person, place and time and anxious affect  HEENT: Sclera anti-icteric, EOMI, HFNC present  Lungs:  clear to auscultation bilaterally, globally diminished breath sounds, normal effort  Heart:  regular rate and rhythm, no murmur  Abdomen:  soft, nontender, nondistended, normal bowel sounds, no masses, hepatomegaly, splenomegaly  Extremities:  no redness, no tenderness in the calves, 1+ edema noted to bilateral lower extremities. Skin:  no gross lesions, rashes, induration  Psych: Appears anxious    Assessment:        Hospital Problems           Last Modified POA    * (Principal) Pneumonia due to COVID-19 virus 1/2/2022 Yes    Pulmonary embolus, left ([address] 1/2/2022 Yes    Acute respiratory failure with hypoxia ([address] 1/2/2022 Yes    Postmenopausal bleeding 1/3/2022 Yes    Overview Addendum 1/3/2022  6:29 PM by Li Fuentes DO     During hospitalization in December 2021  Outpatient follow-up recommended due to acute respiratory failure. TVUS 1/2/22 demonstrates:  3.3 cm echogenic mass in the region of the uterine body which is   possibly the obscuring the endometrium. Obesity with body mass index greater than 30 1/2/2022 Yes          Plan:        Acute respiratory failure with hypoxia - multifactorial, due to #2 and #3. Alternate HFNC with Bipap. On 46% FiO2 via HFNC. Continue to wean. Stalled out currently. Using IS today. Increasing activity. COVID-19 pneumonia  - Completed decadron. Continue baricitinib.  Continue vitamin D/C/zinc.   Acute PE - continue anticoagulation with Eliquis  Abnormal uterine bleeding - appreciate GYN input. F/u o/p  Obesity with BMI 33.64  Albuterol prn, symbicort  PT/OT - continue to increase activity. Labs every other day  Discussed with son, Zhen Fitch, on 1/4. Ultimately, she is appropriate for d/c once her oxygen level is appropriate for transfer to LTAC. Await bed/insurance auth. Discussed with patient and CM this morning.     33 Shameka Paniagua DO  1/9/2022  2:30 PM

## 2022-01-09 NOTE — PROGRESS NOTES
Physical Therapy  Facility/Department: [facility]  Daily Treatment Note  NAME: Daija Delacruz  : 1950  MRN: [id number]    Date of Service: 2022    Discharge Recommendations:  Patient would benefit from continued therapy after discharge   PT Equipment Recommendations  Other: CTA    Assessment   Body structures, Functions, Activity limitations: Decreased functional mobility ; Decreased posture;Decreased endurance;Decreased strength;Decreased balance;Decreased safe awareness  Assessment: Pt with mobility deficits requiring min-A to perform bed mobility, pt SpO2 desats with mobility. Pt performed supine exercises with frequent rest breaks due to fatigue. Pt would be unsafe to return to prior living arrangements upon discharge. Pt would benefit from additional therapy upon discharge to assist in return to prior level of functional independence. Prognosis: Good  PT Education: Goals;Transfer Training;PT Role;Functional Mobility Training;Plan of Care;General Safety  REQUIRES PT FOLLOW UP: Yes  Activity Tolerance  Activity Tolerance: Patient limited by endurance; Patient limited by fatigue     Patient Diagnosis(es): The primary encounter diagnosis was COVID-19. Diagnoses of Dehydration and Tachycardia were also pertinent to this visit. has no past medical history on file. has no past surgical history on file. Restrictions  Restrictions/Precautions  Restrictions/Precautions: Up as Tolerated,Isolation,Contact Precautions  Required Braces or Orthoses?: No  Position Activity Restriction  Other position/activity restrictions: up with assistance  Subjective   General  Response To Previous Treatment: Patient with no complaints from previous session. Family / Caregiver Present: No  Subjective  Subjective: Pt supine in bed and agreeable to therapy, RN agreeable to therapy, pt pleasant and cooperative throughout today's session. General Comment  Comments: Pt retired to supine in bed with call light.  pt on Kendra. RN  Pain Screening  Patient Currently in Pain: Denies  Vital Signs  Patient Currently in Pain: Denies       Orientation  Orientation  Overall Orientation Status: Within Functional Limits  Cognition      Objective   Bed mobility  Bridging: Stand by assistance  Rolling to Left: Stand by assistance  Rolling to Right: Stand by assistance  Supine to Sit: Minimal assistance  Sit to Supine: Minimal assistance  Scooting: Contact guard assistance  Comment: HOB elevated, multiple trials of rolling and bridging completed for activities of hygiene  Transfers  Sit to Stand: Moderate Assistance  Comment: attempted STS x2 trials, pt unable to achieve full upright position  Ambulation  Ambulation?: No        Exercises  EOB ~10 mins   Seated LE exercise program: [address] Reps: 10x bilat   Supine Exercises: Ankle Pumps, Gluteal sets, Hamstring Sets, Heel Slides, Hip ABD/ADD, Quad Sets, SLR (AAROM). Reps: 10x bilat  Comments:     AM-PAC Score       Goals  Short term goals  Time Frame for Short term goals: 14 visits  Short term goal 1: Pt will ambulate 75 feet with a RW and SBA to increase functional independence. Short term goal 2: Pt will negotiate 3 stairs with no handrails and SBA to allow the pt to enter prior living arrangements. Short term goal 3: Pt will demonstrate fair+ standing balance to decrease fall risk. Short term goal 4: Pt will tolerate a 35 minute therapy session to promote increased endurance. Short term goal 5: Pt will perform sit<>stand transfer with SBA to increase functional independence.     Plan    Plan  Times per week: 5x  Times per day: Daily  Current Treatment Recommendations: Strengthening,Transfer Training,Endurance Training,ROM,Balance Training,Gait Training,Functional Mobility Training,Safety Education & Training,Home Exercise Program,Stair training,Patient/Caregiver Education & Training,Equipment Evaluation, Education, & procurement,Positioning  Safety Devices  Type of devices: Patient at risk for falls,Left in bed,Call light within reach,Gait belt,Nurse notified,All fall risk precautions in place  Restraints  Initially in place: No     Therapy Time   Individual Concurrent Group Co-treatment   Time In 1455         Time Out 1526         Minutes 31         Timed Code Treatment Minutes: [address] [place], [place]

## 2022-01-09 NOTE — PLAN OF CARE
Problem: Airway Clearance - Ineffective  Goal: Achieve or maintain patent airway  Outcome: Ongoing     Problem: Gas Exchange - Impaired  Goal: Absence of hypoxia  Outcome: Ongoing     Problem: Gas Exchange - Impaired  Goal: Promote optimal lung function  Outcome: Ongoing     Problem: Breathing Pattern - Ineffective  Goal: Ability to achieve and maintain a regular respiratory rate  Outcome: Ongoing     BRONCHOSPASM/BRONCHOCONSTRICTION     [x]         IMPROVE AERATION/BREATH SOUNDS  [x]   ADMINISTER BRONCHODILATOR THERAPY AS APPROPRIATE  [x]   ASSESS BREATH SOUNDS  []   IMPLEMENT AEROSOL/MDI PROTOCOL  [x]   PATIENT EDUCATION AS NEEDED     NON INVASIVE VENTILATION    PROVIDE OPTIMAL VENTILATION/ACCEPTABLE SP02  IMPLEMENT NON INVASIVE VENTILATION PROTOCOL  ASSESSMENT SKIN INTEGRITY  PATIENT EDUCATION AS NEEDED  BIPAP AS NEEDED

## 2022-01-09 NOTE — PROGRESS NOTES
PULMONARY & CRITICAL CARE MEDICINE PROGRESS NOTE     Patient:  Kassie Rinaldi  MRN: [id number]  Admit date: 12/24/2021  Primary Care Physician: No primary care provider on file. Consulting Physician: Parvin Lacy DO  CODE Status: Full Code  LOS: 12     SUBJECTIVE     CHIEF COMPLAINT/REASON FOR INITIAL CONSULT:   Acute respiratory failure/COVID-19 pneumonia with ARDS/pulmonary [place] involving subsegmental left lower lobe    BRIEF HOSPITAL COURSE:   The patient is a 70 y.o. female was admitted with complaints of shortness of breath and found to be Covid positive but also had a left lower lobe subsegmental pulmonary embolism with right ventricular strain pattern  Vascular surgery was consulted and it was felt, rightfully so, that the cause of the right ventricular strain is unlikely pulmonary embolism  Patient was admitted to the [facility] ICU, requiring 95% oxygen via high flow. Chest x-ray shows bilateral diffuse interstitial alveolar opacities in both lungs  Has shortness of breath along with coughing. INTERVAL HISTORY:  01/09/22  Overnight events noted, chart reviewed, available labs seen and medications reviewed. Last 24-hour patient is afebrile T-max of 98.5 respiratory rate is usually low to mid 20s. She remains on high flow nasal cannula between 60 to 70% FiO2 and flow of 35 to 40 L currently she is on 40 L high flow. She is afebrile hemodynamically stable last 24 hours  She does complain of cough cough is usually associated with dryness sometimes sputum production. Denies difficulty swallowing choking or coughing and denies aspiration does complain of shortness of breath on moving out of bed to chair. She did use BiPAP overnight BiPAP settings 16/10/50% to 55% she did not require BiPAP during the daytime.     Labs on 01/08/2022 sodium 134 BUN 19 creatinine 0.33 bicarbonate 25 WBC 8.8 hemoglobin 11.9 platelet 961    Chest x-ray on 01/05/2021 shows low lung volume bilateral pulm infiltrate as before more on the left as compared to right slightly better aeration of left lung as compared to chest x-ray on 2021  ABG on 2021 did not show hypercapnia and it was 7.4  on high flow nasal cannula. REVIEW OF SYSTEMS:  Review of Systems   Constitutional: Positive for activity change and fatigue. Negative for fever. HENT: Negative for postnasal drip, sinus pain, sore throat, trouble swallowing and voice change. Eyes: Negative for photophobia and visual disturbance. Respiratory: Positive for cough and shortness of breath. Cardiovascular: Negative for chest pain, palpitations and leg swelling. Gastrointestinal: Negative for abdominal pain, diarrhea and vomiting. Endocrine: Negative. Genitourinary: Negative for difficulty urinating, dysuria and hematuria. Musculoskeletal: Negative. Allergic/Immunologic: Negative. Neurological: Negative for dizziness, syncope, speech difficulty and headaches. Hematological: Negative for adenopathy. Does not bruise/bleed easily. Psychiatric/Behavioral: Negative. OBJECTIVE     PaO2/FiO2 RATIO:  No results for input(s): POCPO2 in the last 72 hours.    FiO2 : 68 %     VITAL SIGNS:   LAST:  /72   Pulse 116   Temp 99 °F (37.2 °C) (Oral)   Resp 22   Ht 5' 4\" (1.626 m)   Wt 195 lb 15.8 oz (88.9 kg)   SpO2 91%   Breastfeeding No   BMI 33.64 kg/m²   8-24 HR RANGE:  TEMP Temp  Av.1 °F (36.7 °C)  Min: 96.7 °F (35.9 °C)  Max: 99 °F (20.5 °C)   BP Systolic (37UJV), RRI:961 , Min:101 , OAD:569      Diastolic (66IIZ), ZWE:69, Min:72, Max:88     PULSE Pulse  Av.1  Min: 64  Max: 116   RR Resp  Av.5  Min: 14  Max: 24   O2 SAT SpO2  Av.8 %  Min: 90 %  Max: 100 %   OXYGEN DELIVERY O2 Flow Rate (L/min)  Av L/min  Min: 40 L/min  Max: 40 L/min        SYSTEMIC EXAMINATION:   General appearance - Ill-appearing, mild respiratory distress  Mental status - awake & alert, follows commands  Eyes - pupils equal and reactive, sclera anicteric  Mouth - mucous membranes moist, pharynx normal without lesions  Neck -short thick neck supple, no significant adenopathy, carotids upstroke normal bilaterally, no bruits  Chest - Breath sounds bilaterally were dimnished to auscultation at bases. There were bilateral intermittent crackles present without rhonchi. There is no intercostal recession or use of accessory muscles  Heart - normal rate, regular rhythm, normal S1, S2, no murmurs, rubs, clicks or gallops  Abdomen - soft, nontender, nondistended, no masses or organomegaly  Neurological - non-focal  Extremities - peripheral pulses normal, mild pedal edema, no clubbing or cyanosis  Skin - normal coloration and turgor, no rashes, no suspicious skin lesions noted     DATA REVIEW     Medications: Current Inpatient  Scheduled Meds:   Vitamin D  2,000 Units Oral Daily    ascorbic acid  500 mg Oral BID    zinc sulfate  50 mg Oral Daily    budesonide-formoterol  2 puff Inhalation BID    apixaban  5 mg Oral BID    famotidine  20 mg Oral BID    sodium chloride flush  10 mL IntraVENous 2 times per day     Continuous Infusions:   sodium chloride         INPUT/OUTPUT:  In: 1590 [P.O.:1590]  Out: 1600 [Urine:1600]  Date 01/09/22 0000 - 01/09/22 2359   Shift 8393-3413 5978-6761 1265-9187 24 Hour Total   INTAKE   P.O.(mL/kg/hr) 560(0.8) 550  1110   Shift Total(mL/kg) 560(6.3) 550(6.2)  1110(12.5)   OUTPUT   Urine(mL/kg/hr) 400(0.6) 600  1000   Shift Total(mL/kg) 400(4.5) 600(6.7)  1000(11.2)   Weight (kg) 88.9 88.9 88.9 88.9        LABS:  ABGs:   No results for input(s): POCPH, POCPCO2, POCPO2, POCHCO3, LUYL0QRG in the last 72 hours. CBC:   Recent Labs     01/08/22  0746   WBC 8.8   HGB 11.9   HCT 35.9*   .1      LYMPHOPCT 13*   RBC 3.55*   MCH 33.5   MCHC 33.1   RDW 14.1     CRP:   No results for input(s): CRP in the last 72 hours. LDH:   No results for input(s): LDH in the last 72 hours.   BMP:   Recent Labs 01/08/22  0746   *   K 4.8      CO2 25   BUN 19   CREATININE 0.33*   GLUCOSE 133*     Liver Function Test:   No results for input(s): PROT, LABALBU, ALT, AST, GGT, ALKPHOS, BILITOT in the last 72 hours. Coagulation Profile:   No results for input(s): INR, PROTIME, APTT in the last 72 hours. D-Dimer:  No results for input(s): DDIMER in the last 72 hours. Ferritin:    No results for input(s): FERRITIN in the last 72 hours. Lactic Acid:  No results for input(s): LACTA in the last 72 hours. Cardiac Enzymes:  No results for input(s): CKTOTAL, CKMB, CKMBINDEX, TROPONINI in the last 72 hours. Invalid input(s): TROPONIN, HSTROP  BNP/ProBNP:   No results for input(s): BNP, PROBNP in the last 72 hours. Triglycerides:  No results for input(s): TRIG in the last 72 hours. Microbiology:  Urine Culture:  No components found for: CURINE  Blood Culture:  No components found for: CBLOOD, CFUNGUSBL  Sputum Culture:  No components found for: CSPUTUM  No results for input(s): SPECDESC, SPECIAL, CULTURE, STATUS, ORG, CDIFFTOXPCR, CAMPYLOBPCR, SALMONELLAPC, SHIGAPCR, SHIGELLAPCR, MPNEUG, MPNEUM, LACTOQL in the last 72 hours. No results for input(s): SPUTUM, SPECDESC, SPECIAL, CULTURE, STATUS, ORG, CDIFFTOXPCR, MPNEUM, MPNEUG in the last 72 hours. Invalid input(s): CURINE, CBLOOD, CFUNGUSBL     Pathology:    Radiology Reports:  XR CHEST PORTABLE   Final Result   No significant change in bilateral airspace disease. US PELVIS COMPLETE   Final Result   3.3 cm echogenic mass in the region of the uterine body which is possibly the   obscuring the endometrium. RECOMMENDATION:   Transvaginal sonography or MRI with contrast for further workup      RECOMMENDATIONS:   Unavailable         XR CHEST PORTABLE   Final Result   Moderate diffuse interstitial and alveolar opacities throughout the lungs   bilaterally.   The finding is nonspecific although suspicious for multilobar   pneumonia, including active COVID-19 viral pneumonitis. CT CHEST PULMONARY EMBOLISM W CONTRAST   Final Result   Motion degraded study. Acute pulmonary emboli suspected particularly in the   left lower lobe but not well visualized due to motion artifact. There is evidence for right ventricular strain with septal bowing suggesting   a significant clot burden. Moderate patchy ground-glass opacities involving all lobes of both lungs   consistent with multifocal pneumonia typical for COVID pneumonia. Findings were discussed with Brennan Camacho at 5:39 pm on 12/24/2021. RECOMMENDATIONS:   Unavailable              Echocardiogram:   Results for orders placed during the hospital encounter of 12/24/21    ECHO Complete 2D W Doppler W Color    Narrative  Transthoracic Echocardiography Report (TTE)    Patient Name Carroll Scott      Date of Study         12/25/2021  [place]    Date of      1950  Gender                Female  Birth    Age          70 year(s)  Race                  Unknown    Room Number  3005        Height:               94 inch, 238.76 cm    Corporate ID [id number]    Weight:               186 pounds, 84.4 kg  #    Patient [place] [de-identified]   BSA:       2.51 m^2   BMI:         14.8 kg/m^2  #    MR #         [id number]     Sonographer           Joanne Weber    Accession #  [id number]  Interpreting          Genesis Aggarwal  Physician    Fellow                   Referring Nurse  Practitioner    Interpreting             Referring Physician   Delphina Klinefelter,  Fellow                                         CNP    Type of Study    TTE procedure:2D Echocardiogram, M-Mode, Doppler, Color Doppler. Procedure Date  Date: 12/25/2021 Start: 01:47 PM    Study Location: [facility]  Technical Quality: Adequate visualization    Indications:Pulmonary embolus and Right heart strain. History / Tech. Comments:  Echo done at patient bedside. Procedure explained to patient.   Covid-19    Patient Status: Inpatient    Height: 94 inches Weight: 186 pounds BSA: 2.51 m^2 BMI: 14.8 kg/m^2    CONCLUSIONS    Summary  Global left ventricular systolic function is normal. Estimated EF 50-55%. Dilated Right ventricular with mildly impaired systolic function  Estimated right ventricular systolic pressure is 01WZHO. No significant valvular regurgitation or stenosis seen. No pericardial effusion seen. Signature  ----------------------------------------------------------------------------  Electronically signed by Clare Jama(Sonographer) on 12/25/2021  02:30 PM  ----------------------------------------------------------------------------    ----------------------------------------------------------------------------  Electronically signed by Genesis Aggarwal(Interpreting physician) on  12/26/2021 07:52 AM  ----------------------------------------------------------------------------  FINDINGS  Left Atrium  Left atrium is normal in size. Left Ventricle  Left ventricle is normal in size. Global left ventricular systolic function  is normal. Calculated ejection fraction 50% by Orantes's method. Right Atrium  Right atrial dilatation. Right Ventricle  Right ventricular systolic function appears mildly reduced reduced. Moderately dilated right ventricular cavity. TAPSE value of 1.65cm noted. Mitral Valve  Normal mitral valve structure and function. No mitral regurgitation. Aortic Valve  Aortic valve is trileaflet and opens adequately. No aortic insufficiency. Tricuspid Valve  No obvious valvular abnormality. Trivial tricuspid regurgitation. No pulmonary hypertension. Estimated right ventricular systolic pressure is  02OXOW. Pulmonic Valve  The pulmonic valve is normal in structure. Mild pulmonic insufficiency. Pericardial Effusion  No pericardial effusion seen. Miscellaneous  E/E' average = 9.4.  IVC normal diameter & inspiratory collapse indicating normal RA filling  pressure .     M-mode / 2D Measurements & Calculations:    LVIDd:3.7 cm(3.7 - 5.6 cm)       Diastolic SBHIKH:13.1 ml  GKQFQ:8.3 cm(2.2 - 4.0 cm)       Systolic OIIARW:66.7 ml  UMKK:9.6 cm(0.6 - 1.1 cm)        Aortic Root:2.7 cm(2.0 - 3.7 cm)  LVPWd:0.9 cm(0.6 - 1.1 cm)       LA Dimension: 3.3 cm(1.9 - 4.0 cm)  Fractional Shortenin.73 %    LA volume/Index: 32.1 ml /13m^2  Calculated LVEF (%): 50.94 %     LVOT:2 cm  RVDd:2.6 cm    Mitral:                                 Aortic    Valve Area (P1/2-Time): 7.59 cm^2       Peak Velocity: 1.10 m/s  Peak E-Wave: 0.68 m/s                   Mean Velocity: 0.58 m/s  Peak A-Wave: 0.46 m/s                   Peak Gradient: 4.84 mmHg  E/A Ratio: 1.48                         Mean Gradient: 2 mmHg  Peak Gradient: 1.83 mmHg  Mean Gradient: 1 mmHg  Deceleration Time: 99 msec              Area (continuity): 2.77 cm^2  P1/2t: 29 msec                          AV VTI: 17.6 cm    Area (continuity): 3.43 cm^2  Mean Velocity: 0.51 m/s    Tricuspid:                              Pulmonic:    Peak TR Velocity: 2.71 m/s              Peak Velocity: 0.83 m/s  Peak TR Gradient: 29.3764 mmHg          Peak Gradient: 2.74 mmHg    Diastology / Tissue Doppler  Septal Wall E' velocity:0.06 m/s  Septal Wall E/E':11.5  Lateral Wall E' velocity:0.09 m/s  Lateral Wall E/E':7.4       ASSESSMENT AND PLAN     Assessment:    // Acute hypoxic respiratory failure  // Acute respiratory distress syndrome  // Bilateral multifocal pneumonia due to COVID 19 infection  // Left lower lobe segmental pulmonary embolism  // Obesity            //  Postmenopausal bleeding    Plan:    I personally interviewed/examined the patient; reviewed interval history, interpreted all available radiographic and laboratory data at the time of service. Patient is currently requiring high flow nasal cannula 40 L and 60 to 70 %. We will try to wean high flow nasal cannula.   Encourage use of BiPAP at night and alternate with high flow nasal cannula during the daytime. Patient did use BiPAP last night 16/10/50 percent FiO2  Continue supplemental oxygen to keep oxygen saturation >90%  Encourage prone position when sleeping, incentive spirometry  Continue pulmonary toilet, aspiration precautions and bronchodilators  Chest x-ray to be done as needed  Avoid benzodiazepine. Finished Decadron 6 mg once daily for 10 days. Last dose on 01/07/2022  She was on baricitinib per infectious disease  She is on Eliquis 5 mg twice daily, finished 10 mg twice daily for 1 week  Currently on Symbicort and will continue  May need intermittent low-dose diuresis  Monitor I/O, electrolytes with a goal of even/negative fluid balance  Chemical DVT prophylaxis on Eliquis therapeutic dose  Antimicrobials reviewed; currently not on antibiotic  Monitor CRP, LDH, AST/ALT, D-Dimer, Ferritin periodically/as needed  Glycemic control per primary service  Physical/occupational therapy    Discussed with respiratory therapist and nursing staff. The patient is/remains critically ill with illness/injury that acutely impairs one or more vital organ systems, such that there is a high probability of imminent or life threatening deterioration in the patient's condition. Critical care time of 30 minutes was spent (excluding procedures), in coordination of care during bedside rounds and discussion of patient care in detail, and recommendations of the team were adopted in the plan. Necessity of all invasive devices was also confirmed. Cary Dakins, MD   Pulmonary and Critical Care Medicine           1/9/2022, 1:00 PM    This patient was evaluated in the context of the global SARS-CoV-2 (COVID-19) pandemic, which necessitated considerations that the patient either has COVID-19 infection or is at risk of infection with COVID-19.  Institutional protocols and algorithms that pertain to the evaluation & management of patients with COVID-19 or those at risk for COVID-19 are in a state of rapid changes based on information released by regulatory bodies including the CDC and federal and state organizations. These policies and algorithms were followed during the patient's care. Please note that this chart was generated using voice recognition Dragon dictation software. Although every effort was made to ensure the accuracy of this automated transcription, some errors in transcription may have occurred.

## 2022-01-09 NOTE — PROGRESS NOTES
[facility] -   Infectious diseases evaluation  admission date 12/24/2021    reason for consultation:   covid +    Impression :   Current:  · covid pneumonia   · PE -  left lower lobe segmental emboli -  · bandemia  · Thrombocytopenia    Other:  ·   Discussion / summary of stay / plan of care   ·   Recommendations   · barcitinib 12/24 -  · Decadron 6 mg daily 12/24  · Anticoagulation  · CRP down to normal,   · Oxygenation improving    Isolate until 1/13/22    Infection Control Recommendations   · [place] Precautions  · Contact Isolation   · Airborne isolation  · Droplet Isolation  Antimicrobial Stewardship Recommendations   · Off AB    Coordination ofOutpatient Care:   · Estimated Length of IV antimicrobials:  · Patient will need Midline / picc Catheter Insertion:   · Patient will need SNF:  · Patient will need outpatient wound care:     History of Present Illness:   Initial history:  Dede Burch is a 70y.o.-year-old female Patient presented through ER via EMS after her family found her with severe shortness of breath in her shower. She had developed a cough approximately a week ago and according to the patient had a negative Covid test at that time. She was found to have an SPO2 of 50% on room air requiring BiPAP. A rapid Covid swab was positive  CTA of the chest showed segmental pulmonary embolism in the left lower lobe with possible right heart strain. An echocardiogram was completed and was negative for RV strain with RV systolic pressure of 29 mmHg  The patient was initiated on a heparin drip as well as high-dose Decadron, Rocephin and baricitinib. Interval changes:   12/28  60 FiO2 high flow, he feels better eating better but he saturating 90%,    12/29  70% FiO2 high flow, saturating 89%, she is short of breath, eating slightly, has a cough.   In general she feels okay  Labs reviewed    12/30  60 FiO2 high flow, good appetite, alert oriented x3  WBC 10  Urine culture 12/26 -    12/31  96 saturation FiO2 70% on high flow, she feels better. WBC 7.4  CRP 6    1/1/22  High flow 45%, which is an improvement,  98 saturation, alert with a cough, WBC normal 9.2, tolerating her statin    1/3/22  Patient is on BiPAP 70%, saturating 99 and alert appropriate    1/4  High flow improved 70%, saturating 96%, no fever, looks better  CRP nose normal    1/5  Chest x-ray has not changed, saturating 91% on 57% high flow  Oriented x3, still has a cough  In general better    1/6  97 saturation BiPAP 60, pulse 87  CRP less than 3  No positive culture    1/7  98 saturation 50% FiO2 high flow, oriented x3  CRP less than 3     1/8   The patient remains on high flow nasal cannula with 65% FiO2 and 30 L/min      Current evaluation  1/9/2022   Patient Vitals for the past 8 hrs:   BP Temp Temp src Pulse Resp SpO2   01/09/22 1226 101/72 99 °F (37.2 °C) Oral 116 22 91 %   01/09/22 0903 -- -- -- 113 22 95 %   01/09/22 0843 -- -- -- -- -- 90 %   01/09/22 0833 -- 98.5 °F (36.9 °C) Oral 100 24 --   01/09/22 0832 114/73 -- -- -- -- --   01/09/22 0824 -- -- -- -- -- 93 %     T-max 99.0 °F  Vital signs stable with tachycardia    Patient remains on high flow nasal cannula with 65% FiO2 and 40 L/min    No acute issues noted at this time    Summary of relevant labs:  Labs:  .5-18-6-3    BNP 9968  Troponin 54  Lactic acid 3.5  Ferritin 858  WBC 14.6 -7-->8.8  D-dimer 70.48    Micro:  Covid positive  Imaging:  CT chest bilat covid pneumonia w thick consolidations    I have personally reviewed the past medical history, past surgical history, medications, social history, and family history, and I haveupdated the database accordingly. Allergies:   Patient has no known allergies. Review of Systems:     Review of Systems   Constitutional: Positive for activity change. Negative for chills and diaphoresis. HENT: Positive for congestion.     Eyes: Negative for photophobia, pain file.    Past Surgical  History:   No past surgical history on file. Medications:      Vitamin D  2,000 Units Oral Daily    ascorbic acid  500 mg Oral BID    zinc sulfate  50 mg Oral Daily    budesonide-formoterol  2 puff Inhalation BID    apixaban  5 mg Oral BID    famotidine  20 mg Oral BID    sodium chloride flush  10 mL IntraVENous 2 times per day       Social History:     Social History     Socioeconomic History    Marital status: Unknown     Spouse name: Not on file    Number of children: Not on file    Years of education: Not on file    Highest education level: Not on file   Occupational History    Not on file   Tobacco Use    Smoking status: Current Every Day Smoker     Types: Cigarettes    Smokeless tobacco: Never Used   Substance and Sexual Activity    Alcohol use: Not Currently    Drug use: Not Currently    Sexual activity: Not on file   Other Topics Concern    Not on file   Social History Narrative    Not on file     Social Determinants of Health     Financial Resource Strain:     Difficulty of Paying Living Expenses: Not on file   Food Insecurity:     Worried About Running Out of Food in the Last Year: Not on file    Jones of Food in the Last Year: Not on file   Transportation Needs:     Lack of Transportation (Medical): Not on file    Lack of Transportation (Non-Medical):  Not on file   Physical Activity:     Days of Exercise per Week: Not on file    Minutes of Exercise per Session: Not on file   Stress:     Feeling of Stress : Not on file   Social Connections:     Frequency of Communication with Friends and Family: Not on file    Frequency of Social Gatherings with Friends and Family: Not on file    Attends Bahai Services: Not on file    Active Member of Clubs or Organizations: Not on file    Attends Club or Organization Meetings: Not on file    Marital Status: Not on file   Intimate Partner Violence:     Fear of Current or Ex-Partner: Not on file   Anton Emotionally Abused: Not on file    Physically Abused: Not on file    Sexually Abused: Not on file   Housing Stability:     Unable to Pay for Housing in the Last Year: Not on file    Number of Places Lived in the Last Year: Not on file    Unstable Housing in the Last Year: Not on file       Family History:     Family History   Problem Relation Age of Onset    Cancer Maternal Aunt         breast      Medical Decision Making:   I have independently reviewed/ordered the following labs:    CBC with Differential:   Recent Labs     01/08/22  0746   WBC 8.8   HGB 11.9   HCT 35.9*      LYMPHOPCT 13*   MONOPCT 8*     BMP:  Recent Labs     01/08/22  0746   *   K 4.8      CO2 25   BUN 19   CREATININE 0.33*     Hepatic Function Panel:   No results for input(s): PROT, LABALBU, BILIDIR, IBILI, BILITOT, ALKPHOS, ALT, AST in the last 72 hours. No results for input(s): RPR in the last 72 hours. No results for input(s): HIV in the last 72 hours. No results for input(s): BC in the last 72 hours. Lab Results   Component Value Date    CREATININE 0.33 01/08/2022    GLUCOSE 133 01/08/2022       Detailed results: Thank you for allowing us to participate in the care of this patient. Please call with questions. This note is created with the assistance of a speech recognition program.  While intending to generate adocument that actually reflects the content of the visit, the document can still have some errors including those of syntax and sound a like substitutions which may escape proof reading. It such instances, actual meaningcan be extrapolated by contextual diversion. MORENITA Guerrero - CNP  Office: [phone]  Perfect serve / office [phone]      ATTESTATION:    I have discussed the case, including pertinent history and exam findings with the APRN.  I have evaluated the  History, physical findings and pictures of the patient and the key elements of the encounter have been performed by me. I have reviewed the laboratory data, other diagnostic studies and discussed them with the APRN. I have updated the medical record where necessary. I agree with the assessment, plan and orders as documented by the APRN.     Sailaja Antony MD.

## 2022-01-10 LAB
ABSOLUTE EOS #: 0.34 K/UL (ref 0–0.44)
ABSOLUTE IMMATURE GRANULOCYTE: 0.14 K/UL (ref 0–0.3)
ABSOLUTE LYMPH #: 0.81 K/UL (ref 1.1–3.7)
ABSOLUTE MONO #: 0.38 K/UL (ref 0.1–1.2)
ANION GAP SERPL CALCULATED.3IONS-SCNC: 11 MMOL/L (ref 9–17)
BASOPHILS # BLD: 0 % (ref 0–2)
BASOPHILS ABSOLUTE: <0.03 K/UL (ref 0–0.2)
BUN BLDV-MCNC: 13 MG/DL (ref 8–23)
BUN/CREAT BLD: ABNORMAL (ref 9–20)
CALCIUM SERPL-MCNC: 8.9 MG/DL (ref 8.6–10.4)
CHLORIDE BLD-SCNC: 99 MMOL/L (ref 98–107)
CO2: 26 MMOL/L (ref 20–31)
CREAT SERPL-MCNC: 0.36 MG/DL (ref 0.5–0.9)
DIFFERENTIAL TYPE: ABNORMAL
EOSINOPHILS RELATIVE PERCENT: 2 % (ref 1–4)
GFR AFRICAN AMERICAN: >60 ML/MIN
GFR NON-AFRICAN AMERICAN: >60 ML/MIN
GFR SERPL CREATININE-BSD FRML MDRD: ABNORMAL ML/MIN/{1.73_M2}
GFR SERPL CREATININE-BSD FRML MDRD: ABNORMAL ML/MIN/{1.73_M2}
GLUCOSE BLD-MCNC: 172 MG/DL (ref 70–99)
HCT VFR BLD CALC: 42.3 % (ref 36.3–47.1)
HEMOGLOBIN: 13.5 G/DL (ref 11.9–15.1)
IMMATURE GRANULOCYTES: 1 %
LYMPHOCYTES # BLD: 5 % (ref 24–43)
MCH RBC QN AUTO: 32.5 PG (ref 25.2–33.5)
MCHC RBC AUTO-ENTMCNC: 31.9 G/DL (ref 28.4–34.8)
MCV RBC AUTO: 101.9 FL (ref 82.6–102.9)
MONOCYTES # BLD: 2 % (ref 3–12)
NRBC AUTOMATED: 0 PER 100 WBC
PDW BLD-RTO: 14.9 % (ref 11.8–14.4)
PLATELET # BLD: 192 K/UL (ref 138–453)
PLATELET ESTIMATE: ABNORMAL
PMV BLD AUTO: 10.8 FL (ref 8.1–13.5)
POTASSIUM SERPL-SCNC: 5.3 MMOL/L (ref 3.7–5.3)
RBC # BLD: 4.15 M/UL (ref 3.95–5.11)
RBC # BLD: ABNORMAL 10*6/UL
SEG NEUTROPHILS: 90 % (ref 36–65)
SEGMENTED NEUTROPHILS ABSOLUTE COUNT: 14.76 K/UL (ref 1.5–8.1)
SODIUM BLD-SCNC: 136 MMOL/L (ref 135–144)
WBC # BLD: 16.5 K/UL (ref 3.5–11.3)
WBC # BLD: ABNORMAL 10*3/UL

## 2022-01-10 PROCEDURE — 36415 COLL VENOUS BLD VENIPUNCTURE: CPT

## 2022-01-10 PROCEDURE — 99291 CRITICAL CARE FIRST HOUR: CPT | Performed by: INTERNAL MEDICINE

## 2022-01-10 PROCEDURE — 2580000003 HC RX 258: Performed by: STUDENT IN AN ORGANIZED HEALTH CARE EDUCATION/TRAINING PROGRAM

## 2022-01-10 PROCEDURE — 94761 N-INVAS EAR/PLS OXIMETRY MLT: CPT

## 2022-01-10 PROCEDURE — 2060000000 HC ICU INTERMEDIATE R&B

## 2022-01-10 PROCEDURE — 80048 BASIC METABOLIC PNL TOTAL CA: CPT

## 2022-01-10 PROCEDURE — 6370000000 HC RX 637 (ALT 250 FOR IP): Performed by: INTERNAL MEDICINE

## 2022-01-10 PROCEDURE — 99239 HOSP IP/OBS DSCHRG MGMT >30: CPT | Performed by: INTERNAL MEDICINE

## 2022-01-10 PROCEDURE — 85025 COMPLETE CBC W/AUTO DIFF WBC: CPT

## 2022-01-10 PROCEDURE — 6370000000 HC RX 637 (ALT 250 FOR IP): Performed by: FAMILY MEDICINE

## 2022-01-10 PROCEDURE — 2700000000 HC OXYGEN THERAPY PER DAY

## 2022-01-10 PROCEDURE — 99232 SBSQ HOSP IP/OBS MODERATE 35: CPT | Performed by: NURSE PRACTITIONER

## 2022-01-10 RX ORDER — CODEINE PHOSPHATE AND GUAIFENESIN 10; 100 MG/5ML; MG/5ML
10 SOLUTION ORAL EVERY 4 HOURS PRN
Qty: 180 ML | Refills: 0 | Status: SHIPPED | OUTPATIENT
Start: 2022-01-10 | End: 2022-01-13

## 2022-01-10 RX ORDER — BENZONATATE 200 MG/1
200 CAPSULE ORAL 3 TIMES DAILY PRN
Refills: 0 | DISCHARGE
Start: 2022-01-10 | End: 2022-01-17

## 2022-01-10 RX ORDER — ALBUTEROL SULFATE 90 UG/1
2 AEROSOL, METERED RESPIRATORY (INHALATION) EVERY 6 HOURS PRN
Qty: 18 G | Refills: 0 | DISCHARGE
Start: 2022-01-10

## 2022-01-10 RX ORDER — CHOLECALCIFEROL (VITAMIN D3) 50 MCG
2000 TABLET ORAL DAILY
Qty: 60 TABLET | DISCHARGE
Start: 2022-01-11

## 2022-01-10 RX ORDER — CODEINE PHOSPHATE AND GUAIFENESIN 10; 100 MG/5ML; MG/5ML
10 SOLUTION ORAL EVERY 4 HOURS PRN
Qty: 180 ML | Refills: 0 | Status: SHIPPED | OUTPATIENT
Start: 2022-01-10 | End: 2022-01-10

## 2022-01-10 RX ADMIN — FAMOTIDINE 20 MG: 20 TABLET, FILM COATED ORAL at 22:01

## 2022-01-10 RX ADMIN — ZINC SULFATE 220 MG (50 MG) CAPSULE 50 MG: CAPSULE at 08:00

## 2022-01-10 RX ADMIN — APIXABAN 5 MG: 5 TABLET, FILM COATED ORAL at 22:01

## 2022-01-10 RX ADMIN — Medication 2000 UNITS: at 08:00

## 2022-01-10 RX ADMIN — OXYCODONE HYDROCHLORIDE AND ACETAMINOPHEN 500 MG: 500 TABLET ORAL at 08:00

## 2022-01-10 RX ADMIN — SODIUM CHLORIDE, PRESERVATIVE FREE 10 ML: 5 INJECTION INTRAVENOUS at 22:02

## 2022-01-10 RX ADMIN — FAMOTIDINE 20 MG: 20 TABLET, FILM COATED ORAL at 08:00

## 2022-01-10 RX ADMIN — BUDESONIDE AND FORMOTEROL FUMARATE DIHYDRATE 2 PUFF: 160; 4.5 AEROSOL RESPIRATORY (INHALATION) at 22:01

## 2022-01-10 RX ADMIN — OXYCODONE HYDROCHLORIDE AND ACETAMINOPHEN 500 MG: 500 TABLET ORAL at 22:01

## 2022-01-10 RX ADMIN — GUAIFENESIN AND CODEINE PHOSPHATE 10 ML: 100; 10 SOLUTION ORAL at 08:27

## 2022-01-10 RX ADMIN — BUDESONIDE AND FORMOTEROL FUMARATE DIHYDRATE 2 PUFF: 160; 4.5 AEROSOL RESPIRATORY (INHALATION) at 08:09

## 2022-01-10 RX ADMIN — SODIUM CHLORIDE, PRESERVATIVE FREE 10 ML: 5 INJECTION INTRAVENOUS at 08:00

## 2022-01-10 RX ADMIN — APIXABAN 5 MG: 5 TABLET, FILM COATED ORAL at 08:00

## 2022-01-10 ASSESSMENT — ENCOUNTER SYMPTOMS
ABDOMINAL PAIN: 0
COUGH: 1
VOMITING: 0
PHOTOPHOBIA: 0
VOICE CHANGE: 0
SINUS PAIN: 0
DIARRHEA: 0
SORE THROAT: 0
GASTROINTESTINAL NEGATIVE: 1
TROUBLE SWALLOWING: 0
ALLERGIC/IMMUNOLOGIC NEGATIVE: 1
SHORTNESS OF BREATH: 1

## 2022-01-10 ASSESSMENT — PAIN SCALES - GENERAL
PAINLEVEL_OUTOF10: 0

## 2022-01-10 NOTE — PLAN OF CARE
Problem: OXYGENATION/RESPIRATORY FUNCTION  Goal: Patient will maintain patent airway  1/10/2022 0925 by Paz Acevedo RCP  Outcome: Ongoing     Problem: OXYGENATION/RESPIRATORY FUNCTION  Goal: Patient will achieve/maintain normal respiratory rate/effort  Description: Respiratory rate and effort will be within normal limits for the patient  1/10/2022 9690 by Paz Acevedo RCP  Outcome: Ongoing

## 2022-01-10 NOTE — PROGRESS NOTES
Comprehensive Nutrition Assessment    Type and Reason for Visit:  Reassess    Nutrition Recommendations/Plan: Continue current diet with Ensure Enlive oral supplements at all meals. Encourage/monitor PO intakes as tolerated. Will monitor labs, weights, and plan of care. Nutrition Assessment:  Pt reports having an \"okay\" appetite. This morning ate 25-50% of her breakfast.  RN reports pt has been eating variable amounts of her meals but is taking fluids well. RN encouraging intakes of oral supplements. Last BM 1/10. Labs/Meds reviewed. Malnutrition Assessment:  Malnutrition Status:  Insufficient data    Context:  Acute Illness     Findings of the 6 clinical characteristics of malnutrition:  Energy Intake:  Mild decrease in energy intake  Weight Loss:  Unable to assess     Body Fat Loss:  1 - Mild body fat loss Orbital   Muscle Mass Loss:  Unable to assess  Fluid Accumulation:  1 - Mild Extremities   Strength:  Not Performed    Estimated Daily Nutrient Needs:  Energy (kcal):  20 kcal/kg = 1800 kcals/day; Weight Used for Energy Requirements:  Current     Protein (g):  1.2-1.5 gm/kg = 65-80 gm pro/day; Weight Used for Protein Requirements:  Ideal        Fluid (ml/day):  2000 mL/day or per MD; Method Used for Fluid Requirements:  1 ml/kcal      Nutrition Related Findings:  Labs/Meds reviewed. Last BM 1/10. Wounds:   (wound to right buttocks)       Current Nutrition Therapies:    ADULT DIET; Regular; Low Fat/Low Chol/High Fiber/LOUISE; Low Sodium (2 gm)  ADULT ORAL NUTRITION SUPPLEMENT; Breakfast, Lunch, Dinner; Standard High Calorie/High Protein Oral Supplement    Anthropometric Measures:  · Height: 5' 4\" (162.6 cm)  · Current Body Weight: 195 lb 15.8 oz (88.9 kg)   · Admission Body Weight: 188 lb 0.8 oz (85.3 kg)    · Ideal Body Weight: 120 lbs; % Ideal Body Weight 163.3 %   · BMI: 33.6  · BMI Categories: Obese Class 1 (BMI 30.0-34. 9)       Nutrition Diagnosis:   · Inadequate oral intake related to (appetite; current medical condition) as evidenced by intake 0-25%,intake 26-50%,intake 51-75% (variable PO intakes; need for ONS)    Nutrition Interventions:   Food and/or Nutrient Delivery:  Continue Current Diet,Continue Oral Nutrition Supplement  Nutrition Education/Counseling:  No recommendation at this time,Education not indicated   Coordination of Nutrition Care:  Continue to monitor while inpatient    Goals:  Oral intakes to meet at least 75% of estimated nutrition needs. Nutrition Monitoring and Evaluation:   Behavioral-Environmental Outcomes:  None Identified   Food/Nutrient Intake Outcomes:  Food and Nutrient Intake,Supplement Intake  Physical Signs/Symptoms Outcomes:  Biochemical Data,GI Status,Fluid Status or Edema,Nutrition Focused Physical Findings,Weight,Skin     Discharge Planning:     Too soon to determine     Electronically signed by Anna Malik RD, LD on 1/10/22 at 1:25 PM EST    Contact: [phone]

## 2022-01-10 NOTE — PROGRESS NOTES
[facility]  Progress Note    Sally Pozo  MRN: [id number]  Date: 1/10/2022  LOS: 16     Reason for F/U :   COVID-19 virus infection    Impression :   1. COVID-19 virus pneumonia  2. COVID-19 virus infection  3. Left lower lobe segmental pulmonary embolism  4. Thrombocytopenia  5. Bandemia    Recommendations:   · Status post baricitinib  · Status post Decadron per COVID-19 protocol  · Oxygen requirement decreased to 6 L per nasal cannula  · Continue droplet plus precautions through 1/13/2022    Infection Control Recommendations:   S precautions    Discharge Planning:   Estimated Length of IV antimicrobials:   Patient will need Midline Catheter Insertion/ PICC line Insertion: No  Patient will need: Home IV , [place],  SNF,  LTAC: Undetermined  Patient willneed outpatient wound care: No    Medical Decision making / Summary of Stay:   Initial history:  Sally Pozo is a 70y.o.-year-old female Patient presented through [address] [place] [address] after her family found her with severe shortness of breath in her shower.  She had developed a cough approximately a week ago and according to the patient had a negative Covid test at that time. She was found to have an SPO2 of 50% on room air requiring BiPAP.  A rapid Covid swab was positive  CTA of the chest showed segmental pulmonary embolism in the left lower lobe with possible right heart strain. An echocardiogram was completed and was negative for RV strain with RV systolic pressure of 29 mmHg  The patient was initiated on a heparin drip as well as high-dose Decadron, Rocephin and baricitinib.     Interval changes:   12/28  60 FiO2 high flow, he feels better eating better but he saturating 90%,     12/29  70% FiO2 high flow, saturating 89%, she is short of breath, eating slightly, has a cough.   In general she feels okay  Labs reviewed     12/30  60 FiO2 high flow, good appetite, alert oriented x3  WBC 10  Urine culture 12/26 -     12/31  96 saturation FiO2 70% on high flow, she feels better. WBC 7.4  CRP 6     22  High flow 45%, which is an improvement,  98 saturation, alert with a cough, WBC normal 9.2, tolerating her statin     1/3/22  Patient is on BiPAP 70%, saturating 99 and alert appropriate       High flow improved 70%, saturating 96%, no fever, looks better  CRP nose normal       Chest x-ray has not changed, saturating 91% on 57% high flow  Oriented x3, still has a cough  In general better       97 saturation BiPAP 60, pulse 87  CRP less than 3  No positive culture       98 saturation 50% FiO2 high flow, oriented x3  CRP less than 3         The patient remains on high flow nasal cannula with 65% FiO2 and 30 L/min    Current evaluation:1/10/2022    /84   Pulse 78   Temp 98.5 °F (36.9 °C) (Oral)   Resp 29   Ht 5' 4\" (1.626 m)   Wt 195 lb 15.8 oz (88.9 kg)   SpO2 (!) 89%   Breastfeeding No   BMI 33.64 kg/m²     Temperature Range: Temp: 98.5 °F (36.9 °C) Temp  Av.5 °F (36.9 °C)  Min: 97.7 °F (36.5 °C)  Max: 99 °F (37.2 °C)    Patient was seen and evaluated sitting up in the chair, just finished brushing her teeth  Patient was transitioned to 6 L per nasal cannula, was on high flow oxygen per nasal cannula  Dyspneic only due to increased activity but otherwise feels well    Review of Systems   Constitutional: Negative. HENT: Negative. Respiratory: Positive for shortness of breath. Cardiovascular: Negative. Gastrointestinal: Negative. Genitourinary: Negative. Musculoskeletal: Negative. Skin: Negative. Neurological: Negative. Psychiatric/Behavioral: Negative. Physical Examination :     Physical Exam  Constitutional:       Appearance: Normal appearance. She is obese. HENT:      Head: Normocephalic and atraumatic. Cardiovascular:      Rate and Rhythm: Regular rhythm. Tachycardia present. Pulmonary:      Effort: Pulmonary effort is normal. No respiratory distress.    Abdominal: General: Abdomen is flat. Bowel sounds are normal.      Palpations: Abdomen is soft. Musculoskeletal:         General: No swelling. Normal range of motion. Skin:     General: Skin is warm and dry. Neurological:      General: No focal deficit present. Mental Status: She is alert and oriented to person, place, and time. Mental status is at baseline. Psychiatric:         Mood and Affect: Mood normal.         Behavior: Behavior normal.         Thought Content: Thought content normal.         Laboratory data:   I have independently reviewed the followinglabs:  CBC with Differential:   Recent Labs     01/08/22  0746 01/10/22  0900   WBC 8.8 16.5*   HGB 11.9 13.5   HCT 35.9* 42.3    192   LYMPHOPCT 13* 5*   MONOPCT 8* 2*     BMP:   Recent Labs     01/08/22  0746 01/10/22  0900   * 136   K 4.8 5.3    99   CO2 25 26   BUN 19 13   CREATININE 0.33* 0.36*     Hepatic Function Panel: No results for input(s): PROT, LABALBU, BILIDIR, IBILI, BILITOT, ALKPHOS, ALT, AST in the last 72 hours. Lab Results   Component Value Date    PROCAL 0.22 12/24/2021    PROCAL 0.25 12/24/2021     Lab Results   Component Value Date    CRP <3.0 01/04/2022    CRP 6.0 12/30/2021    CRP 18.8 12/27/2021     No results found for: [facility]      Lab Results   Component Value Date    DDIMER 70.48 12/24/2021     Lab Results   Component Value Date    FERRITIN 858 12/24/2021     Lab Results   Component Value Date     12/24/2021     Lab Results   Component Value Date    FIBRINOGEN 247 12/25/2021    FIBRINOGEN 205 12/24/2021       Results in Past 30 Days  Result Component Current Result Ref Range Previous Result Ref Range   SARS-CoV-2, Rapid DETECTED (A) (12/24/2021) Not Detected Not in Time Range      Lab Results   Component Value Date    COVID19 DETECTED 12/24/2021       No results for input(s): VANCOTROUGH in the last 72 hours.     Imaging Studies:   No new imaging    Cultures:   No culture data    Medications:      Vitamin D  2,000 Units Oral Daily    ascorbic acid  500 mg Oral BID    zinc sulfate  50 mg Oral Daily    budesonide-formoterol  2 puff Inhalation BID    apixaban  5 mg Oral BID    famotidine  20 mg Oral BID    sodium chloride flush  10 mL IntraVENous 2 times per day           [facility]  Nohemi Deluca, [address] messaging  OFFICE: [phone]      Electronically signed by MORENITA Koroma CNP on 1/10/2022 at 10:31 AM  Thank you for allowing us to participate in the care of this patient. Please call with questions. This note iscreated with the assistance of a speech recognition program.  While intending to generate a document that actually reflects the content of the visit, the document can still have some errors including those of syntax andsound a like substitutions which may escape proof reading. In such instances, actual meaning can be extrapolated by contextual diversion.

## 2022-01-10 NOTE — DISCHARGE INSTR - COC
Continuity of Care Form    Patient Name: Christine Galaviz   :  1950  MRN:  [id number]    [facility] date:  2021  Discharge date:  2022    Code Status Order: Full Code   Advance Directives:      Admitting Physician:  Vik Aviles MD  PCP: No primary care provider on file. Discharging Nurse: Carmine Marie 23 Unit/Room#: 3021/3021-01  Discharging Unit Phone Number: [phone]    Emergency Contact:   Extended Emergency Contact Information  Primary Emergency Contact: Isaías Carolina [address] [place] [address],[address] Phone: [phone]  Relation: Child  Secondary Emergency Contact: Isaías Carolina [facility] Phone: [phone]  Relation: Child    Past Surgical History:  No past surgical history on file. Immunization History: There is no immunization history on file for this patient.     Active Problems:  Patient Active Problem List   Diagnosis Code    Pneumonia due to COVID-19 virus U07.1, J12.82    Pulmonary embolus, left ([facility]) I26.99    Postmenopausal bleeding N95.0    Acute respiratory failure with hypoxia ([facility]) J96.01    Obesity with body mass index greater than 30 E66.9       Isolation/Infection:   Isolation            No Isolation          Patient Infection Status       Infection Onset Added Last Indicated Last Indicated By Review Planned Expiration Resolved Resolved By    None active    Resolved    COVID-19 21 COVID-19, Rapid   22 Lukas Low RN    Ok to remove isolation per ID    COVID-19 (Rule Out) 21 COVID-19, Rapid (Ordered)   21 Rule-Out Test Resulted            Nurse Assessment:  Last Vital Signs: /68   Pulse 90   Temp 98.6 °F (37 °C)   Resp 23   Ht 5' 4\" (1.626 m)   Wt 195 lb 15.8 oz (88.9 kg)   SpO2 98%   Breastfeeding No   BMI 33.64 kg/m²     Last documented pain score (0-10 scale): Pain Level: 0  Last Weight:   Wt Readings from Last 1 Encounters:   21 195 lb 15.8 oz (88.9 kg)     Mental Status:  alert and oriented x4    IV Access:  Right Upper Arm Midline - Insertion Date: 1/14/2022    Nursing Mobility/ADLs:  Walking   Assisted  Transfer  Assisted  Bathing  Independent  Dressing  Independent  Toileting  Independent  Feeding  Independent  Med Admin  Independent  Med Delivery   whole    Wound Care Documentation and Therapy:  Wound 01/07/22 Buttocks Right (Active)   Number of days: 6        Elimination:  Continence: Bowel: Yes  Bladder: Yes  Urinary Catheter: None   Colostomy/Ileostomy/Ileal Conduit: No       Date of Last BM: 1/14/2022    Intake/Output Summary (Last 24 hours) at 1/14/2022 0847  Last data filed at 1/14/2022 0438  Gross per 24 hour   Intake --   Output 2250 ml   Net -2250 ml     I/O last 3 completed shifts:  In: -   Out: 3090 [Urine:3090]    Safety Concerns:     None    Impairments/Disabilities:      None    Nutrition Therapy:  Current Nutrition Therapy:   - Oral Diet:  General    Routes of Feeding: Oral  Liquids: No Restrictions  Daily Fluid Restriction: no  Last Modified Barium Swallow with Video (Video Swallowing Test): not done    Treatments at the Time of Hospital Discharge:   Respiratory Treatments:   Oxygen Therapy:  is on oxygen at 4 L/min per nasal cannula. Ventilator:    - No ventilator support    Rehab Therapies: Physical Therapy and Occupational Therapy  Weight Bearing Status/Restrictions: No weight bearing restirctions  Other Medical Equipment (for information only, NOT a DME order): Other Treatments:     Patient's personal belongings (please select all that are sent with patient):  None    RN SIGNATURE:  Electronically signed by Claudette Hacker, RN on 1/14/2022 at 9:20 AM      CASE MANAGEMENT/SOCIAL WORK SECTION    Inpatient Status Date:     Readmission Risk Assessment Score:  Readmission Risk              Risk of Unplanned Readmission:  16           Discharging to Facility/ Agency   Name: Jeanine Sunny  Address:   [address] [place] [id number]         Phone: [phone] / signature: Electronically signed by Will Acuna RN on 1/14/22 at 8:05 AM EST    PHYSICIAN SECTION    Prognosis: Fair    Condition at Discharge: Stable    Rehab Potential (if transferring to Rehab): Fair    Recommended Labs or Other Treatments After Discharge: Needs to establish care with PCP. Follow up with OBGYN for abnormal uterine bleeding. PT/OT. Wean supplemental oxygen. Physician Certification: I certify the above information and transfer of Jaylan Griffin  is necessary for the continuing treatment of the diagnosis listed and that she requires [facility] for less than  30 days.      Update Admission H&P: No change in H&P    PHYSICIAN SIGNATURE:  Electronically signed by Brayan Hernandez DO on 1/10/22 at 4:49 PM EST

## 2022-01-10 NOTE — PLAN OF CARE
Ongoing  Goal: Absence of new skin breakdown  Description: Absence of new skin breakdown  Outcome: Ongoing     Problem: Nutrition  Goal: Optimal nutrition therapy  Outcome: Ongoing

## 2022-01-10 NOTE — PROGRESS NOTES
[facility]  Office: [address], DO, Matty Robleskarla, DO, Farhat Jovita, DO, Ernestina Harada Blood, DO, Governor Elmer MD, Daisy Abdi MD, Darian Quintana MD, Aaron Grant MD, Elizabeth Agrawal MD, Heber Walsh MD, Josh Dominique MD, Lorena Ríos, DO, Sarah Mejia, DO, Jose Guy MD,  Sandor Vazquez DO, Nikki Vigil MD, Lizzie Duque MD, Sonal Mark MD, Jennifer Telles MD, Chau Cortes MD, Mariia English MD, Matt Liz MD, Elisa Silva, [facility], [facility], CNP, Rekah Perry, CNP, Matthias Ayala, CNS, Cliff Ward, CNP, Jesus Farmer, CNP, Abby Romero, CNP, Giovanni Alfonso, CNP, Elham Smith, CNP, Kristal Angulo PA-C, Georgi Funk, [facility], Joseph Munguia, [facility], Arnulfo Landry, CNP, Kassy Rivas, CNP, Chris Calderon, CNP, Ellen Mtz, CNP, Kehinde Bowen, CNP, Wendy Davis, [address] [place] [address]    Progress Note    1/10/2022    4:32 PM    Name:   Christine Galaviz  MRN:     [id number]     Acct:      [de-identified]   Room:   [address] [place] [address] Day:  16  Admit Date:  12/24/2021  3:37 PM    PCP:   No primary care provider on file. Code Status:  Full Code    Subjective:     C/C:   Chief Complaint   Patient presents with    Shortness of Breath    Fall     Interval History Status: not changed. Patient seen and examined this afternoon. Weaned to 6 L of oxygen via NC. No acute events. Still reports that she is short of breath. Actively using her incentive spirometer today. Doing bedside exercises. Reports that her legs are \"very weak. \"    Brief History:     Christine Galaviz is 70 y.o. female who was admitted to the hospital on 12/24/2021 for treatment of Pneumonia due to COVID-19 virus. Patient came to the hospital with lightheadedness and dry cough for 1 week. She had episode of fall at home and family called EMS.  Initial evaluation by EMS found patient having tachypnea with respirate 40 to 50/min, hypoxia SPO2 50% on room air, tachycardia. Patient was placed on nonrebreather and improved only to 73%. Patient was then placed on BiPAP. Evaluation emergency room showed positive COVID-19 test, elevated troponin, elevated proBNP. CT chest showed subsegmental PE with possible right heart strain. Vascular surgery was consulted and recommended anticoagulation without need for thrombolysis. Patient also had thrombocytopenia. Cardiology was consulted and echocardiogram obtained which was negative for RV strain with RV systolic pressure 29 mmHg. Patient was treated with heparin infusion, Decadron and started on baricitinib. Patient had vaginal bleeding on 12/30/21 for which OBGYN will see her for in the outpatient setting.    - uptrending oxygen requirements. - oxygen improving. Appropriate for d/c once oxygen levels become acceptable  - OK for discharge to SNF/LTAC, whichever accepts. Review of Systems:     Constitutional: reports weakness and fatigue are slowly improving; negative for chills, fevers, sweats  Respiratory: Reports cough and shortness of breath are improving; report sinus congestion, improvement of secretions; negative for wheezing  Cardiovascular:  negative for chest pain, chest pressure/discomfort, lower extremity edema, palpitations  Gastrointestinal: reports resolution of constipation; negative for abdominal pain, nausea, vomiting  Neurological:  negative for dizziness, headache    Medications:      Allergies:  No Known Allergies    Current Meds:   Scheduled Meds:    Vitamin D  2,000 Units Oral Daily    ascorbic acid  500 mg Oral BID    zinc sulfate  50 mg Oral Daily    budesonide-formoterol  2 puff Inhalation BID    apixaban  5 mg Oral BID    famotidine  20 mg Oral BID    sodium chloride flush  10 mL IntraVENous 2 times per day     Continuous Infusions:    sodium chloride       PRN Meds: guaiFENesin-codeine, calcium carbonate, albuterol sulfate HFA, benzocaine-menthol, loperamide, benzonatate, sodium chloride flush, sodium chloride, promethazine **OR** ondansetron, polyethylene glycol, acetaminophen **OR** acetaminophen    Data:     Past Medical History:   has no past medical history on file. Social History:   reports that she has been smoking cigarettes. She has never used smokeless tobacco. She reports previous alcohol use. She reports previous drug use. Family History:   Family History   Problem Relation Age of Onset    Cancer Maternal Aunt         breast       Vitals:  /76   Pulse 120   Temp 97.7 °F (36.5 °C) (Oral)   Resp 26   Ht 5' 4\" (1.626 m)   Wt 195 lb 15.8 oz (88.9 kg)   SpO2 91%   Breastfeeding No   BMI 33.64 kg/m²   Temp (24hrs), Av.2 °F (36.8 °C), Min:97.7 °F (36.5 °C), Max:98.8 °F (37.1 °C)    No results for input(s): POCGLU in the last 72 hours. I/O (24Hr): Intake/Output Summary (Last 24 hours) at 1/10/2022 1632  Last data filed at 1/10/2022 1404  Gross per 24 hour   Intake 750 ml   Output 400 ml   Net 350 ml       Labs:  Hematology:  Recent Labs     22  0746 01/10/22  0900   WBC 8.8 16.5*   RBC 3.55* 4.15   HGB 11.9 13.5   HCT 35.9* 42.3   .1 101.9   MCH 33.5 32.5   MCHC 33.1 31.9   RDW 14.1 14.9*    192   MPV 10.3 10.8     Chemistry:  Recent Labs     22  0746 01/10/22  0900   * 136   K 4.8 5.3    99   CO2 25 26   GLUCOSE 133* 172*   BUN 19 13   CREATININE 0.33* 0.36*   ANIONGAP 9 11   LABGLOM >60 >60   GFRAA >60 >60   CALCIUM 8.1* 8.9     No results for input(s): PROT, LABALBU, LABA1C, J1TCCJU, H0XNOWB, FT4, TSH, AST, ALT, LDH, GGT, ALKPHOS, LABGGT, BILITOT, BILIDIR, AMMONIA, AMYLASE, LIPASE, LACTATE, CHOL, HDL, LDLCHOLESTEROL, CHOLHDLRATIO, TRIG, VLDL, NMK83PO, PHENYTOIN, PHENYF, URICACID, POCGLU in the last 72 hours.   ABG:  Lab Results   Component Value Date    POCPH 7.406 2022    POCPCO2 41.1 2022    POCPO2 67.0 2022    POCHCO3 25.8 2022    NBEA NOT REPORTED 2022    PBEA 1 2022 hypoxia - multifactorial, due to #2 and #3. Alternate HFNC with Bipap. On 6L of oxygen via NC. Continue to wean. Using IS today. Increasing activity. COVID-19 pneumonia  - Completed decadron. Continue baricitinib. Continue vitamin D/C/zinc.   Acute PE - continue anticoagulation with Eliquis  Abnormal uterine bleeding - appreciate GYN input. F/u o/p  Obesity with BMI 33.64  Albuterol prn, symbicort  PT/OT - continue to increase activity. Labs every other day  Discussed with son, Vicky Black, on 1/4. Ultimately, she is appropriate for d/c once her oxygen level is appropriate for transfer to LTAC. Await bed/insurance auth. Discussed with patient and CM this morning.     33 Shameka Paniagua DO  1/10/2022  4:32 PM

## 2022-01-10 NOTE — DISCHARGE SUMMARY
[facility]  Office: [address], DO, Jo Miller, DO, Gennaro Winslow, DO, Juan Mvalentine Riveraelsie Rocha, DO, Andrés Koch MD, Damon Bryson MD, Tiffany Akers MD, Domingo Smith MD, Ynes Wolf MD, Elizabeth Sutton MD, Emmett Sosa MD, Anna Daniel, DO, Renzo Vincent DO, Adam Wolfe MD,  Kei Hutchinson DO, Luana Duke MD, Henrique Clinton MD, Calderon Nunez MD, Luis Jones MD, Aviva Rendon MD, Angie Martin MD, El Thompson MD, Erick Calderon [facility], [facility], CNP, Santa Mendez, CNP, Emmett Kang, CNS, Rayray Massey, CNP, Carlos Rivers, CNP, Magda Witt, CNP, Lakisha Shukla, CNP, Kari Nash, CNP, Jennifer Arambula PAMALLORY, Rosa Juarez, [facility], Neto Price, [facility], aLtrell Clark, CNP, Trent Ly, CNP, Wyatt Talamantes, CNP, Karo Godfrey, CNP, Zainab Black, [facility], Prashanth Meek, [facility] [place][note 2][facility]    Discharge Summary     Patient ID: Adal Lopez  :  1950   MRN: [id number]     ACCOUNT:  [de-identified]   Patient's PCP: No primary care provider on file. Admit Date: 2021   Discharge Date: 1/10/2022     Length of Stay: 17  Code Status:  Full Code  Admitting Physician: Saritha Casey MD  Discharge Physician: Lulú Poole DO     Active Discharge Diagnoses:     Hospital Problem Lists:  Principal Problem:    Pneumonia due to COVID-19 virus  Active Problems:    Pulmonary embolus, left ([address]    Acute respiratory failure with hypoxia ([facility] [address]    Postmenopausal bleeding    Obesity with body mass index greater than 30  Resolved Problems:    * No resolved hospital problems. *    Admission Condition:  serious     Discharged Condition: fair    Hospital Stay:     Hospital Course:  Adal Lopez is a 70 y.o. female who was admitted to [facility] on 2021 for COVID-19 pneumonia. Patient had been experiencing lightheadedness and a dry cough for 1 week prior to admission.  She does not routinely see physicians in the outpatient setting. She fell at home, prompting her evaluation in the ED. She was noted to be tachycardic and tachypneic with EMS. She was extremely hypoxic on initial presentation. She was placed on a NRB mask and subsequently NIPPV. COVID positive. CT chest showed subsegmental PE with possible right heart strain. Vascular surgery was consulted and recommended anticoagulation without need for thrombolytics. Cardiology evaluated the patient, and 2d echo revealed no RV heart strain with preserved EF. She was transition from heparin infusion to eliquis. She had requiring high oxygen requirements, up to 90%. She has been slowly weaned from supplemental oxygen, down to 6 L today. She was initially attempting to get into an LTAC. However, this has been denied. A peer to peer is pending. Ultimately, she is medically stable for discharge to SNF vs LTAC.     Significant therapeutic interventions:   - Anticoagulation  - NIPPV  - 2d echo  - Decadron and barcitinib  - cardio, critical care/pulmonology, vascular surgery evaluations    Significant Diagnostic Studies:   Labs / Micro:  CBC:   Lab Results   Component Value Date    WBC 16.5 01/10/2022    RBC 4.15 01/10/2022    HGB 13.5 01/10/2022    HCT 42.3 01/10/2022    .9 01/10/2022    MCH 32.5 01/10/2022    MCHC 31.9 01/10/2022    RDW 14.9 01/10/2022     01/10/2022     CMP:    Lab Results   Component Value Date    GLUCOSE 172 01/10/2022     01/10/2022    K 5.3 01/10/2022    CL 99 01/10/2022    CO2 26 01/10/2022    BUN 13 01/10/2022    CREATININE 0.36 01/10/2022    ANIONGAP 11 01/10/2022    ALKPHOS 81 12/27/2021    ALT 37 12/27/2021    AST 37 12/27/2021    BILITOT 0.56 12/27/2021    LABALBU 2.8 12/27/2021    ALBUMIN 0.9 12/27/2021    LABGLOM >60 01/10/2022    GFRAA >60 01/10/2022    GFR      01/10/2022    GFR NOT REPORTED 01/10/2022    PROT 5.9 12/27/2021    CALCIUM 8.9 01/10/2022     COVID-19 positive     Radiology:  XR prescription for each of these medications  guaiFENesin-codeine 100-10 MG/5ML liquid     Information about where to get these medications is not yet available    Ask your nurse or doctor about these medications  albuterol sulfate  (90 Base) MCG/ACT inhaler  apixaban 5 MG Tabs tablet  benzonatate 200 MG capsule  vitamin D 50 MCG (2000 UT) Tabs tablet         No discharge procedures on file. Time Spent on discharge is  50 mins in patient examination, evaluation, counseling as well as medication reconciliation, prescriptions for required medications, discharge plan and follow up. Electronically signed by   Poli Leon DO  1/10/2022  9:52 PM      Thank you Dr. Xiomara Hayes primary care provider on file. for the opportunity to be involved in this patient's care.

## 2022-01-10 NOTE — DISCHARGE INSTR - DIET
Good nutrition is important when healing from an illness, injury, or surgery. Follow any nutrition recommendations given to you during your hospital stay. If you were given an oral nutrition supplement while in the hospital, continue to take this supplement at home. You can take it with meals, in-between meals, and/or before bedtime. These supplements can be purchased at most local grocery stores, pharmacies, and chain [employer]-stores.    If you have any questions about your diet or nutrition, call the hospital and ask for the dietitian.    - Regular diet

## 2022-01-10 NOTE — PROGRESS NOTES
PULMONARY & CRITICAL CARE MEDICINE PROGRESS NOTE     Patient:  Jaylan Griffin  MRN: [id number]  Admit date: 12/24/2021  Primary Care Physician: No primary care provider on file. Consulting Physician: Brandon Saez DO  CODE Status: Full Code  LOS: 16     SUBJECTIVE     CHIEF COMPLAINT/REASON FOR INITIAL CONSULT:   Acute respiratory failure/COVID-19 pneumonia with ARDS/pulmonary [place] involving subsegmental left lower lobe    BRIEF HOSPITAL COURSE:   The patient is a 70 y.o. female was admitted with complaints of shortness of breath and found to be Covid positive but also had a left lower lobe subsegmental pulmonary embolism with right ventricular strain pattern. Vascular surgery was consulted and it was felt, rightfully so, that the cause of the right ventricular strain is unlikely pulmonary embolism. Patient was admitted to the [facility] ICU, requiring 95% oxygen via high flow. Chest x-ray shows bilateral diffuse interstitial alveolar opacities in both lungs    INTERVAL HISTORY:  01/10/22  Patient has been weaned off high flow nasal cannula this morning  He is currently saturating well at nasal cannula 6 L/min  Afebrile, hemodynamically stable  Currently being monitored off antimicrobials  Completed course of Decadron on 1/7  She is on Eliquis 5 mg twice daily     REVIEW OF SYSTEMS:  Review of Systems   Constitutional: Positive for activity change and fatigue. Negative for fever. HENT: Negative for postnasal drip, sinus pain, sore throat, trouble swallowing and voice change. Eyes: Negative for photophobia and visual disturbance. Respiratory: Positive for cough and shortness of breath. Cardiovascular: Negative for chest pain, palpitations and leg swelling. Gastrointestinal: Negative for abdominal pain, diarrhea and vomiting. Endocrine: Negative. Genitourinary: Negative for difficulty urinating, dysuria and hematuria. Musculoskeletal: Negative. Allergic/Immunologic: Negative. Neurological: Negative for dizziness, syncope, speech difficulty and headaches. Hematological: Negative for adenopathy. Does not bruise/bleed easily. Psychiatric/Behavioral: Negative. OBJECTIVE     PaO2/FiO2 RATIO:  No results for input(s): POCPO2 in the last 72 hours. FiO2 : 70 %     VITAL SIGNS:   LAST:  /76   Pulse 120   Temp 97.7 °F (36.5 °C) (Oral)   Resp 26   Ht 5' 4\" (1.626 m)   Wt 195 lb 15.8 oz (88.9 kg)   SpO2 91%   Breastfeeding No   BMI 33.64 kg/m²   8-24 HR RANGE:  TEMP Temp  Av.2 °F (36.8 °C)  Min: 97.7 °F (36.5 °C)  Max: 98.8 °F (47.2 °C)   BP Systolic (92SPG), UDK:953 , Min:110 , NJH:189      Diastolic (47ZWE), TTE:98, Min:76, Max:84     PULSE Pulse  Av.1  Min: 68  Max: 120   RR Resp  Av.3  Min: 22  Max: 29   O2 SAT SpO2  Av.3 %  Min: 89 %  Max: 97 %   OXYGEN DELIVERY O2 Flow Rate (L/min)  Av.5 L/min  Min: 6 L/min  Max: 40 L/min        SYSTEMIC EXAMINATION:   General appearance - Ill-appearing, mild respiratory distress  Mental status - awake & alert, follows commands  Eyes - pupils equal and reactive, sclera anicteric  Mouth - mucous membranes moist, pharynx normal without lesions  Neck -short thick neck supple, no significant adenopathy, carotids upstroke normal bilaterally, no bruits  Chest - Breath sounds bilaterally were dimnished to auscultation at bases. There were bilateral intermittent crackles present without rhonchi.   There is no intercostal recession or use of accessory muscles  Heart - normal rate, regular rhythm, normal S1, S2, no murmurs, rubs, clicks or gallops  Abdomen - soft, nontender, nondistended, no masses or organomegaly  Neurological - non-focal  Extremities - peripheral pulses normal, mild pedal edema, no clubbing or cyanosis  Skin - normal coloration and turgor, no rashes, no suspicious skin lesions noted     DATA REVIEW     Medications: Current Inpatient  Scheduled Meds:   Vitamin D  2,000 Units Oral Daily    ascorbic acid  500 mg Oral BID    zinc sulfate  50 mg Oral Daily    budesonide-formoterol  2 puff Inhalation BID    apixaban  5 mg Oral BID    famotidine  20 mg Oral BID    sodium chloride flush  10 mL IntraVENous 2 times per day     Continuous Infusions:   sodium chloride         INPUT/OUTPUT:  In: 1550 [P.O.:1550]  Out: 1000 [Urine:1000]  Date 01/10/22 0000 - 01/10/22 2359   Shift 7645-7610 8776-7875 9477-7218 24 Hour Total   INTAKE   P.O.(mL/kg/hr)  750  750   Shift Total(mL/kg)  750(8.4)  750(8.4)   OUTPUT   Urine(mL/kg/hr)  400  400   Shift Total(mL/kg)  400(4.5)  400(4.5)   Weight (kg) 88.9 88.9 88.9 88.9        LABS:  ABGs:   No results for input(s): POCPH, POCPCO2, POCPO2, POCHCO3, FJCP4VCS in the last 72 hours. CBC:   Recent Labs     01/08/22  0746 01/10/22  0900   WBC 8.8 16.5*   HGB 11.9 13.5   HCT 35.9* 42.3   .1 101.9    192   LYMPHOPCT 13* 5*   RBC 3.55* 4.15   MCH 33.5 32.5   MCHC 33.1 31.9   RDW 14.1 14.9*     CRP:   No results for input(s): CRP in the last 72 hours. LDH:   No results for input(s): LDH in the last 72 hours. BMP:   Recent Labs     01/08/22  0746 01/10/22  0900   * 136   K 4.8 5.3    99   CO2 25 26   BUN 19 13   CREATININE 0.33* 0.36*   GLUCOSE 133* 172*     Liver Function Test:   No results for input(s): PROT, LABALBU, ALT, AST, GGT, ALKPHOS, BILITOT in the last 72 hours. Coagulation Profile:   No results for input(s): INR, PROTIME, APTT in the last 72 hours. D-Dimer:  No results for input(s): DDIMER in the last 72 hours. Ferritin:    No results for input(s): FERRITIN in the last 72 hours. Lactic Acid:  No results for input(s): LACTA in the last 72 hours. Cardiac Enzymes:  No results for input(s): CKTOTAL, CKMB, CKMBINDEX, TROPONINI in the last 72 hours. Invalid input(s): TROPONIN, HSTROP  BNP/ProBNP:   No results for input(s): BNP, PROBNP in the last 72 hours. Triglycerides:  No results for input(s): TRIG in the last 72 hours. Microbiology:  Urine Culture:  No components found for: CURINE  Blood Culture:  No components found for: CBLOOD, CFUNGUSBL  Sputum Culture:  No components found for: CSPUTUM  No results for input(s): SPECDESC, SPECIAL, CULTURE, STATUS, ORG, CDIFFTOXPCR, CAMPYLOBPCR, SALMONELLAPC, SHIGAPCR, SHIGELLAPCR, MPNEUG, MPNEUM, LACTOQL in the last 72 hours. No results for input(s): SPUTUM, SPECDESC, SPECIAL, CULTURE, STATUS, ORG, CDIFFTOXPCR, MPNEUM, MPNEUG in the last 72 hours. Invalid input(s): CURINE, CBLOOD, CFUNGUSBL     Pathology:    Radiology Reports:  XR CHEST PORTABLE   Final Result   No significant change in bilateral airspace disease. US PELVIS COMPLETE   Final Result   3.3 cm echogenic mass in the region of the uterine body which is possibly the   obscuring the endometrium. RECOMMENDATION:   Transvaginal sonography or MRI with contrast for further workup      RECOMMENDATIONS:   Unavailable         XR CHEST PORTABLE   Final Result   Moderate diffuse interstitial and alveolar opacities throughout the lungs   bilaterally. The finding is nonspecific although suspicious for multilobar   pneumonia, including active COVID-19 viral pneumonitis. CT CHEST PULMONARY EMBOLISM W CONTRAST   Final Result   Motion degraded study. Acute pulmonary emboli suspected particularly in the   left lower lobe but not well visualized due to motion artifact. There is evidence for right ventricular strain with septal bowing suggesting   a significant clot burden. Moderate patchy ground-glass opacities involving all lobes of both lungs   consistent with multifocal pneumonia typical for COVID pneumonia. Findings were discussed with Maritza Stevens at 5:39 pm on 12/24/2021.       RECOMMENDATIONS:   Unavailable              Echocardiogram:   Results for orders placed during the hospital encounter of 12/24/21    ECHO Complete 2D W Doppler W Color    Narrative    Summary  Global left ventricular systolic recommendations of the team were adopted in the plan. Necessity of all invasive devices was also confirmed. Silviano Castro MD   Pulmonary and Critical Care Medicine           1/10/2022, 3:56 PM    This patient was evaluated in the context of the global SARS-CoV-2 (COVID-19) pandemic, which necessitated considerations that the patient either has COVID-19 infection or is at risk of infection with COVID-19. Institutional protocols and algorithms that pertain to the evaluation & management of patients with COVID-19 or those at risk for COVID-19 are in a state of rapid changes based on information released by regulatory bodies including the CDC and federal and state organizations. These policies and algorithms were followed during the patient's care. Please note that this chart was generated using voice recognition Dragon dictation software. Although every effort was made to ensure the accuracy of this automated transcription, some errors in transcription may have occurred.

## 2022-01-10 NOTE — CARE COORDINATION
[facility] Quality Flow/Interdisciplinary Rounds Progress Note    Quality Flow Rounds held on January 10, 2022 at [address] Attending:  Bedside Nurse, ,  and Nursing Unit Leadership    Barriers to Discharge:     Anticipated Discharge Date:       Anticipated Discharge Disposition: LTACH    Readmission Risk              Risk of Unplanned Readmission:  13           Discussed patient goal for the day, patient clinical progression, and barriers to discharge. The following Goal(s) of the Day/Commitment(s) have been identified:  Activity Progression  Transitional Care Plan    Patient currently on HFO2 @ 40L, being placed on 6L/NC trial intermittently. Call received from Michael Sheridan with [facility] has denied authorization to [facility] but is offering a [id number] that is available until 1600 today. The number is [phone], Option #4. PS Dr Jose Stanford to notify him. RN updated    Krzysztof Zavala RN  January 10, 2022

## 2022-01-11 PROCEDURE — 6370000000 HC RX 637 (ALT 250 FOR IP): Performed by: FAMILY MEDICINE

## 2022-01-11 PROCEDURE — 99233 SBSQ HOSP IP/OBS HIGH 50: CPT | Performed by: INTERNAL MEDICINE

## 2022-01-11 PROCEDURE — 97116 GAIT TRAINING THERAPY: CPT

## 2022-01-11 PROCEDURE — 6370000000 HC RX 637 (ALT 250 FOR IP): Performed by: INTERNAL MEDICINE

## 2022-01-11 PROCEDURE — 94660 CPAP INITIATION&MGMT: CPT

## 2022-01-11 PROCEDURE — 2580000003 HC RX 258: Performed by: STUDENT IN AN ORGANIZED HEALTH CARE EDUCATION/TRAINING PROGRAM

## 2022-01-11 PROCEDURE — 2580000003 HC RX 258: Performed by: INTERNAL MEDICINE

## 2022-01-11 PROCEDURE — 36415 COLL VENOUS BLD VENIPUNCTURE: CPT

## 2022-01-11 PROCEDURE — 2700000000 HC OXYGEN THERAPY PER DAY

## 2022-01-11 PROCEDURE — 97535 SELF CARE MNGMENT TRAINING: CPT

## 2022-01-11 PROCEDURE — 2060000000 HC ICU INTERMEDIATE R&B

## 2022-01-11 PROCEDURE — 97110 THERAPEUTIC EXERCISES: CPT

## 2022-01-11 PROCEDURE — 99231 SBSQ HOSP IP/OBS SF/LOW 25: CPT | Performed by: STUDENT IN AN ORGANIZED HEALTH CARE EDUCATION/TRAINING PROGRAM

## 2022-01-11 PROCEDURE — 94761 N-INVAS EAR/PLS OXIMETRY MLT: CPT

## 2022-01-11 PROCEDURE — 6360000002 HC RX W HCPCS: Performed by: INTERNAL MEDICINE

## 2022-01-11 PROCEDURE — 87040 BLOOD CULTURE FOR BACTERIA: CPT

## 2022-01-11 RX ADMIN — Medication 2000 UNITS: at 08:42

## 2022-01-11 RX ADMIN — SODIUM CHLORIDE, PRESERVATIVE FREE 10 ML: 5 INJECTION INTRAVENOUS at 08:42

## 2022-01-11 RX ADMIN — BUDESONIDE AND FORMOTEROL FUMARATE DIHYDRATE 2 PUFF: 160; 4.5 AEROSOL RESPIRATORY (INHALATION) at 20:22

## 2022-01-11 RX ADMIN — CEFEPIME HYDROCHLORIDE 2000 MG: 2 INJECTION, POWDER, FOR SOLUTION INTRAVENOUS at 20:22

## 2022-01-11 RX ADMIN — OXYCODONE HYDROCHLORIDE AND ACETAMINOPHEN 500 MG: 500 TABLET ORAL at 20:22

## 2022-01-11 RX ADMIN — ZINC SULFATE 220 MG (50 MG) CAPSULE 50 MG: CAPSULE at 08:43

## 2022-01-11 RX ADMIN — FAMOTIDINE 20 MG: 20 TABLET, FILM COATED ORAL at 20:22

## 2022-01-11 RX ADMIN — APIXABAN 5 MG: 5 TABLET, FILM COATED ORAL at 08:42

## 2022-01-11 RX ADMIN — FAMOTIDINE 20 MG: 20 TABLET, FILM COATED ORAL at 08:42

## 2022-01-11 RX ADMIN — BUDESONIDE AND FORMOTEROL FUMARATE DIHYDRATE 2 PUFF: 160; 4.5 AEROSOL RESPIRATORY (INHALATION) at 08:43

## 2022-01-11 RX ADMIN — APIXABAN 5 MG: 5 TABLET, FILM COATED ORAL at 20:22

## 2022-01-11 RX ADMIN — OXYCODONE HYDROCHLORIDE AND ACETAMINOPHEN 500 MG: 500 TABLET ORAL at 08:42

## 2022-01-11 RX ADMIN — SODIUM CHLORIDE, PRESERVATIVE FREE 10 ML: 5 INJECTION INTRAVENOUS at 20:22

## 2022-01-11 ASSESSMENT — ENCOUNTER SYMPTOMS
DIARRHEA: 0
ABDOMINAL PAIN: 0
ALLERGIC/IMMUNOLOGIC NEGATIVE: 1
VOMITING: 0
COUGH: 1
SORE THROAT: 0
NAUSEA: 0
TROUBLE SWALLOWING: 0
SINUS PRESSURE: 0
SINUS PAIN: 0
CONSTIPATION: 0
EYE PAIN: 0
PHOTOPHOBIA: 0
VOICE CHANGE: 0
ABDOMINAL DISTENTION: 0
COLOR CHANGE: 0
WHEEZING: 0
SHORTNESS OF BREATH: 1

## 2022-01-11 NOTE — PROGRESS NOTES
[facility] -   Infectious diseases evaluation  admission date 12/24/2021    reason for consultation:   covid +    Impression :   Current:  · covid pneumonia   · PE -  left lower lobe segmental emboli -  · bandemia  · Thrombocytopenia  · Leukocytosis starting 1/10/22  · CRP elevation resolved as of 1/4/22  · Sepsis, suspected UTI     Other:  ·   Discussion / summary of stay / plan of care   ·   Recommendations   · Post barcitinib 12/24   · Post Decadron 6 mg daily 12/24  · Anticoagulation on Eliquis     · 1/11 -leukocytosis -get 2 BC  · 1/11 Start cefepime for 7 days for sepsis     Isolate until 1/13/22    Infection Control Recommendations   · [place] Precautions  · Contact Isolation   · Airborne isolation  · Droplet Isolation  Antimicrobial Stewardship Recommendations   · Off AB    Coordination ofOutpatient Care:   · Estimated Length of IV antimicrobials:  · Patient will need Midline / picc Catheter Insertion:   · Patient will need SNF:  · Patient will need outpatient wound care:     History of Present Illness:   Initial history:  Philip Liu is a 70y.o.-year-old female Patient presented through ER via EMS after her family found her with severe shortness of breath in her shower. She had developed a cough approximately a week ago and according to the patient had a negative Covid test at that time. She was found to have an SPO2 of 50% on room air requiring BiPAP. A rapid Covid swab was positive  CTA of the chest showed segmental pulmonary embolism in the left lower lobe with possible right heart strain. An echocardiogram was completed and was negative for RV strain with RV systolic pressure of 29 mmHg  The patient was initiated on a heparin drip as well as high-dose Decadron, Rocephin and baricitinib.     Interval changes:   12/28  60 FiO2 high flow, he feels better eating better but he saturating 90%,    12/29  70% FiO2 high flow, saturating 89%, she is short of breath, eating slightly, has a cough. In general she feels okay  Labs reviewed    12/30  60 FiO2 high flow, good appetite, alert oriented x3  WBC 10  Urine culture 12/26 -    12/31  96 saturation FiO2 70% on high flow, she feels better. WBC 7.4  CRP 6    1/1/22  High flow 45%, which is an improvement,  98 saturation, alert with a cough, WBC normal 9.2, tolerating her statin    1/3/22  Patient is on BiPAP 70%, saturating 99 and alert appropriate    1/4  High flow improved 70%, saturating 96%, no fever, looks better  CRP nose normal    1/5  Chest x-ray has not changed, saturating 91% on 57% high flow  Oriented x3, still has a cough  In general better    1/6  97 saturation BiPAP 60, pulse 87  CRP less than 3  No positive culture    1/7  98 saturation 50% FiO2 high flow, oriented x3  CRP less than 3     1/8   The patient remains on high flow nasal cannula with 65% FiO2 and 30 L/min      Current evaluation  1/11/2022   Patient Vitals for the past 8 hrs:   BP Temp src Pulse Resp SpO2   01/11/22 1119 -- -- 110 24 95 %   01/11/22 0812 -- -- 76 18 99 %   01/11/22 0750 125/83 Oral 71 19 100 %     Afebrile, tachycardic, VS stable otherwise     Patient on high flow nasal cannula with 55% FiO2 and 40 L/min alternating with 6L/ min nasal cannula at 95% SpO2. No acute issues noted at this time. WBC elevated from 8.8 on 1/8/22 to 16.5 on 1/10/22. Start cefepime. Obtain blood cultures    Initially, she was attempting placement in a LTAC, but was denied. She is medically stable per IM to discharge to SNF or LTAC. Summary of relevant labs:  Labs:  .5-18-6-3    BNP 9968  Troponin 54  Lactic acid 3.5  Ferritin 858  WBC 14.6 -7-->8.8-->16.5 (as of 1/10/22)   D-dimer 70.48    Micro:  Covid positive  Blood culture: no growth   Urine culture: no growth     Imaging:  CT chest 12/24/22: bilat covid pneumonia w thick consolidations  CXR 1/5/22: no significant change in bilateral airspace disease.      I have personally reviewed the past medical history, past surgical history, medications, social history, and family history, and I haveupdated the database accordingly. Allergies:   Patient has no known allergies. Review of Systems:     Review of Systems   Constitutional: Positive for activity change and fatigue. Negative for chills and diaphoresis. HENT: Positive for congestion. Negative for sinus pressure and trouble swallowing. Eyes: Negative for photophobia and pain. Respiratory: Positive for cough and shortness of breath. Negative for wheezing. Cardiovascular: Negative for chest pain and leg swelling. Gastrointestinal: Negative for abdominal distention, constipation, diarrhea and nausea. Endocrine: Negative for polydipsia and polyphagia. Genitourinary: Negative for dysuria and frequency. Musculoskeletal: Negative for arthralgias and myalgias. Skin: Negative for color change and rash. Allergic/Immunologic: Negative for immunocompromised state. Neurological: Negative for dizziness and headaches. Psychiatric/Behavioral: Negative for agitation and confusion. Physical Examination :       Physical Exam  Constitutional:       General: She is not in acute distress. Appearance: Normal appearance. She is not ill-appearing or diaphoretic. HENT:      Head: Normocephalic and atraumatic. Nose: Nose normal.      Mouth/Throat:      Mouth: Mucous membranes are moist.   Eyes:      General: No scleral icterus. Extraocular Movements: Extraocular movements intact. Conjunctiva/sclera: Conjunctivae normal.   Cardiovascular:      Rate and Rhythm: Normal rate and regular rhythm. Heart sounds: Normal heart sounds. No murmur heard. No friction rub. No gallop. Pulmonary:      Effort: No respiratory distress. Breath sounds: Normal breath sounds. No wheezing. Abdominal:      General: There is no distension. Palpations: Abdomen is soft. Tenderness:  There is no abdominal tenderness. Genitourinary:     Comments: No castro  No CVA tenderness  Musculoskeletal:         General: No swelling, deformity or signs of injury. Cervical back: Neck supple. No rigidity. Right lower leg: No edema. Left lower leg: No edema. Skin:     General: Skin is dry. Capillary Refill: Capillary refill takes less than 2 seconds. Coloration: Skin is not jaundiced or pale. Neurological:      General: No focal deficit present. Mental Status: She is alert. Mental status is at baseline. Psychiatric:         Mood and Affect: Mood normal.         Thought Content: Thought content normal.         Past Medical History:   No past medical history on file. Past Surgical  History:   No past surgical history on file.     Medications:      Vitamin D  2,000 Units Oral Daily    ascorbic acid  500 mg Oral BID    zinc sulfate  50 mg Oral Daily    budesonide-formoterol  2 puff Inhalation BID    apixaban  5 mg Oral BID    famotidine  20 mg Oral BID    sodium chloride flush  10 mL IntraVENous 2 times per day       Social History:     Social History     Socioeconomic History    Marital status: Unknown     Spouse name: Not on file    Number of children: Not on file    Years of education: Not on file    Highest education level: Not on file   Occupational History    Not on file   Tobacco Use    Smoking status: Current Every Day Smoker     Types: Cigarettes    Smokeless tobacco: Never Used   Substance and Sexual Activity    Alcohol use: Not Currently    Drug use: Not Currently    Sexual activity: Not on file   Other Topics Concern    Not on file   Social History Narrative    Not on file     Social Determinants of Health     Financial Resource Strain:     Difficulty of Paying Living Expenses: Not on file   Food Insecurity:     Worried About Running Out of Food in the Last Year: Not on file    Jones of Food in the Last Year: Not on file   Transportation Needs:     Lack of Transportation (Medical): Not on file    Lack of Transportation (Non-Medical): Not on file   Physical Activity:     Days of Exercise per Week: Not on file    Minutes of Exercise per Session: Not on file   Stress:     Feeling of Stress : Not on file   Social Connections:     Frequency of Communication with Friends and Family: Not on file    Frequency of Social Gatherings with Friends and Family: Not on file    Attends Bahai Services: Not on file    Active Member of [address] [place] [address] [employer] or Organizations: Not on file    Attends Club or Organization Meetings: Not on file    Marital Status: Not on file   Intimate Partner Violence:     Fear of Current or Ex-Partner: Not on file    Emotionally Abused: Not on file    Physically Abused: Not on file    Sexually Abused: Not on file   Housing Stability:     Unable to Pay for Housing in the Last Year: Not on file    Number of [place] in the Last Year: Not on file    Unstable Housing in the Last Year: Not on file       Family History:     Family History   Problem Relation Age of Onset    Cancer Maternal Aunt         breast      Medical Decision Making:   I have independently reviewed/ordered the following labs:    CBC with Differential:   Recent Labs     01/10/22  0900   WBC 16.5*   HGB 13.5   HCT 42.3      LYMPHOPCT 5*   MONOPCT 2*     BMP:  Recent Labs     01/10/22  0900      K 5.3   CL 99   CO2 26   BUN 13   CREATININE 0.36*     Hepatic Function Panel:   No results for input(s): PROT, LABALBU, BILIDIR, IBILI, BILITOT, ALKPHOS, ALT, AST in the last 72 hours. No results for input(s): RPR in the last 72 hours. No results for input(s): HIV in the last 72 hours. No results for input(s): BC in the last 72 hours. Lab Results   Component Value Date    CREATININE 0.36 01/10/2022    GLUCOSE 172 01/10/2022       Thank you for allowing us to participate in the care of this patient. Please call with questions.     This note is created with the assistance of a speech recognition program.  While intending to generate adocument that actually reflects the content of the visit, the document can still have some errors including those of syntax and sound a like substitutions which may escape proof reading. It such instances, actual meaningcan be extrapolated by contextual diversion. Sanju Weaver, [facility]  Office: [phone]  Perfect serve / office [phone]      I have discussed the care of the patient, including pertinent history and exam findings,  with the resident. I have seen and examined the patient and the key elements of all parts of the encounter have been performed by me. I agree with the assessment, plan and orders as documented by the resident.     Angelina Bee, Infectious Diseases

## 2022-01-11 NOTE — PLAN OF CARE
Problem: Airway Clearance - Ineffective  Goal: Achieve or maintain patent airway  Outcome: Ongoing     Problem: Gas Exchange - Impaired  Goal: Absence of hypoxia  Outcome: Ongoing  Goal: Promote optimal lung function  Outcome: Ongoing     Problem: Breathing Pattern - Ineffective  Goal: Ability to achieve and maintain a regular respiratory rate  Outcome: Ongoing     Problem:  Body Temperature -  Risk of, Imbalanced  Goal: Ability to maintain a body temperature within defined limits  Outcome: Ongoing  Goal: Will regain or maintain usual level of consciousness  Outcome: Ongoing  Goal: Complications related to the disease process, condition or treatment will be avoided or minimized  Outcome: Ongoing     Problem: Isolation Precautions - Risk of Spread of Infection  Goal: Prevent transmission of infection  Outcome: Ongoing     Problem: Nutrition Deficits  Goal: Optimize nutritional status  Outcome: Ongoing     Problem: Risk for Fluid Volume Deficit  Goal: Maintain normal heart rhythm  Outcome: Ongoing  Goal: Maintain absence of muscle cramping  Outcome: Ongoing  Goal: Maintain normal serum potassium, sodium, calcium, phosphorus, and pH  Outcome: Ongoing     Problem: Loneliness or Risk for Loneliness  Goal: Demonstrate positive use of time alone when socialization is not possible  Outcome: Ongoing     Problem: Fatigue  Goal: Verbalize increase energy and improved vitality  Outcome: Ongoing     Problem: Patient Education: Go to Patient Education Activity  Goal: Patient/Family Education  Outcome: Ongoing     Problem: OXYGENATION/RESPIRATORY FUNCTION  Goal: Patient will maintain patent airway  Outcome: Ongoing  Goal: Patient will achieve/maintain normal respiratory rate/effort  Description: Respiratory rate and effort will be within normal limits for the patient  Outcome: Ongoing     Problem: Skin Integrity:  Goal: Will show no infection signs and symptoms  Description: Will show no infection signs and symptoms  Outcome: Ongoing  Goal: Absence of new skin breakdown  Description: Absence of new skin breakdown  Outcome: Ongoing     Problem: Nutrition  Goal: Optimal nutrition therapy  Outcome: Ongoing

## 2022-01-11 NOTE — PROGRESS NOTES
Physical Therapy  Facility/Department: [facility] 3  Daily Treatment Note  NAME: Dede Burch  : 1950  MRN: [id number]    Date of Service: 2022    Discharge Recommendations:  Patient would benefit from continued therapy after discharge   PT Equipment Recommendations  Equipment Needed: No    Assessment   Body structures, Functions, Activity limitations: Decreased functional mobility ; Decreased posture;Decreased endurance;Decreased strength;Decreased balance;Decreased safe awareness  Assessment: Pt amb ~8 ft with RW and MOD A. Very unsteady with limited ability to progress LE's. Recommend contiuned PT after d/c to address deficits  Prognosis: Good  REQUIRES PT FOLLOW UP: Yes  Activity Tolerance  Activity Tolerance: Patient limited by endurance; Patient limited by fatigue     Patient Diagnosis(es): The primary encounter diagnosis was COVID-19. Diagnoses of Dehydration, Tachycardia, and Pneumonia due to COVID-19 virus were also pertinent to this visit. has no past medical history on file. has no past surgical history on file. Restrictions  Restrictions/Precautions  Restrictions/Precautions: Up as Tolerated,Isolation,Contact Precautions  Required Braces or Orthoses?: No  Position Activity Restriction  Other position/activity restrictions: O2 NC 6 Lm. Subjective   General  Response To Previous Treatment: Patient with no complaints from previous session. Family / Caregiver Present: No  Subjective  Subjective: Pt resting in recliner upon arrival, agreeable to PT. Pleasant and coopeative, states feeling better, but weak  General Comment  Comments: Pt on 6L O2 throughout  Pain Screening  Patient Currently in Pain: Denies  Vital Signs  Patient Currently in Pain: Denies       Orientation  Orientation  Overall Orientation Status: Within Functional Limits  Cognition      Objective      Transfers  Sit to Stand:  Moderate Assistance  Stand to sit: Moderate Assistance  Comment: Sit to stand x 2 trials MOD A with cues for hand placement  Ambulation  Ambulation?: Yes  Ambulation 1  Surface: level tile  Device: Rolling Walker  Assistance: Moderate assistance  Quality of Gait: Unsatedy, difficulty progressing B LE  Distance: 8 ft  Comments: Limited by O2 to 80% and HR up to 130  Stairs/Curb  Stairs?: No     Balance  Posture: Fair  Sitting - Static: Fair;+  Sitting - Dynamic: Fair  Standing - Static: Fair;-  Standing - Dynamic: Fair;-  Comments: assessed with RW for support    Exercise  Seated LE exercise program: Long Arc Quads, hip abduction/adduction, heel/toe raises, and marches. Reps: 15x            AM-PAC Score  AM-PAC Inpatient Mobility Raw Score : 12 (01/11/22 1544)  AM-PAC Inpatient T-Scale Score : 35.33 (01/11/22 1544)  Mobility Inpatient CMS 0-100% Score: 68.66 (01/11/22 1544)  Mobility Inpatient CMS G-Code Modifier : CL (01/11/22 1544)          Goals  Short term goals  Time Frame for Short term goals: 14 visits  Short term goal 1: Pt will ambulate 75 feet with a RW and SBA to increase functional independence. Short term goal 2: Pt will negotiate 3 stairs with no handrails and SBA to allow the pt to enter prior living arrangements. Short term goal 3: Pt will demonstrate fair+ standing balance to decrease fall risk. Short term goal 4: Pt will tolerate a 35 minute therapy session to promote increased endurance. Short term goal 5: Pt will perform sit<>stand transfer with SBA to increase functional independence.     Plan    Plan  Times per week: 5x  Times per day: Daily  Current Treatment Recommendations: Strengthening,Transfer Training,Endurance Training,ROM,Balance Training,Gait Training,Functional Mobility Training,Safety Education & Training,Home Exercise Program,Stair training,Patient/Caregiver Education & Training,Equipment Evaluation, Education, & procurement,Positioning  Safety Devices  Type of devices: Patient at risk for falls,Call light within reach,Gait belt,Nurse notified,All fall risk precautions in place,Left in chair  Restraints  Initially in place: No     Therapy Time   Individual Concurrent Group Co-treatment   Time In 1138         Time Out 1206         Minutes 28         Timed Code Treatment Minutes: [address] [facility], [facility]

## 2022-01-11 NOTE — CARE COORDINATION
Left  for Lolis at [facility] to inquire results of peer to peer, requested return call    [phone] spoke with Jes Woodall at [address] [facility], she is going to look into insurance approval and will call me back    [phone] received call from Dr. Juanito Duenas, who relates she is working on peer to peer for [facility], she asks for bipap settings, which I have provided to her as 16/10 55% after speaking with bedside nurse Cate Villagran [facility] call from Dr. Juanito Duenas, who relates LTACH approved.   Notified Lolis at [facility], she needs to hear on her end, but no beds today, possibly tomorrow

## 2022-01-11 NOTE — PLAN OF CARE
Problem: Airway Clearance - Ineffective  Goal: Achieve or maintain patent airway  1/11/2022 1556 by Simone Wiley RN  Outcome: Ongoing  1/11/2022 0503 by Barbara Wheeler RN  Outcome: Ongoing     Problem: Gas Exchange - Impaired  Goal: Absence of hypoxia  1/11/2022 1556 by Simone Wiley RN  Outcome: Ongoing  1/11/2022 0503 by Barbara Wheeler RN  Outcome: Ongoing  Goal: Promote optimal lung function  1/11/2022 1556 by Simone Wiley RN  Outcome: Ongoing  1/11/2022 0503 by Barbara Wheeler RN  Outcome: Ongoing     Problem: Breathing Pattern - Ineffective  Goal: Ability to achieve and maintain a regular respiratory rate  1/11/2022 1556 by Simone Wiley RN  Outcome: Ongoing  1/11/2022 0503 by Barbara Wheeler RN  Outcome: Ongoing     Problem:  Body Temperature -  Risk of, Imbalanced  Goal: Ability to maintain a body temperature within defined limits  1/11/2022 1556 by Simone Wiley RN  Outcome: Ongoing  1/11/2022 0503 by Barbara Wheeler RN  Outcome: Ongoing  Goal: Will regain or maintain usual level of consciousness  1/11/2022 1556 by Simone Wiley RN  Outcome: Ongoing  1/11/2022 0503 by Barbara Wheeler RN  Outcome: Ongoing  Goal: Complications related to the disease process, condition or treatment will be avoided or minimized  1/11/2022 1556 by Simone Wiley RN  Outcome: Ongoing  1/11/2022 0503 by Barbara Wheeler RN  Outcome: Ongoing     Problem: Isolation Precautions - Risk of Spread of Infection  Goal: Prevent transmission of infection  1/11/2022 1556 by Simone Wiley RN  Outcome: Ongoing  1/11/2022 0503 by Barbara Wheeler RN  Outcome: Ongoing     Problem: Nutrition Deficits  Goal: Optimize nutritional status  1/11/2022 1556 by Simone Wiley RN  Outcome: Ongoing  1/11/2022 0503 by Barbara Wheeler RN  Outcome: Ongoing     Problem: Risk for Fluid Volume Deficit  Goal: Maintain normal heart rhythm  1/11/2022 1556 by Simone Wiley RN  Outcome: Ongoing  1/11/2022 0503 by Barbara Wheeler RN  Outcome: Ongoing  Goal: Maintain absence of muscle cramping  1/11/2022 1556 by Kay Orantes RN  Outcome: Ongoing  1/11/2022 0503 by Stefan Real RN  Outcome: Ongoing  Goal: Maintain normal serum potassium, sodium, calcium, phosphorus, and pH  1/11/2022 1556 by Kay Orantes RN  Outcome: Ongoing  1/11/2022 0503 by Stefan Real RN  Outcome: Ongoing     Problem: Loneliness or Risk for Loneliness  Goal: Demonstrate positive use of time alone when socialization is not possible  1/11/2022 1556 by Kay Orantes RN  Outcome: Ongoing  1/11/2022 0503 by Stefan Real RN  Outcome: Ongoing     Problem: Fatigue  Goal: Verbalize increase energy and improved vitality  1/11/2022 1556 by Kay Orantes RN  Outcome: Ongoing  1/11/2022 0503 by Stefan Real RN  Outcome: Ongoing     Problem: Patient Education: Go to Patient Education Activity  Goal: Patient/Family Education  1/11/2022 1556 by Kay Orantes RN  Outcome: Ongoing  1/11/2022 0503 by Stefan Real RN  Outcome: Ongoing     Problem: OXYGENATION/RESPIRATORY FUNCTION  Goal: Patient will maintain patent airway  1/11/2022 1556 by Kay Orantes RN  Outcome: Ongoing  1/11/2022 0503 by Stefan Real RN  Outcome: Ongoing  Goal: Patient will achieve/maintain normal respiratory rate/effort  Description: Respiratory rate and effort will be within normal limits for the patient  1/11/2022 1556 by Kay Orantes RN  Outcome: Ongoing  1/11/2022 0503 by Stefan Real RN  Outcome: Ongoing     Problem: Skin Integrity:  Goal: Will show no infection signs and symptoms  Description: Will show no infection signs and symptoms  1/11/2022 1556 by Kay Orantes RN  Outcome: Ongoing  1/11/2022 0503 by Stefan Real RN  Outcome: Ongoing  Goal: Absence of new skin breakdown  Description: Absence of new skin breakdown  1/11/2022 1556 by Kay Orantes RN  Outcome: Ongoing  1/11/2022 0503 by Stefan Real RN  Outcome: Ongoing     Problem: Nutrition  Goal: Optimal nutrition therapy  1/11/2022 1556 by Anne Millard RN  Outcome: Ongoing  1/11/2022 0503 by Mallory Hernandez RN  Outcome: Ongoing

## 2022-01-11 NOTE — PROGRESS NOTES
Occupational Therapy  Facility/Department: [facility] CAR 3  Daily Treatment Note  NAME: Amairani Tejada  : 1950  MRN: [id number]    Date of Service: 2022    Discharge Recommendations:  Patient would benefit from continued therapy after discharge       Assessment   Performance deficits / Impairments: Decreased functional mobility ; Decreased ADL status; Decreased endurance;Decreased high-level IADLs;Decreased safe awareness;Decreased balance;Decreased strength  Prognosis: Good  Patient Education: OT POC, transfer/walker safety, importance of participation in therapy, pursed lip breathing with good return. REQUIRES OT FOLLOW UP: Yes  Activity Tolerance  Activity Tolerance: Patient Tolerated treatment well;Patient limited by fatigue  Safety Devices  Safety Devices in place: Yes  Type of devices: Call light within reach;Gait belt;Left in chair;Nurse notified         Patient Diagnosis(es): The primary encounter diagnosis was COVID-19. Diagnoses of Dehydration, Tachycardia, and Pneumonia due to COVID-19 virus were also pertinent to this visit. has no past medical history on file. has no past surgical history on file. Restrictions  Restrictions/Precautions  Restrictions/Precautions: Up as Tolerated,Isolation,Contact Precautions  Required Braces or Orthoses?: No  Position Activity Restriction  Other position/activity restrictions: O2 NC 6 Lm. Subjective   General  Patient assessed for rehabilitation services?: Yes  Family / Caregiver Present: No  General Comment  Vital Signs  Patient Currently in Pain: Denies   Orientation  Orientation  Overall Orientation Status: Within Functional Limits  Objective    ADL  Grooming: Setup; Increased time to complete;Stand by assistance (Oral care seated in chair.)  UE Bathing: Setup; Increased time to complete;Contact guard assistance (Max A for back seated EOB d/t limited reach.)  UE Dressing: Setup;Minimal assistance (To manage gown.)  Additional Comments: Pt completed ADL care seated in chair. Increased time needed to complete d/t fatigue and mild SOB. Pt educated on incorporating pursed lip breathing when participating in functional activities, pt verbalized understanding. Sit/stand transfer using RW. Pt returned to seated in chair at end of session. Pt able to maintain O2 saturation 88-92% during functional activities. Pt -130 during seated/standing activity. Balance  Sitting Balance: Supervision (Seated in chair.)  Standing Balance: Contact guard assistance  Standing Balance  Time: 2 minutes  Activity: Static standing at chair using RW. Functional Mobility  Functional - Mobility Device: Rolling Walker  Activity: Other  Assist Level: Minimal assistance  Functional Mobility Comments: 2-step forward and back. Pt fatigued easily. Transfers  Sit to stand: 2 Person assistance;Minimal assistance  Stand to sit: Minimal assistance  Transfer Comments: Verbal cues for hand placement during transfers with good return. Pt stated BLE feeling weak. No LOB/buckling noted.   Cognition  Overall Cognitive Status: WFL  Plan   Plan  Times per week: 3-4x/wk  Current Treatment Recommendations: Strengthening,Endurance Training,Patient/Caregiver Education & Training,Self-Care / ADL,Equipment Evaluation, Education, & procurement,Home Management Training,Safety Education & Training,Functional Mobility Training,Balance Training  AM-PAC Score        AM-[facility] Inpatient Daily Activity Raw Score: 18 (01/11/22 1341)  AM-PAC Inpatient ADL T-Scale Score : 38.66 (01/11/22 1341)  ADL Inpatient CMS 0-100% Score: 46.65 (01/11/22 1341)  ADL Inpatient CMS G-Code Modifier : CK (01/11/22 1341)    Goals  Short term goals  Time Frame for Short term goals: Patient will, by discharge  Short term goal 1: demo UB ADLs at Supervision  Short term goal 2: demo LB ADLs at [facility] A using AE PRN  Short term goal 3: demo 10+ min of dynamic standing balance at SBA to engage in ADLs  Short term goal 4: demo use of pursed lip breathing independently with s/s of SOB  Short term goal 5: demo independence in B UE HEP to improve B strength for functional tasks       Therapy Time   Individual Concurrent Group Co-treatment   Time In 1125         Time Out 1153         Minutes 28         Timed Code Treatment Minutes: 28 Minutes     Pt seated in chair upon therapist arrival. Pleasant and agreeable to therapy. See above for LOF for all tasks. Pt retired to seated in chair at end of session with call light within reach.     Joy García, BLACKMAN/L

## 2022-01-11 NOTE — PROGRESS NOTES
PULMONARY & CRITICAL CARE MEDICINE PROGRESS NOTE     Patient:  Armin Bhakta  MRN: [id number]  Admit date: 12/24/2021  Primary Care Physician: No primary care provider on file. Consulting Physician: Suraj Parrish MD  CODE Status: Full Code  LOS: 25     SUBJECTIVE     CHIEF COMPLAINT/REASON FOR INITIAL CONSULT:   Acute respiratory failure/COVID-19 pneumonia with ARDS/pulmonary embolism involving subsegmental left lower lobe    BRIEF HOSPITAL COURSE:   The patient is a 70 y.o. female was admitted with complaints of shortness of breath and found to be Covid positive but also had a left lower lobe subsegmental pulmonary embolism with right ventricular strain pattern. Vascular surgery was consulted and it was felt, rightfully so, that the cause of the right ventricular strain is unlikely pulmonary embolism. Patient was admitted to the [facility] ICU, requiring 95% oxygen via high flow. Chest x-ray shows bilateral diffuse interstitial alveolar opacities in both lungs    INTERVAL HISTORY:  01/11/22  Patient is currently saturating well at nasal cannula 6 L/min  Afebrile, hemodynamically stable  Currently being monitored off antimicrobials  Completed course of Decadron on 1/7  She is on Eliquis 5 mg twice daily  No overnight issues reported    REVIEW OF SYSTEMS:  Review of Systems   Constitutional: Positive for activity change and fatigue. Negative for fever. HENT: Negative for postnasal drip, sinus pain, sore throat, trouble swallowing and voice change. Eyes: Negative for photophobia and visual disturbance. Respiratory: Positive for cough and shortness of breath. Cardiovascular: Negative for chest pain, palpitations and leg swelling. Gastrointestinal: Negative for abdominal pain, diarrhea and vomiting. Endocrine: Negative. Genitourinary: Negative for difficulty urinating, dysuria and hematuria. Musculoskeletal: Negative. Allergic/Immunologic: Negative.     Neurological: Negative for dizziness, syncope, speech difficulty and headaches. Hematological: Negative for adenopathy. Does not bruise/bleed easily. Psychiatric/Behavioral: Negative. OBJECTIVE     PaO2/FiO2 RATIO:  No results for input(s): POCPO2 in the last 72 hours. FiO2 : 55 %     VITAL SIGNS:   LAST:  /83   Pulse 110   Temp 97.9 °F (36.6 °C)   Resp 24   Ht 5' 4\" (1.626 m)   Wt 195 lb 15.8 oz (88.9 kg)   SpO2 95%   Breastfeeding No   BMI 33.64 kg/m²   8-24 HR RANGE:  TEMP Temp  Av °F (36.7 °C)  Min: 97.9 °F (36.6 °C)  Max: 98.1 °F (92.0 °C)   BP Systolic (46MQB), TWI:743 , Min:113 , QBE:953      Diastolic (37YCQ), MHS:00, Min:74, Max:83     PULSE Pulse  Av  Min: 71  Max: 120   RR Resp  Av  Min: 24  Max: 24   O2 SAT SpO2  Av %  Min: 95 %  Max: 95 %   OXYGEN DELIVERY O2 Flow Rate (L/min)  Av L/min  Min: 6 L/min  Max: 6 L/min        SYSTEMIC EXAMINATION:   General appearance -comfortable, no acute distress  Mental status - awake & alert, follows commands  Eyes - pupils equal and reactive, sclera anicteric  Mouth - mucous membranes moist, pharynx normal without lesions  Neck -short thick neck supple, no significant adenopathy  Chest - Breath sounds bilaterally were dimnished to auscultation at bases. There were bilateral intermittent crackles present without rhonchi.     Heart - normal rate, regular rhythm, normal S1, S2, no murmurs, rubs, clicks or gallops  Abdomen - soft, nontender, nondistended, no masses or organomegaly  Neurological - non-focal  Extremities - peripheral pulses normal, mild pedal edema, no clubbing or cyanosis  Skin - normal coloration and turgor, no rashes, no suspicious skin lesions noted     DATA REVIEW     Medications: Current Inpatient  Scheduled Meds:   Vitamin D  2,000 Units Oral Daily    ascorbic acid  500 mg Oral BID    zinc sulfate  50 mg Oral Daily    budesonide-formoterol  2 puff Inhalation BID    apixaban  5 mg Oral BID    famotidine  20 mg Oral BID    sodium chloride flush  10 mL IntraVENous 2 times per day     Continuous Infusions:   sodium chloride         INPUT/OUTPUT:  In: 750 [P.O.:750]  Out: 500 [Urine:500]       LABS:  ABGs:   No results for input(s): POCPH, POCPCO2, POCPO2, POCHCO3, OSCP7SRG in the last 72 hours. CBC:   Recent Labs     01/10/22  0900   WBC 16.5*   HGB 13.5   HCT 42.3   .9      LYMPHOPCT 5*   RBC 4.15   MCH 32.5   MCHC 31.9   RDW 14.9*     CRP:   No results for input(s): CRP in the last 72 hours. LDH:   No results for input(s): LDH in the last 72 hours. BMP:   Recent Labs     01/10/22  0900      K 5.3   CL 99   CO2 26   BUN 13   CREATININE 0.36*   GLUCOSE 172*     Liver Function Test:   No results for input(s): PROT, LABALBU, ALT, AST, GGT, ALKPHOS, BILITOT in the last 72 hours. Coagulation Profile:   No results for input(s): INR, PROTIME, APTT in the last 72 hours. D-Dimer:  No results for input(s): DDIMER in the last 72 hours. Ferritin:    No results for input(s): FERRITIN in the last 72 hours. Lactic Acid:  No results for input(s): LACTA in the last 72 hours. Cardiac Enzymes:  No results for input(s): CKTOTAL, CKMB, CKMBINDEX, TROPONINI in the last 72 hours. Invalid input(s): TROPONIN, HSTROP  BNP/ProBNP:   No results for input(s): BNP, PROBNP in the last 72 hours. Triglycerides:  No results for input(s): TRIG in the last 72 hours. Microbiology:  Urine Culture:  No components found for: CURINE  Blood Culture:  No components found for: CBLOOD, CFUNGUSBL  Sputum Culture:  No components found for: CSPUTUM  No results for input(s): SPECDESC, SPECIAL, CULTURE, STATUS, ORG, CDIFFTOXPCR, CAMPYLOBPCR, SALMONELLAPC, SHIGAPCR, SHIGELLAPCR, MPNEUG, MPNEUM, LACTOQL in the last 72 hours. No results for input(s): SPUTUM, SPECDESC, SPECIAL, CULTURE, STATUS, ORG, CDIFFTOXPCR, MPNEUM, MPNEUG in the last 72 hours.     Invalid input(s): ANA, [address], CFUNGUSBL     Pathology:    Radiology Reports:  XR CHEST PORTABLE   Final Result   No significant change in bilateral airspace disease. US PELVIS COMPLETE   Final Result   3.3 cm echogenic mass in the region of the uterine body which is possibly the   obscuring the endometrium. RECOMMENDATION:   Transvaginal sonography or MRI with contrast for further workup      RECOMMENDATIONS:   Unavailable         XR CHEST PORTABLE   Final Result   Moderate diffuse interstitial and alveolar opacities throughout the lungs   bilaterally. The finding is nonspecific although suspicious for multilobar   pneumonia, including active COVID-19 viral pneumonitis. CT CHEST PULMONARY EMBOLISM W CONTRAST   Final Result   Motion degraded study. Acute pulmonary emboli suspected particularly in the   left lower lobe but not well visualized due to motion artifact. There is evidence for right ventricular strain with septal bowing suggesting   a significant clot burden. Moderate patchy ground-glass opacities involving all lobes of both lungs   consistent with multifocal pneumonia typical for COVID pneumonia. Findings were discussed with Kenny Zurita at 5:39 pm on 12/24/2021. RECOMMENDATIONS:   Unavailable              Echocardiogram:   Results for orders placed during the hospital encounter of 12/24/21    ECHO Complete 2D W Doppler W Color    Narrative    Summary  Global left ventricular systolic function is normal. Estimated EF 50-55%. Dilated Right ventricular with mildly impaired systolic function  Estimated right ventricular systolic pressure is 63KMPX. No significant valvular regurgitation or stenosis seen. No pericardial effusion seen.        ASSESSMENT AND PLAN     Assessment:    // Acute hypoxic respiratory failure, on nasal cannula  // Acute respiratory distress syndrome  // Bilateral multifocal pneumonia due to COVID 19 infection  // Left lower lobe segmental pulmonary embolism  // Obesity            // Postmenopausal bleeding    Plan:    I personally interviewed/examined the patient; reviewed interval history, interpreted all available radiographic and laboratory data at the time of service. Patient has been weaned off of high flow nasal cannula, currently on 6 L/min  We will recommend to continue nocturnal and as needed BiPAP  Continue supplemental oxygen to keep oxygen saturation >90%  Encourage incentive spirometry/Acapella  Continue pulmonary toilet, aspiration precautions and bronchodilators  Chest x-ray to be done as needed  Finished course of Decadron on 01/07/2022  She was on baricitinib per infectious disease  She is on Eliquis 5 mg twice daily  Currently on Symbicort and will continue  Monitor I/O, electrolytes with a goal of even/negative fluid balance  Chemical DVT prophylaxis not needed, patient on Eliquis  Antimicrobials reviewed; continue to monitor off antimicrobials  Monitor CRP, LDH, AST/ALT, D-Dimer, Ferritin periodically/as needed  Glycemic control per primary service   Physical/occupational therapy    We will continue to follow. I would like to thank you for allowing me to participate in the care of this patient. Please feel free to call with any further questions or concerns. Janis Parisi MD   Pulmonary and Critical Care Medicine           1/11/2022, 4:20 PM    This patient was evaluated in the context of the global SARS-CoV-2 (COVID-19) pandemic, which necessitated considerations that the patient either has COVID-19 infection or is at risk of infection with COVID-19. Institutional protocols and algorithms that pertain to the evaluation & management of patients with COVID-19 or those at risk for COVID-19 are in a state of rapid changes based on information released by regulatory bodies including the CDC and federal and state organizations. These policies and algorithms were followed during the patient's care. Please note that this chart was generated using voice recognition Dragon dictation software.   Although every effort was made to ensure the accuracy of this automated transcription, some errors in transcription may have occurred.

## 2022-01-12 LAB
ABSOLUTE EOS #: 0.33 K/UL (ref 0–0.44)
ABSOLUTE IMMATURE GRANULOCYTE: 0.07 K/UL (ref 0–0.3)
ABSOLUTE LYMPH #: 0.78 K/UL (ref 1.1–3.7)
ABSOLUTE MONO #: 0.43 K/UL (ref 0.1–1.2)
ANION GAP SERPL CALCULATED.3IONS-SCNC: 8 MMOL/L (ref 9–17)
BASOPHILS # BLD: 0 % (ref 0–2)
BASOPHILS ABSOLUTE: <0.03 K/UL (ref 0–0.2)
BUN BLDV-MCNC: 10 MG/DL (ref 8–23)
BUN/CREAT BLD: ABNORMAL (ref 9–20)
CALCIUM SERPL-MCNC: 8.6 MG/DL (ref 8.6–10.4)
CHLORIDE BLD-SCNC: 103 MMOL/L (ref 98–107)
CO2: 29 MMOL/L (ref 20–31)
CREAT SERPL-MCNC: 0.28 MG/DL (ref 0.5–0.9)
DIFFERENTIAL TYPE: ABNORMAL
EOSINOPHILS RELATIVE PERCENT: 4 % (ref 1–4)
GFR AFRICAN AMERICAN: >60 ML/MIN
GFR NON-AFRICAN AMERICAN: >60 ML/MIN
GFR SERPL CREATININE-BSD FRML MDRD: ABNORMAL ML/MIN/{1.73_M2}
GFR SERPL CREATININE-BSD FRML MDRD: ABNORMAL ML/MIN/{1.73_M2}
GLUCOSE BLD-MCNC: 94 MG/DL (ref 70–99)
HCT VFR BLD CALC: 35.4 % (ref 36.3–47.1)
HEMOGLOBIN: 11.4 G/DL (ref 11.9–15.1)
IMMATURE GRANULOCYTES: 1 %
LYMPHOCYTES # BLD: 8 % (ref 24–43)
MCH RBC QN AUTO: 33.2 PG (ref 25.2–33.5)
MCHC RBC AUTO-ENTMCNC: 32.2 G/DL (ref 28.4–34.8)
MCV RBC AUTO: 103.2 FL (ref 82.6–102.9)
MONOCYTES # BLD: 5 % (ref 3–12)
NRBC AUTOMATED: 0 PER 100 WBC
PDW BLD-RTO: 15.3 % (ref 11.8–14.4)
PLATELET # BLD: ABNORMAL K/UL (ref 138–453)
PLATELET ESTIMATE: ABNORMAL
PLATELET, FLUORESCENCE: 133 K/UL (ref 138–453)
PLATELET, IMMATURE FRACTION: 4.9 % (ref 1.1–10.3)
PMV BLD AUTO: ABNORMAL FL (ref 8.1–13.5)
POTASSIUM SERPL-SCNC: 5.5 MMOL/L (ref 3.7–5.3)
RBC # BLD: 3.43 M/UL (ref 3.95–5.11)
RBC # BLD: ABNORMAL 10*6/UL
SEG NEUTROPHILS: 83 % (ref 36–65)
SEGMENTED NEUTROPHILS ABSOLUTE COUNT: 7.69 K/UL (ref 1.5–8.1)
SODIUM BLD-SCNC: 140 MMOL/L (ref 135–144)
WBC # BLD: 9.3 K/UL (ref 3.5–11.3)
WBC # BLD: ABNORMAL 10*3/UL

## 2022-01-12 PROCEDURE — 36415 COLL VENOUS BLD VENIPUNCTURE: CPT

## 2022-01-12 PROCEDURE — 94640 AIRWAY INHALATION TREATMENT: CPT

## 2022-01-12 PROCEDURE — 80048 BASIC METABOLIC PNL TOTAL CA: CPT

## 2022-01-12 PROCEDURE — 2700000000 HC OXYGEN THERAPY PER DAY

## 2022-01-12 PROCEDURE — 6370000000 HC RX 637 (ALT 250 FOR IP): Performed by: INTERNAL MEDICINE

## 2022-01-12 PROCEDURE — 2580000003 HC RX 258: Performed by: STUDENT IN AN ORGANIZED HEALTH CARE EDUCATION/TRAINING PROGRAM

## 2022-01-12 PROCEDURE — 85055 RETICULATED PLATELET ASSAY: CPT

## 2022-01-12 PROCEDURE — 6360000002 HC RX W HCPCS: Performed by: INTERNAL MEDICINE

## 2022-01-12 PROCEDURE — 2060000000 HC ICU INTERMEDIATE R&B

## 2022-01-12 PROCEDURE — 6370000000 HC RX 637 (ALT 250 FOR IP): Performed by: FAMILY MEDICINE

## 2022-01-12 PROCEDURE — 2580000003 HC RX 258: Performed by: INTERNAL MEDICINE

## 2022-01-12 PROCEDURE — 94761 N-INVAS EAR/PLS OXIMETRY MLT: CPT

## 2022-01-12 PROCEDURE — 99232 SBSQ HOSP IP/OBS MODERATE 35: CPT | Performed by: INTERNAL MEDICINE

## 2022-01-12 PROCEDURE — 85025 COMPLETE CBC W/AUTO DIFF WBC: CPT

## 2022-01-12 PROCEDURE — 99233 SBSQ HOSP IP/OBS HIGH 50: CPT | Performed by: INTERNAL MEDICINE

## 2022-01-12 PROCEDURE — 94660 CPAP INITIATION&MGMT: CPT

## 2022-01-12 RX ADMIN — ZINC SULFATE 220 MG (50 MG) CAPSULE 50 MG: CAPSULE at 10:11

## 2022-01-12 RX ADMIN — FAMOTIDINE 20 MG: 20 TABLET, FILM COATED ORAL at 09:45

## 2022-01-12 RX ADMIN — GUAIFENESIN AND CODEINE PHOSPHATE 10 ML: 100; 10 SOLUTION ORAL at 20:54

## 2022-01-12 RX ADMIN — SODIUM CHLORIDE, PRESERVATIVE FREE 10 ML: 5 INJECTION INTRAVENOUS at 09:45

## 2022-01-12 RX ADMIN — CEFEPIME HYDROCHLORIDE 2000 MG: 2 INJECTION, POWDER, FOR SOLUTION INTRAVENOUS at 23:00

## 2022-01-12 RX ADMIN — OXYCODONE HYDROCHLORIDE AND ACETAMINOPHEN 500 MG: 500 TABLET ORAL at 09:30

## 2022-01-12 RX ADMIN — BUDESONIDE AND FORMOTEROL FUMARATE DIHYDRATE 2 PUFF: 160; 4.5 AEROSOL RESPIRATORY (INHALATION) at 20:10

## 2022-01-12 RX ADMIN — SODIUM CHLORIDE, PRESERVATIVE FREE 10 ML: 5 INJECTION INTRAVENOUS at 20:54

## 2022-01-12 RX ADMIN — CEFEPIME HYDROCHLORIDE 2000 MG: 2 INJECTION, POWDER, FOR SOLUTION INTRAVENOUS at 09:00

## 2022-01-12 RX ADMIN — APIXABAN 5 MG: 5 TABLET, FILM COATED ORAL at 20:54

## 2022-01-12 RX ADMIN — APIXABAN 5 MG: 5 TABLET, FILM COATED ORAL at 09:30

## 2022-01-12 RX ADMIN — SODIUM CHLORIDE, PRESERVATIVE FREE 10 ML: 5 INJECTION INTRAVENOUS at 11:19

## 2022-01-12 RX ADMIN — Medication 2000 UNITS: at 10:11

## 2022-01-12 RX ADMIN — OXYCODONE HYDROCHLORIDE AND ACETAMINOPHEN 500 MG: 500 TABLET ORAL at 20:54

## 2022-01-12 RX ADMIN — BUDESONIDE AND FORMOTEROL FUMARATE DIHYDRATE 2 PUFF: 160; 4.5 AEROSOL RESPIRATORY (INHALATION) at 09:00

## 2022-01-12 RX ADMIN — FAMOTIDINE 20 MG: 20 TABLET, FILM COATED ORAL at 21:05

## 2022-01-12 ASSESSMENT — ENCOUNTER SYMPTOMS
EYE PAIN: 0
SORE THROAT: 0
SINUS PAIN: 0
ALLERGIC/IMMUNOLOGIC NEGATIVE: 1
ABDOMINAL DISTENTION: 0
SHORTNESS OF BREATH: 0
VOICE CHANGE: 0
ABDOMINAL PAIN: 0
COUGH: 1
SHORTNESS OF BREATH: 1
COLOR CHANGE: 0
PHOTOPHOBIA: 0
COUGH: 0
VOMITING: 0
DIARRHEA: 0
TROUBLE SWALLOWING: 0
NAUSEA: 0
WHEEZING: 0

## 2022-01-12 ASSESSMENT — PAIN SCALES - GENERAL: PAINLEVEL_OUTOF10: 0

## 2022-01-12 ASSESSMENT — PAIN SCALES - WONG BAKER
WONGBAKER_NUMERICALRESPONSE: 0

## 2022-01-12 NOTE — PROGRESS NOTES
PULMONARY & CRITICAL CARE MEDICINE PROGRESS NOTE     Patient:  Nabila Franklin  MRN: [id number]  Admit date: 12/24/2021  Primary Care Physician: No primary care provider on file. Consulting Physician: Asa Landa MD  CODE Status: Full Code  LOS: 23     SUBJECTIVE     CHIEF COMPLAINT/REASON FOR INITIAL CONSULT:   Acute respiratory failure/COVID-19 pneumonia with ARDS/pulmonary embolism involving subsegmental left lower lobe    BRIEF HOSPITAL COURSE:   The patient is a 70 y.o. female was admitted with complaints of shortness of breath and found to be Covid positive but also had a left lower lobe subsegmental pulmonary embolism with right ventricular strain pattern. Vascular surgery was consulted and it was felt, rightfully so, that the cause of the right ventricular strain is unlikely pulmonary embolism. Patient was admitted to the [facility] ICU, requiring 95% oxygen via high flow. Chest x-ray shows bilateral diffuse interstitial alveolar opacities in both lungs    INTERVAL HISTORY:  01/12/22  Patient is currently saturating well at nasal cannula 6 L/min  Afebrile, hemodynamically stable  Patient is currently on cefepime as per ID recommendations  Completed course of Decadron on 1/7  She is on Eliquis 5 mg twice daily  No overnight issues reported    REVIEW OF SYSTEMS:  Review of Systems   Constitutional: Positive for activity change and fatigue. Negative for fever. HENT: Negative for postnasal drip, sinus pain, sore throat, trouble swallowing and voice change. Eyes: Negative for photophobia and visual disturbance. Respiratory: Positive for cough and shortness of breath. Cardiovascular: Negative for chest pain, palpitations and leg swelling. Gastrointestinal: Negative for abdominal pain, diarrhea and vomiting. Endocrine: Negative. Genitourinary: Negative for difficulty urinating, dysuria and hematuria. Musculoskeletal: Negative. Allergic/Immunologic: Negative.     Neurological: Negative for dizziness, syncope, speech difficulty and headaches. Hematological: Negative for adenopathy. Does not bruise/bleed easily. Psychiatric/Behavioral: Negative. OBJECTIVE     PaO2/FiO2 RATIO:  No results for input(s): POCPO2 in the last 72 hours. FiO2 : 55 %     VITAL SIGNS:   LAST:  /78   Pulse 78   Temp 97.8 °F (36.6 °C) (Oral)   Resp 18   Ht 5' 4\" (1.626 m)   Wt 195 lb 15.8 oz (88.9 kg)   SpO2 98%   Breastfeeding No   BMI 33.64 kg/m²   8-24 HR RANGE:  TEMP Temp  Av.7 °F (36.5 °C)  Min: 97.5 °F (36.4 °C)  Max: 97.9 °F (87.9 °C)   BP Systolic (45HTH), YHC:094 , Min:133 , NDC:780      Diastolic (82YPO), WGA:12, Min:78, Max:99     PULSE Pulse  Av.5  Min: 72  Max: 116   RR Resp  Av  Min: 18  Max: 18   O2 SAT SpO2  Av.7 %  Min: 96 %  Max: 98 %   OXYGEN DELIVERY O2 Flow Rate (L/min)  Av L/min  Min: 4 L/min  Max: 4 L/min        SYSTEMIC EXAMINATION:   General appearance -comfortable, no acute distress  Mental status - awake & alert, follows commands  Eyes - pupils equal and reactive, sclera anicteric  Mouth - mucous membranes moist, pharynx normal without lesions  Neck -short thick neck supple, no significant adenopathy  Chest - Breath sounds bilaterally were dimnished to auscultation at bases. There were bilateral intermittent crackles present without rhonchi.     Heart - normal rate, regular rhythm, normal S1, S2, no murmurs, rubs, clicks or gallops  Abdomen - soft, nontender, nondistended, no masses or organomegaly  Neurological - non-focal  Extremities - peripheral pulses normal, mild pedal edema, no clubbing or cyanosis  Skin - normal coloration and turgor, no rashes, no suspicious skin lesions noted     DATA REVIEW     Medications: Current Inpatient  Scheduled Meds:   cefepime  2,000 mg IntraVENous Q12H    Vitamin D  2,000 Units Oral Daily    ascorbic acid  500 mg Oral BID    zinc sulfate  50 mg Oral Daily    budesonide-formoterol  2 puff Inhalation BID    apixaban 5 mg Oral BID    famotidine  20 mg Oral BID    sodium chloride flush  10 mL IntraVENous 2 times per day     Continuous Infusions:   sodium chloride         INPUT/OUTPUT:  In: 52.6 [I.V.:52.6]  Out: 1800 [Urine:1800]  Date 01/12/22 0000 - 01/12/22 2359   Shift 0749-6909 5375-6559 8513-1173 24 Hour Total   INTAKE   Shift Total(mL/kg)       OUTPUT   Urine(mL/kg/hr) 800(1.1) 1000(1.4)  1800   Shift Total(mL/kg) 800(9) 1000(11.2)  1800(20.2)   Weight (kg) 88.9 88.9 88.9 88.9        LABS:  ABGs:   No results for input(s): POCPH, POCPCO2, POCPO2, POCHCO3, [id number] in the last 72 hours. CBC:   Recent Labs     01/10/22  0900 01/12/22  0716   WBC 16.5* 9.3   HGB 13.5 11.4*   HCT 42.3 35.4*   .9 103.2*    See Reflexed IPF Result   LYMPHOPCT 5* 8*   RBC 4.15 3.43*   MCH 32.5 33.2   MCHC 31.9 32.2   RDW 14.9* 15.3*     CRP:   No results for input(s): CRP in the last 72 hours. LDH:   No results for input(s): LDH in the last 72 hours. BMP:   Recent Labs     01/10/22  0900 01/12/22  0716    140   K 5.3 5.5*   CL 99 103   CO2 26 29   BUN 13 10   CREATININE 0.36* 0.28*   GLUCOSE 172* 94     Liver Function Test:   No results for input(s): PROT, LABALBU, ALT, AST, GGT, ALKPHOS, BILITOT in the last 72 hours. Coagulation Profile:   No results for input(s): INR, PROTIME, APTT in the last 72 hours. D-Dimer:  No results for input(s): DDIMER in the last 72 hours. Ferritin:    No results for input(s): FERRITIN in the last 72 hours. Lactic Acid:  No results for input(s): LACTA in the last 72 hours. Cardiac Enzymes:  No results for input(s): CKTOTAL, CKMB, CKMBINDEX, TROPONINI in the last 72 hours. Invalid input(s): TROPONIN, HSTROP  BNP/ProBNP:   No results for input(s): BNP, PROBNP in the last 72 hours. Triglycerides:  No results for input(s): TRIG in the last 72 hours.      Microbiology:  Urine Culture:  No components found for: CURINE  Blood Culture:  No components found for: CBLOOD, CFUNGUSBL  Sputum Culture:  No components found for: CSPUTUM  Recent Labs     01/11/22  1734   SPECDESC . BLOOD  . BLOOD   SPECIAL  R ARM 3 ML  L THUMB 8 ML   CULTURE NO GROWTH 12 HOURS  NO GROWTH 12 HOURS     Recent Labs     01/11/22  1734   SPECDESC . BLOOD  . BLOOD   SPECIAL  R ARM 3 ML  L THUMB 8 ML   CULTURE NO GROWTH 12 HOURS  NO GROWTH 12 HOURS        Pathology:    Radiology Reports:  XR CHEST PORTABLE   Final Result   No significant change in bilateral airspace disease. US PELVIS COMPLETE   Final Result   3.3 cm echogenic mass in the region of the uterine body which is possibly the   obscuring the endometrium. RECOMMENDATION:   Transvaginal sonography or MRI with contrast for further workup      RECOMMENDATIONS:   Unavailable         XR CHEST PORTABLE   Final Result   Moderate diffuse interstitial and alveolar opacities throughout the lungs   bilaterally. The finding is nonspecific although suspicious for multilobar   pneumonia, including active COVID-19 viral pneumonitis. CT CHEST PULMONARY EMBOLISM W CONTRAST   Final Result   Motion degraded study. Acute pulmonary emboli suspected particularly in the   left lower lobe but not well visualized due to motion artifact. There is evidence for right ventricular strain with septal bowing suggesting   a significant clot burden. Moderate patchy ground-glass opacities involving all lobes of both lungs   consistent with multifocal pneumonia typical for COVID pneumonia. Findings were discussed with Tony Rankin at 5:39 pm on 12/24/2021. RECOMMENDATIONS:   Unavailable              Echocardiogram:   Results for orders placed during the hospital encounter of 12/24/21    ECHO Complete 2D W Doppler W Color    Narrative    Summary  Global left ventricular systolic function is normal. Estimated EF 50-55%. Dilated Right ventricular with mildly impaired systolic function  Estimated right ventricular systolic pressure is 41GBFO.   No significant valvular regurgitation or stenosis seen. No pericardial effusion seen. ASSESSMENT AND PLAN     Assessment:    // Acute hypoxic respiratory failure, on nasal cannula  // Acute respiratory distress syndrome  // Bilateral multifocal pneumonia due to COVID 19 infection  // Left lower lobe segmental pulmonary embolism  // Obesity            // Postmenopausal bleeding    Plan:    I personally interviewed/examined the patient; reviewed interval history, interpreted all available radiographic and laboratory data at the time of service. Patient has been weaned off of high flow nasal cannula, currently on 6 L/min  We will recommend to continue nocturnal and as needed BiPAP  Continue supplemental oxygen to keep oxygen saturation >90%  Encourage incentive spirometry/Acapella  Continue pulmonary toilet, aspiration precautions and bronchodilators  Chest x-ray to be done as needed  Finished course of Decadron on 01/07/2022  S/p baricitinib  She is on Eliquis 5 mg twice daily  Currently on Symbicort and will continue  Monitor I/O, electrolytes with a goal of even/negative fluid balance  Chemical DVT prophylaxis not needed, patient on Eliquis  Antimicrobials reviewed; currently on cefepime as per ID recommendations  Monitor CRP, LDH, AST/ALT, D-Dimer, Ferritin periodically/as needed  Glycemic control per primary service   Physical/occupational therapy    We will continue to follow. I would like to thank you for allowing me to participate in the care of this patient. Please feel free to call with any further questions or concerns. Elvin Petersen MD   Pulmonary and Critical Care Medicine           1/12/2022, 5:20 PM    This patient was evaluated in the context of the global SARS-CoV-2 (COVID-19) pandemic, which necessitated considerations that the patient either has COVID-19 infection or is at risk of infection with COVID-19.  Institutional protocols and algorithms that pertain to the evaluation & management of patients with COVID-19 or those at risk for COVID-19 are in a state of rapid changes based on information released by regulatory bodies including the CDC and federal and state organizations. These policies and algorithms were followed during the patient's care. Please note that this chart was generated using voice recognition Dragon dictation software. Although every effort was made to ensure the accuracy of this automated transcription, some errors in transcription may have occurred.

## 2022-01-12 NOTE — PROGRESS NOTES
[facility] -   Infectious diseases evaluation  admission date 12/24/2021    reason for consultation:   covid +    Impression :   Current:  · covid pneumonia   · PE -  left lower lobe segmental emboli -  · bandemia  · Thrombocytopenia  · Leukocytosis 1/10, till  1/12  · CRP elevation resolved as of 1/4  · Sepsis, suspected UTI     Other:  ·   Discussion / summary of stay / plan of care   ·   Recommendations   · Post barcitinib 12/24   · Post Decadron 6 mg daily 12/24  · Anticoagulation on Eliquis     · 1/11 -leukocytosis -get 2 BC  · 1/11 Start cefepime for 7 days for sepsis till 1/17  · AB reconciled- midline  · ID signing off    Isolate through 1/13/22    Infection Control Recommendations   · [place] Precautions  · Contact Isolation   · Airborne isolation  · Droplet Isolation  Antimicrobial Stewardship Recommendations   · Off AB    Coordination ofOutpatient Care:   · Estimated Length of IV antimicrobials:  · Patient will need Midline / picc Catheter Insertion:   · Patient will need SNF:  · Patient will need outpatient wound care:     History of Present Illness:   Initial history:  Nabila Franklin is a 70y.o.-year-old female Patient presented through ER via EMS after her family found her with severe shortness of breath in her shower. She had developed a cough approximately a week ago and according to the patient had a negative Covid test at that time. She was found to have an SPO2 of 50% on room air requiring BiPAP. A rapid Covid swab was positive  CTA of the chest showed segmental pulmonary embolism in the left lower lobe with possible right heart strain. An echocardiogram was completed and was negative for RV strain with RV systolic pressure of 29 mmHg  The patient was initiated on a heparin drip as well as high-dose Decadron, Rocephin and baricitinib.     Interval changes:   12/28  60 FiO2 high flow, he feels better eating better but he saturating 90%,    12/29  70% FiO2 high flow, saturating 89%, she is short of breath, eating slightly, has a cough. In general she feels okay  Labs reviewed    12/30  60 FiO2 high flow, good appetite, alert oriented x3  WBC 10  Urine culture 12/26 -    12/31  96 saturation FiO2 70% on high flow, she feels better. WBC 7.4  CRP 6    1/1/22  High flow 45%, which is an improvement,  98 saturation, alert with a cough, WBC normal 9.2, tolerating her statin    1/3/22  Patient is on BiPAP 70%, saturating 99 and alert appropriate    1/4  High flow improved 70%, saturating 96%, no fever, looks better  CRP nose normal    1/5  Chest x-ray has not changed, saturating 91% on 57% high flow  Oriented x3, still has a cough  In general better    1/6  97 saturation BiPAP 60, pulse 87  CRP less than 3  No positive culture    1/7  98 saturation 50% FiO2 high flow, oriented x3  CRP less than 3     1/8   The patient remains on high flow nasal cannula with 65% FiO2 and 30 L/min    Current evaluation  1/12/2022   /78   Pulse 78   Temp 97.8 °F (36.6 °C) (Oral)   Resp 18   Ht 5' 4\" (1.626 m)   Wt 195 lb 15.8 oz (88.9 kg)   SpO2 96%   Breastfeeding No   BMI 33.64 kg/m²   Afebrile, tachycardic, VS stable otherwise     Patient on nasal cannula now at 3L, SpO2 96%. She is on BiPAP overnight still to prevent noctural desaturation and due to suspected MAURILIO. No acute issues noted at this time. WBC elevated from 8.8 on 1/8/22 to 16.5 on 1/10/22. Start cefepime. Obtain blood cultures. WBC now normalized at 9.3. She is medically stable per IM to discharge to SNF or LTAC.      Summary of relevant labs:  Labs:  .5-18-6-3    BNP 9968  Troponin 54  Lactic acid 3.5  Ferritin 858  WBC 14.6 -7-->8.8-->16.5-->9.3   D-dimer 70.48    Micro:  Covid positive  Blood culture: no growth   Urine culture: no growth     Imaging:  CT chest 12/24/22: bilat covid pneumonia w thick consolidations  CXR 1/5/22: no significant change in bilateral airspace disease. I have personally reviewed the past medical history, past surgical history, medications, social history, and family history, and I haveupdated the database accordingly. Allergies:   Patient has no known allergies. Review of Systems:     Review of Systems   Constitutional: Negative for activity change, chills, diaphoresis and fatigue. HENT: Negative for congestion and trouble swallowing. Eyes: Negative for photophobia and pain. Respiratory: Negative for cough, shortness of breath and wheezing. Cardiovascular: Negative for chest pain and leg swelling. Gastrointestinal: Negative for abdominal distention, diarrhea and nausea. Endocrine: Negative for polydipsia and polyphagia. Genitourinary: Negative for dysuria and frequency. Musculoskeletal: Negative for arthralgias and myalgias. Skin: Negative for color change and rash. Allergic/Immunologic: Negative for immunocompromised state. Neurological: Negative for dizziness, seizures, numbness and headaches. Psychiatric/Behavioral: Negative for agitation and confusion. Physical Examination :       Physical Exam  Constitutional:       General: She is not in acute distress. Appearance: Normal appearance. She is obese. She is not ill-appearing or diaphoretic. HENT:      Head: Normocephalic and atraumatic. Nose: Nose normal.      Mouth/Throat:      Mouth: Mucous membranes are moist.      Pharynx: Oropharynx is clear. Eyes:      General: No scleral icterus. Extraocular Movements: Extraocular movements intact. Conjunctiva/sclera: Conjunctivae normal.   Cardiovascular:      Rate and Rhythm: Normal rate and regular rhythm. Heart sounds: Normal heart sounds. No murmur heard. No friction rub. No gallop. Pulmonary:      Effort: No respiratory distress. Breath sounds: Normal breath sounds. No wheezing. Abdominal:      General: There is no distension. Palpations: Abdomen is soft. Tenderness: There is no abdominal tenderness. Genitourinary:     Comments: No castro  No CVA tenderness  Musculoskeletal:         General: No swelling, deformity or signs of injury. Cervical back: Neck supple. No rigidity. Right lower leg: No edema. Left lower leg: No edema. Skin:     General: Skin is dry. Capillary Refill: Capillary refill takes less than 2 seconds. Coloration: Skin is not jaundiced or pale. Findings: No erythema or rash. Neurological:      General: No focal deficit present. Mental Status: She is alert. Mental status is at baseline. Psychiatric:         Mood and Affect: Mood normal.         Thought Content: Thought content normal.         Past Medical History:   No past medical history on file. Past Surgical  History:   No past surgical history on file.     Medications:      cefepime  2,000 mg IntraVENous Q12H    Vitamin D  2,000 Units Oral Daily    ascorbic acid  500 mg Oral BID    zinc sulfate  50 mg Oral Daily    budesonide-formoterol  2 puff Inhalation BID    apixaban  5 mg Oral BID    famotidine  20 mg Oral BID    sodium chloride flush  10 mL IntraVENous 2 times per day       Social History:     Social History     Socioeconomic History    Marital status: Unknown     Spouse name: Not on file    Number of children: Not on file    Years of education: Not on file    Highest education level: Not on file   Occupational History    Not on file   Tobacco Use    Smoking status: Current Every Day Smoker     Types: Cigarettes    Smokeless tobacco: Never Used   Substance and Sexual Activity    Alcohol use: Not Currently    Drug use: Not Currently    Sexual activity: Not on file   Other Topics Concern    Not on file   Social History Narrative    Not on file     Social Determinants of Health     Financial Resource Strain:     Difficulty of Paying Living Expenses: Not on file   Food Insecurity:     Worried About Running Out of Food in the Last Year: Not on file    Ran Out of Food in the Last Year: Not on file   Transportation Needs:     Lack of Transportation (Medical): Not on file    Lack of Transportation (Non-Medical): Not on file   Physical Activity:     Days of Exercise per Week: Not on file    Minutes of Exercise per Session: Not on file   Stress:     Feeling of Stress : Not on file   Social Connections:     Frequency of Communication with Friends and Family: Not on file    Frequency of Social Gatherings with Friends and Family: Not on file    Attends Alevism Services: Not on file    Active Member of [address] [place] [address] [place] or Organizations: Not on file    Attends Club or Organization Meetings: Not on file    Marital Status: Not on file   Intimate Partner Violence:     Fear of Current or Ex-Partner: Not on file    Emotionally Abused: Not on file    Physically Abused: Not on file    Sexually Abused: Not on file   Housing Stability:     Unable to Pay for Housing in the Last Year: Not on file    Number of [place] in the Last Year: Not on file    Unstable Housing in the Last Year: Not on file       Family History:     Family History   Problem Relation Age of Onset    Cancer Maternal Aunt         breast      Medical Decision Making:   I have independently reviewed/ordered the following labs:    CBC with Differential:   Recent Labs     01/10/22  0900 01/12/22  0716   WBC 16.5* 9.3   HGB 13.5 11.4*   HCT 42.3 35.4*    See Reflexed IPF Result   LYMPHOPCT 5* 8*   MONOPCT 2* 5     BMP:  Recent Labs     01/10/22  0900 01/12/22  0716    140   K 5.3 5.5*   CL 99 103   CO2 26 29   BUN 13 10   CREATININE 0.36* 0.28*     Hepatic Function Panel:   No results for input(s): PROT, LABALBU, BILIDIR, IBILI, BILITOT, ALKPHOS, ALT, AST in the last 72 hours. No results for input(s): RPR in the last 72 hours. No results for input(s): HIV in the last 72 hours. No results for input(s): BC in the last 72 hours.   Lab Results   Component Value Date CREATININE 0.28 01/12/2022    GLUCOSE 94 01/12/2022       Thank you for allowing us to participate in the care of this patient. Please call with questions. This note is created with the assistance of a speech recognition program.  While intending to generate adocument that actually reflects the content of the visit, the document can still have some errors including those of syntax and sound a like substitutions which may escape proof reading. It such instances, actual meaningcan be extrapolated by contextual diversion. Maria Elena Trimble, [facility]  Office: [phone]  Perfect serve / office [phone]      I have discussed the care of the patient, including pertinent history and exam findings,  with the resident. I have seen and examined the patient and the key elements of all parts of the encounter have been performed by me. I agree with the assessment, plan and orders as documented by the resident.     Angelina Bee, Infectious Diseases

## 2022-01-12 NOTE — PROGRESS NOTES
[facility]  Office: [address], DO, Lisette Pitts, DO, Johanna Kerr, DO, Justa De Leon Blood, DO, Abhijit Silveira MD, Calderon Kay MD, Manpreet Jean MD, Rafael Viveros MD, Antoine Varma MD, Renny Jackman MD, Regi Swenson MD, Mindi Maria, DO, Caitlin Sheppard, DO, Michelle Porras MD,  Sylvester Sandoval, DO, Solomon Torres MD, Blanka Maynard MD, Keila Beckford MD, Mary Ribeiro MD, Dai De Jesus MD, Roxy Joe MD, Agatha Ryan MD, Kristen Glover, [facility], [facility], CNP, Leo Hancock, CNP, Cristal Corey, CNS, Yessenia Bill, CNP, Josefina Hirsch, CNP, Anisa Purcell, CNP, Taco Earl, CNP, Humberto Marino, CNP, Sly Cabrera PA-C, Melony Sweeney, [facility], Fermín Treviño, [facility], Jagdish Alejandre, CNP, Tej Alcala, CNP, Marquez Fajardo, CNP, Antonella Gillette, CNP, Kalli Hoskins, [facility], Dayanara Wilkinson, [address] [place] [address]    Progress Note    1/12/2022    7:37 AM    Name:   Michelle Ochoa  MRN:     [id number]     Acct:      [de-identified]   Room:   [address] [place] [address] Day:  23  Admit Date:  12/24/2021  3:37 PM    PCP:   No primary care provider on file. Code Status:  Full Code    Subjective:     C/C:   Chief Complaint   Patient presents with    Shortness of Breath    Fall     Interval History Status: improved.          Brief History:     70 F Covid (+) 12/24 pneumonia 2/2 covid  Completed decadron baricitinib  Had subsegmental PE started on eliquis - will cont for 3-6 months (provoked)    ECHO also performed RVSP 29mm Hg no wall motion abnl - preserved EF 50-55 may have hfpef  Could also be result of PE although there is more evidence of RV dysfunction    Irrespective, she is convalescent and ready to go to short-term nursing facility today    She continues to be on 6 L nasal cannula will wean this as she is able to tolerated  Per pulmonary she is getting BiPAP at night to prevent nocturnal desaturation    She may have undiagnosed obstructive sleep apnea    Review of Systems:     Constitutional:  negative for chills, fevers, sweats  Respiratory:  negative for cough, dyspnea on exertion, shortness of breath, wheezing  Cardiovascular:  negative for chest pain, chest pressure/discomfort, lower extremity edema, palpitations  Gastrointestinal:  negative for abdominal pain, constipation, diarrhea, nausea, vomiting  Neurological:  negative for dizziness, headache    Medications: Allergies:  No Known Allergies    Current Meds:   Scheduled Meds:    cefepime  2,000 mg IntraVENous Q12H    Vitamin D  2,000 Units Oral Daily    ascorbic acid  500 mg Oral BID    zinc sulfate  50 mg Oral Daily    budesonide-formoterol  2 puff Inhalation BID    apixaban  5 mg Oral BID    famotidine  20 mg Oral BID    sodium chloride flush  10 mL IntraVENous 2 times per day     Continuous Infusions:    sodium chloride       PRN Meds: guaiFENesin-codeine, calcium carbonate, albuterol sulfate HFA, benzocaine-menthol, loperamide, benzonatate, sodium chloride flush, sodium chloride, promethazine **OR** ondansetron, polyethylene glycol, acetaminophen **OR** acetaminophen    Data:     Past Medical History:   has no past medical history on file. Social History:   reports that she has been smoking cigarettes. She has never used smokeless tobacco. She reports previous alcohol use. She reports previous drug use. Family History:   Family History   Problem Relation Age of Onset    Cancer Maternal Aunt         breast       Vitals:  BP (!) 139/90   Pulse 72   Temp 97.5 °F (36.4 °C) (Axillary)   Resp 22   Ht 5' 4\" (1.626 m)   Wt 195 lb 15.8 oz (88.9 kg)   SpO2 100%   Breastfeeding No   BMI 33.64 kg/m²   Temp (24hrs), Av.6 °F (36.4 °C), Min:97.5 °F (36.4 °C), Max:97.9 °F (36.6 °C)    No results for input(s): POCGLU in the last 72 hours. I/O (24Hr):     Intake/Output Summary (Last 24 hours) at 2022 8089  Last data filed at 2022 5706  Gross per 24 hour   Intake 52.6 ml   Output 800 ml   Net -747.4 ml       Labs:  Hematology:  Recent Labs     01/10/22  0900   WBC 16.5*   RBC 4.15   HGB 13.5   HCT 42.3   .9   MCH 32.5   MCHC 31.9   RDW 14.9*      MPV 10.8     Chemistry:  Recent Labs     01/10/22  0900      K 5.3   CL 99   CO2 26   GLUCOSE 172*   BUN 13   CREATININE 0.36*   ANIONGAP 11   LABGLOM >60   GFRAA >60   CALCIUM 8.9   No results for input(s): PROT, LABALBU, LABA1C, A2GZAMP, R5EFZTS, FT4, TSH, AST, ALT, LDH, GGT, ALKPHOS, LABGGT, BILITOT, BILIDIR, AMMONIA, AMYLASE, LIPASE, LACTATE, CHOL, HDL, LDLCHOLESTEROL, CHOLHDLRATIO, TRIG, VLDL, HQG25WM, PHENYTOIN, PHENYF, URICACID, POCGLU in the last 72 hours. ABG:  Lab Results   Component Value Date    POCPH 7.406 01/04/2022    POCPCO2 41.1 01/04/2022    POCPO2 67.0 01/04/2022    POCHCO3 25.8 01/04/2022    NBEA NOT REPORTED 01/04/2022    PBEA 1 01/04/2022    SBU2KPL NOT REPORTED 01/04/2022    MSAB3PUL 93 01/04/2022    FIO2 60.0 01/04/2022     Lab Results   Component Value Date/Time    SPECIAL  R ARM 3 ML 01/11/2022 05:34 PM    SPECIAL L THUMB 8 ML 01/11/2022 05:34 PM     Lab Results   Component Value Date/Time    CULTURE NO GROWTH 12 HOURS 01/11/2022 05:34 PM    CULTURE NO GROWTH 12 HOURS 01/11/2022 05:34 PM       Radiology:  No results found.     Physical Examination:        General appearance:  alert, cooperative and no distress, irritated because she is wearing the BiPAP  Mental Status:  oriented to person, place and time and normal affect  Lungs:  clear to auscultation bilaterally, normal effort  Heart:  regular rate and rhythm, no murmur  Abdomen:  soft, nontender, nondistended, normal bowel sounds, no masses, hepatomegaly, splenomegaly  Extremities:  no edema, redness, tenderness in the calves  Skin:  no gross lesions, rashes, induration    Assessment:        Hospital Problems           Last Modified POA    * (Principal) Pneumonia due to COVID-19 virus 1/10/2022 Yes    Acute respiratory failure with hypoxia ([address] 1/10/2022 Yes    Obesity with body mass index greater than 30 1/10/2022 Yes    Pulmonary embolus, left ([address] 1/10/2022 Yes    Postmenopausal bleeding 1/10/2022 Yes    Overview Addendum 1/3/2022  6:29 PM by Adalberto Paez,      During hospitalization in December 2021  Outpatient follow-up recommended due to acute respiratory failure. TVUS 1/2/22 demonstrates:  3.3 cm echogenic mass in the region of the uterine body which is   possibly the obscuring the endometrium.                     Plan:        Acute hypoxemic respiratory failure on BiPAP  COVID-pneumonia  Subsegmental pulmonary embolus on anticoagulation  Uterine body mass will require outpatient evaluation by gynecology possible malignancy/biopsy  BMI greater than 30 portends worse prognosis although patient doing well  No DVT prophylaxis patient on [facility]  Cardiac diet    I would anticipate discharge once oxygen requirements improved, and patient has location appropriate for discharge  Next 24 to 48 hours    Mariia English MD  1/12/2022  7:37 AM

## 2022-01-12 NOTE — CARE COORDINATION
Notified by Carrie Avila at [facility] that they are able to accept today. PS sent to Dr Jovan Goldman to determine if he wants pt discharged to [facility] since she is currently on 4L NC    1530 Dr Jovan Goldman placed home O2 eval and d/c to home. Lolis at [facility] notified. Jennifer RN spoke to pt. She would like to go to SNF instead of home. List given. Call back to Carrie Avila. Bed is no longer available. Bed may be available tomorrow. She will call insurance company in the AM to determine if they are still able to accept pt d/t 4L NC. Call to pt. She would like to discuss with her son, Yulissa Langston. Call to Yulissa Langston. Update provided. Call transferred to pt's room    [phone] spoke to pt. She would like, in order of preference, [address] in , [facility], [facility], and [facility].  Referrals sent

## 2022-01-12 NOTE — PLAN OF CARE
Spoke with son Dalila Kingsley about DC plans, pt was given facility list and she will choose from that . With ambulating from recline to [facility] she does get tachycardic highest rate 115, and desatted to 80% , takes her a few minutes to recover, she remains on 4 L NC. Pt back in recliner , stable.

## 2022-01-12 NOTE — PROGRESS NOTES
Spoke to son Tre Kahn, updates given. Wants to know when can expect to go to rehab, advised will check with day team and  to confirm.

## 2022-01-12 NOTE — PLAN OF CARE
Problem: Airway Clearance - Ineffective  Goal: Achieve or maintain patent airway  1/12/2022 1428 by Becky Maddox RN  Outcome: Ongoing  1/12/2022 0439 by Soumya Bolanos RN  Outcome: Met This Shift     Problem: Gas Exchange - Impaired  Goal: Absence of hypoxia  1/12/2022 1428 by Becky Maddox RN  Outcome: Ongoing  1/12/2022 0439 by Soumya Bolanos RN  Outcome: Met This Shift  Goal: Promote optimal lung function  Outcome: Ongoing     Problem: Breathing Pattern - Ineffective  Goal: Ability to achieve and maintain a regular respiratory rate  Outcome: Ongoing     Problem:  Body Temperature -  Risk of, Imbalanced  Goal: Ability to maintain a body temperature within defined limits  1/12/2022 1428 by Becky Maddox RN  Outcome: Ongoing  1/12/2022 0439 by Soumya Bolanos RN  Outcome: Met This Shift  Goal: Will regain or maintain usual level of consciousness  1/12/2022 1428 by Becky Maddox RN  Outcome: Ongoing  1/12/2022 0439 by Soumya Bolanos RN  Outcome: Ongoing  Goal: Complications related to the disease process, condition or treatment will be avoided or minimized  Outcome: Ongoing     Problem: Isolation Precautions - Risk of Spread of Infection  Goal: Prevent transmission of infection  1/12/2022 1428 by Becky Maddox RN  Outcome: Ongoing  1/12/2022 0439 by Soumya Bolanos RN  Outcome: Ongoing     Problem: Nutrition Deficits  Goal: Optimize nutritional status  Outcome: Ongoing     Problem: Risk for Fluid Volume Deficit  Goal: Maintain normal heart rhythm  1/12/2022 1428 by Becky Maddox RN  Outcome: Ongoing  1/12/2022 0439 by Soumya Bolanos RN  Outcome: Met This Shift  Goal: Maintain absence of muscle cramping  1/12/2022 1428 by Becky Maddox RN  Outcome: Ongoing  1/12/2022 0439 by Soumya Bolanos RN  Outcome: Met This Shift  Goal: Maintain normal serum potassium, sodium, calcium, phosphorus, and pH  Outcome: Ongoing     Problem: Loneliness or Risk for Loneliness  Goal: Demonstrate positive use of time alone when socialization is not possible  1/12/2022 1428 by Ellen Isbell RN  Outcome: Ongoing  1/12/2022 0439 by Pricila Weathers RN  Outcome: Ongoing     Problem: Fatigue  Goal: Verbalize increase energy and improved vitality  Outcome: Ongoing     Problem: Patient Education: Go to Patient Education Activity  Goal: Patient/Family Education  Outcome: Ongoing     Problem: OXYGENATION/RESPIRATORY FUNCTION  Goal: Patient will maintain patent airway  Outcome: Ongoing  Goal: Patient will achieve/maintain normal respiratory rate/effort  Outcome: Ongoing     Problem: Skin Integrity:  Goal: Will show no infection signs and symptoms  1/12/2022 1428 by Ellen Isbell RN  Outcome: Ongoing  1/12/2022 0439 by Pricila Weathers RN  Outcome: Ongoing  Goal: Absence of new skin breakdown  1/12/2022 1428 by Ellen Isbell RN  Outcome: Ongoing  1/12/2022 0439 by Pricila Weathers RN  Outcome: Ongoing     Problem: Nutrition  Goal: Optimal nutrition therapy  Outcome: Ongoing

## 2022-01-13 LAB
ANION GAP SERPL CALCULATED.3IONS-SCNC: 12 MMOL/L (ref 9–17)
BUN BLDV-MCNC: 12 MG/DL (ref 8–23)
BUN/CREAT BLD: ABNORMAL (ref 9–20)
CALCIUM SERPL-MCNC: 9.1 MG/DL (ref 8.6–10.4)
CHLORIDE BLD-SCNC: 100 MMOL/L (ref 98–107)
CO2: 26 MMOL/L (ref 20–31)
CREAT SERPL-MCNC: 0.33 MG/DL (ref 0.5–0.9)
GFR AFRICAN AMERICAN: >60 ML/MIN
GFR NON-AFRICAN AMERICAN: >60 ML/MIN
GFR SERPL CREATININE-BSD FRML MDRD: ABNORMAL ML/MIN/{1.73_M2}
GFR SERPL CREATININE-BSD FRML MDRD: ABNORMAL ML/MIN/{1.73_M2}
GLUCOSE BLD-MCNC: 118 MG/DL (ref 70–99)
POTASSIUM SERPL-SCNC: 5.3 MMOL/L (ref 3.7–5.3)
SODIUM BLD-SCNC: 138 MMOL/L (ref 135–144)

## 2022-01-13 PROCEDURE — 99233 SBSQ HOSP IP/OBS HIGH 50: CPT | Performed by: INTERNAL MEDICINE

## 2022-01-13 PROCEDURE — 97116 GAIT TRAINING THERAPY: CPT

## 2022-01-13 PROCEDURE — 6370000000 HC RX 637 (ALT 250 FOR IP): Performed by: FAMILY MEDICINE

## 2022-01-13 PROCEDURE — 2580000003 HC RX 258: Performed by: STUDENT IN AN ORGANIZED HEALTH CARE EDUCATION/TRAINING PROGRAM

## 2022-01-13 PROCEDURE — 99232 SBSQ HOSP IP/OBS MODERATE 35: CPT | Performed by: STUDENT IN AN ORGANIZED HEALTH CARE EDUCATION/TRAINING PROGRAM

## 2022-01-13 PROCEDURE — 36415 COLL VENOUS BLD VENIPUNCTURE: CPT

## 2022-01-13 PROCEDURE — 80048 BASIC METABOLIC PNL TOTAL CA: CPT

## 2022-01-13 PROCEDURE — 94761 N-INVAS EAR/PLS OXIMETRY MLT: CPT

## 2022-01-13 PROCEDURE — 2060000000 HC ICU INTERMEDIATE R&B

## 2022-01-13 PROCEDURE — 97110 THERAPEUTIC EXERCISES: CPT

## 2022-01-13 PROCEDURE — 6360000002 HC RX W HCPCS: Performed by: INTERNAL MEDICINE

## 2022-01-13 PROCEDURE — 97530 THERAPEUTIC ACTIVITIES: CPT

## 2022-01-13 PROCEDURE — 2580000003 HC RX 258: Performed by: INTERNAL MEDICINE

## 2022-01-13 PROCEDURE — 2700000000 HC OXYGEN THERAPY PER DAY

## 2022-01-13 PROCEDURE — 94660 CPAP INITIATION&MGMT: CPT

## 2022-01-13 PROCEDURE — 6370000000 HC RX 637 (ALT 250 FOR IP): Performed by: INTERNAL MEDICINE

## 2022-01-13 RX ADMIN — CEFEPIME HYDROCHLORIDE 2000 MG: 2 INJECTION, POWDER, FOR SOLUTION INTRAVENOUS at 20:41

## 2022-01-13 RX ADMIN — SODIUM CHLORIDE, PRESERVATIVE FREE 10 ML: 5 INJECTION INTRAVENOUS at 20:41

## 2022-01-13 RX ADMIN — OXYCODONE HYDROCHLORIDE AND ACETAMINOPHEN 500 MG: 500 TABLET ORAL at 08:13

## 2022-01-13 RX ADMIN — APIXABAN 5 MG: 5 TABLET, FILM COATED ORAL at 20:40

## 2022-01-13 RX ADMIN — FAMOTIDINE 20 MG: 20 TABLET, FILM COATED ORAL at 08:14

## 2022-01-13 RX ADMIN — ZINC SULFATE 220 MG (50 MG) CAPSULE 50 MG: CAPSULE at 08:13

## 2022-01-13 RX ADMIN — APIXABAN 5 MG: 5 TABLET, FILM COATED ORAL at 08:13

## 2022-01-13 RX ADMIN — OXYCODONE HYDROCHLORIDE AND ACETAMINOPHEN 500 MG: 500 TABLET ORAL at 20:40

## 2022-01-13 RX ADMIN — CEFEPIME HYDROCHLORIDE 2000 MG: 2 INJECTION, POWDER, FOR SOLUTION INTRAVENOUS at 08:14

## 2022-01-13 RX ADMIN — Medication 2000 UNITS: at 08:14

## 2022-01-13 RX ADMIN — SODIUM CHLORIDE, PRESERVATIVE FREE 10 ML: 5 INJECTION INTRAVENOUS at 08:14

## 2022-01-13 RX ADMIN — FAMOTIDINE 20 MG: 20 TABLET, FILM COATED ORAL at 20:41

## 2022-01-13 ASSESSMENT — PAIN SCALES - GENERAL
PAINLEVEL_OUTOF10: 0

## 2022-01-13 ASSESSMENT — ENCOUNTER SYMPTOMS
VOICE CHANGE: 0
PHOTOPHOBIA: 0
DIARRHEA: 0
COUGH: 1
SHORTNESS OF BREATH: 1
SINUS PAIN: 0
ABDOMINAL PAIN: 0
SORE THROAT: 0
TROUBLE SWALLOWING: 0
ALLERGIC/IMMUNOLOGIC NEGATIVE: 1
VOMITING: 0

## 2022-01-13 ASSESSMENT — PAIN SCALES - WONG BAKER
WONGBAKER_NUMERICALRESPONSE: 0

## 2022-01-13 NOTE — PROGRESS NOTES
Physical Therapy  Facility/Department: [facility] CAR 3  Daily Treatment Note  NAME: Kiana Heading  : 1950  MRN: [id number]    Date of Service: 2022    Discharge Recommendations: Further therapy recommended at discharge. The patient should be able to tolerate at least 3 hours of therapy per day over 5 days or 15 hours over 7 days. Patient would benefit from continued therapy after discharge   PT Equipment Recommendations  Equipment Needed: No    Assessment   Body structures, Functions, Activity limitations: Decreased functional mobility ; Decreased posture;Decreased endurance;Decreased strength;Decreased balance;Decreased safe awareness  Assessment: Pt amb ~8 ft with RW and MOD Aincreased effort to progress LE's. Pt stood 3x for 1-3 mins, pt limited by O2 desaturation upon movement. Recommend contiuned PT after d/c to address deficits  Prognosis: Good  PT Education: Goals;Transfer Training;PT Role;Functional Mobility Training;Plan of Care;General Safety  Patient Education: educated on the importance of pursed lip breathing with activity. REQUIRES PT FOLLOW UP: Yes  Activity Tolerance  Activity Tolerance: Pt's SpO2 ranged from 94%-83% during today's session, drops quickly with stands and exercise. Patient Diagnosis(es): The primary encounter diagnosis was COVID-19. Diagnoses of Dehydration, Tachycardia, and Pneumonia due to COVID-19 virus were also pertinent to this visit. has no past medical history on file. has no past surgical history on file. Restrictions  Restrictions/Precautions  Restrictions/Precautions: Up as Tolerated,Isolation,Contact Precautions  Required Braces or Orthoses?: No  Position Activity Restriction  Other position/activity restrictions: O2 NC 6 Lm. Subjective   General  Chart Reviewed: Yes  Response To Previous Treatment: Patient with no complaints from previous session.   Family / Caregiver Present: No  Subjective  Subjective: Pt resting in recliner upon arrival, agreeable to PT.  Pleasant and coopeative, states feeling better, but weak  General Comment  Comments: Pt on 6L O2 throughout, retired to chair  Pain Assessment  Pain Assessment: 0-10  Pain Level: 0       Orientation  Orientation  Overall Orientation Status: Within Functional Limits  Cognition   Cognition  Overall Cognitive Status: WFL  Objective   Bed mobility  Supine to Sit: Minimal assistance  Sit to Supine: Unable to assess  Scooting: Contact guard assistance  Comment: HOB elevated, bed rails utalized, pt states she has weakness, required assist with postioning EOB  Transfers  Sit to Stand: Minimal Assistance  Stand to sit: Minimal Assistance  Bed to Chair: Minimal assistance  Car Transfer: Supervision  Comment: Sit to stand x 4 trials MIN A with cues for hand placement, increased effort and time, bed raised slightly for last 2 STS  Ambulation  Ambulation?: Yes  Ambulation 1  Device: Rolling Walker  Assistance: Minimal assistance  Quality of Gait: Unsatedy, difficulty progressing B LE  Gait Deviations: Decreased step length  Distance: 8 ft  Comments: Limited by O2 which decreassed with activity 83%- 94% when resting and HR up to 100     Balance  Posture: Fair  Sitting - Static: Fair;+  Sitting - Dynamic: Fair  Standing - Static: Fair;-  Standing - Dynamic: Fair;-  Comments: assessed with RW for support  Exercises  Quad Sets: x 10 reps  Heelslides: x 10 reps  Gluteal Sets: x 10 reps  Hip Flexion: x10 seated  Hip Abduction: x 10 reps in seated  Knee Long Arc Quad: x10 in seated  Ankle Pumps: x 10 reps  Core Strengthening: Pt sat EOB for 10 miins, w/out LB support  BUE there ex: shoulder flexion, bicep curls,x15 seated situp x15 in chair    Comments: pt performed seated and supine ther ex 10 reps each side      AM-PAC Score  AM-PAC Inpatient Mobility Raw Score : 16 (01/13/22 1447)  AM-PAC Inpatient T-Scale Score : 40.78 (01/13/22 1447)  Mobility Inpatient CMS 0-100% Score: 54.16 (01/13/22 1447)  Mobility Inpatient CMS G-Code Modifier : CK (01/13/22 2213)          Goals  Short term goals  Time Frame for Short term goals: 14 visits  Short term goal 1: Pt will ambulate 75 feet with a RW and SBA to increase functional independence. Short term goal 2: Pt will negotiate 3 stairs with no handrails and SBA to allow the pt to enter prior living arrangements. Short term goal 3: Pt will demonstrate fair+ standing balance to decrease fall risk. Short term goal 4: Pt will tolerate a 35 minute therapy session to promote increased endurance. Short term goal 5: Pt will perform sit<>stand transfer with SBA to increase functional independence.     Plan    Plan  Times per week: 5x  Times per day: Daily  Current Treatment Recommendations: Strengthening,Transfer Training,Endurance Training,ROM,Balance Training,Gait Training,Functional Mobility Training,Safety Education & Training,Home Exercise Program,Stair training,Patient/Caregiver Education & Training,Equipment Evaluation, Education, & procurement,Positioning  Safety Devices  Type of devices: Patient at risk for falls,Call light within reach,Gait belt,Nurse notified,All fall risk precautions in place,Left in chair  Restraints  Initially in place: No     Therapy Time   Individual Concurrent Group Co-treatment   Time In 1408         Time Out 1446         Minutes 38         Timed Code Treatment Minutes: [address], PTA

## 2022-01-13 NOTE — PROGRESS NOTES
[facility]  Office: [address], DO, Reji Bernstein, DO, Ingrid Galvez, DO, Willem Rocha, DO, Anna Rizo MD, Alix Garcia MD, Solomon Hernandez MD, Johnathon Zamora MD, Austin Vega MD, Bushra Sharma MD, Estephania Fuentes MD, Abdullahi Rojas, DO, Scott Cespedes, DO, Dionne Mcconnell MD,  Jacqueline Jenkins, DO, Karina Guallpa MD, Joseph Peguero MD, Oza Hatchet, MD, Carlos Dennison MD, Falguni Sharp MD, Emily Landrum MD, Amy Spain MD, Zelda Schirmer, [facility], [facility], CNP, Kate Garcia, CNP, Travon Guerra, CNS, Jyothi Gallagher, CNP, Kunal Moreno, CNP, Tierra Ratliff, CNP, Hiro Jacques, CNP, Ebenezer Landon, CNP, Manpreet Cui PA-C, Sara Alvarez, DNP, Annabelle Davis, [facility], Yanelis Jean-Baptiste, CNP, Yue Torres, CNP, Barbara Borja, CNP, Chaz Rosado, CNP, Jorge Sherman, CNP, Carson Goltz, [address] [place] [address]    Progress Note    1/13/2022    10:13 AM    Name:   Jessica Or  MRN:     [id number]     Acct:      [de-identified]   Room:   [address] [place] [address] Day:  21  Admit Date:  12/24/2021  3:37 PM    PCP:   No primary care provider on file. Code Status:  Full Code    Subjective:     C/C:   Chief Complaint   Patient presents with    Shortness of Breath    Fall     Interval History Status: improved. Brief History:     70 F Covid (+) 12/24 pneumonia 2/2 covid  Completed decadron baricitinib  Had subsegmental PE started on eliquis - will cont for 3-6 months (provoked)     ECHO also performed RVSP 29mm Hg no wall motion abnl - preserved EF 50-55 may have hfpef  Could also be result of PE although there is more evidence of RV dysfunction     Irrespective, she is convalescent and ready to go to short-term nursing facility today     She continues to be on 3 L nasal cannula will wean this as she is able to tolerated  Per pulmonary she is getting BiPAP at night to prevent nocturnal desaturation.     Needs placement but otherwise can go home      Review of Systems:     Constitutional:  negative for chills, fevers, sweats  Respiratory:  negative for cough, dyspnea on exertion, shortness of breath, wheezing  Cardiovascular:  negative for chest pain, chest pressure/discomfort, lower extremity edema, palpitations  Gastrointestinal:  negative for abdominal pain, constipation, diarrhea, nausea, vomiting  Neurological:  negative for dizziness, headache    Medications: Allergies:  No Known Allergies    Current Meds:   Scheduled Meds:    cefepime  2,000 mg IntraVENous Q12H    Vitamin D  2,000 Units Oral Daily    ascorbic acid  500 mg Oral BID    zinc sulfate  50 mg Oral Daily    budesonide-formoterol  2 puff Inhalation BID    apixaban  5 mg Oral BID    famotidine  20 mg Oral BID    sodium chloride flush  10 mL IntraVENous 2 times per day     Continuous Infusions:    sodium chloride       PRN Meds: guaiFENesin-codeine, calcium carbonate, albuterol sulfate HFA, benzocaine-menthol, loperamide, benzonatate, sodium chloride flush, sodium chloride, promethazine **OR** ondansetron, polyethylene glycol, acetaminophen **OR** acetaminophen    Data:     Past Medical History:   has no past medical history on file. Social History:   reports that she has been smoking cigarettes. She has never used smokeless tobacco. She reports previous alcohol use. She reports previous drug use. Family History:   Family History   Problem Relation Age of Onset    Cancer Maternal Aunt         breast       Vitals:  /85   Pulse 75   Temp 98 °F (36.7 °C) (Oral)   Resp 19   Ht 5' 4\" (1.626 m)   Wt 195 lb 15.8 oz (88.9 kg)   SpO2 99%   Breastfeeding No   BMI 33.64 kg/m²   Temp (24hrs), Av.1 °F (36.7 °C), Min:97.8 °F (36.6 °C), Max:98.4 °F (36.9 °C)    No results for input(s): POCGLU in the last 72 hours. I/O (24Hr):     Intake/Output Summary (Last 24 hours) at 2022 1013  Last data filed at 2022 2045  Gross per 24 hour   Intake 360 ml Output 1840 ml   Net -1480 ml       Labs:  Hematology:  Recent Labs     01/12/22  0716   WBC 9.3   RBC 3.43*   HGB 11.4*   HCT 35.4*   .2*   MCH 33.2   MCHC 32.2   RDW 15.3*   PLT See Reflexed IPF Result   MPV NOT REPORTED     Chemistry:  Recent Labs     01/12/22  0716      K 5.5*      CO2 29   GLUCOSE 94   BUN 10   CREATININE 0.28*   ANIONGAP 8*   LABGLOM >60   GFRAA >60   CALCIUM 8.6   No results for input(s): PROT, LABALBU, LABA1C, A3ZKMDS, J0LLTFS, FT4, TSH, AST, ALT, LDH, GGT, ALKPHOS, LABGGT, BILITOT, BILIDIR, AMMONIA, AMYLASE, LIPASE, LACTATE, CHOL, HDL, LDLCHOLESTEROL, CHOLHDLRATIO, TRIG, VLDL, FMM49FN, PHENYTOIN, PHENYF, URICACID, POCGLU in the last 72 hours. ABG:  Lab Results   Component Value Date    POCPH 7.406 01/04/2022    POCPCO2 41.1 01/04/2022    POCPO2 67.0 01/04/2022    POCHCO3 25.8 01/04/2022    NBEA NOT REPORTED 01/04/2022    PBEA 1 01/04/2022    OWN4MJK NOT REPORTED 01/04/2022    JQSU4RHR 93 01/04/2022    FIO2 60.0 01/04/2022     Lab Results   Component Value Date/Time    SPECIAL  R ARM 3 ML 01/11/2022 05:34 PM    SPECIAL L THUMB 8 ML 01/11/2022 05:34 PM     Lab Results   Component Value Date/Time    CULTURE NO GROWTH 1 DAY 01/11/2022 05:34 PM    CULTURE NO GROWTH 1 DAY 01/11/2022 05:34 PM       Radiology:  No results found.     Physical Examination:        General appearance:  alert, cooperative and no distress   Mental Status:  oriented to person, place and time and normal affect  Lungs:  clear to auscultation bilaterally, normal effort  Heart:  regular rate and rhythm, no murmur  Abdomen:  soft, nontender, nondistended, normal bowel sounds, no masses, hepatomegaly, splenomegaly   Extremities:  no edema, redness, tenderness in the calves  Skin:  no gross lesions, rashes, induration  Psych: Normal affect and mood    Assessment:        Hospital Problems           Last Modified POA    * (Principal) Pneumonia due to COVID-19 virus 1/10/2022 Yes    Acute respiratory failure with hypoxia [address] [address] 1/10/2022 Yes    Obesity with body mass index greater than 30 1/10/2022 Yes    Pulmonary embolus, left ([address] 1/10/2022 Yes    Postmenopausal bleeding 1/10/2022 Yes    Overview Addendum 1/3/2022  6:29 PM by Ruth Ann Davison DO     During hospitalization in December 2021  Outpatient follow-up recommended due to acute respiratory failure. TVUS 1/2/22 demonstrates:  3.3 cm echogenic mass in the region of the uterine body which is   possibly the obscuring the endometrium. Plan:           Acute hypoxemic respiratory failure now only on nasal cannula during day w 4 L o2 had home o2 eval  Awaiting SNF placement - maybe home with home care better option due to less wait. ..   Can see me in office next Monday Tues if homecare, or SNF then follow up with [place] local doc  COVID-pneumonia  Subsegmental pulmonary embolus on anticoagulation  Uterine body mass will require outpatient evaluation by gynecology possible malignancy/biopsy  BMI greater than 30 portends worse prognosis although patient doing well  [facility]  Cardiac diet  Mild hyperkalemia - totally resolved  Mild thrombocytopenia 126    Morelia Parker MD  1/13/2022  10:13 AM

## 2022-01-13 NOTE — PROGRESS NOTES
Comprehensive Nutrition Assessment    Type and Reason for Visit:  Reassess    Nutrition Recommendations/Plan: Continue current diet with Ensure Enlive oral supplements at all meals. Encourage/monitor PO intakes as tolerated. Will monitor labs, weights, and plan of care. Nutrition Assessment:  Pt has been eating at least 50% of meals and drinking some of Ensure oral supplements. Last BM 1/12. Labs/Meds reviewed. Malnutrition Assessment:  Malnutrition Status:  Insufficient data    Context:  Acute Illness     Findings of the 6 clinical characteristics of malnutrition:  Energy Intake:  Mild decrease in energy intake   Weight Loss:  Unable to assess     Body Fat Loss:  1 - Mild body fat loss Orbital   Muscle Mass Loss:  Unable to assess   Fluid Accumulation:  1 - Mild Extremities   Strength:  Not Performed    Estimated Daily Nutrient Needs:  Energy (kcal):  20 kcal/kg = 1800 kcals/day; Weight Used for Energy Requirements:  Current     Protein (g):  1.2-1.5 gm/kg = 65-80 gm pro/day; Weight Used for Protein Requirements:  Ideal        Fluid (ml/day):  2000 mL/day or per MD; Method Used for Fluid Requirements:  1 ml/kcal      Nutrition Related Findings:  Labs/Meds reviewed. Last BM /12. Wounds:   (wound to right buttocks)       Current Nutrition Therapies:    ADULT DIET; Regular; Low Fat/Low Chol/High Fiber/LOUISE; Low Sodium (2 gm)  ADULT ORAL NUTRITION SUPPLEMENT; Breakfast, Lunch, Dinner; Standard High Calorie/High Protein Oral Supplement    Anthropometric Measures:  · Height: 5' 4\" (162.6 cm)  · Current Body Weight: 195 lb 15.8 oz (88.9 kg)   · Admission Body Weight: 188 lb 0.8 oz (85.3 kg)    · Ideal Body Weight: 120 lbs; % Ideal Body Weight 163.3 %   · BMI: 33.6   · BMI Categories: Obese Class 1 (BMI 30.0-34. 9)       Nutrition Diagnosis:   · Inadequate oral intake related to  (appetite; current medical condition) as evidenced by intake 0-25%,intake 26-50%,intake 51-75% (variable PO intakes; need for ONS)    Nutrition Interventions:   Food and/or Nutrient Delivery:  Continue Current Diet,Continue Oral Nutrition Supplement  Nutrition Education/Counseling:  No recommendation at this time,Education not indicated   Coordination of Nutrition Care:  Continue to monitor while inpatient    Goals:  Oral intakes to meet at least 75% of estimated nutrition needs. Nutrition Monitoring and Evaluation:   Behavioral-Environmental Outcomes:  None Identified   Food/Nutrient Intake Outcomes:  Food and Nutrient Intake,Supplement Intake  Physical Signs/Symptoms Outcomes:  Biochemical Data,GI Status,Fluid Status or Edema,Hemodynamic Status,Nutrition Focused Physical Findings,Weight,Skin     Discharge Planning:     Too soon to determine     Electronically signed by Joseph Schafer RD, LD on 1/13/22 at 1:32 PM EST    Contact: [phone]

## 2022-01-13 NOTE — CARE COORDINATION
Voicemail left for Petra at [facility] requesting return call to follow up on referral. Spoke to [facility] at [facility]. They have no beds available until next week. Spoke to [place] at [place]. They are not currently accepting new admits. Spoke to Katie at [facility]. They are reviewing. Voicemail left for admissions at [facility] 20 spoke to [facility] at [address]. She spoke to [employer]. They may deny payment if pt would admit to [address] because she is more SNF appropriate, therefore, she is no longer able to accept. Spoke to Rik at [facility]. She is reviewing    [phone] received call from Rik at [facility]. They are able to accept and will start precert.  Pt notified and agreeable with plan

## 2022-01-13 NOTE — PLAN OF CARE
Problem: Airway Clearance - Ineffective  Goal: Achieve or maintain patent airway  Outcome: Ongoing     Problem: Gas Exchange - Impaired  Goal: Absence of hypoxia  Outcome: Ongoing  Goal: Promote optimal lung function  Outcome: Ongoing     Problem: Breathing Pattern - Ineffective  Goal: Ability to achieve and maintain a regular respiratory rate  Outcome: Ongoing     Problem:  Body Temperature -  Risk of, Imbalanced  Goal: Ability to maintain a body temperature within defined limits  Outcome: Ongoing  Goal: Will regain or maintain usual level of consciousness  Outcome: Ongoing  Goal: Complications related to the disease process, condition or treatment will be avoided or minimized  Outcome: Ongoing     Problem: Isolation Precautions - Risk of Spread of Infection  Goal: Prevent transmission of infection  Outcome: Ongoing     Problem: Nutrition Deficits  Goal: Optimize nutritional status  Outcome: Ongoing     Problem: Risk for Fluid Volume Deficit  Goal: Maintain normal heart rhythm  Outcome: Ongoing  Goal: Maintain absence of muscle cramping  Outcome: Ongoing  Goal: Maintain normal serum potassium, sodium, calcium, phosphorus, and pH  Outcome: Ongoing     Problem: Loneliness or Risk for Loneliness  Goal: Demonstrate positive use of time alone when socialization is not possible  Outcome: Ongoing     Problem: Fatigue  Goal: Verbalize increase energy and improved vitality  Outcome: Ongoing     Problem: Patient Education: Go to Patient Education Activity  Goal: Patient/Family Education  Outcome: Ongoing     Problem: OXYGENATION/RESPIRATORY FUNCTION  Goal: Patient will maintain patent airway  Outcome: Ongoing  Goal: Patient will achieve/maintain normal respiratory rate/effort  Outcome: Ongoing     Problem: Skin Integrity:  Goal: Will show no infection signs and symptoms  Outcome: Ongoing  Goal: Absence of new skin breakdown  Outcome: Ongoing     Problem: Nutrition  Goal: Optimal nutrition therapy  Outcome: Ongoing

## 2022-01-14 VITALS
RESPIRATION RATE: 27 BRPM | OXYGEN SATURATION: 97 % | DIASTOLIC BLOOD PRESSURE: 81 MMHG | HEIGHT: 64 IN | WEIGHT: 195.99 LBS | BODY MASS INDEX: 33.46 KG/M2 | SYSTOLIC BLOOD PRESSURE: 113 MMHG | TEMPERATURE: 98.7 F | HEART RATE: 93 BPM

## 2022-01-14 LAB
ABSOLUTE EOS #: 0.26 K/UL (ref 0–0.44)
ABSOLUTE IMMATURE GRANULOCYTE: 0.13 K/UL (ref 0–0.3)
ABSOLUTE LYMPH #: 1.24 K/UL (ref 1.1–3.7)
ABSOLUTE MONO #: 0.65 K/UL (ref 0.1–1.2)
ANION GAP SERPL CALCULATED.3IONS-SCNC: 9 MMOL/L (ref 9–17)
BASOPHILS # BLD: 0 % (ref 0–2)
BASOPHILS ABSOLUTE: 0.03 K/UL (ref 0–0.2)
BUN BLDV-MCNC: 12 MG/DL (ref 8–23)
BUN/CREAT BLD: ABNORMAL (ref 9–20)
CALCIUM SERPL-MCNC: 9 MG/DL (ref 8.6–10.4)
CHLORIDE BLD-SCNC: 105 MMOL/L (ref 98–107)
CO2: 28 MMOL/L (ref 20–31)
CREAT SERPL-MCNC: 0.37 MG/DL (ref 0.5–0.9)
DIFFERENTIAL TYPE: ABNORMAL
EOSINOPHILS RELATIVE PERCENT: 3 % (ref 1–4)
GFR AFRICAN AMERICAN: >60 ML/MIN
GFR NON-AFRICAN AMERICAN: >60 ML/MIN
GFR SERPL CREATININE-BSD FRML MDRD: ABNORMAL ML/MIN/{1.73_M2}
GFR SERPL CREATININE-BSD FRML MDRD: ABNORMAL ML/MIN/{1.73_M2}
GLUCOSE BLD-MCNC: 104 MG/DL (ref 70–99)
HCT VFR BLD CALC: 34.6 % (ref 36.3–47.1)
HEMOGLOBIN: 11.3 G/DL (ref 11.9–15.1)
IMMATURE GRANULOCYTES: 2 %
LYMPHOCYTES # BLD: 16 % (ref 24–43)
MCH RBC QN AUTO: 33.3 PG (ref 25.2–33.5)
MCHC RBC AUTO-ENTMCNC: 32.7 G/DL (ref 28.4–34.8)
MCV RBC AUTO: 102.1 FL (ref 82.6–102.9)
MONOCYTES # BLD: 8 % (ref 3–12)
NRBC AUTOMATED: 0 PER 100 WBC
PDW BLD-RTO: 15.7 % (ref 11.8–14.4)
PLATELET # BLD: ABNORMAL K/UL (ref 138–453)
PLATELET ESTIMATE: ABNORMAL
PLATELET, FLUORESCENCE: 126 K/UL (ref 138–453)
PLATELET, IMMATURE FRACTION: 4.9 % (ref 1.1–10.3)
PMV BLD AUTO: ABNORMAL FL (ref 8.1–13.5)
POTASSIUM SERPL-SCNC: 5.1 MMOL/L (ref 3.7–5.3)
RBC # BLD: 3.39 M/UL (ref 3.95–5.11)
RBC # BLD: ABNORMAL 10*6/UL
SEG NEUTROPHILS: 71 % (ref 36–65)
SEGMENTED NEUTROPHILS ABSOLUTE COUNT: 5.7 K/UL (ref 1.5–8.1)
SODIUM BLD-SCNC: 142 MMOL/L (ref 135–144)
WBC # BLD: 8 K/UL (ref 3.5–11.3)
WBC # BLD: ABNORMAL 10*3/UL

## 2022-01-14 PROCEDURE — 6370000000 HC RX 637 (ALT 250 FOR IP): Performed by: INTERNAL MEDICINE

## 2022-01-14 PROCEDURE — 91303 HC ADM SARSCOV2 VAC AD26 .5ML: CPT | Performed by: STUDENT IN AN ORGANIZED HEALTH CARE EDUCATION/TRAINING PROGRAM

## 2022-01-14 PROCEDURE — 85055 RETICULATED PLATELET ASSAY: CPT

## 2022-01-14 PROCEDURE — 91303 HC RX W HCPCS: CPT | Performed by: STUDENT IN AN ORGANIZED HEALTH CARE EDUCATION/TRAINING PROGRAM

## 2022-01-14 PROCEDURE — 2580000003 HC RX 258: Performed by: STUDENT IN AN ORGANIZED HEALTH CARE EDUCATION/TRAINING PROGRAM

## 2022-01-14 PROCEDURE — 94761 N-INVAS EAR/PLS OXIMETRY MLT: CPT

## 2022-01-14 PROCEDURE — 6370000000 HC RX 637 (ALT 250 FOR IP): Performed by: FAMILY MEDICINE

## 2022-01-14 PROCEDURE — 6360000002 HC RX W HCPCS: Performed by: INTERNAL MEDICINE

## 2022-01-14 PROCEDURE — 05HB33Z INSERTION OF INFUSION DEVICE INTO RIGHT BASILIC VEIN, PERCUTANEOUS APPROACH: ICD-10-PCS | Performed by: STUDENT IN AN ORGANIZED HEALTH CARE EDUCATION/TRAINING PROGRAM

## 2022-01-14 PROCEDURE — 85025 COMPLETE CBC W/AUTO DIFF WBC: CPT

## 2022-01-14 PROCEDURE — 36415 COLL VENOUS BLD VENIPUNCTURE: CPT

## 2022-01-14 PROCEDURE — 2580000003 HC RX 258: Performed by: INTERNAL MEDICINE

## 2022-01-14 PROCEDURE — 76937 US GUIDE VASCULAR ACCESS: CPT

## 2022-01-14 PROCEDURE — 2700000000 HC OXYGEN THERAPY PER DAY

## 2022-01-14 PROCEDURE — C1751 CATH, INF, PER/CENT/MIDLINE: HCPCS

## 2022-01-14 PROCEDURE — 80048 BASIC METABOLIC PNL TOTAL CA: CPT

## 2022-01-14 PROCEDURE — 0031A HC ADM SARSCOV2 VAC AD26 .5ML: CPT | Performed by: STUDENT IN AN ORGANIZED HEALTH CARE EDUCATION/TRAINING PROGRAM

## 2022-01-14 PROCEDURE — 6360000002 HC RX W HCPCS: Performed by: STUDENT IN AN ORGANIZED HEALTH CARE EDUCATION/TRAINING PROGRAM

## 2022-01-14 RX ADMIN — SODIUM CHLORIDE, PRESERVATIVE FREE 10 ML: 5 INJECTION INTRAVENOUS at 08:45

## 2022-01-14 RX ADMIN — OXYCODONE HYDROCHLORIDE AND ACETAMINOPHEN 500 MG: 500 TABLET ORAL at 08:43

## 2022-01-14 RX ADMIN — FAMOTIDINE 20 MG: 20 TABLET, FILM COATED ORAL at 08:43

## 2022-01-14 RX ADMIN — Medication 0.5 ML: at 11:42

## 2022-01-14 RX ADMIN — ZINC SULFATE 220 MG (50 MG) CAPSULE 50 MG: CAPSULE at 08:43

## 2022-01-14 RX ADMIN — BUDESONIDE AND FORMOTEROL FUMARATE DIHYDRATE 2 PUFF: 160; 4.5 AEROSOL RESPIRATORY (INHALATION) at 08:43

## 2022-01-14 RX ADMIN — CEFEPIME HYDROCHLORIDE 2000 MG: 2 INJECTION, POWDER, FOR SOLUTION INTRAVENOUS at 09:56

## 2022-01-14 RX ADMIN — Medication 2000 UNITS: at 08:42

## 2022-01-14 RX ADMIN — APIXABAN 5 MG: 5 TABLET, FILM COATED ORAL at 08:43

## 2022-01-14 ASSESSMENT — PAIN SCALES - GENERAL: PAINLEVEL_OUTOF10: 0

## 2022-01-14 NOTE — PROCEDURES
Product type: Bard 4 Fr single lumen PowerMidline  History/Labs/Allergies Reviewed  Placed By: Duglas Caldera RN  Assisted By: MALIK  Time out Performed using Two Identifiers  Lot # [id number]  Expiration date 11/30/2022  Catheter size 4  Gabonese  Trimmed at 14 cm  Total length inserted 13 cm  External catheter length 1 cm  Location right basilic vein  Number of attempts 1  Estimated blood loss: 1 ml  Placement verified by- positive blood return & flushes easily  Special equipment used- ultrasound & micro-introducer technique   Catheter secured with statlock  Dressing applied- Tegaderm CHG  Lidocaine administered intradermally conc.1% 2 mL  Midline education:   [ x ] Discussed with patient/Family or POA prior to procedure. Risks and Benefits along with reason for procedure were discussed and teaching was reinforced with an education handout on Midline insertion. [facility] FAQ Catheter Associated Blood Stream Infections and [facility] [address] REV. 7/13 Nursing and Booklet left at bedside or in chart. Patient (Family or POA) acknowledged understanding of information taught and agreed to procedure. [  ] Was not discussed with patient/family or POA due to pts medical status at time of procedure. pts family or POA not available to discuss Midline education.  [facility] FAQ Catheter Associated Blood Stream Infections and [facility] REV. 7/13 Nursing and Booklet left at bedside or in chart    Link EMILIE Lantigua

## 2022-01-14 NOTE — PROGRESS NOTES
PULMONARY & CRITICAL CARE MEDICINE PROGRESS NOTE     Patient:  Leah Brooke  MRN: [id number]  Admit date: 12/24/2021  Primary Care Physician: No primary care provider on file. Consulting Physician: Rosa Elena Silverman MD  CODE Status: Full Code  LOS: 21     SUBJECTIVE     CHIEF COMPLAINT/REASON FOR INITIAL CONSULT:   Acute respiratory failure/COVID-19 pneumonia with ARDS/pulmonary embolism involving subsegmental left lower lobe    BRIEF HOSPITAL COURSE:   The patient is a 70 y.o. female was admitted with complaints of shortness of breath and found to be Covid positive but also had a left lower lobe subsegmental pulmonary embolism with right ventricular strain pattern. Vascular surgery was consulted and it was felt, rightfully so, that the cause of the right ventricular strain is unlikely pulmonary embolism. Patient was admitted to the [facility] ICU, requiring 95% oxygen via high flow. Chest x-ray shows bilateral diffuse interstitial alveolar opacities in both lungs    INTERVAL HISTORY:  01/13/22  Patient is currently saturating well at nasal cannula 6 L/min  Afebrile, hemodynamically stable  Patient is currently on cefepime  Completed course of Decadron on 1/7  She is on Eliquis 5 mg twice daily  No overnight issues reported    REVIEW OF SYSTEMS:  Review of Systems   Constitutional: Positive for activity change and fatigue. Negative for fever. HENT: Negative for postnasal drip, sinus pain, sore throat, trouble swallowing and voice change. Eyes: Negative for photophobia and visual disturbance. Respiratory: Positive for cough and shortness of breath. Cardiovascular: Negative for chest pain, palpitations and leg swelling. Gastrointestinal: Negative for abdominal pain, diarrhea and vomiting. Endocrine: Negative. Genitourinary: Negative for difficulty urinating, dysuria and hematuria. Musculoskeletal: Negative. Allergic/Immunologic: Negative.     Neurological: Negative for dizziness, syncope, speech difficulty and headaches. Hematological: Negative for adenopathy. Does not bruise/bleed easily. Psychiatric/Behavioral: Negative. OBJECTIVE     PaO2/FiO2 RATIO:  No results for input(s): POCPO2 in the last 72 hours. FiO2 : 30 % (pt currently on bipap , still wants to sleep )     VITAL SIGNS:   LAST:  /74   Pulse 110   Temp 98.1 °F (36.7 °C) (Oral)   Resp (!) 33   Ht 5' 4\" (1.626 m)   Wt 195 lb 15.8 oz (88.9 kg)   SpO2 95%   Breastfeeding No   BMI 33.64 kg/m²   8-24 HR RANGE:  TEMP Temp  Av.1 °F (36.7 °C)  Min: 98 °F (36.7 °C)  Max: 98.2 °F (45.5 °C)   BP Systolic (55FLW), NNJ:005 , Min:113 , XDT:869      Diastolic (60AFE), ONS:85, Min:74, Max:95     PULSE Pulse  Av  Min: 75  Max: 110   RR Resp  Av  Min: 29  Max: 33   O2 SAT SpO2  Av %  Min: 95 %  Max: 95 %   OXYGEN DELIVERY O2 Flow Rate (L/min)  Av L/min  Min: 6 L/min  Max: 6 L/min        SYSTEMIC EXAMINATION:   General appearance -comfortable, no acute distress  Mental status - awake & alert, follows commands  Eyes - pupils equal and reactive, sclera anicteric  Mouth - mucous membranes moist, pharynx normal without lesions  Neck -short thick neck supple, no significant adenopathy  Chest - Breath sounds bilaterally were dimnished to auscultation at bases. There were bilateral intermittent crackles present without rhonchi.     Heart - normal rate, regular rhythm, normal S1, S2, no murmurs, rubs, clicks or gallops  Abdomen - soft, nontender, nondistended, no masses or organomegaly  Neurological - non-focal  Extremities - peripheral pulses normal, mild pedal edema, no clubbing or cyanosis  Skin - normal coloration and turgor, no rashes, no suspicious skin lesions noted     DATA REVIEW     Medications: Current Inpatient  Scheduled Meds:   cefepime  2,000 mg IntraVENous Q12H    Vitamin D  2,000 Units Oral Daily    ascorbic acid  500 mg Oral BID    zinc sulfate  50 mg Oral Daily    budesonide-formoterol  2 puff Inhalation BID    apixaban  5 mg Oral BID    famotidine  20 mg Oral BID    sodium chloride flush  10 mL IntraVENous 2 times per day     Continuous Infusions:   sodium chloride         INPUT/OUTPUT:  In: -   Out: 2190 [Urine:2190]  Date 01/13/22 0000 - 01/13/22 2359   Shift 3547-8647 5951-3144 7865-2248 24 Hour Total   INTAKE   Shift Total(mL/kg)       OUTPUT   Urine(mL/kg/hr)   1350 1350   Shift Total(mL/kg)   1350(15.2) 1350(15.2)   Weight (kg) 88.9 88.9 88.9 88.9        LABS:  ABGs:   No results for input(s): POCPH, POCPCO2, POCPO2, POCHCO3, MNGB4SPW in the last 72 hours. CBC:   Recent Labs     01/12/22  0716   WBC 9.3   HGB 11.4*   HCT 35.4*   .2*   PLT See Reflexed IPF Result   LYMPHOPCT 8*   RBC 3.43*   MCH 33.2   MCHC 32.2   RDW 15.3*     CRP:   No results for input(s): CRP in the last 72 hours. LDH:   No results for input(s): LDH in the last 72 hours. BMP:   Recent Labs     01/12/22  0716 01/13/22  1138    138   K 5.5* 5.3    100   CO2 29 26   BUN 10 12   CREATININE 0.28* 0.33*   GLUCOSE 94 118*     Liver Function Test:   No results for input(s): PROT, LABALBU, ALT, AST, GGT, ALKPHOS, BILITOT in the last 72 hours. Coagulation Profile:   No results for input(s): INR, PROTIME, APTT in the last 72 hours. D-Dimer:  No results for input(s): DDIMER in the last 72 hours. Ferritin:    No results for input(s): FERRITIN in the last 72 hours. Lactic Acid:  No results for input(s): LACTA in the last 72 hours. Cardiac Enzymes:  No results for input(s): CKTOTAL, CKMB, CKMBINDEX, TROPONINI in the last 72 hours. Invalid input(s): TROPONIN, HSTROP  BNP/ProBNP:   No results for input(s): BNP, PROBNP in the last 72 hours. Triglycerides:  No results for input(s): TRIG in the last 72 hours.      Microbiology:  Urine Culture:  No components found for: CURINE  Blood Culture:  No components found for: CBLOOD, CFUNGUSBL  Sputum Culture:  No components found for: [place] 11 01/11/22  1734   SPECDESC . BLOOD  . BLOOD   SPECIAL  R ARM 3 ML  L THUMB 8 ML   CULTURE NO GROWTH 2 DAYS  NO GROWTH 2 DAYS     Recent Labs     01/11/22  1734   SPECDESC . BLOOD  . BLOOD   SPECIAL  R ARM 3 ML  L THUMB 8 ML   CULTURE NO GROWTH 2 DAYS  NO GROWTH 2 DAYS        Pathology:    Radiology Reports:  XR CHEST PORTABLE   Final Result   No significant change in bilateral airspace disease. US PELVIS COMPLETE   Final Result   3.3 cm echogenic mass in the region of the uterine body which is possibly the   obscuring the endometrium. RECOMMENDATION:   Transvaginal sonography or MRI with contrast for further workup      RECOMMENDATIONS:   Unavailable         XR CHEST PORTABLE   Final Result   Moderate diffuse interstitial and alveolar opacities throughout the lungs   bilaterally. The finding is nonspecific although suspicious for multilobar   pneumonia, including active COVID-19 viral pneumonitis. CT CHEST PULMONARY EMBOLISM W CONTRAST   Final Result   Motion degraded study. Acute pulmonary emboli suspected particularly in the   left lower lobe but not well visualized due to motion artifact. There is evidence for right ventricular strain with septal bowing suggesting   a significant clot burden. Moderate patchy ground-glass opacities involving all lobes of both lungs   consistent with multifocal pneumonia typical for COVID pneumonia. Findings were discussed with Sandy Juan at 5:39 pm on 12/24/2021. RECOMMENDATIONS:   Unavailable              Echocardiogram:   Results for orders placed during the hospital encounter of 12/24/21    ECHO Complete 2D W Doppler W Color    Narrative    Summary  Global left ventricular systolic function is normal. Estimated EF 50-55%. Dilated Right ventricular with mildly impaired systolic function  Estimated right ventricular systolic pressure is 12UISY. No significant valvular regurgitation or stenosis seen.   No pericardial effusion seen.       ASSESSMENT AND PLAN     Assessment:    // Acute hypoxic respiratory failure, on nasal cannula  // Acute respiratory distress syndrome  // Bilateral multifocal pneumonia due to COVID 19 infection  // Left lower lobe segmental pulmonary embolism  // Obesity            // Postmenopausal bleeding    Plan:    I personally interviewed/examined the patient; reviewed interval history, interpreted all available radiographic and laboratory data at the time of service. Patient has been weaned off of high flow nasal cannula, currently on 6 L/min  Recommended to continue as needed BiPAP  Continue supplemental oxygen to keep oxygen saturation >90%  Encourage incentive spirometry/Acapella  Continue pulmonary toilet, aspiration precautions and bronchodilators  Chest x-ray to be done as needed  Finished course of Decadron on 01/07/2022  S/p baricitinib  She is on Eliquis 5 mg twice daily  Currently on Symbicort and will continue  Monitor I/O, electrolytes with a goal of even/negative fluid balance  Chemical DVT prophylaxis not needed, patient on Eliquis  Antimicrobials reviewed; currently on cefepime as per ID recommendations  Monitor CRP, LDH, AST/ALT, D-Dimer, Ferritin periodically/as needed  Glycemic control per primary service   Physical/occupational therapy    We will continue to follow. I would like to thank you for allowing me to participate in the care of this patient. Please feel free to call with any further questions or concerns. Pranay Atkins MD   Pulmonary and Critical Care Medicine           1/13/2022    This patient was evaluated in the context of the global SARS-CoV-2 (COVID-19) pandemic, which necessitated considerations that the patient either has COVID-19 infection or is at risk of infection with COVID-19.  Institutional protocols and algorithms that pertain to the evaluation & management of patients with COVID-19 or those at risk for COVID-19 are in a state of rapid changes based on information

## 2022-01-14 NOTE — CARE COORDINATION
Received call from Rik at [facility]. They are able to accept today at any time. Transportation request sent to [address] [place] [address] for asap    [facility]. Pt is scheduled for 12pm    1049 pt updated and agreeable with plan. Tsefanie Mean updated and agreeable with plan. HENS and AVS faxed to  [facility]. Rik notified of transport time.      Discharge [address] [facility] Case Management Department  Written by: Kamaljit Wilson RN    Patient Name: Gogo Lawrence  Attending Provider: Nneka Cornell MD  Admit Date: 2021  3:37 PM  MRN: [id number]  Account: [de-identified]                     : 1950  Discharge Date: 2022      Disposition: [facility]    Kamaljit Wilson RN

## 2022-01-14 NOTE — PLAN OF CARE
Problem: Airway Clearance - Ineffective  Goal: Achieve or maintain patent airway  1/14/2022 1251 by Rubio Gibbs RN  Outcome: Completed  1/14/2022 0627 by Man Rangel RN  Outcome: Ongoing     Problem: Gas Exchange - Impaired  Goal: Absence of hypoxia  1/14/2022 1251 by Rubio Gibbs RN  Outcome: Completed  1/14/2022 0627 by Man Rangel RN  Outcome: Ongoing  Goal: Promote optimal lung function  1/14/2022 1251 by Rubio Gibbs RN  Outcome: Completed  1/14/2022 0627 by Man Rangel RN  Outcome: Ongoing     Problem: Breathing Pattern - Ineffective  Goal: Ability to achieve and maintain a regular respiratory rate  1/14/2022 1251 by Rubio Gibbs RN  Outcome: Completed  1/14/2022 0627 by Man Rangel RN  Outcome: Ongoing     Problem:  Body Temperature -  Risk of, Imbalanced  Goal: Ability to maintain a body temperature within defined limits  1/14/2022 1251 by Rubio Gibbs RN  Outcome: Completed  1/14/2022 0627 by Man Rangel RN  Outcome: Ongoing  Goal: Will regain or maintain usual level of consciousness  1/14/2022 1251 by Rubio Gibbs RN  Outcome: Completed  1/14/2022 0627 by Man Rangel RN  Outcome: Ongoing  Goal: Complications related to the disease process, condition or treatment will be avoided or minimized  1/14/2022 1251 by Rubio Gibbs RN  Outcome: Completed  1/14/2022 0627 by Man Rangel RN  Outcome: Ongoing     Problem: Isolation Precautions - Risk of Spread of Infection  Goal: Prevent transmission of infection  1/14/2022 1251 by Rubio Gibbs RN  Outcome: Completed  1/14/2022 0627 by Man Rangel RN  Outcome: Ongoing     Problem: Nutrition Deficits  Goal: Optimize nutritional status  1/14/2022 1251 by Rubio Gibbs RN  Outcome: Completed  1/14/2022 0627 by Man Rangel RN  Outcome: Ongoing     Problem: Risk for Fluid Volume Deficit  Goal: Maintain normal heart rhythm  1/14/2022 1251 by Rubio Gibbs RN  Outcome: Completed  1/14/2022 0627 by Luz Pinedo RN  Outcome: Ongoing  Goal: Maintain absence of muscle cramping  1/14/2022 1251 by Adrian Corona RN  Outcome: Completed  1/14/2022 0627 by Luz Pinedo RN  Outcome: Ongoing  Goal: Maintain normal serum potassium, sodium, calcium, phosphorus, and pH  1/14/2022 1251 by Adrian Corona RN  Outcome: Completed  1/14/2022 0627 by Luz Pinedo RN  Outcome: Ongoing     Problem: Loneliness or Risk for Loneliness  Goal: Demonstrate positive use of time alone when socialization is not possible  1/14/2022 1251 by Adrian Corona RN  Outcome: Completed  1/14/2022 0627 by Luz Pinedo RN  Outcome: Ongoing     Problem: Fatigue  Goal: Verbalize increase energy and improved vitality  1/14/2022 1251 by Adrian Corona RN  Outcome: Completed  1/14/2022 0627 by Luz Pinedo RN  Outcome: Ongoing     Problem: Patient Education: Go to Patient Education Activity  Goal: Patient/Family Education  1/14/2022 1251 by Adrian Corona RN  Outcome: Completed  1/14/2022 0627 by Luz Pinedo RN  Outcome: Ongoing     Problem: OXYGENATION/RESPIRATORY FUNCTION  Goal: Patient will maintain patent airway  1/14/2022 1251 by Adrian Corona RN  Outcome: Completed  1/14/2022 0627 by Luz Pinedo RN  Outcome: Ongoing  Goal: Patient will achieve/maintain normal respiratory rate/effort  Description: Respiratory rate and effort will be within normal limits for the patient  1/14/2022 1251 by Adrian Corona RN  Outcome: Completed  1/14/2022 0627 by Luz Pinedo RN  Outcome: Ongoing     Problem: Skin Integrity:  Goal: Will show no infection signs and symptoms  Description: Will show no infection signs and symptoms  1/14/2022 1251 by Adrian Corona RN  Outcome: Completed  1/14/2022 0627 by Luz Pinedo RN  Outcome: Ongoing  Goal: Absence of new skin breakdown  Description: Absence of new skin breakdown  1/14/2022 1251 by Adrian Corona RN  Outcome: Completed  1/14/2022 0627 by Joycelyn Kramer RN  Outcome: Ongoing     Problem: Nutrition  Goal: Optimal nutrition therapy  1/14/2022 1251 by Vinay Wu RN  Outcome: Completed  1/14/2022 0627 by Joycelyn Kramer RN  Outcome: Ongoing

## 2022-01-16 LAB
CULTURE: NORMAL
CULTURE: NORMAL
Lab: NORMAL
Lab: NORMAL
SPECIMEN DESCRIPTION: NORMAL
SPECIMEN DESCRIPTION: NORMAL

## 2022-07-20 NOTE — CARE COORDINATION
Spoke to pt via telephone to discuss transitional planning. Her goal is to return home. If she requires high flow oxygen, she is agreeable to [facility]. List given to Shana Pierce RN to give to pt    [phone] spoke to pt via telephone to answer questions r/e LTACH.  She has not chosen facility yet Hydroxyzine Pregnancy And Lactation Text: This medication is not safe during pregnancy and should not be taken. It is also excreted in breast milk and breast feeding isn't recommended.

## 2023-10-25 NOTE — PROGRESS NOTES
[facility]  Office: [address], DO, Adrian Khannamame, DO, Kent Mohs, DO, Lorin Leora Rocha, DO, Yg Gant MD, Betzaida Zaragoza MD, Katelyn Pat MD, Sarah Romo MD, Kathryn Burrell MD, Ronda Flanagan MD, Celestino Lainez MD, Carly Monsalve, DO, Asiya Salinas, DO, Víctor Orellana MD,  Contreras Sevilla DO, Ronaldo Paz MD, Aurora Ordoñez MD, Raghav Baldwin MD, Heavenly Villagomez MD, Ashley Ye MD, Goyo Leung MD, Reddy Lucio MD, Milena Su, [facility], [facility], CNP, Rivera Love, CNP, Demian Thurman, CNS, Rosita Mazariegos, [facility], Nilam Hemphill, CNP, Waleska Meek, CNP, Christi Dao, CNP, Letitia Walker, CNP, Ivan Zarate PA-C, Di Chen, CARY, Eladio Goldberg, [facility], Kailey Granda, CNP, Rosanna Rocha, CNP, Ned Rodriguez, CNP, Dorothy Yeung, CNP, Lucy Andino, [facility], Lakisha Long, [address] [place] [address]    Progress Note    1/11/2022    6:14 PM    Name:   Daija Delacruz  MRN:     [id number]     Acct:      [de-identified]   Room:   [address] [place] [address] Day:  25  Admit Date:  12/24/2021  3:37 PM    PCP:   No primary care provider on file. Code Status:  Full Code    Subjective:     C/C:   Chief Complaint   Patient presents with    Shortness of Breath    Fall     Interval History Status: not changed. Patient seen and examined. On nasal cannula . No complaints. Feeling well overall. Wants to get out of here. Brief History:   Per chart:  \"  Ashley Khoury 70 y. o. female who was admitted to the hospital on 12/24/2021 for treatment of Pneumonia due to COVID-19 virus. Patient came to the hospital with lightheadedness and dry cough for 1 week. She had episode of fall at home and family called EMS. Initial evaluation by EMS found patient having tachypnea with respirate 40 to 50/min, hypoxia SPO2 50% on room air, tachycardia.  Patient was placed on nonrebreather and improved only to 73%. Patient was then placed on Patient's mobile number was not in service.     #1 Left message for patient on home phone number to call back.    BiPAP. Evaluation emergency room showed positive COVID-19 test, elevated troponin, elevated proBNP. CT chest showed subsegmental PE with possible right heart strain. Vascular surgery was consulted and recommended anticoagulation without need for thrombolysis. Patient also had thrombocytopenia. Cardiology was consulted and echocardiogram obtained which was negative for RV strain with RV systolic pressure 29 mmHg. Patient was treated with heparin infusion, Decadron and started on baricitinib. Patient had vaginal bleeding on 12/30/21 for which OBGYN will see her for in the outpatient setting.     - uptrending oxygen requirements. - oxygen improving. Appropriate for d/c once oxygen levels become acceptable  - OK for discharge to SNF/[facility], whichever accepts. \"        Review of Systems:     Constitutional:  negative for chills, fevers, sweats  Respiratory:  negative for cough, dyspnea on exertion, shortness of breath, wheezing  Cardiovascular:  negative for chest pain, chest pressure/discomfort, lower extremity edema, palpitations  Gastrointestinal:  negative for abdominal pain, constipation, diarrhea, nausea, vomiting  Neurological:  negative for dizziness, headache    Medications:      Allergies:  No Known Allergies    Current Meds:   Scheduled Meds:    cefepime  2,000 mg IntraVENous Q12H    Vitamin D  2,000 Units Oral Daily    ascorbic acid  500 mg Oral BID    zinc sulfate  50 mg Oral Daily    budesonide-formoterol  2 puff Inhalation BID    apixaban  5 mg Oral BID    famotidine  20 mg Oral BID    sodium chloride flush  10 mL IntraVENous 2 times per day     Continuous Infusions:    sodium chloride       PRN Meds: guaiFENesin-codeine, calcium carbonate, albuterol sulfate HFA, benzocaine-menthol, loperamide, benzonatate, sodium chloride flush, sodium chloride, promethazine **OR** ondansetron, polyethylene glycol, acetaminophen **OR** acetaminophen    Data:     Past Medical History:   has no past medical history on file. Social History:   reports that she has been smoking cigarettes. She has never used smokeless tobacco. She reports previous alcohol use. She reports previous drug use. Family History:   Family History   Problem Relation Age of Onset    Cancer Maternal Aunt         breast       Vitals:  /83   Pulse 110   Temp 97.9 °F (36.6 °C)   Resp 24   Ht 5' 4\" (1.626 m)   Wt 195 lb 15.8 oz (88.9 kg)   SpO2 95%   Breastfeeding No   BMI 33.64 kg/m²   Temp (24hrs), Av °F (36.7 °C), Min:97.9 °F (36.6 °C), Max:98.1 °F (36.7 °C)    No results for input(s): POCGLU in the last 72 hours. I/O (24Hr): Intake/Output Summary (Last 24 hours) at 2022  Last data filed at 1/10/2022 2000  Gross per 24 hour   Intake --   Output 100 ml   Net -100 ml       Labs:  Hematology:  Recent Labs     01/10/22  0900   WBC 16.5*   RBC 4.15   HGB 13.5   HCT 42.3   .9   MCH 32.5   MCHC 31.9   RDW 14.9*      MPV 10.8     Chemistry:  Recent Labs     01/10/22  0900      K 5.3   CL 99   CO2 26   GLUCOSE 172*   BUN 13   CREATININE 0.36*   ANIONGAP 11   LABGLOM >60   GFRAA >60   CALCIUM 8.9   No results for input(s): PROT, LABALBU, LABA1C, R2RMPOW, X7ZNBZG, FT4, TSH, AST, ALT, LDH, GGT, ALKPHOS, LABGGT, BILITOT, BILIDIR, AMMONIA, AMYLASE, LIPASE, LACTATE, CHOL, HDL, LDLCHOLESTEROL, CHOLHDLRATIO, TRIG, VLDL, EEV48QY, PHENYTOIN, PHENYF, URICACID, POCGLU in the last 72 hours.   ABG:  Lab Results   Component Value Date    POCPH 7.406 2022    POCPCO2 41.1 2022    POCPO2 67.0 2022    POCHCO3 25.8 2022    NBEA NOT REPORTED 2022    PBEA 1 2022    ULV0XLU NOT REPORTED 2022    GIJN2BVY 93 2022    FIO2 60.0 2022     Lab Results   Component Value Date/Time    SPECIAL L THUMB 8 ML 2022 05:34 PM     Lab Results   Component Value Date/Time    CULTURE NO GROWTH <24 HRS 2022 05:34 PM       Radiology:  XR CHEST PORTABLE    Result Date: 2022  No significant change in bilateral airspace disease. Physical Examination:        General appearance:  alert, cooperative requiring nasal cannula oxygen  Mental Status:  oriented to person, place and time and normal affect  Lungs:  Decreased bilaterally  Heart:  regular rate and rhythm, no murmur  Abdomen:  soft, nontender, nondistended, normal bowel sounds, no masses, hepatomegaly, splenomegaly  Extremities:  no edema, redness, tenderness in the calves  Skin:  no gross lesions, rashes, induration    Assessment:        Hospital Problems           Last Modified POA    * (Principal) Pneumonia due to COVID-19 virus 1/10/2022 Yes    Acute respiratory failure with hypoxia ([address] 1/10/2022 Yes    Obesity with body mass index greater than 30 1/10/2022 Yes    Pulmonary embolus, left ([address] 1/10/2022 Yes    Postmenopausal bleeding 1/10/2022 Yes    Overview Addendum 1/3/2022  6:29 PM by Suraj White DO     During hospitalization in December 2021  Outpatient follow-up recommended due to acute respiratory failure. TVUS 1/2/22 demonstrates:  3.3 cm echogenic mass in the region of the uterine body which is   possibly the obscuring the endometrium.                     Plan:        Completed covid treatment  On eliquis  Cefepime to continue for 12 days  Discharge plannign to     Farooq Mayorga MD  1/11/2022  6:14 PM

## 2024-04-29 NOTE — FLOWSHEET NOTE
FMLA or Disability Forms were received and faxed to the Forms Completion Department today at [phone]. (Please include all appropriate authorization forms with your fax).    Did you have the patient complete (in full) and sign the “Authorization For Disclosure of Health Information Forms Completion” form? Yes    DUE TO VERY HIGH FORM VOLUMES, please communicate to the patient that the Forms Completion team estimates their form may take longer than our usual 14 business day turnaround goal.    If you have questions about this encounter, please contact the Forms Completion Dept at [phone], Option 3.    Son, Marc Husbands updated by telephone.  Pt is agreeable to [place] [id number] /SNF placement